# Patient Record
Sex: FEMALE | Race: BLACK OR AFRICAN AMERICAN | Employment: OTHER | URBAN - METROPOLITAN AREA
[De-identification: names, ages, dates, MRNs, and addresses within clinical notes are randomized per-mention and may not be internally consistent; named-entity substitution may affect disease eponyms.]

---

## 2017-02-06 ENCOUNTER — HOSPITAL ENCOUNTER (OUTPATIENT)
Age: 37
Setting detail: SPECIMEN
Discharge: HOME | End: 2017-02-06
Payer: COMMERCIAL

## 2017-02-06 LAB
ALBUMIN SERPL-MCNC: 4.1 G/DL (ref 3.5–5.2)
ALBUMIN/GLOBULIN RATIO: 1.5 (ref 1–2.5)
ALP BLD-CCNC: 96 U/L (ref 35–104)
ALT SERPL-CCNC: 17 U/L (ref 5–33)
ANION GAP SERPL CALCULATED.3IONS-SCNC: 12 MMOL/L (ref 9–17)
AST SERPL-CCNC: 15 U/L
BILIRUB SERPL-MCNC: 0.3 MG/DL (ref 0.3–1.2)
BILIRUBIN DIRECT: <0.08 MG/DL
BILIRUBIN, INDIRECT: NORMAL MG/DL (ref 0–1)
BUN BLDV-MCNC: 5 MG/DL (ref 6–20)
BUN/CREAT BLD: ABNORMAL (ref 9–20)
CALCIUM SERPL-MCNC: 8.8 MG/DL (ref 8.6–10.4)
CHLORIDE BLD-SCNC: 106 MMOL/L (ref 98–107)
CHOLESTEROL/HDL RATIO: 5.2
CHOLESTEROL: 165 MG/DL
CO2: 26 MMOL/L (ref 20–31)
CREAT SERPL-MCNC: 0.85 MG/DL (ref 0.5–0.9)
GFR AFRICAN AMERICAN: >60 ML/MIN
GFR NON-AFRICAN AMERICAN: >60 ML/MIN
GFR SERPL CREATININE-BSD FRML MDRD: ABNORMAL ML/MIN/{1.73_M2}
GFR SERPL CREATININE-BSD FRML MDRD: ABNORMAL ML/MIN/{1.73_M2}
GLOBULIN: NORMAL G/DL (ref 1.5–3.8)
GLUCOSE BLD-MCNC: 86 MG/DL (ref 70–99)
HDLC SERPL-MCNC: 32 MG/DL
LDL CHOLESTEROL: 94 MG/DL (ref 0–130)
POTASSIUM SERPL-SCNC: 3.6 MMOL/L (ref 3.7–5.3)
SODIUM BLD-SCNC: 144 MMOL/L (ref 135–144)
TOTAL CK: 105 U/L (ref 26–192)
TOTAL PROTEIN: 6.9 G/DL (ref 6.4–8.3)
TRIGL SERPL-MCNC: 196 MG/DL
TSH SERPL DL<=0.05 MIU/L-ACNC: 1.47 MIU/L (ref 0.3–5)
VITAMIN D 25-HYDROXY: 31.6 NG/ML (ref 30–100)
VLDLC SERPL CALC-MCNC: ABNORMAL MG/DL (ref 1–30)

## 2017-02-06 PROCEDURE — 36415 COLL VENOUS BLD VENIPUNCTURE: CPT

## 2017-02-06 PROCEDURE — 84443 ASSAY THYROID STIM HORMONE: CPT

## 2017-02-06 PROCEDURE — 80076 HEPATIC FUNCTION PANEL: CPT

## 2017-02-06 PROCEDURE — 82550 ASSAY OF CK (CPK): CPT

## 2017-02-06 PROCEDURE — 80048 BASIC METABOLIC PNL TOTAL CA: CPT

## 2017-02-06 PROCEDURE — 82306 VITAMIN D 25 HYDROXY: CPT

## 2017-02-06 PROCEDURE — 80061 LIPID PANEL: CPT

## 2017-03-13 ENCOUNTER — HOSPITAL ENCOUNTER (OUTPATIENT)
Age: 37
Setting detail: SPECIMEN
Discharge: HOME | End: 2017-03-13
Payer: COMMERCIAL

## 2017-03-13 LAB
ANION GAP SERPL CALCULATED.3IONS-SCNC: 12 MMOL/L (ref 9–17)
BUN BLDV-MCNC: 11 MG/DL (ref 6–20)
BUN/CREAT BLD: NORMAL (ref 9–20)
CALCIUM SERPL-MCNC: 9 MG/DL (ref 8.6–10.4)
CHLORIDE BLD-SCNC: 105 MMOL/L (ref 98–107)
CO2: 25 MMOL/L (ref 20–31)
CREAT SERPL-MCNC: 0.79 MG/DL (ref 0.5–0.9)
GFR AFRICAN AMERICAN: >60 ML/MIN
GFR NON-AFRICAN AMERICAN: >60 ML/MIN
GFR SERPL CREATININE-BSD FRML MDRD: NORMAL ML/MIN/{1.73_M2}
GFR SERPL CREATININE-BSD FRML MDRD: NORMAL ML/MIN/{1.73_M2}
GLUCOSE BLD-MCNC: 90 MG/DL (ref 70–99)
POTASSIUM SERPL-SCNC: 3.8 MMOL/L (ref 3.7–5.3)
SODIUM BLD-SCNC: 142 MMOL/L (ref 135–144)

## 2017-03-13 PROCEDURE — 80048 BASIC METABOLIC PNL TOTAL CA: CPT

## 2017-03-13 PROCEDURE — 36415 COLL VENOUS BLD VENIPUNCTURE: CPT

## 2017-06-16 ENCOUNTER — APPOINTMENT (OUTPATIENT)
Dept: GENERAL RADIOLOGY | Age: 37
End: 2017-06-16
Payer: COMMERCIAL

## 2017-06-16 ENCOUNTER — HOSPITAL ENCOUNTER (EMERGENCY)
Age: 37
Discharge: HOME | End: 2017-06-16
Attending: EMERGENCY MEDICINE
Payer: COMMERCIAL

## 2017-06-16 VITALS
RESPIRATION RATE: 16 BRPM | HEIGHT: 62 IN | TEMPERATURE: 97.5 F | SYSTOLIC BLOOD PRESSURE: 128 MMHG | BODY MASS INDEX: 39.56 KG/M2 | WEIGHT: 215 LBS | HEART RATE: 70 BPM | DIASTOLIC BLOOD PRESSURE: 83 MMHG | OXYGEN SATURATION: 98 %

## 2017-06-16 DIAGNOSIS — K59.00 CONSTIPATION, UNSPECIFIED CONSTIPATION TYPE: ICD-10-CM

## 2017-06-16 DIAGNOSIS — R10.9 ABDOMINAL PAIN, UNSPECIFIED LOCATION: Primary | ICD-10-CM

## 2017-06-16 LAB
-: NORMAL
ABSOLUTE EOS #: 0 K/UL (ref 0–0.4)
ABSOLUTE LYMPH #: 1.77 K/UL (ref 1–4.8)
ABSOLUTE MONO #: 0.74 K/UL (ref 0.1–1.2)
ALBUMIN SERPL-MCNC: 4 G/DL (ref 3.5–5.2)
ALBUMIN/GLOBULIN RATIO: 1.1 (ref 1–2.5)
ALP BLD-CCNC: 89 U/L (ref 35–104)
ALT SERPL-CCNC: 13 U/L (ref 5–33)
AMORPHOUS: NORMAL
ANION GAP SERPL CALCULATED.3IONS-SCNC: 12 MMOL/L (ref 9–17)
AST SERPL-CCNC: 13 U/L
BACTERIA: NORMAL
BASOPHILS # BLD: 0 %
BASOPHILS ABSOLUTE: 0 K/UL (ref 0–0.2)
BILIRUB SERPL-MCNC: 0.6 MG/DL (ref 0.3–1.2)
BILIRUBIN DIRECT: 0.14 MG/DL
BILIRUBIN URINE: NEGATIVE
BILIRUBIN, INDIRECT: 0.46 MG/DL (ref 0–1)
BUN BLDV-MCNC: 7 MG/DL (ref 6–20)
BUN/CREAT BLD: ABNORMAL (ref 9–20)
CALCIUM SERPL-MCNC: 9.3 MG/DL (ref 8.6–10.4)
CASTS UA: NORMAL /LPF (ref 0–8)
CHLORIDE BLD-SCNC: 106 MMOL/L (ref 98–107)
CO2: 22 MMOL/L (ref 20–31)
COLOR: YELLOW
COMMENT UA: ABNORMAL
CREAT SERPL-MCNC: 0.89 MG/DL (ref 0.5–0.9)
CRYSTALS, UA: NORMAL /HPF
DIFFERENTIAL TYPE: NORMAL
EOSINOPHILS RELATIVE PERCENT: 0 %
EPITHELIAL CELLS UA: NORMAL /HPF (ref 0–5)
GFR AFRICAN AMERICAN: >60 ML/MIN
GFR NON-AFRICAN AMERICAN: >60 ML/MIN
GFR SERPL CREATININE-BSD FRML MDRD: ABNORMAL ML/MIN/{1.73_M2}
GFR SERPL CREATININE-BSD FRML MDRD: ABNORMAL ML/MIN/{1.73_M2}
GLOBULIN: NORMAL G/DL (ref 1.5–3.8)
GLUCOSE BLD-MCNC: 106 MG/DL (ref 70–99)
GLUCOSE URINE: NEGATIVE
HCG(URINE) PREGNANCY TEST: NEGATIVE
HCT VFR BLD CALC: 38.4 % (ref 36–46)
HEMOGLOBIN: 13.2 G/DL (ref 12–16)
KETONES, URINE: NEGATIVE
LEUKOCYTE ESTERASE, URINE: ABNORMAL
LIPASE: 30 U/L (ref 13–60)
LYMPHOCYTES # BLD: 19 %
MCH RBC QN AUTO: 30 PG (ref 26–34)
MCHC RBC AUTO-ENTMCNC: 34.5 G/DL (ref 31–37)
MCV RBC AUTO: 87 FL (ref 80–100)
MONOCYTES # BLD: 8 %
MORPHOLOGY: NORMAL
MUCUS: NORMAL
NITRITE, URINE: NEGATIVE
OTHER OBSERVATIONS UA: NORMAL
PDW BLD-RTO: 14.1 % (ref 12.5–15.4)
PH UA: 7 (ref 5–8)
PLATELET # BLD: 200 K/UL (ref 140–450)
PLATELET ESTIMATE: NORMAL
PMV BLD AUTO: 8.9 FL (ref 6–12)
POTASSIUM SERPL-SCNC: 3.7 MMOL/L (ref 3.7–5.3)
PROTEIN UA: NEGATIVE
RBC # BLD: 4.41 M/UL (ref 4–5.2)
RBC # BLD: NORMAL 10*6/UL
RBC UA: NORMAL /HPF (ref 0–4)
RENAL EPITHELIAL, UA: NORMAL /HPF
SEG NEUTROPHILS: 73 %
SEGMENTED NEUTROPHILS ABSOLUTE COUNT: 6.79 K/UL (ref 1.8–7.7)
SODIUM BLD-SCNC: 140 MMOL/L (ref 135–144)
SPECIFIC GRAVITY UA: 1.01 (ref 1–1.03)
TOTAL PROTEIN: 7.6 G/DL (ref 6.4–8.3)
TRICHOMONAS: NORMAL
TURBIDITY: CLEAR
URINE HGB: NEGATIVE
UROBILINOGEN, URINE: NORMAL
WBC # BLD: 9.3 K/UL (ref 3.5–11)
WBC # BLD: NORMAL 10*3/UL
WBC UA: NORMAL /HPF (ref 0–5)
YEAST: NORMAL

## 2017-06-16 PROCEDURE — 74022 RADEX COMPL AQT ABD SERIES: CPT

## 2017-06-16 PROCEDURE — 80076 HEPATIC FUNCTION PANEL: CPT

## 2017-06-16 PROCEDURE — 85025 COMPLETE CBC W/AUTO DIFF WBC: CPT

## 2017-06-16 PROCEDURE — 84703 CHORIONIC GONADOTROPIN ASSAY: CPT

## 2017-06-16 PROCEDURE — 80048 BASIC METABOLIC PNL TOTAL CA: CPT

## 2017-06-16 PROCEDURE — 6370000000 HC RX 637 (ALT 250 FOR IP): Performed by: EMERGENCY MEDICINE

## 2017-06-16 PROCEDURE — 99284 EMERGENCY DEPT VISIT MOD MDM: CPT

## 2017-06-16 PROCEDURE — 83690 ASSAY OF LIPASE: CPT

## 2017-06-16 PROCEDURE — 81001 URINALYSIS AUTO W/SCOPE: CPT

## 2017-06-16 PROCEDURE — 87086 URINE CULTURE/COLONY COUNT: CPT

## 2017-06-16 RX ORDER — DOCUSATE SODIUM 100 MG/1
100 CAPSULE, LIQUID FILLED ORAL 2 TIMES DAILY
Qty: 30 CAPSULE | Refills: 0 | Status: SHIPPED | OUTPATIENT
Start: 2017-06-16 | End: 2022-08-02

## 2017-06-16 RX ORDER — POLYETHYLENE GLYCOL 3350 17 G/17G
17 POWDER, FOR SOLUTION ORAL DAILY
Qty: 1 BOTTLE | Refills: 0 | Status: SHIPPED | OUTPATIENT
Start: 2017-06-16 | End: 2017-06-23

## 2017-06-16 RX ORDER — LORAZEPAM 0.5 MG/1
1 TABLET ORAL ONCE
Status: COMPLETED | OUTPATIENT
Start: 2017-06-16 | End: 2017-06-16

## 2017-06-16 RX ADMIN — LORAZEPAM 1 MG: 0.5 TABLET ORAL at 13:58

## 2017-06-16 ASSESSMENT — ENCOUNTER SYMPTOMS
RHINORRHEA: 0
COUGH: 0
VOMITING: 0
SORE THROAT: 0
ABDOMINAL PAIN: 1
CONSTIPATION: 0
BLOOD IN STOOL: 0
DIARRHEA: 0
NAUSEA: 0
SHORTNESS OF BREATH: 0

## 2017-06-16 ASSESSMENT — PAIN DESCRIPTION - ORIENTATION: ORIENTATION: LOWER

## 2017-06-16 ASSESSMENT — PAIN DESCRIPTION - LOCATION: LOCATION: ABDOMEN

## 2017-06-16 ASSESSMENT — PAIN DESCRIPTION - FREQUENCY: FREQUENCY: CONTINUOUS

## 2017-06-16 ASSESSMENT — PAIN DESCRIPTION - PAIN TYPE: TYPE: ACUTE PAIN

## 2017-06-16 ASSESSMENT — PAIN SCALES - GENERAL: PAINLEVEL_OUTOF10: 9

## 2017-06-16 ASSESSMENT — PAIN DESCRIPTION - DESCRIPTORS: DESCRIPTORS: CONSTANT

## 2017-06-16 ASSESSMENT — PAIN DESCRIPTION - PROGRESSION: CLINICAL_PROGRESSION: NOT CHANGED

## 2017-06-17 LAB
CULTURE: NORMAL
CULTURE: NORMAL
Lab: NORMAL
SPECIMEN DESCRIPTION: NORMAL
STATUS: NORMAL

## 2017-06-23 ENCOUNTER — HOSPITAL ENCOUNTER (EMERGENCY)
Age: 37
Discharge: HOME | End: 2017-06-23
Attending: EMERGENCY MEDICINE
Payer: COMMERCIAL

## 2017-06-23 ENCOUNTER — APPOINTMENT (OUTPATIENT)
Dept: GENERAL RADIOLOGY | Age: 37
End: 2017-06-23
Payer: COMMERCIAL

## 2017-06-23 VITALS
TEMPERATURE: 97.5 F | DIASTOLIC BLOOD PRESSURE: 71 MMHG | OXYGEN SATURATION: 98 % | SYSTOLIC BLOOD PRESSURE: 121 MMHG | HEART RATE: 98 BPM | RESPIRATION RATE: 14 BRPM

## 2017-06-23 DIAGNOSIS — M25.462 KNEE EFFUSION, LEFT: Primary | ICD-10-CM

## 2017-06-23 PROCEDURE — 6370000000 HC RX 637 (ALT 250 FOR IP): Performed by: PHYSICIAN ASSISTANT

## 2017-06-23 PROCEDURE — 99283 EMERGENCY DEPT VISIT LOW MDM: CPT

## 2017-06-23 PROCEDURE — 73562 X-RAY EXAM OF KNEE 3: CPT

## 2017-06-23 RX ORDER — NAPROXEN 500 MG/1
500 TABLET ORAL 2 TIMES DAILY
Qty: 60 TABLET | Refills: 0 | Status: SHIPPED | OUTPATIENT
Start: 2017-06-23 | End: 2019-03-29 | Stop reason: ALTCHOICE

## 2017-06-23 RX ORDER — HYDROCODONE BITARTRATE AND ACETAMINOPHEN 5; 325 MG/1; MG/1
1 TABLET ORAL ONCE
Status: COMPLETED | OUTPATIENT
Start: 2017-06-23 | End: 2017-06-23

## 2017-06-23 RX ADMIN — HYDROCODONE BITARTRATE AND ACETAMINOPHEN 1 TABLET: 5; 325 TABLET ORAL at 21:24

## 2017-06-23 ASSESSMENT — PAIN DESCRIPTION - ORIENTATION: ORIENTATION: LEFT

## 2017-06-23 ASSESSMENT — PAIN DESCRIPTION - DESCRIPTORS: DESCRIPTORS: ACHING

## 2017-06-23 ASSESSMENT — PAIN DESCRIPTION - LOCATION: LOCATION: KNEE

## 2017-06-23 ASSESSMENT — PAIN DESCRIPTION - ONSET: ONSET: SUDDEN

## 2017-06-23 ASSESSMENT — ENCOUNTER SYMPTOMS
ABDOMINAL PAIN: 0
VOMITING: 0
BACK PAIN: 0
COUGH: 0
DIARRHEA: 0
COLOR CHANGE: 0
SHORTNESS OF BREATH: 0
NAUSEA: 0

## 2017-06-23 ASSESSMENT — PAIN DESCRIPTION - PAIN TYPE: TYPE: ACUTE PAIN

## 2017-06-23 ASSESSMENT — PAIN DESCRIPTION - FREQUENCY: FREQUENCY: CONTINUOUS

## 2017-06-23 ASSESSMENT — PAIN SCALES - GENERAL
PAINLEVEL_OUTOF10: 7
PAINLEVEL_OUTOF10: 4
PAINLEVEL_OUTOF10: 8

## 2017-06-29 ENCOUNTER — HOSPITAL ENCOUNTER (OUTPATIENT)
Age: 37
Setting detail: SPECIMEN
Discharge: HOME | End: 2017-06-29
Payer: COMMERCIAL

## 2017-06-29 LAB
ABSOLUTE EOS #: 0.1 K/UL (ref 0–0.4)
ABSOLUTE LYMPH #: 3.3 K/UL (ref 1–4.8)
ABSOLUTE MONO #: 0.4 K/UL (ref 0.1–1.2)
ALBUMIN SERPL-MCNC: 4.2 G/DL (ref 3.5–5.2)
ALBUMIN/GLOBULIN RATIO: 1.4 (ref 1–2.5)
ALP BLD-CCNC: 83 U/L (ref 35–104)
ALT SERPL-CCNC: 24 U/L (ref 5–33)
ANION GAP SERPL CALCULATED.3IONS-SCNC: 16 MMOL/L (ref 9–17)
AST SERPL-CCNC: 20 U/L
BASOPHILS # BLD: 1 %
BASOPHILS ABSOLUTE: 0 K/UL (ref 0–0.2)
BILIRUB SERPL-MCNC: 0.19 MG/DL (ref 0.3–1.2)
BILIRUBIN DIRECT: <0.08 MG/DL
BILIRUBIN, INDIRECT: ABNORMAL MG/DL (ref 0–1)
BUN BLDV-MCNC: 14 MG/DL (ref 6–20)
BUN/CREAT BLD: ABNORMAL (ref 9–20)
CALCIUM SERPL-MCNC: 9.5 MG/DL (ref 8.6–10.4)
CHLORIDE BLD-SCNC: 103 MMOL/L (ref 98–107)
CHOLESTEROL, FASTING: 161 MG/DL
CHOLESTEROL/HDL RATIO: 7
CO2: 22 MMOL/L (ref 20–31)
CREAT SERPL-MCNC: 0.96 MG/DL (ref 0.5–0.9)
DIFFERENTIAL TYPE: NORMAL
EOSINOPHILS RELATIVE PERCENT: 2 %
GFR AFRICAN AMERICAN: >60 ML/MIN
GFR NON-AFRICAN AMERICAN: >60 ML/MIN
GFR SERPL CREATININE-BSD FRML MDRD: ABNORMAL ML/MIN/{1.73_M2}
GFR SERPL CREATININE-BSD FRML MDRD: ABNORMAL ML/MIN/{1.73_M2}
GLOBULIN: ABNORMAL G/DL (ref 1.5–3.8)
GLUCOSE BLD-MCNC: 90 MG/DL (ref 70–99)
HCT VFR BLD CALC: 39.2 % (ref 36–46)
HDLC SERPL-MCNC: 23 MG/DL
HEMOGLOBIN: 13.2 G/DL (ref 12–16)
LDL CHOLESTEROL: 87 MG/DL (ref 0–130)
LYMPHOCYTES # BLD: 49 %
MCH RBC QN AUTO: 30 PG (ref 26–34)
MCHC RBC AUTO-ENTMCNC: 33.7 G/DL (ref 31–37)
MCV RBC AUTO: 89.3 FL (ref 80–100)
MONOCYTES # BLD: 6 %
PDW BLD-RTO: 13.6 % (ref 12.5–15.4)
PLATELET # BLD: 322 K/UL (ref 140–450)
PLATELET ESTIMATE: NORMAL
PMV BLD AUTO: 8.7 FL (ref 6–12)
POTASSIUM SERPL-SCNC: 3.9 MMOL/L (ref 3.7–5.3)
RBC # BLD: 4.39 M/UL (ref 4–5.2)
RBC # BLD: NORMAL 10*6/UL
SEG NEUTROPHILS: 42 %
SEGMENTED NEUTROPHILS ABSOLUTE COUNT: 2.8 K/UL (ref 1.8–7.7)
SODIUM BLD-SCNC: 141 MMOL/L (ref 135–144)
THYROXINE, FREE: 0.82 NG/DL (ref 0.93–1.7)
TOTAL PROTEIN: 7.3 G/DL (ref 6.4–8.3)
TRIGLYCERIDE, FASTING: 253 MG/DL
TSH SERPL DL<=0.05 MIU/L-ACNC: 5.24 MIU/L (ref 0.3–5)
VITAMIN D 25-HYDROXY: 40.2 NG/ML (ref 30–100)
VLDLC SERPL CALC-MCNC: ABNORMAL MG/DL (ref 1–30)
WBC # BLD: 6.7 K/UL (ref 3.5–11)
WBC # BLD: NORMAL 10*3/UL

## 2017-06-30 LAB
ESTIMATED AVERAGE GLUCOSE: 120 MG/DL
HBA1C MFR BLD: 5.8 % (ref 4–6)

## 2017-07-10 ENCOUNTER — OFFICE VISIT (OUTPATIENT)
Dept: ORTHOPEDIC SURGERY | Age: 37
End: 2017-07-10
Payer: COMMERCIAL

## 2017-07-10 VITALS — BODY MASS INDEX: 38.98 KG/M2 | HEIGHT: 63 IN | WEIGHT: 220 LBS

## 2017-07-10 DIAGNOSIS — M23.92 DERANGEMENT, KNEE INTERNAL, LEFT: Primary | ICD-10-CM

## 2017-07-10 PROCEDURE — 99203 OFFICE O/P NEW LOW 30 MIN: CPT | Performed by: STUDENT IN AN ORGANIZED HEALTH CARE EDUCATION/TRAINING PROGRAM

## 2017-07-10 RX ORDER — IBUPROFEN 800 MG/1
800 TABLET ORAL EVERY 8 HOURS
Qty: 120 TABLET | Refills: 1 | Status: ON HOLD | OUTPATIENT
Start: 2017-07-10 | End: 2019-04-05 | Stop reason: SDUPTHER

## 2017-07-10 ASSESSMENT — ENCOUNTER SYMPTOMS
CHEST TIGHTNESS: 0
NAUSEA: 0
EYE DISCHARGE: 0
VOMITING: 0
DIARRHEA: 0
WHEEZING: 0
CHOKING: 0
BACK PAIN: 0
ABDOMINAL PAIN: 0

## 2017-07-12 ENCOUNTER — HOSPITAL ENCOUNTER (OUTPATIENT)
Dept: PHYSICAL THERAPY | Age: 37
Setting detail: THERAPIES SERIES
Discharge: HOME | End: 2017-07-12
Payer: COMMERCIAL

## 2017-07-12 PROCEDURE — 97016 VASOPNEUMATIC DEVICE THERAPY: CPT

## 2017-07-12 PROCEDURE — 97161 PT EVAL LOW COMPLEX 20 MIN: CPT

## 2017-07-14 ENCOUNTER — HOSPITAL ENCOUNTER (OUTPATIENT)
Dept: PHYSICAL THERAPY | Age: 37
Setting detail: THERAPIES SERIES
Discharge: HOME | End: 2017-07-14
Payer: COMMERCIAL

## 2017-07-14 PROCEDURE — G0283 ELEC STIM OTHER THAN WOUND: HCPCS

## 2017-07-14 PROCEDURE — 97110 THERAPEUTIC EXERCISES: CPT

## 2017-07-18 ENCOUNTER — HOSPITAL ENCOUNTER (OUTPATIENT)
Dept: PHYSICAL THERAPY | Age: 37
Setting detail: THERAPIES SERIES
Discharge: HOME | End: 2017-07-18
Payer: COMMERCIAL

## 2017-07-21 ENCOUNTER — HOSPITAL ENCOUNTER (OUTPATIENT)
Dept: PHYSICAL THERAPY | Age: 37
Setting detail: THERAPIES SERIES
Discharge: HOME | End: 2017-07-21
Payer: COMMERCIAL

## 2017-07-21 PROCEDURE — 97016 VASOPNEUMATIC DEVICE THERAPY: CPT

## 2017-07-21 PROCEDURE — 97110 THERAPEUTIC EXERCISES: CPT

## 2017-07-25 ENCOUNTER — HOSPITAL ENCOUNTER (OUTPATIENT)
Dept: PHYSICAL THERAPY | Age: 37
Setting detail: THERAPIES SERIES
Discharge: HOME | End: 2017-07-25
Payer: COMMERCIAL

## 2017-07-25 PROCEDURE — 97140 MANUAL THERAPY 1/> REGIONS: CPT

## 2017-07-25 PROCEDURE — 97016 VASOPNEUMATIC DEVICE THERAPY: CPT

## 2017-07-25 PROCEDURE — 97110 THERAPEUTIC EXERCISES: CPT

## 2017-07-28 ENCOUNTER — HOSPITAL ENCOUNTER (OUTPATIENT)
Dept: PHYSICAL THERAPY | Age: 37
Setting detail: THERAPIES SERIES
Discharge: HOME | End: 2017-07-28
Payer: COMMERCIAL

## 2017-07-28 ENCOUNTER — HOSPITAL ENCOUNTER (OUTPATIENT)
Dept: MAMMOGRAPHY | Age: 37
Discharge: HOME | End: 2017-07-28
Payer: COMMERCIAL

## 2017-07-28 DIAGNOSIS — Z12.31 ENCOUNTER FOR SCREENING MAMMOGRAM FOR MALIGNANT NEOPLASM OF BREAST: ICD-10-CM

## 2017-07-28 PROCEDURE — G0202 SCR MAMMO BI INCL CAD: HCPCS

## 2017-07-28 PROCEDURE — 97110 THERAPEUTIC EXERCISES: CPT

## 2017-08-03 ENCOUNTER — HOSPITAL ENCOUNTER (OUTPATIENT)
Dept: ULTRASOUND IMAGING | Age: 37
Discharge: HOME | End: 2017-08-03
Payer: COMMERCIAL

## 2017-08-03 DIAGNOSIS — E01.0 THYROMEGALY: ICD-10-CM

## 2017-08-03 PROCEDURE — 76536 US EXAM OF HEAD AND NECK: CPT

## 2017-08-04 ENCOUNTER — HOSPITAL ENCOUNTER (OUTPATIENT)
Dept: PHYSICAL THERAPY | Age: 37
Setting detail: THERAPIES SERIES
Discharge: HOME | End: 2017-08-04
Payer: COMMERCIAL

## 2017-08-04 PROCEDURE — 97110 THERAPEUTIC EXERCISES: CPT

## 2017-08-11 ENCOUNTER — HOSPITAL ENCOUNTER (OUTPATIENT)
Dept: PHYSICAL THERAPY | Age: 37
Setting detail: THERAPIES SERIES
Discharge: HOME | End: 2017-08-11
Payer: COMMERCIAL

## 2017-08-14 ENCOUNTER — OFFICE VISIT (OUTPATIENT)
Dept: ORTHOPEDIC SURGERY | Age: 37
End: 2017-08-14
Payer: COMMERCIAL

## 2017-08-14 VITALS — BODY MASS INDEX: 42.14 KG/M2 | WEIGHT: 229 LBS | HEIGHT: 62 IN

## 2017-08-14 DIAGNOSIS — M22.2X2 PATELLOFEMORAL PAIN SYNDROME OF LEFT KNEE: Primary | ICD-10-CM

## 2017-08-14 PROCEDURE — 20610 DRAIN/INJ JOINT/BURSA W/O US: CPT | Performed by: STUDENT IN AN ORGANIZED HEALTH CARE EDUCATION/TRAINING PROGRAM

## 2017-08-14 PROCEDURE — 99213 OFFICE O/P EST LOW 20 MIN: CPT | Performed by: STUDENT IN AN ORGANIZED HEALTH CARE EDUCATION/TRAINING PROGRAM

## 2017-08-14 RX ORDER — BUPIVACAINE HYDROCHLORIDE 2.5 MG/ML
2 INJECTION, SOLUTION INFILTRATION; PERINEURAL ONCE
Status: COMPLETED | OUTPATIENT
Start: 2017-08-14 | End: 2017-08-14

## 2017-08-14 RX ORDER — METHYLPREDNISOLONE ACETATE 80 MG/ML
80 INJECTION, SUSPENSION INTRA-ARTICULAR; INTRALESIONAL; INTRAMUSCULAR; SOFT TISSUE ONCE
Status: COMPLETED | OUTPATIENT
Start: 2017-08-14 | End: 2017-08-14

## 2017-08-14 RX ADMIN — METHYLPREDNISOLONE ACETATE 80 MG: 80 INJECTION, SUSPENSION INTRA-ARTICULAR; INTRALESIONAL; INTRAMUSCULAR; SOFT TISSUE at 15:48

## 2017-08-14 RX ADMIN — BUPIVACAINE HYDROCHLORIDE 5 MG: 2.5 INJECTION, SOLUTION INFILTRATION; PERINEURAL at 15:48

## 2017-08-15 PROBLEM — M22.2X2 PATELLOFEMORAL PAIN SYNDROME OF LEFT KNEE: Status: ACTIVE | Noted: 2017-08-15

## 2017-08-23 ENCOUNTER — HOSPITAL ENCOUNTER (OUTPATIENT)
Dept: ULTRASOUND IMAGING | Age: 37
End: 2017-08-23
Payer: COMMERCIAL

## 2017-08-23 ENCOUNTER — HOSPITAL ENCOUNTER (OUTPATIENT)
Dept: MAMMOGRAPHY | Age: 37
Discharge: HOME | End: 2017-08-23
Payer: COMMERCIAL

## 2017-08-23 DIAGNOSIS — R92.8 ABNORMAL MAMMOGRAM: ICD-10-CM

## 2017-08-23 PROCEDURE — G0279 TOMOSYNTHESIS, MAMMO: HCPCS

## 2017-09-06 ENCOUNTER — HOSPITAL ENCOUNTER (OUTPATIENT)
Dept: PHYSICAL THERAPY | Age: 37
Setting detail: THERAPIES SERIES
Discharge: HOME | End: 2017-09-06
Payer: COMMERCIAL

## 2017-10-20 ENCOUNTER — HOSPITAL ENCOUNTER (OUTPATIENT)
Age: 37
Discharge: HOME | End: 2017-10-20
Payer: COMMERCIAL

## 2017-10-20 ENCOUNTER — HOSPITAL ENCOUNTER (OUTPATIENT)
Dept: PULMONOLOGY | Age: 37
Discharge: HOME | End: 2017-10-20
Payer: COMMERCIAL

## 2017-10-20 ENCOUNTER — HOSPITAL ENCOUNTER (OUTPATIENT)
Dept: GENERAL RADIOLOGY | Age: 37
Discharge: HOME | End: 2017-10-20
Payer: COMMERCIAL

## 2017-10-20 DIAGNOSIS — J45.40 MODERATE PERSISTENT ASTHMA, UNCOMPLICATED: ICD-10-CM

## 2017-10-20 PROCEDURE — 94727 GAS DIL/WSHOT DETER LNG VOL: CPT

## 2017-10-20 PROCEDURE — 88738 HGB QUANT TRANSCUTANEOUS: CPT

## 2017-10-20 PROCEDURE — 94729 DIFFUSING CAPACITY: CPT

## 2017-10-20 PROCEDURE — 94010 BREATHING CAPACITY TEST: CPT

## 2017-10-20 PROCEDURE — 94728 AIRWY RESIST BY OSCILLOMETRY: CPT

## 2017-10-20 PROCEDURE — 71020 XR CHEST STANDARD TWO VW: CPT

## 2017-10-20 PROCEDURE — 94726 PLETHYSMOGRAPHY LUNG VOLUMES: CPT

## 2017-11-28 ENCOUNTER — OFFICE VISIT (OUTPATIENT)
Dept: OBGYN | Age: 37
End: 2017-11-28
Payer: COMMERCIAL

## 2017-11-28 ENCOUNTER — HOSPITAL ENCOUNTER (OUTPATIENT)
Age: 37
Setting detail: SPECIMEN
Discharge: HOME | End: 2017-11-28
Payer: COMMERCIAL

## 2017-11-28 VITALS
DIASTOLIC BLOOD PRESSURE: 83 MMHG | HEIGHT: 62 IN | BODY MASS INDEX: 40.85 KG/M2 | HEART RATE: 68 BPM | WEIGHT: 222 LBS | SYSTOLIC BLOOD PRESSURE: 128 MMHG

## 2017-11-28 DIAGNOSIS — Z01.419 WELL WOMAN EXAM WITH ROUTINE GYNECOLOGICAL EXAM: Primary | ICD-10-CM

## 2017-11-28 DIAGNOSIS — Z11.3 SCREENING FOR STD (SEXUALLY TRANSMITTED DISEASE): ICD-10-CM

## 2017-11-28 DIAGNOSIS — D06.9 CARCINOMA IN SITU OF CERVIX, UNSPECIFIED LOCATION: ICD-10-CM

## 2017-11-28 LAB
CONTROL: NORMAL
DIRECT EXAM: ABNORMAL
Lab: ABNORMAL
PREGNANCY TEST URINE, POC: NEGATIVE
SPECIMEN DESCRIPTION: ABNORMAL
STATUS: ABNORMAL

## 2017-11-28 PROCEDURE — 81025 URINE PREGNANCY TEST: CPT | Performed by: OBSTETRICS & GYNECOLOGY

## 2017-11-28 PROCEDURE — 99395 PREV VISIT EST AGE 18-39: CPT | Performed by: OBSTETRICS & GYNECOLOGY

## 2017-11-28 RX ORDER — METRONIDAZOLE 500 MG/1
500 TABLET ORAL 2 TIMES DAILY
Qty: 14 TABLET | Refills: 0 | Status: SHIPPED | OUTPATIENT
Start: 2017-11-28 | End: 2017-12-05

## 2017-11-28 NOTE — PROGRESS NOTES
History and Physical    Goldy Graves  2017              44 y.o. Chief Complaint   Patient presents with    Gynecologic Exam       No LMP recorded (lmp unknown). Primary Care Physician: Geri Marin MD    The patient was seen and examined. She has no chief complaint today and is here for her annual exam.  Her bowels are regular. There are no voiding complaints. She denies any bloating. She denies vaginal discharge and was counseled on STD's and the need for barrier contraception. HPI : Goldy Graves is a 44 y.o. female [id number]    Pt here for annual exam.  No chief complaint.   Pt requesting STD testing today - will obtain cultures and give lab slips.  ________________________________________________________________________  Obstetric History       T7      L7     SAB0   TAB0   Ectopic0   Molar0   Multiple0   Live Births0       # Outcome Date GA Lbr Lasha/2nd Weight Sex Delivery Anes PTL Lv   7 Term      Vag-Spont      6 Term      Vag-Spont      5 Term      Vag-Spont      4 Term      Vag-Spont      3 Term      Vag-Spont      2 Term      Vag-Spont      1 Term      Vag-Spont           Past Medical History:   Diagnosis Date    Abnormal Pap smear of cervix [id number]    Anxiety     Asthma     Depression     GERD (gastroesophageal reflux disease)     Headache(784.0)     Heart murmur     Herpes     Hyperlipidemia     Hypertension     Obesity     Schizophrenia ([address]     Seasonal allergies     Sleep apnea     no machine                                                                   Past Surgical History:   Procedure Laterality Date    LEEP  16    PRE-MALIGNANT / BENIGN SKIN LESION EXCISION Left 16    foot     Family History   Problem Relation Age of Onset    Cancer Mother      uterine    Diabetes Mother     High Blood Pressure Mother     Endometrial Cancer Mother     Diabetes Brother     Heart Disease Brother      Social History     Social History    Marital status: Single     Spouse name: N/A    Number of children: N/A    Years of education: N/A     Occupational History    Not on file.      Social History Main Topics    Smoking status: Current Every Day Smoker     Packs/day: 1.00     Types: Cigarettes    Smokeless tobacco: Never Used    Alcohol use Yes      Comment: rare    Drug use: No      Comment: last use  5-8-16    Sexual activity: Yes     Partners: Male     Other Topics Concern    Not on file     Social History Narrative    No narrative on file       MEDICATIONS:  Current Outpatient Prescriptions   Medication Sig Dispense Refill    ibuprofen (ADVIL;MOTRIN) 800 MG tablet Take 1 tablet by mouth every 8 hours 120 tablet 1    naproxen (NAPROSYN) 500 MG tablet Take 1 tablet by mouth 2 times daily 60 tablet 0    docusate sodium (COLACE) 100 MG capsule Take 1 capsule by mouth 2 times daily 30 capsule 0    HYDROcodone-acetaminophen (NORCO) 5-325 MG per tablet Take 1 tablet by mouth every 6 hours as needed for Pain 10 tablet 0    atorvastatin (LIPITOR) 40 MG tablet Take 40 mg by mouth daily       econazole nitrate 1 % cream Apply 1 each topically daily as needed       Emollient (LUBRIDERM) LOTN Apply 1 each topically daily       Multiple Vitamin (MULTI-VITAMINS) TABS Take 1 tablet by mouth daily       paliperidone (INVEGA) 6 MG ER tablet Take 9 mg by mouth daily       topiramate (TOPAMAX) 25 MG tablet Take 25 mg by mouth daily       traZODone (DESYREL) 50 MG tablet Take 50 mg by mouth daily       Prenatal Multivit-Min-Fe-FA (MYNATAL) CAPS Take 1 tablet by mouth daily 60 capsule 6    traMADol (ULTRAM) 50 MG tablet Take 1 tablet by mouth every 8 hours as needed for Pain 20 tablet 0    Budesonide-Formoterol Fumarate (SYMBICORT IN) Inhale 2 puffs into the lungs daily as needed       cetirizine (ZYRTEC ALLERGY) 10 MG TABS Take 10 mg by mouth daily 30 tablet 0    albuterol (PROVENTIL HFA) 108 (90 BASE) MCG/ACT inhaler Inhale 2 puffs into the lungs every 4 hours as needed for Wheezing.  acyclovir (ZOVIRAX) 200 MG capsule Take 200 mg by mouth daily as needed       dicyclomine (BENTYL) 10 MG capsule Take 10 mg by mouth daily       diphenoxylate-atropine (LOMOTIL) 2.5-0.025 MG per tablet Take 1 tablet by mouth daily as needed       polyethylene glycol (GLYCOLAX) powder Take 17 g by mouth daily as needed       metoprolol (TOPROL-XL) 50 MG XL tablet Take 50 mg by mouth daily.  pantoprazole (PROTONIX) 40 MG tablet Take 40 mg by mouth daily.  ondansetron (ZOFRAN) 4 MG tablet Take 1 tablet by mouth every 8 hours as needed for Nausea. 20 tablet 0     No current facility-administered medications for this visit. ALLERGIES:  Allergies as of 11/28/2017 - Review Complete 11/28/2017   Allergen Reaction Noted    Codeine Hives 04/18/2014    Penicillins Swelling 04/18/2014       Symptoms of decreased mood absent  Symptoms of anhedonia absent      Immunization status: stated as current, but no records available. Gynecologic History:  Menarche: 14 yo  Menopause: n/a     No LMP recorded (lmp unknown). Sexually Active: Yes - men only    STD History: No     Permanent Sterilization: No   Reversible Birth Control: No        Hormone Replacement Exposure: No      Genetic Qualified Family History of Breast, Ovarian , Colon or Uterine Cancer: Yes - pt's mother had breast cancer     If YES see scanned worksheet.     Preventative Health Testing:    Health Maintenance:  Health Maintenance Due   Topic Date Due    DTaP/Tdap/Td vaccine (1 - Tdap) 03/21/1998    Pneumococcal med risk (1 of 1 - PPSV23) 03/21/1998    Flu vaccine (1) 09/01/2017       Date of Last Pap Smear: 2/2016  Abnormal Pap Smear History: see below    9/2011 - Pap normal  7/2014 - ASCUS +HPV  9/2014 - benign colpo biopsies  2/2016 - ASC-H +HPV  4/2016 - colpo biopsies showed EVER 1, 2, and 3  6/2016 - LEEP - EVER-3 on ectocervix with extension into endocervical glands, and endocervix showed EVER-2 with extension into endocervical glands, however, the margin appeared narrowly negative for dysplasia    Colposcopy History: see above  Date of Last Mammogram: 8/2017 - birads 1  Date of Last Colonoscopy: n/a  Date of Last Bone Density: n/a      ________________________________________________________________________      REVIEW OF SYSTEMS:       A minimum of an eleven point review of systems was completed. Review Of Systems (11 point):  Constitutional: No fever, chills or malaise; No weight change or fatigue  Head and Eyes: No vision, Headache, Dizziness or trauma in last 12 months  ENT ROS: No hearing, Tinnitis, sinus or taste problems  Hematological and Lymphatic ROS:No Lymphoma, Von Willebrand's, Hemophillia or Bleeding History  Psych ROS: No Depression, Homicidal thoughts,suicidal thoughts, or anxiety  Breast ROS: No prior breast abnormalities or lumps  Respiratory ROS: No SOB, Pneumoniae,Cough, or Pulmonary Embolism History  Cardiovascular ROS: No Chest Pain with Exertion, Palpitations, Syncope, Edema, Arrhythmia  Gastrointestinal ROS: No Indigestion, Heartburn, Nausea, vomiting, Diarrhea, Constipation,or Bowel Changes; No Bloody Stools or melena  Genito-Urinary ROS: No Dysuria, Hematuria or Nocturia. No Urinary Incontinence or Vaginal Discharge  Musculoskeletal ROS: No Arthralgia, Arthritis,Gout,Osteoporosis or Rheumatism  Neurological ROS: No CVA, Migraines, Epilepsy, Seizure Hx, or Limb Weakness  Dermatological ROS: No Rash, Itching, Hives, Mole Changes or Cancer                                                                                                                                                                                                                                  PHYSICAL Exam:     Constitutional:  Vitals:    11/28/17 0821   BP: 128/83   Pulse: 68   Weight: 222 lb (100.7 kg)   Height: 5' 2\" (1.575 m)         General Appearance:   This  is a well Developed, well Nourished, well groomed female. Her BMI was reviewed. Nutritional decision making was discussed. Skin:  There was a Normal Inspection of the skin without rashes or lesions. There were no rashes. (Papular, Maculopapular, Hives, Pustular, Macular)     There were no lesions (Ulcers, Erythema, Abn. Appearing Nevi)            Lymphatic:  No Lymph Nodes were Palpable in the neck , axilla or groin.  0 # Of Lymph Nodes; Location ; Character [Normal]  [Shotty] [Tender] [Enlarged]     Neck and EENT:  The neck was supple. There were no masses   The thyroid was not enlarged and had no masses. Perrla, EOMI B/L, TMI B/L No Abnormalities. Throat inspected-No exudates or Masses, Nares Patent No Masses        Respiratory: The lungs were auscultated and found to be clear. There were no rales, rhonchi or wheezes. There was a good respiratory effort. Cardiovascular: The heart was in a regular rate and rhythm. . No S3 or S4. There was no murmur appreciated. Location, grade, and radiation are not applicable. Extremities: The patients extremities were without calf tenderness, edema, or varicosities. There was full range of motion in all four extremities. Pulses in all four extremities were appreciated and are 2/4. Abdomen: The abdomen was soft and non-tender. There were good bowel sounds in all quadrants and there was no guarding, rebound or rigidity. On evaluation there was no evidence of hepatosplenomegaly and there was no costal vertebral carmen tenderness bilaterally. No hernias were appreciated. Abdominal Scars: none    Psych:   The patient had a normal Orientation to: Time, Place, Person, and Situation  There is no Mood / Affect changes    Breast:  (Chest)  normal appearance, no masses or tenderness, Inspection negative, No nipple retraction or dimpling, No nipple discharge or bleeding, No axillary or supraclavicular adenopathy, Normal to palpation without dominant masses, Taught monthly breast self examination  Self breast exams were reviewed in detail. Literature was given. Pelvic Exam:  External genitalia: normal general appearance  Urinary system: urethral meatus normal  Vaginal: normal mucosa without prolapse or lesions, normal rugae and vaginitis probe obtained  Cervix: normal appearance, thin prep PAP obtained and GC prep obtained  Adnexa: normal bimanual exam  Uterus: normal single, nontender, anteverted and mid-position  Negative bladder sweep, no lymphadenopathy, negative CMT        Musculosk:  Normal Gait and station was noted. Digits were evaluated without abnormal findings. Range of motion, stability and strength were evaluated and found to be appropriate for the patients age. OMM Structural Component:  The patient did not complain of a Chief complaint requiring OMM. Chief Complaint:none    Structural Exam: No Interest              ASSESSMENT:      44 y.o. Annual  1. Well woman exam with routine gynecological exam  PAP Smear    Vaginitis DNA Probe    C. Trachomatis / N. Gonorrhoeae, DNA   2. Carcinoma in situ of cervix, unspecified location  PAP Smear   3. Screening for STD (sexually transmitted disease)  Vaginitis DNA Probe    C. Trachomatis / N.  Gonorrhoeae, DNA    Hepatitis Panel, Acute    T. pallidum Ab    HIV Screen          Chief Complaint   Patient presents with    Gynecologic Exam          Past Medical History:   Diagnosis Date    Abnormal Pap smear of cervix [id number]    Anxiety     Asthma     Depression     GERD (gastroesophageal reflux disease)     Headache(784.0)     Heart murmur     Herpes     Hyperlipidemia     Hypertension     Obesity     Schizophrenia ([address]     Seasonal allergies     Sleep apnea     no machine         Patient Active Problem List    Diagnosis Date Noted    Atypical squamous cells cannot exclude high grade squamous intraepithelial lesion on cytologic smear of cervix (ASC-H) 04/05/2016     Priority: High     Colposcopy performed 4/5/16      ASCUS with positive high risk HPV 09/03/2014     Priority: High     colpo performed 9/3/14 - ECC and biopsy x 2 done negative  Needs co-testing 9/2015      Schizophrenia ([address] 09/03/2014     Priority: Low    HTN (hypertension) 09/03/2014     Priority: Low    Anxiety 09/03/2014     Priority: Low    GERD (gastroesophageal reflux disease) 09/03/2014     Priority: Low    Hyperlipemia 09/03/2014     Priority: Low    Patellofemoral pain syndrome of left knee 08/15/2017    EVER III (cervical intraepithelial neoplasia III)     EVER II (cervical intraepithelial neoplasia II)     EVER I (cervical intraepithelial neoplasia I)           Hereditary Breast, Ovarian, Colon and Uterine Cancer screening Done. Tobacco & Secondary smoke risks reviewed; instructed on cessation and avoidance      Counseling Completed:  Preventative Health Recommendations and Follow up. The patient was instructed to stop smoking. Morbidity, mortality, and cessation programs were reviewed. Worsening of long and short term medical health risks were discussed with the addition of tobacco. Secondary smoke risks were reviewed with respect to children and newborns. Increases in Sudden Infant Death Syndrome, asthma, respiratory problems, and cancers were reviewed. The patient was informed of the recommended preventative health recommendations. 1. Annuals every year; Cytology collections per prevailing guidelines. 2. Mammograms begin every year at 35 yo if no abnormalities are found and no family     History. 3. Bone density studies every 2-3 years. Begin at 71 yo. If no fracture history or osteoporosis family history. (significant). 4. Colonoscopy begin at 47 yo. Repeat every ten years if negative and no family history. 5. Calcium of 5863-4564 mg/day in split dosing  6. Vitamin D 400-800 IU/day  7. All other preventative health recommendations will be managed by the patients Primary care physician.                PLAN:  Pap smear

## 2017-11-29 LAB
C TRACH DNA GENITAL QL NAA+PROBE: NEGATIVE
HPV SAMPLE: ABNORMAL
HPV SOURCE: ABNORMAL
HPV, GENOTYPE 16: NOT DETECTED
HPV, GENOTYPE 18: NOT DETECTED
HPV, HIGH RISK OTHER: DETECTED
HPV, INTERPRETATION: ABNORMAL
N. GONORRHOEAE DNA: NEGATIVE

## 2017-12-05 LAB — CYTOLOGY REPORT: NORMAL

## 2018-01-24 ENCOUNTER — HOSPITAL ENCOUNTER (OUTPATIENT)
Age: 38
Setting detail: SPECIMEN
Discharge: HOME | End: 2018-01-24
Payer: COMMERCIAL

## 2018-01-24 LAB
-: ABNORMAL
ABSOLUTE EOS #: 0.06 K/UL (ref 0–0.44)
ABSOLUTE IMMATURE GRANULOCYTE: 0.03 K/UL (ref 0–0.3)
ABSOLUTE LYMPH #: 2.06 K/UL (ref 1.1–3.7)
ABSOLUTE MONO #: 0.56 K/UL (ref 0.1–1.2)
ALBUMIN SERPL-MCNC: 4.2 G/DL (ref 3.5–5.2)
ALBUMIN/GLOBULIN RATIO: 1.5 (ref 1–2.5)
ALP BLD-CCNC: 83 U/L (ref 35–104)
ALT SERPL-CCNC: 11 U/L (ref 5–33)
AMORPHOUS: ABNORMAL
ANION GAP SERPL CALCULATED.3IONS-SCNC: 15 MMOL/L (ref 9–17)
AST SERPL-CCNC: 12 U/L
BACTERIA: ABNORMAL
BASOPHILS # BLD: 1 % (ref 0–2)
BASOPHILS ABSOLUTE: 0.03 K/UL (ref 0–0.2)
BILIRUB SERPL-MCNC: 0.18 MG/DL (ref 0.3–1.2)
BILIRUBIN URINE: NEGATIVE
BUN BLDV-MCNC: 14 MG/DL (ref 6–20)
BUN/CREAT BLD: ABNORMAL (ref 9–20)
CALCIUM SERPL-MCNC: 9.2 MG/DL (ref 8.6–10.4)
CASTS UA: ABNORMAL /LPF (ref 0–2)
CHLORIDE BLD-SCNC: 108 MMOL/L (ref 98–107)
CHOLESTEROL/HDL RATIO: 3.8
CHOLESTEROL: 144 MG/DL
CO2: 20 MMOL/L (ref 20–31)
COLOR: YELLOW
COMMENT UA: ABNORMAL
CREAT SERPL-MCNC: 1.02 MG/DL (ref 0.5–0.9)
CRYSTALS, UA: ABNORMAL /HPF
DIFFERENTIAL TYPE: ABNORMAL
EOSINOPHILS RELATIVE PERCENT: 1 % (ref 1–4)
EPITHELIAL CELLS UA: ABNORMAL /HPF (ref 0–5)
GFR AFRICAN AMERICAN: >60 ML/MIN
GFR NON-AFRICAN AMERICAN: >60 ML/MIN
GFR SERPL CREATININE-BSD FRML MDRD: ABNORMAL ML/MIN/{1.73_M2}
GFR SERPL CREATININE-BSD FRML MDRD: ABNORMAL ML/MIN/{1.73_M2}
GLUCOSE BLD-MCNC: 67 MG/DL (ref 70–99)
GLUCOSE URINE: NEGATIVE
HCT VFR BLD CALC: 39.9 % (ref 36.3–47.1)
HDLC SERPL-MCNC: 38 MG/DL
HEMOGLOBIN: 12.8 G/DL (ref 11.9–15.1)
IMMATURE GRANULOCYTES: 1 %
KETONES, URINE: NEGATIVE
LDL CHOLESTEROL: 75 MG/DL (ref 0–130)
LEUKOCYTE ESTERASE, URINE: NEGATIVE
LYMPHOCYTES # BLD: 31 % (ref 24–43)
MCH RBC QN AUTO: 30.5 PG (ref 25.2–33.5)
MCHC RBC AUTO-ENTMCNC: 32.1 G/DL (ref 28.4–34.8)
MCV RBC AUTO: 95 FL (ref 82.6–102.9)
MONOCYTES # BLD: 9 % (ref 3–12)
MUCUS: ABNORMAL
NITRITE, URINE: NEGATIVE
NRBC AUTOMATED: 0 PER 100 WBC
OTHER OBSERVATIONS UA: ABNORMAL
PDW BLD-RTO: 13.5 % (ref 11.8–14.4)
PH UA: 6.5 (ref 5–8)
PLATELET # BLD: 251 K/UL (ref 138–453)
PLATELET ESTIMATE: ABNORMAL
PMV BLD AUTO: 11.2 FL (ref 8.1–13.5)
POTASSIUM SERPL-SCNC: 4 MMOL/L (ref 3.7–5.3)
PROTEIN UA: NEGATIVE
RBC # BLD: 4.2 M/UL (ref 3.95–5.11)
RBC # BLD: ABNORMAL 10*6/UL
RBC UA: ABNORMAL /HPF (ref 0–2)
RENAL EPITHELIAL, UA: ABNORMAL /HPF
SEG NEUTROPHILS: 57 % (ref 36–65)
SEGMENTED NEUTROPHILS ABSOLUTE COUNT: 3.82 K/UL (ref 1.5–8.1)
SODIUM BLD-SCNC: 143 MMOL/L (ref 135–144)
SPECIFIC GRAVITY UA: 1.02 (ref 1–1.03)
THYROXINE, FREE: 0.95 NG/DL (ref 0.93–1.7)
TOTAL PROTEIN: 7 G/DL (ref 6.4–8.3)
TRICHOMONAS: ABNORMAL
TRIGL SERPL-MCNC: 154 MG/DL
TSH SERPL DL<=0.05 MIU/L-ACNC: 1.71 MIU/L (ref 0.3–5)
TURBIDITY: ABNORMAL
URINE HGB: NEGATIVE
UROBILINOGEN, URINE: NORMAL
VITAMIN D 25-HYDROXY: 33.9 NG/ML (ref 30–100)
VLDLC SERPL CALC-MCNC: ABNORMAL MG/DL (ref 1–30)
WBC # BLD: 6.6 K/UL (ref 3.5–11.3)
WBC # BLD: ABNORMAL 10*3/UL
WBC UA: ABNORMAL /HPF (ref 0–5)
YEAST: ABNORMAL

## 2018-01-25 LAB
ESTIMATED AVERAGE GLUCOSE: 108 MG/DL
HBA1C MFR BLD: 5.4 % (ref 4–6)

## 2018-02-07 ENCOUNTER — HOSPITAL ENCOUNTER (OUTPATIENT)
Dept: ULTRASOUND IMAGING | Age: 38
Discharge: HOME | End: 2018-02-09
Payer: COMMERCIAL

## 2018-02-07 ENCOUNTER — HOSPITAL ENCOUNTER (OUTPATIENT)
Dept: NON INVASIVE DIAGNOSTICS | Age: 38
Discharge: HOME | End: 2018-02-07
Payer: COMMERCIAL

## 2018-02-07 ENCOUNTER — HOSPITAL ENCOUNTER (OUTPATIENT)
Age: 38
Discharge: HOME | End: 2018-02-07
Payer: COMMERCIAL

## 2018-02-07 DIAGNOSIS — E04.9 THYROID ENLARGED: ICD-10-CM

## 2018-02-07 LAB
EKG ATRIAL RATE: 44 BPM
EKG P-R INTERVAL: 130 MS
EKG Q-T INTERVAL: 486 MS
EKG QRS DURATION: 90 MS
EKG QTC CALCULATION (BAZETT): 415 MS
EKG R AXIS: -7 DEGREES
EKG T AXIS: -6 DEGREES
EKG VENTRICULAR RATE: 44 BPM
LV EF: 55 %
LVEF MODALITY: NORMAL

## 2018-02-07 PROCEDURE — 93005 ELECTROCARDIOGRAM TRACING: CPT

## 2018-02-07 PROCEDURE — 93306 TTE W/DOPPLER COMPLETE: CPT

## 2018-02-07 PROCEDURE — 76536 US EXAM OF HEAD AND NECK: CPT

## 2018-06-28 ENCOUNTER — NURSE ONLY (OUTPATIENT)
Dept: OBGYN | Age: 38
End: 2018-06-28
Payer: COMMERCIAL

## 2018-06-28 VITALS — HEIGHT: 62 IN | TEMPERATURE: 97.7 F | BODY MASS INDEX: 36.18 KG/M2 | WEIGHT: 196.6 LBS

## 2018-06-28 DIAGNOSIS — N91.2 AMENORRHEA: Primary | ICD-10-CM

## 2018-06-28 LAB
CONTROL: NORMAL
PREGNANCY TEST URINE, POC: NEGATIVE

## 2018-06-28 PROCEDURE — 99211 OFF/OP EST MAY X REQ PHY/QHP: CPT

## 2018-06-28 PROCEDURE — 81025 URINE PREGNANCY TEST: CPT

## 2018-07-24 ENCOUNTER — HOSPITAL ENCOUNTER (OUTPATIENT)
Age: 38
Setting detail: SPECIMEN
Discharge: HOME | End: 2018-07-24
Payer: COMMERCIAL

## 2018-07-24 LAB
-: ABNORMAL
AMORPHOUS: ABNORMAL
BACTERIA: ABNORMAL
BILIRUBIN URINE: NEGATIVE
CASTS UA: ABNORMAL /LPF (ref 0–8)
COLOR: YELLOW
COMMENT UA: ABNORMAL
CRYSTALS, UA: ABNORMAL /HPF
EPITHELIAL CELLS UA: ABNORMAL /HPF (ref 0–5)
GLUCOSE URINE: NEGATIVE
KETONES, URINE: NEGATIVE
LEUKOCYTE ESTERASE, URINE: ABNORMAL
MUCUS: ABNORMAL
NITRITE, URINE: NEGATIVE
OTHER OBSERVATIONS UA: ABNORMAL
PH UA: 7 (ref 5–8)
PROTEIN UA: NEGATIVE
RBC UA: ABNORMAL /HPF (ref 0–4)
RENAL EPITHELIAL, UA: ABNORMAL /HPF
SPECIFIC GRAVITY UA: 1.01 (ref 1–1.03)
TRICHOMONAS: ABNORMAL
TURBIDITY: CLEAR
URINE HGB: NEGATIVE
UROBILINOGEN, URINE: NORMAL
WBC UA: ABNORMAL /HPF (ref 0–5)
YEAST: ABNORMAL

## 2018-07-28 ENCOUNTER — HOSPITAL ENCOUNTER (EMERGENCY)
Age: 38
Discharge: HOME | End: 2018-07-28
Attending: EMERGENCY MEDICINE
Payer: COMMERCIAL

## 2018-07-28 VITALS
RESPIRATION RATE: 14 BRPM | DIASTOLIC BLOOD PRESSURE: 86 MMHG | TEMPERATURE: 99.7 F | BODY MASS INDEX: 36.58 KG/M2 | HEART RATE: 86 BPM | WEIGHT: 200 LBS | SYSTOLIC BLOOD PRESSURE: 130 MMHG | OXYGEN SATURATION: 97 %

## 2018-07-28 DIAGNOSIS — H10.31 ACUTE BACTERIAL CONJUNCTIVITIS OF RIGHT EYE: Primary | ICD-10-CM

## 2018-07-28 PROCEDURE — 6370000000 HC RX 637 (ALT 250 FOR IP): Performed by: EMERGENCY MEDICINE

## 2018-07-28 PROCEDURE — 99282 EMERGENCY DEPT VISIT SF MDM: CPT

## 2018-07-28 RX ORDER — POLYMYXIN B SULFATE AND TRIMETHOPRIM 1; 10000 MG/ML; [USP'U]/ML
1 SOLUTION OPHTHALMIC ONCE
Status: COMPLETED | OUTPATIENT
Start: 2018-07-28 | End: 2018-07-28

## 2018-07-28 RX ORDER — DIPHENHYDRAMINE HCL 25 MG
25 CAPSULE ORAL EVERY 6 HOURS PRN
Qty: 30 CAPSULE | Refills: 0 | Status: SHIPPED | OUTPATIENT
Start: 2018-07-28 | End: 2018-08-07

## 2018-07-28 RX ORDER — POLYMYXIN B SULFATE AND TRIMETHOPRIM 1; 10000 MG/ML; [USP'U]/ML
1 SOLUTION OPHTHALMIC EVERY 4 HOURS
Qty: 1 BOTTLE | Refills: 0 | Status: SHIPPED | OUTPATIENT
Start: 2018-07-28 | End: 2018-08-07

## 2018-07-28 RX ORDER — DIPHENHYDRAMINE HCL 25 MG
25 TABLET ORAL ONCE
Status: COMPLETED | OUTPATIENT
Start: 2018-07-28 | End: 2018-07-28

## 2018-07-28 RX ADMIN — DIPHENHYDRAMINE HCL 25 MG: 25 TABLET ORAL at 13:33

## 2018-07-28 RX ADMIN — POLYMYXIN B SULFATE, TRIMETHOPRIM SULFATE 1 DROP: 10000; 1 SOLUTION/ DROPS OPHTHALMIC at 13:34

## 2018-07-28 ASSESSMENT — PAIN DESCRIPTION - DESCRIPTORS: DESCRIPTORS: DISCOMFORT;BURNING

## 2018-07-28 ASSESSMENT — PAIN DESCRIPTION - LOCATION: LOCATION: EYE

## 2018-07-28 ASSESSMENT — PAIN SCALES - GENERAL: PAINLEVEL_OUTOF10: 7

## 2018-07-28 ASSESSMENT — PAIN DESCRIPTION - ORIENTATION: ORIENTATION: RIGHT

## 2018-07-28 ASSESSMENT — PAIN DESCRIPTION - PAIN TYPE: TYPE: ACUTE PAIN

## 2018-07-28 NOTE — ED NOTES
Pt presents to ED w/ c/o right eye itch and discharge which pt states started this AM, and pt rated pain and discomfort in right eye at 7/10 pain. Pt has noted redness to right eye, as well as purulent discharge noted.  Pt is A&OX4     Dawn Blakely RN  07/28/18 1816

## 2018-07-28 NOTE — ED NOTES
Bed: 05  Expected date:   Expected time:   Means of arrival:   Comments:  ENT     Margarito Bennett RN  07/28/18 8509

## 2018-07-30 NOTE — ED PROVIDER NOTES
adenopathy. Does not bruise/bleed easily. Psychiatric/Behavioral: Negative for confusion. The patient is not nervous/anxious. PHYSICAL EXAM   (up to 7 for level 4, 8 or more for level 5)      INITIAL VITALS:   /86   Pulse 86   Temp 99.7 °F (37.6 °C) (Oral)   Resp 14   Wt 200 lb (90.7 kg)   LMP 07/04/2018 Comment: June 2018  SpO2 97%   BMI 36.58 kg/m²     Physical Exam   Constitutional: She is oriented to person, place, and time. She appears well-developed and well-nourished. No distress. HENT:   Head: Normocephalic and atraumatic. Eyes: Conjunctivae and EOM are normal. Pupils are equal, round, and reactive to light. Neck: Normal range of motion. Neck supple. Cardiovascular: Normal rate, regular rhythm, normal heart sounds and intact distal pulses. Exam reveals no gallop and no friction rub. No murmur heard. Pulmonary/Chest: Effort normal and breath sounds normal. No respiratory distress. She has no wheezes. She has no rales. Abdominal: Soft. Bowel sounds are normal. She exhibits no distension. There is no tenderness. There is no rebound and no guarding. Musculoskeletal: Normal range of motion. She exhibits no edema or tenderness. Neurological: She is alert and oriented to person, place, and time. Skin: Skin is warm and dry. No rash noted. Psychiatric: She has a normal mood and affect. Her behavior is normal.       DIFFERENTIAL  DIAGNOSIS     PLAN (LABS / IMAGING / EKG):  No orders of the defined types were placed in this encounter.       MEDICATIONS ORDERED:  Orders Placed This Encounter   Medications    trimethoprim-polymyxin b (POLYTRIM) ophthalmic solution 1 drop    diphenhydrAMINE (BENADRYL) tablet 25 mg    diphenhydrAMINE (BENADRYL) 25 MG capsule     Sig: Take 1 capsule by mouth every 6 hours as needed for Itching or Allergies     Dispense:  30 capsule     Refill:  0    trimethoprim-polymyxin b (POLYTRIM) 51555-2.1 UNIT/ML-% ophthalmic solution     Sig: Place 1 drop into the right eye every 4 hours for 10 days     Dispense:  1 Bottle     Refill:  0       DDX: Allergic Conjunctivitis, Bacterial Conjunctivitis, Viral Conjunctivitis, Bacterial Endophthalmitis, Chalazion, Chemical Burn, Conjunctivitis, Acute Hemorrhagic, Contact Lens Complications, Corneal Foreign Body, Corneal Ulcer, Dacryocystitis, Corneal Abrasion, Glaucoma, Angle Closure, Acute, Herpes Zoster, Hordeolum, Orbital Cellulitis, Preseptal Cellulitis, Pterygium, Dubois-Alli Syndrome, Trauma     DIAGNOSTIC RESULTS / EMERGENCY DEPARTMENT COURSE / MDM     LABS:  No results found for this visit on 07/28/18. RADIOLOGY:  None    EKG  None    All EKG's are interpreted by the Emergency Department Physician who either signs or Co-signs this chart in the absence of a cardiologist.    EMERGENCY DEPARTMENT COURSE:  Pt seen and evaluated. She is sitting in the chair comfortably. In no acute distress. She is holding her right eye partially closed secondary to the discomfort. Examination findings most consistent with acute conjunctivitis of the right eye only, active purulent discharge concerning for potential bacterial infection. Discussed findings with pt and treatment plan for abx gtts and benadryl for the itching. She voiced understanding and is in agreement with the plan. Pt stable and ready for discharge home. PROCEDURES:  None    CONSULTS:  None    CRITICAL CARE:  None    FINAL IMPRESSION      1.  Acute bacterial conjunctivitis of right eye          DISPOSITION / PLAN     DISPOSITION Decision To Discharge 07/28/2018 01:27:55 PM      PATIENT REFERRED TO:  Anita Gomez, SHERRIE - CNP  [address] [address]  [phone]    Call in 2 days  As needed, If symptoms worsen      DISCHARGE MEDICATIONS:  Discharge Medication List as of 7/28/2018  1:31 PM      START taking these medications    Details   diphenhydrAMINE (BENADRYL) 25 MG capsule Take 1 capsule by mouth every 6 hours as needed for Itching or Allergies, Disp-30 capsule, R-0Print      trimethoprim-polymyxin b (POLYTRIM) 68354-9.1 UNIT/ML-% ophthalmic solution Place 1 drop into the right eye every 4 hours for 10 days, Disp-1 Bottle, R-0Print             Scotty Jay MD  Emergency Medicine Resident    (Please note that portions of this note were completed with a voice recognition program.  Efforts were made to edit the dictations but occasionally words are mis-transcribed.)       Scotty Jay MD  Resident  08/02/18 6615

## 2018-08-02 ASSESSMENT — ENCOUNTER SYMPTOMS
SHORTNESS OF BREATH: 0
NAUSEA: 0
VOMITING: 0
EYE ITCHING: 1
DIARRHEA: 0
EYE PAIN: 1
EYE REDNESS: 1
SORE THROAT: 0
EYE DISCHARGE: 1
ABDOMINAL PAIN: 0
COUGH: 0

## 2018-09-07 ENCOUNTER — HOSPITAL ENCOUNTER (OUTPATIENT)
Dept: MAMMOGRAPHY | Age: 38
Discharge: HOME | End: 2018-09-09
Payer: COMMERCIAL

## 2018-09-07 DIAGNOSIS — Z12.31 VISIT FOR SCREENING MAMMOGRAM: ICD-10-CM

## 2018-09-07 PROCEDURE — 77067 SCR MAMMO BI INCL CAD: CPT

## 2018-09-26 ENCOUNTER — NURSE ONLY (OUTPATIENT)
Dept: OBGYN | Age: 38
End: 2018-09-26
Payer: COMMERCIAL

## 2018-09-26 VITALS
SYSTOLIC BLOOD PRESSURE: 138 MMHG | DIASTOLIC BLOOD PRESSURE: 89 MMHG | BODY MASS INDEX: 36.78 KG/M2 | WEIGHT: 199.9 LBS | HEART RATE: 77 BPM | HEIGHT: 62 IN

## 2018-09-26 DIAGNOSIS — N92.6 ABNORMAL MENSES: Primary | ICD-10-CM

## 2018-09-26 LAB
CONTROL: PRESENT
PREGNANCY TEST URINE, POC: NEGATIVE

## 2018-09-26 PROCEDURE — 81025 URINE PREGNANCY TEST: CPT

## 2018-09-26 PROCEDURE — 99211 OFF/OP EST MAY X REQ PHY/QHP: CPT

## 2018-10-30 ENCOUNTER — HOSPITAL ENCOUNTER (OUTPATIENT)
Age: 38
Discharge: HOME | End: 2018-11-01
Payer: COMMERCIAL

## 2018-10-30 ENCOUNTER — HOSPITAL ENCOUNTER (OUTPATIENT)
Dept: ULTRASOUND IMAGING | Age: 38
Discharge: HOME | End: 2018-11-01
Payer: COMMERCIAL

## 2018-10-30 ENCOUNTER — HOSPITAL ENCOUNTER (OUTPATIENT)
Dept: NEUROLOGY | Age: 38
Discharge: HOME | End: 2018-10-30
Payer: COMMERCIAL

## 2018-10-30 ENCOUNTER — HOSPITAL ENCOUNTER (OUTPATIENT)
Dept: GENERAL RADIOLOGY | Age: 38
Discharge: HOME | End: 2018-11-01
Payer: COMMERCIAL

## 2018-10-30 DIAGNOSIS — J45.40 MODERATE PERSISTENT ASTHMA, UNCOMPLICATED: ICD-10-CM

## 2018-10-30 DIAGNOSIS — E01.0 THYROMEGALY: ICD-10-CM

## 2018-10-30 PROCEDURE — 94060 EVALUATION OF WHEEZING: CPT

## 2018-10-30 PROCEDURE — 76536 US EXAM OF HEAD AND NECK: CPT

## 2018-10-30 PROCEDURE — 94664 DEMO&/EVAL PT USE INHALER: CPT

## 2018-10-30 PROCEDURE — 6370000000 HC RX 637 (ALT 250 FOR IP): Performed by: NURSE PRACTITIONER

## 2018-10-30 PROCEDURE — 94729 DIFFUSING CAPACITY: CPT

## 2018-10-30 PROCEDURE — 71046 X-RAY EXAM CHEST 2 VIEWS: CPT

## 2018-10-30 RX ORDER — ALBUTEROL SULFATE 90 UG/1
2 AEROSOL, METERED RESPIRATORY (INHALATION) ONCE
Status: COMPLETED | OUTPATIENT
Start: 2018-10-30 | End: 2018-10-30

## 2018-10-30 RX ADMIN — ALBUTEROL SULFATE 2 PUFF: 90 AEROSOL, METERED RESPIRATORY (INHALATION) at 13:00

## 2018-12-12 ENCOUNTER — APPOINTMENT (OUTPATIENT)
Dept: GENERAL RADIOLOGY | Age: 38
End: 2018-12-12
Payer: COMMERCIAL

## 2018-12-12 ENCOUNTER — HOSPITAL ENCOUNTER (EMERGENCY)
Age: 38
Discharge: HOME | End: 2018-12-12
Attending: EMERGENCY MEDICINE
Payer: COMMERCIAL

## 2018-12-12 VITALS
RESPIRATION RATE: 18 BRPM | SYSTOLIC BLOOD PRESSURE: 126 MMHG | HEIGHT: 62 IN | DIASTOLIC BLOOD PRESSURE: 77 MMHG | OXYGEN SATURATION: 99 % | BODY MASS INDEX: 36.07 KG/M2 | TEMPERATURE: 98.4 F | HEART RATE: 60 BPM | WEIGHT: 196 LBS

## 2018-12-12 DIAGNOSIS — J40 BRONCHITIS: ICD-10-CM

## 2018-12-12 DIAGNOSIS — R07.89 MUSCULAR CHEST PAIN: Primary | ICD-10-CM

## 2018-12-12 LAB
ABSOLUTE EOS #: 0.05 K/UL (ref 0–0.44)
ABSOLUTE IMMATURE GRANULOCYTE: <0.03 K/UL (ref 0–0.3)
ABSOLUTE LYMPH #: 2.73 K/UL (ref 1.1–3.7)
ABSOLUTE MONO #: 0.6 K/UL (ref 0.1–1.2)
ANION GAP SERPL CALCULATED.3IONS-SCNC: 11 MMOL/L (ref 9–17)
BASOPHILS # BLD: 0 % (ref 0–2)
BASOPHILS ABSOLUTE: 0.03 K/UL (ref 0–0.2)
BUN BLDV-MCNC: 12 MG/DL (ref 6–20)
BUN/CREAT BLD: ABNORMAL (ref 9–20)
CALCIUM SERPL-MCNC: 9.1 MG/DL (ref 8.6–10.4)
CHLORIDE BLD-SCNC: 108 MMOL/L (ref 98–107)
CO2: 20 MMOL/L (ref 20–31)
CREAT SERPL-MCNC: 0.92 MG/DL (ref 0.5–0.9)
DIFFERENTIAL TYPE: NORMAL
EKG ATRIAL RATE: 68 BPM
EKG P AXIS: 55 DEGREES
EKG P-R INTERVAL: 156 MS
EKG Q-T INTERVAL: 416 MS
EKG QRS DURATION: 98 MS
EKG QTC CALCULATION (BAZETT): 442 MS
EKG R AXIS: -20 DEGREES
EKG T AXIS: 23 DEGREES
EKG VENTRICULAR RATE: 68 BPM
EOSINOPHILS RELATIVE PERCENT: 1 % (ref 1–4)
GFR AFRICAN AMERICAN: >60 ML/MIN
GFR NON-AFRICAN AMERICAN: >60 ML/MIN
GFR SERPL CREATININE-BSD FRML MDRD: ABNORMAL ML/MIN/{1.73_M2}
GFR SERPL CREATININE-BSD FRML MDRD: ABNORMAL ML/MIN/{1.73_M2}
GLUCOSE BLD-MCNC: 70 MG/DL (ref 70–99)
HCT VFR BLD CALC: 42 % (ref 36.3–47.1)
HEMOGLOBIN: 13.8 G/DL (ref 11.9–15.1)
IMMATURE GRANULOCYTES: 0 %
LYMPHOCYTES # BLD: 39 % (ref 24–43)
MCH RBC QN AUTO: 30.9 PG (ref 25.2–33.5)
MCHC RBC AUTO-ENTMCNC: 32.9 G/DL (ref 28.4–34.8)
MCV RBC AUTO: 94 FL (ref 82.6–102.9)
MONOCYTES # BLD: 9 % (ref 3–12)
NRBC AUTOMATED: 0 PER 100 WBC
PDW BLD-RTO: 13.2 % (ref 11.8–14.4)
PLATELET # BLD: 219 K/UL (ref 138–453)
PLATELET ESTIMATE: NORMAL
PMV BLD AUTO: 10.5 FL (ref 8.1–13.5)
POC TROPONIN I: 0 NG/ML (ref 0–0.1)
POC TROPONIN I: 0 NG/ML (ref 0–0.1)
POC TROPONIN INTERP: NORMAL
POC TROPONIN INTERP: NORMAL
POTASSIUM SERPL-SCNC: 4.4 MMOL/L (ref 3.7–5.3)
RBC # BLD: 4.47 M/UL (ref 3.95–5.11)
RBC # BLD: NORMAL 10*6/UL
SEG NEUTROPHILS: 51 % (ref 36–65)
SEGMENTED NEUTROPHILS ABSOLUTE COUNT: 3.65 K/UL (ref 1.5–8.1)
SODIUM BLD-SCNC: 139 MMOL/L (ref 135–144)
WBC # BLD: 7.1 K/UL (ref 3.5–11.3)
WBC # BLD: NORMAL 10*3/UL

## 2018-12-12 PROCEDURE — 80048 BASIC METABOLIC PNL TOTAL CA: CPT

## 2018-12-12 PROCEDURE — 99285 EMERGENCY DEPT VISIT HI MDM: CPT

## 2018-12-12 PROCEDURE — 71046 X-RAY EXAM CHEST 2 VIEWS: CPT

## 2018-12-12 PROCEDURE — 84484 ASSAY OF TROPONIN QUANT: CPT

## 2018-12-12 PROCEDURE — 93005 ELECTROCARDIOGRAM TRACING: CPT

## 2018-12-12 PROCEDURE — 6370000000 HC RX 637 (ALT 250 FOR IP): Performed by: EMERGENCY MEDICINE

## 2018-12-12 PROCEDURE — 85025 COMPLETE CBC W/AUTO DIFF WBC: CPT

## 2018-12-12 RX ORDER — IBUPROFEN 800 MG/1
800 TABLET ORAL EVERY 8 HOURS PRN
Qty: 30 TABLET | Refills: 0 | Status: SHIPPED | OUTPATIENT
Start: 2018-12-12 | End: 2019-03-29 | Stop reason: SDUPTHER

## 2018-12-12 RX ORDER — BENZONATATE 100 MG/1
100 CAPSULE ORAL 3 TIMES DAILY PRN
Qty: 30 CAPSULE | Refills: 0 | Status: SHIPPED | OUTPATIENT
Start: 2018-12-12 | End: 2018-12-19

## 2018-12-12 RX ORDER — CYCLOBENZAPRINE HCL 5 MG
5 TABLET ORAL 2 TIMES DAILY PRN
Qty: 12 TABLET | Refills: 0 | Status: SHIPPED | OUTPATIENT
Start: 2018-12-12 | End: 2018-12-17

## 2018-12-12 RX ORDER — BENZONATATE 100 MG/1
100 CAPSULE ORAL 3 TIMES DAILY PRN
Status: DISCONTINUED | OUTPATIENT
Start: 2018-12-12 | End: 2018-12-12 | Stop reason: HOSPADM

## 2018-12-12 RX ORDER — IBUPROFEN 800 MG/1
800 TABLET ORAL ONCE
Status: COMPLETED | OUTPATIENT
Start: 2018-12-12 | End: 2018-12-12

## 2018-12-12 RX ADMIN — IBUPROFEN 800 MG: 800 TABLET ORAL at 16:01

## 2018-12-12 RX ADMIN — BENZONATATE 100 MG: 100 CAPSULE ORAL at 16:01

## 2018-12-12 ASSESSMENT — ENCOUNTER SYMPTOMS
COLOR CHANGE: 0
ABDOMINAL PAIN: 0
SHORTNESS OF BREATH: 0
NAUSEA: 0
WHEEZING: 0
VOMITING: 0
DIARRHEA: 0

## 2018-12-12 ASSESSMENT — PAIN DESCRIPTION - ORIENTATION: ORIENTATION: LEFT

## 2018-12-12 ASSESSMENT — PAIN DESCRIPTION - DESCRIPTORS: DESCRIPTORS: PRESSURE

## 2018-12-12 ASSESSMENT — PAIN DESCRIPTION - FREQUENCY: FREQUENCY: CONTINUOUS

## 2018-12-12 ASSESSMENT — PAIN SCALES - GENERAL
PAINLEVEL_OUTOF10: 10
PAINLEVEL_OUTOF10: 10

## 2018-12-12 ASSESSMENT — PAIN DESCRIPTION - LOCATION: LOCATION: CHEST

## 2018-12-12 NOTE — ED PROVIDER NOTES
[facility] ED  Emergency Department Encounter  EmergencyMedicine Resident     Pt Name:Chantelle Black  MRN: [id number]  Birthdate 1980  Date of evaluation: 12/12/18  PCP:  SHERRIE Whiteside CNP    CHIEF COMPLAINT       Chief Complaint   Patient presents with    Chest Pain     x 3 weeks. Pt reports left sided chest pain, denies radiation. Pt denies taking anything/trying anything for pain.  URI     cough, congestion, rhinorrhea x 2 weeks. Pt states that she was seen by her PCP and was put on Z pack for sinus infection one week ago. HISTORY OF PRESENT ILLNESS  (Location/Symptom, Timing/Onset, Context/Setting, Quality, Duration, Modifying Factors, Severity.)      Pt states that her cough and Left sided infraclavicular chest pain began 3 weeks ago. Pt decided not to see a physician at the time, because she thought that it was a cold and would pass. One week ago, pt developed a sinus infection and saw her ENT, which gave her a z-pack for treatment with follow up instructions in late December. Pt states that after the onset of the sinus infection, her cough has worsened and aggravated her asthma and her chest pain. Her chest pain is rated 10/10 and was described as sharp on the left side of her chest originating in the infraclavicular region spreading to the whole chest.  The pain is brought on with coughing and movement of the left arm, otherwise no pain at rest. There is no radiation of pain to the jaw, shoulders, back, flank or groin. When pt is not moving, pt denies chest pain. Pt denies sick contacts.     Pt denies nausea or vomiting. Pt denies SOB. Pt also denies history of blood clots, hemoptysis, leg swelling or long periods of immobility. No risk factors for PE. Perc negative. PAST MEDICAL / SURGICAL / SOCIAL / FAMILY HISTORY      has a past medical history of Abnormal Pap smear of cervix; Anxiety;  Asthma; Depression; GERD (gastroesophageal reflux disease); daily 6/16/17   Luigi Earl MD   atorvastatin (LIPITOR) 40 MG tablet Take 40 mg by mouth daily  4/1/16   Historical Provider, MD   econazole nitrate 1 % cream Apply 1 each topically daily as needed  3/29/16   Historical Provider, MD Bonita Brar) LOTN Apply 1 each topically daily  3/30/16   Historical Provider, MD   Multiple Vitamin (MULTI-VITAMINS) TABS Take 1 tablet by mouth daily  3/30/16   Historical Provider, MD   paliperidone (INVEGA) 6 MG ER tablet Take 9 mg by mouth daily  3/28/16   Historical Provider, MD   topiramate (TOPAMAX) 25 MG tablet Take 25 mg by mouth daily  3/30/16   Historical Provider, MD   traZODone (DESYREL) 50 MG tablet Take 50 mg by mouth daily  3/28/16   Historical Provider, MD   Prenatal Multivit-[facility]) CAPS Take 1 tablet by mouth daily 3/9/16   Jennifer Longoria,    Budesonide-Formoterol Fumarate (SYMBICORT IN) Inhale 2 puffs into the lungs daily as needed     Historical Provider, MD   cetirizine (ZYRTEC ALLERGY) 10 MG TABS Take 10 mg by mouth daily 7/8/15   Graeme Rodriguez MD   albuterol (PROVENTIL HFA) 108 (90 BASE) MCG/ACT inhaler Inhale 2 puffs into the lungs every 4 hours as needed for Wheezing. Historical Provider, MD   acyclovir (ZOVIRAX) 200 MG capsule Take 200 mg by mouth daily as needed  5/16/14   Historical Provider, MD   dicyclomine (BENTYL) 10 MG capsule Take 10 mg by mouth daily  5/20/14   Historical Provider, MD   diphenoxylate-atropine (LOMOTIL) 2.5-0.025 MG per tablet Take 1 tablet by mouth daily as needed  4/18/14   Historical Provider, MD   polyethylene glycol (GLYCOLAX) powder Take 17 g by mouth daily as needed  5/20/14   Historical Provider, MD   metoprolol (TOPROL-XL) 50 MG XL tablet Take 50 mg by mouth daily. Historical Provider, MD   pantoprazole (PROTONIX) 40 MG tablet Take 40 mg by mouth daily. Historical Provider, MD   ondansetron (ZOFRAN) 4 MG tablet Take 1 tablet by mouth every 8 hours as needed for Nausea. (ADVIL;MOTRIN) tablet 800 mg    DISCONTD: benzonatate (TESSALON) capsule 100 mg    benzonatate (TESSALON PERLES) 100 MG capsule     Sig: Take 1 capsule by mouth 3 times daily as needed for Cough     Dispense:  30 capsule     Refill:  0    ibuprofen (ADVIL;MOTRIN) 800 MG tablet     Sig: Take 1 tablet by mouth every 8 hours as needed for Pain     Dispense:  30 tablet     Refill:  0    cyclobenzaprine (FLEXERIL) 5 MG tablet     Sig: Take 1 tablet by mouth 2 times daily as needed for Muscle spasms     Dispense:  12 tablet     Refill:  0       DDX: ACS versus STEMI versus dissection versus pneumonia versus tamponade versus musculoskeletal chest pain  versus PE versus gerd      DIAGNOSTIC RESULTS / EMERGENCY DEPARTMENT COURSE / MDM     LABS:  Results for orders placed or performed during the hospital encounter of 12/12/18   CBC WITH AUTO DIFFERENTIAL   Result Value Ref Range    WBC 7.1 3.5 - 11.3 k/uL    RBC 4.47 3.95 - 5.11 m/uL    Hemoglobin 13.8 11.9 - 15.1 g/dL    Hematocrit 42.0 36.3 - 47.1 %    MCV 94.0 82.6 - 102.9 fL    MCH 30.9 25.2 - 33.5 pg    MCHC 32.9 28.4 - 34.8 g/dL    RDW 13.2 11.8 - 14.4 %    Platelets 698 501 - 771 k/uL    MPV 10.5 8.1 - 13.5 fL    NRBC Automated 0.0 0.0 per 100 WBC    Differential Type NOT REPORTED     Seg Neutrophils 51 36 - 65 %    Lymphocytes 39 24 - 43 %    Monocytes 9 3 - 12 %    Eosinophils % 1 1 - 4 %    Basophils 0 0 - 2 %    Immature Granulocytes 0 0 %    Segs Absolute 3.65 1.50 - 8.10 k/uL    Absolute Lymph # 2.73 1.10 - 3.70 k/uL    Absolute Mono # 0.60 0.10 - 1.20 k/uL    Absolute Eos # 0.05 0.00 - 0.44 k/uL    Basophils # 0.03 0.00 - 0.20 k/uL    Absolute Immature Granulocyte <0.03 0.00 - 0.30 k/uL    WBC Morphology NOT REPORTED     RBC Morphology NOT REPORTED     Platelet Estimate NOT REPORTED    BASIC METABOLIC PANEL   Result Value Ref Range    Glucose 70 70 - 99 mg/dL    BUN 12 6 - 20 mg/dL    CREATININE 0.92 (H) 0.50 - 0.90 mg/dL    Bun/Cre Ratio NOT REPORTED 9 - 20 Calcium 9.1 8.6 - 10.4 mg/dL    Sodium 139 135 - 144 mmol/L    Potassium 4.4 3.7 - 5.3 mmol/L    Chloride 108 (H) 98 - 107 mmol/L    CO2 20 20 - 31 mmol/L    Anion Gap 11 9 - 17 mmol/L    GFR Non-African American >60 >60 mL/min    GFR African American >60 >60 mL/min    GFR Comment          GFR Staging NOT REPORTED    POCT troponin   Result Value Ref Range    POC Troponin I 0.00 0.00 - 0.10 ng/mL    POC Troponin Interp       The Troponin-I (POC) results cannot be compared to the Troponin-T results. POCT troponin   Result Value Ref Range    POC Troponin I 0.00 0.00 - 0.10 ng/mL    POC Troponin Interp       The Troponin-I (POC) results cannot be compared to the Troponin-T results. IMPRESSION: 53-year-old female comes emergency Department secondary to chest pain, chest pain is only present when she coughs or moves, otherwise no chest pain at rest, as been having significant cough and a recent bronchitis, most likely costochondritis however multiple cardiac risk factors, but to EKG, chest, 2 sets of troponins. If negative but discharged with Tessalon and  NSAIDS. RADIOLOGY:    Xr Chest Standard (2 Vw)    Result Date: 12/12/2018  EXAMINATION: TWO VIEWS OF THE CHEST 12/12/2018 4:18 pm COMPARISON: 30 October 2018 HISTORY: ORDERING SYSTEM PROVIDED HISTORY: Chest pain TECHNOLOGIST PROVIDED HISTORY: Chest pain FINDINGS: Two-view chest demonstrates overlying cardiac monitoring electrodes. Heart size is normal.  Vascularity is top normal and slightly increased over prior study without consolidation, effusion, or pneumothorax. Osseous and mediastinal structures are stable with mild scoliotic curvature noted. Vascularity is top normal.  Otherwise unremarkable two-view chest examination.        EKG    EKG Interpretation    Interpreted by me    Rhythm: normal sinus   Rate: normal 68  Axis: normal  Ectopy: none  Conduction: normal  ST Segments: no acute change  T Waves: no acute change  Q Waves: none    Clinical

## 2018-12-12 NOTE — H&P
[place]    Social History:     Social History     Social History    Marital status: Single     Spouse name: N/A    Number of children: N/A    Years of education: N/A     Occupational History    Not on file. Social History Main Topics    Smoking status: Former Smoker     Packs/day: 1.00     Types: Cigarettes    Smokeless tobacco: Never Used      Comment: stopped last month     Alcohol use No      Comment: rare    Drug use: Yes     Types: Marijuana      Comment: daily     Sexual activity: Yes     Partners: Male     Other Topics Concern    Not on file     Social History Narrative    No narrative on file       Family History:     Family History   Problem Relation Age of Onset    Cancer Mother         uterine    Diabetes Mother     High Blood Pressure Mother     Endometrial Cancer Mother     Diabetes Brother     Heart Disease Brother        Review of Systems:   Review of Systems    General ROS: positive for - chills and night sweats of 2 day duration. negative for - fatigue or objective fever. Psychological ROS: negative for - anxiety, behavioral disorder or depression  Ophthalmic ROS: negative for - blurry vision, dry eyes or eye pain  ENT ROS: negative for - epistaxis, headaches or nasal congestion  Hematological and Lymphatic ROS: negative for - bleeding problems, blood clots or bruising  Endocrine ROS: negative for -malaise/lethargy, mood swings or palpitations  Respiratory ROS: negative for - cough, hemoptysis or shortness of breath  Cardiovascular ROS: No chest pain or dyspnea on exertion  Gastrointestinal ROS: Pt states she has chronic constipation, but takes fiber.   Genito-Urinary ROS: no dysuria, trouble voiding, or hematuria  Neurological ROS: no TIA or stroke symptoms  Dermatological ROS: negative for dry skin and eczema      Physical Exam:     Vitals:  /83   Pulse 72   Temp 98.4 °F (36.9 °C) (Oral)   Resp 18   Ht 5' 2\" (1.575 m)   Wt 196 lb (88.9 kg)   LMP 12/07/2018 (Approximate)   SpO2 98%   BMI 35.85 kg/m²   Temperature Max - Temp  Av.4 °F (36.9 °C)  Min: 98.4 °F (36.9 °C)  Max: 98.4 °F (36.9 °C)    General appearance - Alert, well appearing and in no acute distress  Mental status - Oriented to person, place and time with normal affect  Head - Normocephalic and atraumatic  Chest - Clear to auscultation, normal effort. Heart - Normal rate, regular rhythm. No gallops, rubs or murmurs. Abdomen - Soft, non-tender, non-distended. Neurological - normal speech, no focal findings or movement disorder noted, cranial nerves II through XII grossly intact  Skin - no gross lesions, rashes, or induration noted  Extremities - PD and PT pulses palpable b/l, no pedal edema or calf pain with palpation. Labs:   {PKLL:[id number]}    EKG:  {Findings; diagnostics ecg readin}    Imaging:   ***      Assessment and Plan:     1. ***    1. Continue *** diet. 2. IVF at *** cc/hr with *** NS.  3. Pain control with *** PRN. 4. Resume home meds. 5. DVT prophylaxis with Lovenox ***, and GI prophylaxis with Protonix ***. 6. Strict I/Os. 7. No immediate surgical intervention indicated at this time.   ***  ----------------------------------------    Signed by David Coker, MS-3 on 2018, 4:10 PM.

## 2019-01-15 ENCOUNTER — OFFICE VISIT (OUTPATIENT)
Dept: OBGYN | Age: 39
End: 2019-01-15
Payer: COMMERCIAL

## 2019-01-15 ENCOUNTER — HOSPITAL ENCOUNTER (OUTPATIENT)
Age: 39
Setting detail: SPECIMEN
Discharge: HOME | End: 2019-01-15
Payer: COMMERCIAL

## 2019-01-15 VITALS
HEIGHT: 62 IN | RESPIRATION RATE: 16 BRPM | SYSTOLIC BLOOD PRESSURE: 131 MMHG | WEIGHT: 210.5 LBS | DIASTOLIC BLOOD PRESSURE: 85 MMHG | HEART RATE: 68 BPM | TEMPERATURE: 97.2 F | BODY MASS INDEX: 38.74 KG/M2

## 2019-01-15 DIAGNOSIS — Z00.00 PREVENTATIVE HEALTH CARE: ICD-10-CM

## 2019-01-15 DIAGNOSIS — Z01.419 WELL WOMAN EXAM WITH ROUTINE GYNECOLOGICAL EXAM: Primary | ICD-10-CM

## 2019-01-15 DIAGNOSIS — Z11.3 SCREEN FOR STD (SEXUALLY TRANSMITTED DISEASE): ICD-10-CM

## 2019-01-15 LAB
DIRECT EXAM: ABNORMAL
HAV IGM SER IA-ACNC: NONREACTIVE
HEPATITIS B CORE IGM ANTIBODY: NONREACTIVE
HEPATITIS B SURFACE ANTIGEN: NONREACTIVE
HEPATITIS C ANTIBODY: NONREACTIVE
HIV AG/AB: NONREACTIVE
Lab: ABNORMAL
SPECIMEN DESCRIPTION: ABNORMAL
STATUS: ABNORMAL
T. PALLIDUM, IGG: NONREACTIVE

## 2019-01-15 PROCEDURE — 80074 ACUTE HEPATITIS PANEL: CPT

## 2019-01-15 PROCEDURE — 87624 HPV HI-RISK TYP POOLED RSLT: CPT

## 2019-01-15 PROCEDURE — 86780 TREPONEMA PALLIDUM: CPT

## 2019-01-15 PROCEDURE — 87591 N.GONORRHOEAE DNA AMP PROB: CPT

## 2019-01-15 PROCEDURE — 36415 COLL VENOUS BLD VENIPUNCTURE: CPT

## 2019-01-15 PROCEDURE — 99395 PREV VISIT EST AGE 18-39: CPT | Performed by: OBSTETRICS & GYNECOLOGY

## 2019-01-15 PROCEDURE — 87660 TRICHOMONAS VAGIN DIR PROBE: CPT

## 2019-01-15 PROCEDURE — 87510 GARDNER VAG DNA DIR PROBE: CPT

## 2019-01-15 PROCEDURE — G0145 SCR C/V CYTO,THINLAYER,RESCR: HCPCS

## 2019-01-15 PROCEDURE — 87480 CANDIDA DNA DIR PROBE: CPT

## 2019-01-15 PROCEDURE — 99213 OFFICE O/P EST LOW 20 MIN: CPT | Performed by: OBSTETRICS & GYNECOLOGY

## 2019-01-15 PROCEDURE — G8484 FLU IMMUNIZE NO ADMIN: HCPCS | Performed by: OBSTETRICS & GYNECOLOGY

## 2019-01-15 PROCEDURE — 87389 HIV-1 AG W/HIV-1&-2 AB AG IA: CPT

## 2019-01-15 PROCEDURE — 87491 CHLMYD TRACH DNA AMP PROBE: CPT

## 2019-01-16 LAB
C TRACH DNA GENITAL QL NAA+PROBE: NEGATIVE
N. GONORRHOEAE DNA: NEGATIVE

## 2019-01-16 RX ORDER — METRONIDAZOLE 500 MG/1
500 TABLET ORAL 2 TIMES DAILY
Qty: 14 TABLET | Refills: 0 | Status: SHIPPED | OUTPATIENT
Start: 2019-01-16 | End: 2019-01-23

## 2019-01-17 LAB
HPV SAMPLE: ABNORMAL
HPV SOURCE: ABNORMAL
HPV, GENOTYPE 16: NOT DETECTED
HPV, GENOTYPE 18: NOT DETECTED
HPV, HIGH RISK OTHER: DETECTED
HPV, INTERPRETATION: ABNORMAL

## 2019-01-30 LAB — CYTOLOGY REPORT: NORMAL

## 2019-02-13 ENCOUNTER — HOSPITAL ENCOUNTER (OUTPATIENT)
Age: 39
Setting detail: SPECIMEN
Discharge: HOME | End: 2019-02-13
Payer: COMMERCIAL

## 2019-02-13 ENCOUNTER — OFFICE VISIT (OUTPATIENT)
Dept: OBGYN | Age: 39
End: 2019-02-13
Payer: COMMERCIAL

## 2019-02-13 VITALS
HEART RATE: 62 BPM | WEIGHT: 213 LBS | BODY MASS INDEX: 39.2 KG/M2 | HEIGHT: 62 IN | DIASTOLIC BLOOD PRESSURE: 74 MMHG | SYSTOLIC BLOOD PRESSURE: 105 MMHG

## 2019-02-13 DIAGNOSIS — A59.01 TRICHOMONAL VAGINITIS: Primary | ICD-10-CM

## 2019-02-13 PROCEDURE — 1036F TOBACCO NON-USER: CPT | Performed by: OBSTETRICS & GYNECOLOGY

## 2019-02-13 PROCEDURE — 99213 OFFICE O/P EST LOW 20 MIN: CPT | Performed by: OBSTETRICS & GYNECOLOGY

## 2019-02-13 PROCEDURE — G8427 DOCREV CUR MEDS BY ELIG CLIN: HCPCS | Performed by: OBSTETRICS & GYNECOLOGY

## 2019-02-13 PROCEDURE — G8484 FLU IMMUNIZE NO ADMIN: HCPCS | Performed by: OBSTETRICS & GYNECOLOGY

## 2019-02-13 PROCEDURE — G8417 CALC BMI ABV UP PARAM F/U: HCPCS | Performed by: OBSTETRICS & GYNECOLOGY

## 2019-02-14 LAB
C TRACH DNA GENITAL QL NAA+PROBE: NEGATIVE
DIRECT EXAM: ABNORMAL
Lab: ABNORMAL
N. GONORRHOEAE DNA: NEGATIVE
SPECIMEN DESCRIPTION: ABNORMAL
SPECIMEN DESCRIPTION: NORMAL

## 2019-02-14 RX ORDER — METRONIDAZOLE 500 MG/1
500 TABLET ORAL 2 TIMES DAILY
Qty: 14 TABLET | Refills: 0 | Status: SHIPPED | OUTPATIENT
Start: 2019-02-14 | End: 2019-02-21

## 2019-02-21 ENCOUNTER — HOSPITAL ENCOUNTER (OUTPATIENT)
Age: 39
Setting detail: SPECIMEN
Discharge: HOME | End: 2019-02-21
Payer: COMMERCIAL

## 2019-02-21 ENCOUNTER — PROCEDURE VISIT (OUTPATIENT)
Dept: OBGYN | Age: 39
End: 2019-02-21
Payer: COMMERCIAL

## 2019-02-21 VITALS
HEIGHT: 62 IN | DIASTOLIC BLOOD PRESSURE: 70 MMHG | SYSTOLIC BLOOD PRESSURE: 122 MMHG | WEIGHT: 214 LBS | BODY MASS INDEX: 39.38 KG/M2

## 2019-02-21 DIAGNOSIS — R87.613 HGSIL (HIGH GRADE SQUAMOUS INTRAEPITHELIAL LESION) ON PAP SMEAR OF CERVIX: Primary | ICD-10-CM

## 2019-02-21 PROBLEM — O34.40 HX LEEP (LOOP ELECTROSURGICAL EXCISION PROCEDURE), CERVIX, PREGNANCY: Status: ACTIVE | Noted: 2019-02-21

## 2019-02-21 PROBLEM — Z98.890 HX LEEP (LOOP ELECTROSURGICAL EXCISION PROCEDURE), CERVIX, PREGNANCY: Status: ACTIVE | Noted: 2019-02-21

## 2019-02-21 LAB
CONTROL: YES
PREGNANCY TEST URINE, POC: NEGATIVE

## 2019-02-21 PROCEDURE — 57454 BX/CURETT OF CERVIX W/SCOPE: CPT | Performed by: OBSTETRICS & GYNECOLOGY

## 2019-02-21 PROCEDURE — 81025 URINE PREGNANCY TEST: CPT | Performed by: OBSTETRICS & GYNECOLOGY

## 2019-02-26 LAB — SURGICAL PATHOLOGY REPORT: NORMAL

## 2019-03-05 ENCOUNTER — OFFICE VISIT (OUTPATIENT)
Dept: OBGYN | Age: 39
End: 2019-03-05
Payer: COMMERCIAL

## 2019-03-05 VITALS
BODY MASS INDEX: 40.3 KG/M2 | HEIGHT: 62 IN | SYSTOLIC BLOOD PRESSURE: 122 MMHG | WEIGHT: 219 LBS | HEART RATE: 82 BPM | DIASTOLIC BLOOD PRESSURE: 73 MMHG

## 2019-03-05 DIAGNOSIS — D06.9 CARCINOMA IN SITU OF CERVIX, UNSPECIFIED LOCATION: ICD-10-CM

## 2019-03-05 DIAGNOSIS — Z01.818 PRE-OPERATIVE CLEARANCE: Primary | ICD-10-CM

## 2019-03-05 PROCEDURE — G8427 DOCREV CUR MEDS BY ELIG CLIN: HCPCS | Performed by: OBSTETRICS & GYNECOLOGY

## 2019-03-05 PROCEDURE — 1036F TOBACCO NON-USER: CPT | Performed by: OBSTETRICS & GYNECOLOGY

## 2019-03-05 PROCEDURE — G8417 CALC BMI ABV UP PARAM F/U: HCPCS | Performed by: OBSTETRICS & GYNECOLOGY

## 2019-03-05 PROCEDURE — 93005 ELECTROCARDIOGRAM TRACING: CPT | Performed by: OBSTETRICS & GYNECOLOGY

## 2019-03-05 PROCEDURE — G8484 FLU IMMUNIZE NO ADMIN: HCPCS | Performed by: OBSTETRICS & GYNECOLOGY

## 2019-03-05 PROCEDURE — 99213 OFFICE O/P EST LOW 20 MIN: CPT | Performed by: OBSTETRICS & GYNECOLOGY

## 2019-03-05 PROCEDURE — 99212 OFFICE O/P EST SF 10 MIN: CPT | Performed by: OBSTETRICS & GYNECOLOGY

## 2019-03-11 ENCOUNTER — TELEPHONE (OUTPATIENT)
Dept: OBGYN | Age: 39
End: 2019-03-11

## 2019-03-18 ENCOUNTER — HOSPITAL ENCOUNTER (OUTPATIENT)
Age: 39
Discharge: HOME | End: 2019-03-18
Payer: COMMERCIAL

## 2019-03-18 LAB
ABSOLUTE EOS #: 0.07 K/UL (ref 0–0.44)
ABSOLUTE IMMATURE GRANULOCYTE: 0.05 K/UL (ref 0–0.3)
ABSOLUTE LYMPH #: 2.48 K/UL (ref 1.1–3.7)
ABSOLUTE MONO #: 0.72 K/UL (ref 0.1–1.2)
ALBUMIN SERPL-MCNC: 4.3 G/DL (ref 3.5–5.2)
ALBUMIN/GLOBULIN RATIO: 1.5 (ref 1–2.5)
ALP BLD-CCNC: 91 U/L (ref 35–104)
ALT SERPL-CCNC: 18 U/L (ref 5–33)
ANION GAP SERPL CALCULATED.3IONS-SCNC: 13 MMOL/L (ref 9–17)
AST SERPL-CCNC: 15 U/L
BASOPHILS # BLD: 1 % (ref 0–2)
BASOPHILS ABSOLUTE: 0.04 K/UL (ref 0–0.2)
BILIRUB SERPL-MCNC: 0.17 MG/DL (ref 0.3–1.2)
BILIRUBIN URINE: NEGATIVE
BUN BLDV-MCNC: 12 MG/DL (ref 6–20)
BUN/CREAT BLD: ABNORMAL (ref 9–20)
CALCIUM SERPL-MCNC: 8.5 MG/DL (ref 8.6–10.4)
CHLORIDE BLD-SCNC: 110 MMOL/L (ref 98–107)
CHOLESTEROL/HDL RATIO: 3.7
CHOLESTEROL: 144 MG/DL
CO2: 18 MMOL/L (ref 20–31)
COLOR: YELLOW
COMMENT UA: NORMAL
CREAT SERPL-MCNC: 1.01 MG/DL (ref 0.5–0.9)
DIFFERENTIAL TYPE: ABNORMAL
EOSINOPHILS RELATIVE PERCENT: 1 % (ref 1–4)
ESTIMATED AVERAGE GLUCOSE: 111 MG/DL
GFR AFRICAN AMERICAN: >60 ML/MIN
GFR NON-AFRICAN AMERICAN: >60 ML/MIN
GFR SERPL CREATININE-BSD FRML MDRD: ABNORMAL ML/MIN/{1.73_M2}
GFR SERPL CREATININE-BSD FRML MDRD: ABNORMAL ML/MIN/{1.73_M2}
GLUCOSE BLD-MCNC: 82 MG/DL (ref 70–99)
GLUCOSE URINE: NEGATIVE
HBA1C MFR BLD: 5.5 % (ref 4–6)
HCT VFR BLD CALC: 39.5 % (ref 36.3–47.1)
HDLC SERPL-MCNC: 39 MG/DL
HEMOGLOBIN: 13.2 G/DL (ref 11.9–15.1)
IMMATURE GRANULOCYTES: 1 %
KETONES, URINE: NEGATIVE
LDL CHOLESTEROL: 69 MG/DL (ref 0–130)
LEUKOCYTE ESTERASE, URINE: NEGATIVE
LYMPHOCYTES # BLD: 37 % (ref 24–43)
MCH RBC QN AUTO: 31 PG (ref 25.2–33.5)
MCHC RBC AUTO-ENTMCNC: 33.4 G/DL (ref 28.4–34.8)
MCV RBC AUTO: 92.7 FL (ref 82.6–102.9)
MONOCYTES # BLD: 11 % (ref 3–12)
NITRITE, URINE: NEGATIVE
NRBC AUTOMATED: 0 PER 100 WBC
PDW BLD-RTO: 13.1 % (ref 11.8–14.4)
PH UA: 7 (ref 5–8)
PLATELET # BLD: 258 K/UL (ref 138–453)
PLATELET ESTIMATE: ABNORMAL
PMV BLD AUTO: 10.4 FL (ref 8.1–13.5)
POTASSIUM SERPL-SCNC: 4 MMOL/L (ref 3.7–5.3)
PROTEIN UA: NEGATIVE
RBC # BLD: 4.26 M/UL (ref 3.95–5.11)
RBC # BLD: ABNORMAL 10*6/UL
SEG NEUTROPHILS: 49 % (ref 36–65)
SEGMENTED NEUTROPHILS ABSOLUTE COUNT: 3.35 K/UL (ref 1.5–8.1)
SODIUM BLD-SCNC: 141 MMOL/L (ref 135–144)
SPECIFIC GRAVITY UA: 1.02 (ref 1–1.03)
THYROXINE, FREE: 0.82 NG/DL (ref 0.93–1.7)
TOTAL PROTEIN: 7.2 G/DL (ref 6.4–8.3)
TRIGL SERPL-MCNC: 181 MG/DL
TSH SERPL DL<=0.05 MIU/L-ACNC: 1.09 MIU/L (ref 0.3–5)
TURBIDITY: CLEAR
URINE HGB: NEGATIVE
UROBILINOGEN, URINE: NORMAL
VITAMIN D 25-HYDROXY: 36.4 NG/ML (ref 30–100)
VLDLC SERPL CALC-MCNC: ABNORMAL MG/DL (ref 1–30)
WBC # BLD: 6.7 K/UL (ref 3.5–11.3)
WBC # BLD: ABNORMAL 10*3/UL

## 2019-03-18 PROCEDURE — 84439 ASSAY OF FREE THYROXINE: CPT

## 2019-03-18 PROCEDURE — 82306 VITAMIN D 25 HYDROXY: CPT

## 2019-03-18 PROCEDURE — 83036 HEMOGLOBIN GLYCOSYLATED A1C: CPT

## 2019-03-18 PROCEDURE — 36415 COLL VENOUS BLD VENIPUNCTURE: CPT

## 2019-03-18 PROCEDURE — 85025 COMPLETE CBC W/AUTO DIFF WBC: CPT

## 2019-03-18 PROCEDURE — 84443 ASSAY THYROID STIM HORMONE: CPT

## 2019-03-18 PROCEDURE — 80053 COMPREHEN METABOLIC PANEL: CPT

## 2019-03-18 PROCEDURE — 80061 LIPID PANEL: CPT

## 2019-03-18 PROCEDURE — 81003 URINALYSIS AUTO W/O SCOPE: CPT

## 2019-03-20 ENCOUNTER — TELEPHONE (OUTPATIENT)
Dept: OBGYN | Age: 39
End: 2019-03-20

## 2019-03-29 ENCOUNTER — HOSPITAL ENCOUNTER (OUTPATIENT)
Dept: GENERAL RADIOLOGY | Age: 39
Discharge: HOME | End: 2019-03-31
Payer: COMMERCIAL

## 2019-03-29 ENCOUNTER — HOSPITAL ENCOUNTER (OUTPATIENT)
Dept: PREADMISSION TESTING | Age: 39
Discharge: HOME | End: 2019-04-02
Payer: COMMERCIAL

## 2019-03-29 VITALS
WEIGHT: 215 LBS | BODY MASS INDEX: 39.56 KG/M2 | OXYGEN SATURATION: 97 % | RESPIRATION RATE: 18 BRPM | TEMPERATURE: 97.8 F | DIASTOLIC BLOOD PRESSURE: 75 MMHG | HEIGHT: 62 IN | HEART RATE: 75 BPM | SYSTOLIC BLOOD PRESSURE: 110 MMHG

## 2019-03-29 LAB
ANION GAP SERPL CALCULATED.3IONS-SCNC: 10 MMOL/L (ref 9–17)
BUN BLDV-MCNC: 11 MG/DL (ref 6–20)
CHLORIDE BLD-SCNC: 110 MMOL/L (ref 98–107)
CO2: 23 MMOL/L (ref 20–31)
CREAT SERPL-MCNC: 0.95 MG/DL (ref 0.5–0.9)
GFR AFRICAN AMERICAN: >60 ML/MIN
GFR NON-AFRICAN AMERICAN: >60 ML/MIN
GFR SERPL CREATININE-BSD FRML MDRD: ABNORMAL ML/MIN/{1.73_M2}
GFR SERPL CREATININE-BSD FRML MDRD: ABNORMAL ML/MIN/{1.73_M2}
GLUCOSE BLD-MCNC: 75 MG/DL (ref 70–99)
HCT VFR BLD CALC: 39.3 % (ref 36.3–47.1)
HEMOGLOBIN: 13.4 G/DL (ref 11.9–15.1)
MCH RBC QN AUTO: 31.4 PG (ref 25.2–33.5)
MCHC RBC AUTO-ENTMCNC: 34.1 G/DL (ref 28.4–34.8)
MCV RBC AUTO: 92 FL (ref 82.6–102.9)
NRBC AUTOMATED: 0 PER 100 WBC
PDW BLD-RTO: 12.8 % (ref 11.8–14.4)
PLATELET # BLD: 241 K/UL (ref 138–453)
PMV BLD AUTO: 10 FL (ref 8.1–13.5)
POTASSIUM SERPL-SCNC: 3.9 MMOL/L (ref 3.7–5.3)
RBC # BLD: 4.27 M/UL (ref 3.95–5.11)
SODIUM BLD-SCNC: 143 MMOL/L (ref 135–144)
WBC # BLD: 6.8 K/UL (ref 3.5–11.3)

## 2019-03-29 PROCEDURE — 80051 ELECTROLYTE PANEL: CPT

## 2019-03-29 PROCEDURE — 82565 ASSAY OF CREATININE: CPT

## 2019-03-29 PROCEDURE — 71046 X-RAY EXAM CHEST 2 VIEWS: CPT

## 2019-03-29 PROCEDURE — 82947 ASSAY GLUCOSE BLOOD QUANT: CPT

## 2019-03-29 PROCEDURE — 36415 COLL VENOUS BLD VENIPUNCTURE: CPT

## 2019-03-29 PROCEDURE — 85027 COMPLETE CBC AUTOMATED: CPT

## 2019-03-29 PROCEDURE — 84520 ASSAY OF UREA NITROGEN: CPT

## 2019-03-29 RX ORDER — SUMATRIPTAN 50 MG/1
100 TABLET, FILM COATED ORAL
COMMUNITY

## 2019-03-29 RX ORDER — ATORVASTATIN CALCIUM 20 MG/1
20 TABLET, FILM COATED ORAL DAILY
Refills: 2 | COMMUNITY
Start: 2019-03-18

## 2019-03-29 RX ORDER — LURASIDONE HYDROCHLORIDE 120 MG/1
120 TABLET, FILM COATED ORAL DAILY
Refills: 2 | COMMUNITY
Start: 2019-03-11

## 2019-03-29 RX ORDER — LEVOTHYROXINE SODIUM 0.05 MG/1
50 TABLET ORAL DAILY
Refills: 2 | COMMUNITY
Start: 2019-03-18

## 2019-03-29 RX ORDER — MIRTAZAPINE 15 MG/1
15 TABLET, FILM COATED ORAL NIGHTLY
COMMUNITY
Start: 2019-03-01 | End: 2020-01-30

## 2019-03-29 RX ORDER — SODIUM CHLORIDE, SODIUM LACTATE, POTASSIUM CHLORIDE, CALCIUM CHLORIDE 600; 310; 30; 20 MG/100ML; MG/100ML; MG/100ML; MG/100ML
1000 INJECTION, SOLUTION INTRAVENOUS CONTINUOUS
Status: CANCELLED | OUTPATIENT
Start: 2019-03-29

## 2019-04-04 ENCOUNTER — ANESTHESIA EVENT (OUTPATIENT)
Dept: OPERATING ROOM | Age: 39
End: 2019-04-04
Payer: COMMERCIAL

## 2019-04-05 ENCOUNTER — HOSPITAL ENCOUNTER (OUTPATIENT)
Age: 39
Setting detail: OUTPATIENT SURGERY
Discharge: HOME | End: 2019-04-05
Attending: OBSTETRICS & GYNECOLOGY | Admitting: OBSTETRICS & GYNECOLOGY
Payer: COMMERCIAL

## 2019-04-05 ENCOUNTER — ANESTHESIA (OUTPATIENT)
Dept: OPERATING ROOM | Age: 39
End: 2019-04-05
Payer: COMMERCIAL

## 2019-04-05 VITALS
WEIGHT: 215 LBS | DIASTOLIC BLOOD PRESSURE: 74 MMHG | RESPIRATION RATE: 16 BRPM | HEIGHT: 62 IN | TEMPERATURE: 97.7 F | BODY MASS INDEX: 39.56 KG/M2 | SYSTOLIC BLOOD PRESSURE: 132 MMHG | HEART RATE: 70 BPM | OXYGEN SATURATION: 98 %

## 2019-04-05 VITALS — DIASTOLIC BLOOD PRESSURE: 67 MMHG | SYSTOLIC BLOOD PRESSURE: 102 MMHG | OXYGEN SATURATION: 97 %

## 2019-04-05 DIAGNOSIS — M23.92 DERANGEMENT, KNEE INTERNAL, LEFT: ICD-10-CM

## 2019-04-05 DIAGNOSIS — G89.18 POST-OPERATIVE PAIN: Primary | ICD-10-CM

## 2019-04-05 PROBLEM — Z98.890 HISTORY OF CONIZATION OF CERVIX: Status: ACTIVE | Noted: 2019-04-05

## 2019-04-05 LAB — HCG, PREGNANCY URINE (POC): NEGATIVE

## 2019-04-05 PROCEDURE — 7100000040 HC SPAR PHASE II RECOVERY - FIRST 15 MIN: Performed by: OBSTETRICS & GYNECOLOGY

## 2019-04-05 PROCEDURE — 6360000002 HC RX W HCPCS: Performed by: ANESTHESIOLOGY

## 2019-04-05 PROCEDURE — 7100000041 HC SPAR PHASE II RECOVERY - ADDTL 15 MIN: Performed by: OBSTETRICS & GYNECOLOGY

## 2019-04-05 PROCEDURE — 84703 CHORIONIC GONADOTROPIN ASSAY: CPT

## 2019-04-05 PROCEDURE — 88305 TISSUE EXAM BY PATHOLOGIST: CPT

## 2019-04-05 PROCEDURE — 2580000003 HC RX 258: Performed by: OBSTETRICS & GYNECOLOGY

## 2019-04-05 PROCEDURE — 2580000003 HC RX 258: Performed by: ANESTHESIOLOGY

## 2019-04-05 PROCEDURE — 2500000003 HC RX 250 WO HCPCS: Performed by: OBSTETRICS & GYNECOLOGY

## 2019-04-05 PROCEDURE — 3600000003 HC SURGERY LEVEL 3 BASE: Performed by: OBSTETRICS & GYNECOLOGY

## 2019-04-05 PROCEDURE — 6370000000 HC RX 637 (ALT 250 FOR IP): Performed by: ANESTHESIOLOGY

## 2019-04-05 PROCEDURE — 6370000000 HC RX 637 (ALT 250 FOR IP): Performed by: OBSTETRICS & GYNECOLOGY

## 2019-04-05 PROCEDURE — 2500000003 HC RX 250 WO HCPCS: Performed by: NURSE ANESTHETIST, CERTIFIED REGISTERED

## 2019-04-05 PROCEDURE — 2709999900 HC NON-CHARGEABLE SUPPLY: Performed by: OBSTETRICS & GYNECOLOGY

## 2019-04-05 PROCEDURE — 57520 CONIZATION OF CERVIX: CPT | Performed by: OBSTETRICS & GYNECOLOGY

## 2019-04-05 PROCEDURE — 6360000002 HC RX W HCPCS: Performed by: NURSE ANESTHETIST, CERTIFIED REGISTERED

## 2019-04-05 PROCEDURE — 3600000013 HC SURGERY LEVEL 3 ADDTL 15MIN: Performed by: OBSTETRICS & GYNECOLOGY

## 2019-04-05 PROCEDURE — 3700000001 HC ADD 15 MINUTES (ANESTHESIA): Performed by: OBSTETRICS & GYNECOLOGY

## 2019-04-05 PROCEDURE — 2580000003 HC RX 258: Performed by: NURSE ANESTHETIST, CERTIFIED REGISTERED

## 2019-04-05 PROCEDURE — 3700000000 HC ANESTHESIA ATTENDED CARE: Performed by: OBSTETRICS & GYNECOLOGY

## 2019-04-05 RX ORDER — IBUPROFEN 800 MG/1
800 TABLET ORAL ONCE
Status: COMPLETED | OUTPATIENT
Start: 2019-04-05 | End: 2019-04-05

## 2019-04-05 RX ORDER — PROPOFOL 10 MG/ML
INJECTION, EMULSION INTRAVENOUS PRN
Status: DISCONTINUED | OUTPATIENT
Start: 2019-04-05 | End: 2019-04-05 | Stop reason: SDUPTHER

## 2019-04-05 RX ORDER — SODIUM CHLORIDE, SODIUM LACTATE, POTASSIUM CHLORIDE, CALCIUM CHLORIDE 600; 310; 30; 20 MG/100ML; MG/100ML; MG/100ML; MG/100ML
1000 INJECTION, SOLUTION INTRAVENOUS CONTINUOUS
Status: DISCONTINUED | OUTPATIENT
Start: 2019-04-05 | End: 2019-04-05 | Stop reason: HOSPADM

## 2019-04-05 RX ORDER — LIDOCAINE HYDROCHLORIDE 10 MG/ML
INJECTION, SOLUTION EPIDURAL; INFILTRATION; INTRACAUDAL; PERINEURAL PRN
Status: DISCONTINUED | OUTPATIENT
Start: 2019-04-05 | End: 2019-04-05 | Stop reason: SDUPTHER

## 2019-04-05 RX ORDER — BUPIVACAINE HYDROCHLORIDE AND EPINEPHRINE 5; 5 MG/ML; UG/ML
INJECTION, SOLUTION EPIDURAL; INTRACAUDAL; PERINEURAL PRN
Status: DISCONTINUED | OUTPATIENT
Start: 2019-04-05 | End: 2019-04-05 | Stop reason: ALTCHOICE

## 2019-04-05 RX ORDER — MIDAZOLAM HYDROCHLORIDE 1 MG/ML
1 INJECTION INTRAMUSCULAR; INTRAVENOUS EVERY 10 MIN PRN
Status: DISCONTINUED | OUTPATIENT
Start: 2019-04-05 | End: 2019-04-05 | Stop reason: HOSPADM

## 2019-04-05 RX ORDER — FENTANYL CITRATE 50 UG/ML
INJECTION, SOLUTION INTRAMUSCULAR; INTRAVENOUS PRN
Status: DISCONTINUED | OUTPATIENT
Start: 2019-04-05 | End: 2019-04-05 | Stop reason: SDUPTHER

## 2019-04-05 RX ORDER — GLYCOPYRROLATE 1 MG/5 ML
SYRINGE (ML) INTRAVENOUS PRN
Status: DISCONTINUED | OUTPATIENT
Start: 2019-04-05 | End: 2019-04-05 | Stop reason: SDUPTHER

## 2019-04-05 RX ORDER — SODIUM CHLORIDE, SODIUM LACTATE, POTASSIUM CHLORIDE, CALCIUM CHLORIDE 600; 310; 30; 20 MG/100ML; MG/100ML; MG/100ML; MG/100ML
INJECTION, SOLUTION INTRAVENOUS CONTINUOUS PRN
Status: DISCONTINUED | OUTPATIENT
Start: 2019-04-05 | End: 2019-04-05 | Stop reason: SDUPTHER

## 2019-04-05 RX ORDER — HYDROCODONE BITARTRATE AND ACETAMINOPHEN 5; 325 MG/1; MG/1
1 TABLET ORAL EVERY 4 HOURS PRN
Qty: 10 TABLET | Refills: 0 | Status: SHIPPED | OUTPATIENT
Start: 2019-04-05 | End: 2019-04-08

## 2019-04-05 RX ORDER — PROPOFOL 10 MG/ML
INJECTION, EMULSION INTRAVENOUS CONTINUOUS PRN
Status: DISCONTINUED | OUTPATIENT
Start: 2019-04-05 | End: 2019-04-05 | Stop reason: SDUPTHER

## 2019-04-05 RX ORDER — MAGNESIUM HYDROXIDE 1200 MG/15ML
LIQUID ORAL CONTINUOUS PRN
Status: DISCONTINUED | OUTPATIENT
Start: 2019-04-05 | End: 2019-04-05 | Stop reason: ALTCHOICE

## 2019-04-05 RX ORDER — IBUPROFEN 800 MG/1
800 TABLET ORAL EVERY 8 HOURS
Qty: 30 TABLET | Refills: 0 | Status: SHIPPED | OUTPATIENT
Start: 2019-04-05 | End: 2019-04-18 | Stop reason: SDUPTHER

## 2019-04-05 RX ORDER — FENTANYL CITRATE 50 UG/ML
25 INJECTION, SOLUTION INTRAMUSCULAR; INTRAVENOUS EVERY 5 MIN PRN
Status: DISCONTINUED | OUTPATIENT
Start: 2019-04-05 | End: 2019-04-05 | Stop reason: HOSPADM

## 2019-04-05 RX ORDER — MIDAZOLAM HYDROCHLORIDE 1 MG/ML
INJECTION INTRAMUSCULAR; INTRAVENOUS PRN
Status: DISCONTINUED | OUTPATIENT
Start: 2019-04-05 | End: 2019-04-05 | Stop reason: SDUPTHER

## 2019-04-05 RX ORDER — SODIUM CHLORIDE, SODIUM LACTATE, POTASSIUM CHLORIDE, CALCIUM CHLORIDE 600; 310; 30; 20 MG/100ML; MG/100ML; MG/100ML; MG/100ML
INJECTION, SOLUTION INTRAVENOUS CONTINUOUS
Status: DISCONTINUED | OUTPATIENT
Start: 2019-04-05 | End: 2019-04-05 | Stop reason: HOSPADM

## 2019-04-05 RX ADMIN — PROPOFOL 50 MG: 10 INJECTION, EMULSION INTRAVENOUS at 07:38

## 2019-04-05 RX ADMIN — LIDOCAINE HYDROCHLORIDE 100 MG: 10 INJECTION, SOLUTION EPIDURAL; INFILTRATION; INTRACAUDAL; PERINEURAL at 07:38

## 2019-04-05 RX ADMIN — MIDAZOLAM HYDROCHLORIDE 1 MG: 1 INJECTION, SOLUTION INTRAMUSCULAR; INTRAVENOUS at 06:57

## 2019-04-05 RX ADMIN — SODIUM CHLORIDE, POTASSIUM CHLORIDE, SODIUM LACTATE AND CALCIUM CHLORIDE: 600; 310; 30; 20 INJECTION, SOLUTION INTRAVENOUS at 07:30

## 2019-04-05 RX ADMIN — MIDAZOLAM HYDROCHLORIDE 2 MG: 1 INJECTION, SOLUTION INTRAMUSCULAR; INTRAVENOUS at 07:38

## 2019-04-05 RX ADMIN — Medication 0.2 MG: at 07:38

## 2019-04-05 RX ADMIN — SODIUM CHLORIDE, POTASSIUM CHLORIDE, SODIUM LACTATE AND CALCIUM CHLORIDE 1000 ML: 600; 310; 30; 20 INJECTION, SOLUTION INTRAVENOUS at 06:42

## 2019-04-05 RX ADMIN — FENTANYL CITRATE 50 MCG: 50 INJECTION INTRAMUSCULAR; INTRAVENOUS at 07:38

## 2019-04-05 RX ADMIN — PROPOFOL 100 MCG/KG/MIN: 10 INJECTION, EMULSION INTRAVENOUS at 07:38

## 2019-04-05 RX ADMIN — IBUPROFEN 800 MG: 800 TABLET, FILM COATED ORAL at 08:32

## 2019-04-05 RX ADMIN — PROPOFOL 30 MG: 10 INJECTION, EMULSION INTRAVENOUS at 07:40

## 2019-04-05 ASSESSMENT — PULMONARY FUNCTION TESTS
PIF_VALUE: 0
PIF_VALUE: 1

## 2019-04-05 ASSESSMENT — PAIN DESCRIPTION - DESCRIPTORS: DESCRIPTORS: CRAMPING

## 2019-04-05 ASSESSMENT — PAIN SCALES - GENERAL
PAINLEVEL_OUTOF10: 9
PAINLEVEL_OUTOF10: 2
PAINLEVEL_OUTOF10: 9
PAINLEVEL_OUTOF10: 5
PAINLEVEL_OUTOF10: 7

## 2019-04-05 ASSESSMENT — PAIN DESCRIPTION - LOCATION: LOCATION: PERINEUM

## 2019-04-05 ASSESSMENT — LIFESTYLE VARIABLES: SMOKING_STATUS: 1

## 2019-04-05 ASSESSMENT — PAIN DESCRIPTION - PAIN TYPE: TYPE: SURGICAL PAIN

## 2019-04-05 ASSESSMENT — PAIN - FUNCTIONAL ASSESSMENT: PAIN_FUNCTIONAL_ASSESSMENT: 0-10

## 2019-04-05 NOTE — ANESTHESIA PRE PROCEDURE
Department of Anesthesiology  Preprocedure Note       Name:  Ivan Matt   Age:  35 y.o.  :  1980                                          MRN:  [id number]         Date:  2019      Surgeon: Adriana Puga):  Claudia Burroughs DO    Procedure: COLD KNIFE CONIZATION WITH BIOPSY (N/A )    Medications prior to admission:   Prior to Admission medications    Medication Sig Start Date End Date Taking? Authorizing Provider   LATUDA 80 MG TABS tablet Take 80 mg by mouth daily 3/11/19  Yes Historical Provider, MD   atorvastatin (LIPITOR) 20 MG tablet Take 20 mg by mouth daily 3/18/19  Yes Historical Provider, MD   levothyroxine (SYNTHROID) 50 MCG tablet Take 50 mcg by mouth daily 3/18/19  Yes Historical Provider, MD   SUMAtriptan (IMITREX) 50 MG tablet Take 100 mg by mouth once as needed for Migraine   Yes Historical Provider, MD   fluticasone (VERAMYST) 27.5 MCG/SPRAY nasal spray 2 sprays by Nasal route 2 times daily   Yes Historical Provider, MD   econazole nitrate 1 % cream Apply 1 each topically daily as needed  3/29/16  Yes Historical Provider, MD   Emolucasient Matti Soto) LOTN Apply 1 each topically daily  3/30/16  Yes Historical Provider, MD   topiramate (TOPAMAX) 25 MG tablet Take 25 mg by mouth daily  3/30/16  Yes Historical Provider, MD   cetirizine (ZYRTEC ALLERGY) 10 MG TABS Take 10 mg by mouth daily 7/8/15  Yes Karyn Ojeda MD   pantoprazole (PROTONIX) 40 MG tablet Take 40 mg by mouth daily.    Yes Historical Provider, MD   mirtazapine (REMERON) 15 MG tablet Take 15 mg by mouth nightly 3/1/19   Historical Provider, MD   ibuprofen (ADVIL;MOTRIN) 800 MG tablet Take 1 tablet by mouth every 8 hours  Patient taking differently: Take 800 mg by mouth every 8 hours as needed for Pain  7/10/17   Dexter Moffett DO   docusate sodium (COLACE) 100 MG capsule Take 1 capsule by mouth 2 times daily 17   Ronald Rivera MD   Budesonide-Formoterol Fumarate (SYMBICORT IN) Inhale 2 puffs into the lungs daily as needed Historical Provider, MD   albuterol (PROVENTIL HFA) 108 (90 BASE) MCG/ACT inhaler Inhale 2 puffs into the lungs every 4 hours as needed for Wheezing. Historical Provider, MD   diphenoxylate-atropine (LOMOTIL) 2.5-0.025 MG per tablet Take 1 tablet by mouth daily as needed  4/18/14   Historical Provider, MD   polyethylene glycol (GLYCOLAX) powder Take 17 g by mouth daily as needed  5/20/14   Historical Provider, MD   ondansetron (ZOFRAN) 4 MG tablet Take 1 tablet by mouth every 8 hours as needed for Nausea. 4/18/14   Anthony Garces MD       Current medications:    Current Facility-Administered Medications   Medication Dose Route Frequency Provider Last Rate Last Dose    lactated ringers infusion   Intravenous Continuous Donte Quinn MD        midazolam (VERSED) injection 1 mg  1 mg Intravenous Q10 Min PRN Donte Quinn MD        lactated ringers infusion 1,000 mL  1,000 mL Intravenous Continuous Sumi Boo MD 50 mL/hr at 04/05/19 0642 1,000 mL at 04/05/19 5773       Allergies:     Allergies   Allergen Reactions    Codeine Hives    Penicillins Swelling       Problem List:    Patient Active Problem List   Diagnosis Code    Schizophrenia ([facility] [address] F20.9    HTN (hypertension) I10    Anxiety F41.9    GERD (gastroesophageal reflux disease) K21.9    Hyperlipemia E78.5    EVER III (cervical intraepithelial neoplasia III) D06.9    Patellofemoral pain syndrome of left knee M22.2X2    Hx LEEP  Z98.890       Past Medical History:        Diagnosis Date    Abnormal Pap smear of cervix [id number]    Anxiety     Aortic regurgitation     mild-mod on echo    Asthma     Depression     GERD (gastroesophageal reflux disease)     Headache(784.0)     Heart murmur     Herpes     Hyperlipidemia     Hypertension     [facility]-Dr. Segundo Jennings    Migraine headache     Obesity     Schizophrenia ([facility] [address]     Seasonal allergies     Sleep apnea     no machine    Wears partial dentures     upper and lower partial       Past Surgical History:        Procedure Laterality Date    LEEP  06/06/16    PRE-MALIGNANT / BENIGN SKIN LESION EXCISION Left 5/12/16    foot       Social History:    Social History     Tobacco Use    Smoking status: Current Every Day Smoker     Packs/day: 1.00     Types: Cigarettes    Smokeless tobacco: Never Used   Substance Use Topics    Alcohol use: No     Comment: rare                                Ready to quit: Not Answered  Counseling given: Not Answered      Vital Signs (Current):   Vitals:    04/05/19 0608 04/05/19 0635 04/05/19 0642   BP:   110/84   Pulse:   69   Resp:   16   Temp:  97.9 °F (36.6 °C)    TempSrc:  Temporal    SpO2:   98%   Weight: 215 lb (97.5 kg)     Height: 5' 2\" (1.575 m)                                                BP Readings from Last 3 Encounters:   04/05/19 110/84   03/29/19 110/75   03/05/19 122/73       NPO Status: Time of last liquid consumption: 2359                        Time of last solid consumption: 2359                        Date of last liquid consumption: 04/04/19                        Date of last solid food consumption: 04/04/19    BMI:   Wt Readings from Last 3 Encounters:   04/05/19 215 lb (97.5 kg)   03/29/19 215 lb (97.5 kg)   03/05/19 219 lb (99.3 kg)     Body mass index is 39.32 kg/m².     CBC:   Lab Results   Component Value Date    WBC 6.8 03/29/2019    RBC 4.27 03/29/2019    RBC 3.89 03/20/2012    HGB 13.4 03/29/2019    HCT 39.3 03/29/2019    MCV 92.0 03/29/2019    RDW 12.8 03/29/2019     03/29/2019     03/20/2012       CMP:   Lab Results   Component Value Date     03/29/2019    K 3.9 03/29/2019     03/29/2019    CO2 23 03/29/2019    BUN 11 03/29/2019    CREATININE 0.95 03/29/2019    GFRAA >60 03/29/2019    LABGLOM >60 03/29/2019    GLUCOSE 75 03/29/2019    PROT 7.2 03/18/2019    CALCIUM 8.5 03/18/2019    BILITOT 0.17 03/18/2019    ALKPHOS 91 03/18/2019    AST 15 03/18/2019    ALT 18 03/18/2019       POC Tests: No results for input(s): POCGLU, POCNA, POCK, POCCL, POCBUN, POCHEMO, POCHCT in the last 72 hours. Coags: No results found for: PROTIME, INR, APTT    HCG (If Applicable):   Lab Results   Component Value Date    PREGTESTUR negative 02/21/2019    HCG NEGATIVE 05/12/2016        ABGs: No results found for: PHART, PO2ART, ZGF6KOM, REG3PIA, BEART, [id number]     Type & Screen (If Applicable):  No results found for: LABABO, LABRH    Anesthesia Evaluation    Airway: Mallampati: II        Dental:    (+) upper dentures      Pulmonary: breath sounds clear to auscultation  (+) sleep apnea:  asthma: current smoker                           Cardiovascular:    (+) hypertension:, valvular problems/murmurs:,         Rhythm: regular  Rate: normal  Echocardiogram reviewed                  Neuro/Psych:   (+) headaches:, psychiatric history:            GI/Hepatic/Renal:   (+) GERD:, morbid obesity          Endo/Other: Negative Endo/Other ROS                    Abdominal:   (+) obese,         Vascular:                                        Anesthesia Plan      general and MAC     ASA 3       Induction: intravenous. Anesthetic plan and risks discussed with patient. Plan discussed with CRNA.                   Jada Kay MD   4/5/2019

## 2019-04-05 NOTE — OP NOTE
the procedure. A weighted speculum was placed into the vagina. The cervix was grasped with a single tooth tenaculum. The cervix was then injected with 10ml 1% lidocaine with epinephrine into the cervical stroma. Stay sutures were placed at the 3 and 9 O'clock positions. An 11 blade scalpel was then used to excise the cervical cone biopsy. The biopsy was grasped with an Allis clamp and curved may scissors were used to transect the base of the cone biopsy. Next, upper endocervical curettage was carried out with sharp curettage. Ball electrocautery was used for hemostasis at the site of the excision. Gel foam was then placed in the biopsy site and the stay sutures were tied in the midline to secure the gelfoam in place. All instruments were then removed from the vagina. Instrument, sponge, and needle counts were correct at the conclusion of the case. SCDs for DVT prophylaxis remain in place for the post operative period. Dr. Sampson Johnston was present for the entire operation.     Findings: Grossly appearing normal cervix   Total IV fluids/Blood products:  700 ml crystalloid  Urine Output:  Patient voided prior to procedure    Estimated blood loss:  5 mL  Drains:  none  Specimens:                1. Cervical Cone Biopsy, open @ 3 O'clock              2. Endocervical Curettings  Instrument and Sponge Count: Correct  Complications:  none  Condition:  stable, transferred to post anesthesia recovery      Neal Joyce DO  Ob/Gyn Resident  4/5/2019, 11:43 AM

## 2019-04-05 NOTE — H&P
OB/GYN Pre-Op H&P  [address] [facility]    Patient Name: Carmen Charles     Patient : 1980  [facility]  Admission Date/Time: 2019  5:02 AM  Primary Care Physician: SHERRIE Perez CNP  MRN: [id number]    Date: 2019  Time: 6:51 AM    The patient was seen in pre-op holding. She is here for a Cold Knife Cone. Patient is a 39y/o female  with persistent cervical dysplasia. She had a colposcopy performed on 19 secondary to HSIL on her pap smear from 1/15/19. Patients pathology revealed persistent EVER 2 on ECC. Both ectocervix biopsies were negative. Patient has had persistent EVER 2 since 2016. She had a LEEP performed in 2016 with narrowly negative margins on endocervical top hat. Patient was counseled on recommendation for repeat excisional procedure via cold knife cone vs definitive surgical management with hysterectomy. Patient states that she is planning to get  soon and would like the ability to have more children. Patient agreeable to recommendation for excisional procedure. Patient was counseled on R/B/A to procedure including risk for a shortened cervix and  delivery in future pregnancies. All questions answered    The labs, Consent, and H&P were reviewed and updated. The patient was counseled on the possibility of  the need of a second surgery. The patient voiced understanding and had all of her questions answered. The possibility of incomplete removal of abnormal tissue was discussed.     OBSTETRICAL HISTORY:   OB History    Para Term  AB Living   7 7 7 0 0 7   SAB TAB Ectopic Molar Multiple Live Births   0 0 0 0 0 0      # Outcome Date GA Lbr Lasha/2nd Weight Sex Delivery Anes PTL Lv   7 Term      Vag-Spont      6 Term      Vag-Spont      5 Term      Vag-Spont      4 Term      Vag-Spont      3 Term      Vag-Spont      2 Term      Vag-Spont      1 Term      Vag-Spont          PAST MEDICAL HISTORY:   has a past medical history of Abnormal Pap smear of cervix, Anxiety, Aortic regurgitation, Asthma, Depression, GERD (gastroesophageal reflux disease), Headache(784.0), Heart murmur, Herpes, Hyperlipidemia, Hypertension, Migraine headache, Obesity, Schizophrenia ([address] Seasonal allergies, Sleep apnea, and Wears partial dentures. PAST SURGICAL HISTORY:   has a past surgical history that includes pre-malignant / benign skin lesion excision (Left, 5/12/16) and LEEP (06/06/16). ALLERGIES:  Allergies as of 03/25/2019 - Review Complete 03/06/2019   Allergen Reaction Noted    Codeine Hives 04/18/2014    Penicillins Swelling 04/18/2014       MEDICATIONS:  Current Facility-Administered Medications   Medication Dose Route Frequency Provider Last Rate Last Dose    lactated ringers infusion   Intravenous Continuous Mery Lozano MD        midazolam (VERSED) injection 1 mg  1 mg Intravenous Q10 Min PRN Mery Lozano MD        lactated ringers infusion 1,000 mL  1,000 mL Intravenous Continuous Vidya Hinkle MD 50 mL/hr at 04/05/19 0642 1,000 mL at 04/05/19 0410    fentaNYL (SUBLIMAZE) injection 25 mcg  25 mcg Intravenous Q5 Min PRN Caridad Kirkland MD           FAMILY HISTORY:  family history includes Cancer in her mother; Diabetes in her brother and mother; Endometrial Cancer in her mother; Heart Disease in her brother; High Blood Pressure in her mother. SOCIAL HISTORY:   reports that she has been smoking cigarettes. She has been smoking about 1.00 pack per day. She has never used smokeless tobacco. She reports that she has current or past drug history. Drug: Marijuana. She reports that she does not drink alcohol.     VITALS:  Vitals:    04/05/19 0608 04/05/19 0635 04/05/19 0642   BP:   110/84   Pulse:   69   Resp:   16   Temp:  97.9 °F (36.6 °C)    TempSrc:  Temporal    SpO2:   98%   Weight: 215 lb (97.5 kg)     Height: 5' 2\" (1.575 m) PHYSICAL EXAM:     General: AAO x 3, NAD  Heart: RRR, no murmur  Lungs: CTA b/l, no wheezing/rhonci   Abdomen: soft, non tender, BS x 4, no guarding or rigidity   Extremities: non tender, non edematous      LAB RESULTS:  Hospital Outpatient Visit on 03/29/2019   Component Date Value Ref Range Status    WBC 03/29/2019 6.8  3.5 - 11.3 k/uL Final    RBC 03/29/2019 4.27  3.95 - 5.11 m/uL Final    Hemoglobin 03/29/2019 13.4  11.9 - 15.1 g/dL Final    Hematocrit 03/29/2019 39.3  36.3 - 47.1 % Final    MCV 03/29/2019 92.0  82.6 - 102.9 fL Final    MCH 03/29/2019 31.4  25.2 - 33.5 pg Final    MCHC 03/29/2019 34.1  28.4 - 34.8 g/dL Final    RDW 03/29/2019 12.8  11.8 - 14.4 % Final    Platelets 61/93/3064 241  138 - 453 k/uL Final    MPV 03/29/2019 10.0  8.1 - 13.5 fL Final    NRBC Automated 03/29/2019 0.0  0.0 per 100 WBC Final    Glucose 03/29/2019 75  70 - 99 mg/dL Final    Sodium 03/29/2019 143  135 - 144 mmol/L Final    Potassium 03/29/2019 3.9  3.7 - 5.3 mmol/L Final    Chloride 03/29/2019 110* 98 - 107 mmol/L Final    CO2 03/29/2019 23  20 - 31 mmol/L Final    Anion Gap 03/29/2019 10  9 - 17 mmol/L Final    BUN 03/29/2019 11  6 - 20 mg/dL Final    CREATININE 03/29/2019 0.95* 0.50 - 0.90 mg/dL Final    GFR Non- 03/29/2019 >60  >60 mL/min Final    GFR  03/29/2019 >60  >60 mL/min Final    GFR Comment 03/29/2019        Final    Comment: Average GFR for 38-51 years old:   80 mL/min/1.73sq m  Chronic Kidney Disease:   <60 mL/min/1.73sq m  Kidney failure:   <15 mL/min/1.73sq m              eGFR calculated using average adult body mass.  Additional eGFR calculator available at:        [email or web address]            GFR Staging 03/29/2019 NOT REPORTED   Final   Hospital Outpatient Visit on 03/18/2019   Component Date Value Ref Range Status    WBC 03/18/2019 6.7  3.5 - 11.3 k/uL Final    RBC 03/18/2019 4.26 3.95 - 5.11 m/uL Final    Hemoglobin 03/18/2019 13.2  11.9 - 15.1 g/dL Final    Hematocrit 03/18/2019 39.5  36.3 - 47.1 % Final    MCV 03/18/2019 92.7  82.6 - 102.9 fL Final    MCH 03/18/2019 31.0  25.2 - 33.5 pg Final    MCHC 03/18/2019 33.4  28.4 - 34.8 g/dL Final    RDW 03/18/2019 13.1  11.8 - 14.4 % Final    Platelets 92/87/2575 258  138 - 453 k/uL Final    MPV 03/18/2019 10.4  8.1 - 13.5 fL Final    NRBC Automated 03/18/2019 0.0  0.0 per 100 WBC Final    Differential Type 03/18/2019 NOT REPORTED   Final    Seg Neutrophils 03/18/2019 49  36 - 65 % Final    Lymphocytes 03/18/2019 37  24 - 43 % Final    Monocytes 03/18/2019 11  3 - 12 % Final    Eosinophils % 03/18/2019 1  1 - 4 % Final    Basophils 03/18/2019 1  0 - 2 % Final    Immature Granulocytes 03/18/2019 1* 0 % Final    Segs Absolute 03/18/2019 3.35  1.50 - 8.10 k/uL Final    Absolute Lymph # 03/18/2019 2.48  1.10 - 3.70 k/uL Final    Absolute Mono # 03/18/2019 0.72  0.10 - 1.20 k/uL Final    Absolute Eos # 03/18/2019 0.07  0.00 - 0.44 k/uL Final    Basophils # 03/18/2019 0.04  0.00 - 0.20 k/uL Final    Absolute Immature Granulocyte 03/18/2019 0.05  0.00 - 0.30 k/uL Final    WBC Morphology 03/18/2019 NOT REPORTED   Final    RBC Morphology 03/18/2019 NOT REPORTED   Final    Platelet Estimate 30/57/4888 NOT REPORTED   Final    Glucose 03/18/2019 82  70 - 99 mg/dL Final    BUN 03/18/2019 12  6 - 20 mg/dL Final    CREATININE 03/18/2019 1.01* 0.50 - 0.90 mg/dL Final    Bun/Cre Ratio 03/18/2019 NOT REPORTED  9 - 20 Final    Calcium 03/18/2019 8.5* 8.6 - 10.4 mg/dL Final    Sodium 03/18/2019 141  135 - 144 mmol/L Final    Potassium 03/18/2019 4.0  3.7 - 5.3 mmol/L Final    Chloride 03/18/2019 110* 98 - 107 mmol/L Final    CO2 03/18/2019 18* 20 - 31 mmol/L Final    Anion Gap 03/18/2019 13  9 - 17 mmol/L Final    Alkaline Phosphatase 03/18/2019 91  35 - 104 U/L Final    ALT 03/18/2019 18  5 - 33 U/L Final    AST 03/18/2019 15 <32 U/L Final    Total Bilirubin 03/18/2019 0.17* 0.3 - 1.2 mg/dL Final    Total Protein 03/18/2019 7.2  6.4 - 8.3 g/dL Final    Alb 03/18/2019 4.3  3.5 - 5.2 g/dL Final    Albumin/Globulin Ratio 03/18/2019 1.5  1.0 - 2.5 Final    GFR Non- 03/18/2019 >60  >60 mL/min Final    GFR  03/18/2019 >60  >60 mL/min Final    GFR Comment 03/18/2019        Final    Comment: Average GFR for 30-36 years old:   107 mL/min/1.73sq m  Chronic Kidney Disease:   <60 mL/min/1.73sq m  Kidney failure:   <15 mL/min/1.73sq m              eGFR calculated using average adult body mass. Additional eGFR calculator available at:        [employer]            GFR Staging 03/18/2019 NOT REPORTED   Final    Thyroxine, Free 03/18/2019 0.82* 0.93 - 1.70 ng/dL Final    Hemoglobin A1C 03/18/2019 5.5  4.0 - 6.0 % Final    Estimated Avg Glucose 03/18/2019 111  mg/dL Final    Comment: The ADA and AACC recommend providing the estimated average glucose result to permit better   patient understanding of their HBA1c result.  Cholesterol 03/18/2019 144  <200 mg/dL Final    Comment:    Cholesterol Guidelines:      <200  Desirable   200-240  Borderline      >240  Undesirable         HDL 03/18/2019 39* >40 mg/dL Final    Comment:    HDL Guidelines:    <40     Undesirable   40-59    Borderline    >59     Desirable         LDL Cholesterol 03/18/2019 69  0 - 130 mg/dL Final    Comment:    LDL Guidelines:     <100    Desirable   100-129   Near to/above Desirable   130-159   Borderline      >159   Undesirable     Direct (measured) LDL and calculated LDL are not interchangeable tests.  Chol/HDL Ratio 03/18/2019 3.7  <5 Final            Triglycerides 03/18/2019 181* <150 mg/dL Final    Comment:    Triglyceride Guidelines:     <150   Desirable   150-199  Borderline   200-499  High     >499   Very high   Based on AHA Guidelines for fasting triglyceride, October 2012.          VLDL 03/18/2019 NOT REPORTED* 1 - 30 mg/dL Final    TSH 03/18/2019 1.09  0.30 - 5.00 mIU/L Final    Vit D, 25-Hydroxy 03/18/2019 36.4  30.0 - 100.0 ng/mL Final    Comment:    Reference Range:  Vitamin D status         Range   Deficiency              <20 ng/mL   Mild Deficiency       20-30 ng/mL   Sufficiency           ng/mL   Toxicity               >100 ng/mL      Color, UA 03/18/2019 YELLOW  YELLOW Final    Turbidity UA 03/18/2019 CLEAR  CLEAR Final    Glucose, Ur 03/18/2019 NEGATIVE  NEGATIVE Final    Bilirubin Urine 03/18/2019 NEGATIVE  NEGATIVE Final    Ketones, Urine 03/18/2019 NEGATIVE  NEGATIVE Final    Specific Gravity, UA 03/18/2019 1.018  1.005 - 1.030 Final    Urine Hgb 03/18/2019 NEGATIVE  NEGATIVE Final    pH, UA 03/18/2019 7.0  5.0 - 8.0 Final    Protein, UA 03/18/2019 NEGATIVE  NEGATIVE Final    Urobilinogen, Urine 03/18/2019 Normal  Normal Final    Nitrite, Urine 03/18/2019 NEGATIVE  NEGATIVE Final    Leukocyte Esterase, Urine 03/18/2019 NEGATIVE  NEGATIVE Final    Urinalysis Comments 03/18/2019 Microscopic exam not performed based on chemical results unless requested in original order. Final       DIAGNOSTICS:  Xr Chest Standard (2 Vw)    Result Date: 3/29/2019  EXAMINATION: TWO VIEWS OF THE CHEST 3/29/2019 3:13 pm COMPARISON: 12/12/2018 HISTORY: ORDERING SYSTEM PROVIDED HISTORY: preop cervical conization with biopsy TECHNOLOGIST PROVIDED HISTORY: preop cervical conization with biopsy FINDINGS: Frontal and lateral views of the chest are submitted for review. The cardiac silhouette is normal in size. Lung parenchyma is clear without focal airspace consolidation, sizeable pleural effusion, or pneumothorax. Trachea is midline. Osseous structures and soft tissues are grossly intact. No acute cardiopulmonary pathology. DIAGNOSIS & PLAN:  1. Persistent Cervical Dysplasia (EVER 2)    - Proceed with planned procedure: Cold Knife Cone    - Consent signed, on chart.    - The patient is ready for transport to the operative suite.       Kings Mcmullen DO  Ob/Gyn Resident  Pager: [phone] [facility]  4/5/2019, 6:51 AM

## 2019-04-05 NOTE — BRIEF OP NOTE
Brief Operative Note  Department of Obstetrics and Gynecology  [address] [facility]     Patient: Nery Anderson   : 1980  MRN: [id number]       Acct: [de-identified]   Date of Procedure: 19     Pre-operative Diagnosis: 35 y.o. female [id number]    Persistent Cervical Dysplasia (EVER 2)   Hx EVER 3      Hx LEEP (2016)     Post-operative Diagnosis: Same    Procedure: Cold Knife Cone     Surgeon: Dr. Shagufta Dior, DO      Assistant(s): Omkar Bejarano, PGY-4, Rosalba Blake, PGY-3     Anesthesia: General    Findings: Grossly appearing normal cervix   Total IV fluids/Blood products:  700 ml crystalloid  Urine Output:  Patient voided prior to procedure    Estimated blood loss:  5 mL  Drains:  none  Specimens:     1. Cervical Cone Biopsy, open @ 3 O'clock   2. Endocervical Curettings  Instrument and Sponge Count: Correct  Complications:  none  Condition:  stable, transferred to post anesthesia recovery    See full operative report for further details.       Familia Villa DO  Ob/Gyn Resident  Pager: [phone]  2019, 7:27 AM

## 2019-04-05 NOTE — ANESTHESIA POSTPROCEDURE EVALUATION
Department of Anesthesiology  Postprocedure Note    Patient: Estephania Mansfield  MRN: [id number]  YOB: 1980  Date of evaluation: 4/5/2019  Time:  10:05 AM     Procedure Summary     Date:  04/05/19 Room / Location:  [facility] OR  / [facility] OR    Anesthesia Start:  0730 Anesthesia Stop:  5230    Procedure:  COLD KNIFE CONIZATION WITH BIOPSY (N/A ) Diagnosis:  (EVER-II)    Surgeon:  Rin Issa DO Responsible Provider:  Yuli Haskins MD    Anesthesia Type:  general, MAC ASA Status:  3          Anesthesia Type: general, MAC    Mason Phase I: Mason Score: 10    Mason Phase II: Mason Score: 10    Last vitals: Reviewed and per EMR flowsheets.        Anesthesia Post Evaluation    Patient location during evaluation: PACU  Patient participation: complete - patient participated  Level of consciousness: awake and alert  Pain score: 3  Airway patency: patent  Nausea & Vomiting: no nausea and no vomiting  Complications: no  Cardiovascular status: hemodynamically stable  Respiratory status: acceptable  Hydration status: euvolemic

## 2019-04-08 LAB — SURGICAL PATHOLOGY REPORT: NORMAL

## 2019-04-17 ENCOUNTER — OFFICE VISIT (OUTPATIENT)
Dept: OBGYN | Age: 39
End: 2019-04-17
Payer: COMMERCIAL

## 2019-04-17 VITALS
BODY MASS INDEX: 39.51 KG/M2 | SYSTOLIC BLOOD PRESSURE: 130 MMHG | HEART RATE: 63 BPM | DIASTOLIC BLOOD PRESSURE: 81 MMHG | WEIGHT: 216 LBS

## 2019-04-17 DIAGNOSIS — Z48.89 POSTOPERATIVE VISIT: Primary | ICD-10-CM

## 2019-04-17 DIAGNOSIS — M23.92 DERANGEMENT, KNEE INTERNAL, LEFT: ICD-10-CM

## 2019-04-17 PROCEDURE — 99024 POSTOP FOLLOW-UP VISIT: CPT | Performed by: OBSTETRICS & GYNECOLOGY

## 2019-04-17 NOTE — PROGRESS NOTES
Depression     GERD (gastroesophageal reflux disease)     Headache(784.0)     Heart murmur     Herpes     Hyperlipidemia     Hypertension     [facility]-Dr. Danika Granados    Migraine headache     Obesity     Schizophrenia ([address]     Seasonal allergies     Sleep apnea     no machine    Wears partial dentures     upper and lower partial       PAST SURGICAL HISTORY:                                                                    Procedure Laterality Date    CERVIX BIOPSY N/A 4/5/2019    COLD KNIFE CONIZATION WITH BIOPSY performed by Marbella Gomes DO at [address] [place] [address]  06/06/16    PRE-MALIGNANT / BENIGN SKIN LESION EXCISION Left 5/12/16    foot       MEDICATIONS:  Current Outpatient Medications   Medication Sig Dispense Refill    ibuprofen (ADVIL;MOTRIN) 800 MG tablet Take 1 tablet by mouth every 8 hours 30 tablet 0    LATUDA 80 MG TABS tablet Take 80 mg by mouth daily  2    atorvastatin (LIPITOR) 20 MG tablet Take 20 mg by mouth daily  2    levothyroxine (SYNTHROID) 50 MCG tablet Take 50 mcg by mouth daily  2    mirtazapine (REMERON) 15 MG tablet Take 15 mg by mouth nightly      SUMAtriptan (IMITREX) 50 MG tablet Take 100 mg by mouth once as needed for Migraine      fluticasone (VERAMYST) 27.5 MCG/SPRAY nasal spray 2 sprays by Nasal route 2 times daily      docusate sodium (COLACE) 100 MG capsule Take 1 capsule by mouth 2 times daily 30 capsule 0    econazole nitrate 1 % cream Apply 1 each topically daily as needed       Emollient (LUBRIDERM) LOTN Apply 1 each topically daily       topiramate (TOPAMAX) 25 MG tablet Take 25 mg by mouth daily       Budesonide-Formoterol Fumarate (SYMBICORT IN) Inhale 2 puffs into the lungs daily as needed       cetirizine (ZYRTEC ALLERGY) 10 MG TABS Take 10 mg by mouth daily 30 tablet 0    albuterol (PROVENTIL HFA) 108 (90 BASE) MCG/ACT inhaler Inhale 2 puffs into the lungs every 4 hours as needed for Wheezing.       diphenoxylate-atropine (LOMOTIL) History:   Pap smear 9/1/2011: negative for dysplasia   Pap smear 7/2/14: ASCUS HPV +   Colpo 9/3/14: negative ectocervical biopsies and ECC   Pap smear 2/29/16: ASCUS HPV+   Colpo 4/5/16:     ECC: EVER 2-3    6 o'clock biopsy: EVER 2    12 o'clock biopsy: EVER 1   LEEP 6/6/16:     Ectocervical Cone Biopsy: EVER 3 with negative margins    Endocervical Top Hat: EVER 2 with narrowly negative margins   Pap smear 11/28/17: negative for dysplasia   Pap smear 1/15/19: HSIL   Colpo 2/21/19: ECC, 12 o'clock, 3 o'clock biopsies pending       Patellofemoral pain syndrome of left knee 08/15/2017    EVER III (cervical intraepithelial neoplasia III)      HSIL pap, ECC positive for EVER 2. History of prior leep with top hat 2016. Findings consistent with persistent / recurrent high grade dysplasia. Plan: repeat excisional procedure / CKC. Hysterectomy would be acceptable if no desire for future pregnancy. Patient was seen with total face to face time of 15 minutes. More than 50% of this visit was on counseling and education regarding her diagnose(s) as listed below and options. She was also counseled on her preventative health maintenance recommendations and follow-up. Diagnosis Orders   1.  Postoperative visit         Raul Will DO  Ob/Gyn   [facility], [address]  4/17/2019, 11:51 AM

## 2019-04-18 RX ORDER — IBUPROFEN 800 MG/1
800 TABLET ORAL EVERY 8 HOURS
Qty: 30 TABLET | Refills: 0 | Status: SHIPPED | OUTPATIENT
Start: 2019-04-18 | End: 2019-09-25

## 2019-04-22 RX ORDER — MULTIVIT-MIN 60/IRON FUM/FOLIC 27 MG-1 MG
TABLET ORAL
Qty: 60 TABLET | Refills: 8 | Status: SHIPPED | OUTPATIENT
Start: 2019-04-22 | End: 2020-09-09

## 2019-09-25 ENCOUNTER — HOSPITAL ENCOUNTER (EMERGENCY)
Age: 39
Discharge: HOME | End: 2019-09-25
Attending: EMERGENCY MEDICINE
Payer: COMMERCIAL

## 2019-09-25 VITALS
DIASTOLIC BLOOD PRESSURE: 84 MMHG | TEMPERATURE: 98.2 F | SYSTOLIC BLOOD PRESSURE: 127 MMHG | RESPIRATION RATE: 17 BRPM | HEART RATE: 79 BPM | BODY MASS INDEX: 40.79 KG/M2 | WEIGHT: 223 LBS

## 2019-09-25 DIAGNOSIS — J06.9 URI WITH COUGH AND CONGESTION: Primary | ICD-10-CM

## 2019-09-25 PROCEDURE — 6360000002 HC RX W HCPCS: Performed by: EMERGENCY MEDICINE

## 2019-09-25 PROCEDURE — 6370000000 HC RX 637 (ALT 250 FOR IP): Performed by: EMERGENCY MEDICINE

## 2019-09-25 PROCEDURE — 96372 THER/PROPH/DIAG INJ SC/IM: CPT

## 2019-09-25 PROCEDURE — 99283 EMERGENCY DEPT VISIT LOW MDM: CPT

## 2019-09-25 RX ORDER — OXYMETAZOLINE HYDROCHLORIDE 0.05 G/100ML
1 SPRAY NASAL ONCE
Status: COMPLETED | OUTPATIENT
Start: 2019-09-25 | End: 2019-09-25

## 2019-09-25 RX ORDER — OXYMETAZOLINE HYDROCHLORIDE 0.05 G/100ML
2 SPRAY NASAL 2 TIMES DAILY
Qty: 14.7 ML | Refills: 0 | Status: SHIPPED | OUTPATIENT
Start: 2019-09-25 | End: 2019-10-25

## 2019-09-25 RX ORDER — IBUPROFEN 800 MG/1
800 TABLET ORAL EVERY 8 HOURS PRN
Qty: 30 TABLET | Refills: 0 | Status: ON HOLD | OUTPATIENT
Start: 2019-09-25 | End: 2021-08-27 | Stop reason: ALTCHOICE

## 2019-09-25 RX ORDER — KETOROLAC TROMETHAMINE 15 MG/ML
15 INJECTION, SOLUTION INTRAMUSCULAR; INTRAVENOUS ONCE
Status: COMPLETED | OUTPATIENT
Start: 2019-09-25 | End: 2019-09-25

## 2019-09-25 RX ORDER — GUAIFENESIN 600 MG/1
600 TABLET, EXTENDED RELEASE ORAL 2 TIMES DAILY
Qty: 30 TABLET | Refills: 0 | Status: SHIPPED | OUTPATIENT
Start: 2019-09-25 | End: 2019-10-10

## 2019-09-25 RX ORDER — PSEUDOEPHEDRINE HYDROCHLORIDE 30 MG/1
60 TABLET ORAL ONCE
Status: COMPLETED | OUTPATIENT
Start: 2019-09-25 | End: 2019-09-25

## 2019-09-25 RX ORDER — GUAIFENESIN 600 MG/1
600 TABLET, EXTENDED RELEASE ORAL ONCE
Status: COMPLETED | OUTPATIENT
Start: 2019-09-25 | End: 2019-09-25

## 2019-09-25 RX ORDER — PSEUDOEPHEDRINE HYDROCHLORIDE 30 MG/1
60 TABLET ORAL EVERY 6 HOURS PRN
Qty: 30 TABLET | Refills: 0 | Status: SHIPPED | OUTPATIENT
Start: 2019-09-25 | End: 2019-10-02

## 2019-09-25 RX ADMIN — KETOROLAC TROMETHAMINE 15 MG: 15 INJECTION INTRAMUSCULAR; INTRAVENOUS at 15:33

## 2019-09-25 RX ADMIN — PSEUDOEPHEDRINE HCL 60 MG: 30 TABLET, FILM COATED ORAL at 15:33

## 2019-09-25 RX ADMIN — Medication 1 SPRAY: at 15:33

## 2019-09-25 RX ADMIN — GUAIFENESIN 600 MG: 600 TABLET, EXTENDED RELEASE ORAL at 15:33

## 2019-09-25 ASSESSMENT — PAIN DESCRIPTION - DESCRIPTORS: DESCRIPTORS: ACHING

## 2019-09-25 ASSESSMENT — PAIN SCALES - GENERAL: PAINLEVEL_OUTOF10: 9

## 2019-09-25 ASSESSMENT — PAIN DESCRIPTION - LOCATION: LOCATION: GENERALIZED

## 2019-10-01 ASSESSMENT — ENCOUNTER SYMPTOMS
VOMITING: 0
SORE THROAT: 0
COUGH: 1
DIARRHEA: 1
EYE PAIN: 0
SHORTNESS OF BREATH: 0
RHINORRHEA: 1
NAUSEA: 1
ABDOMINAL PAIN: 0

## 2019-10-24 ENCOUNTER — HOSPITAL ENCOUNTER (OUTPATIENT)
Age: 39
Discharge: HOME | End: 2019-10-24
Payer: COMMERCIAL

## 2019-10-24 ENCOUNTER — HOSPITAL ENCOUNTER (OUTPATIENT)
Dept: MAMMOGRAPHY | Age: 39
Discharge: HOME | End: 2019-10-26
Payer: COMMERCIAL

## 2019-10-24 DIAGNOSIS — Z12.31 ENCOUNTER FOR SCREENING MAMMOGRAM FOR BREAST CANCER: ICD-10-CM

## 2019-10-24 LAB
-: NORMAL
ABSOLUTE EOS #: 0.09 K/UL (ref 0–0.44)
ABSOLUTE IMMATURE GRANULOCYTE: <0.03 K/UL (ref 0–0.3)
ABSOLUTE LYMPH #: 2.92 K/UL (ref 1.1–3.7)
ABSOLUTE MONO #: 0.7 K/UL (ref 0.1–1.2)
ALBUMIN SERPL-MCNC: 4.2 G/DL (ref 3.5–5.2)
ALBUMIN/GLOBULIN RATIO: 1.2 (ref 1–2.5)
ALP BLD-CCNC: 95 U/L (ref 35–104)
ALT SERPL-CCNC: 28 U/L (ref 5–33)
AMORPHOUS: NORMAL
ANION GAP SERPL CALCULATED.3IONS-SCNC: 12 MMOL/L (ref 9–17)
AST SERPL-CCNC: 23 U/L
BACTERIA: NORMAL
BASOPHILS # BLD: 1 % (ref 0–2)
BASOPHILS ABSOLUTE: 0.03 K/UL (ref 0–0.2)
BILIRUB SERPL-MCNC: 0.25 MG/DL (ref 0.3–1.2)
BILIRUBIN URINE: NEGATIVE
BUN BLDV-MCNC: 14 MG/DL (ref 6–20)
BUN/CREAT BLD: ABNORMAL (ref 9–20)
CALCIUM SERPL-MCNC: 9.1 MG/DL (ref 8.6–10.4)
CASTS UA: NORMAL /LPF (ref 0–8)
CHLORIDE BLD-SCNC: 106 MMOL/L (ref 98–107)
CHOLESTEROL, FASTING: 186 MG/DL
CHOLESTEROL/HDL RATIO: 5.3
CO2: 25 MMOL/L (ref 20–31)
COLOR: ABNORMAL
COMMENT UA: ABNORMAL
CREAT SERPL-MCNC: 0.88 MG/DL (ref 0.5–0.9)
CRYSTALS, UA: NORMAL /HPF
DIFFERENTIAL TYPE: NORMAL
EOSINOPHILS RELATIVE PERCENT: 1 % (ref 1–4)
EPITHELIAL CELLS UA: NORMAL /HPF (ref 0–5)
ESTIMATED AVERAGE GLUCOSE: 114 MG/DL
GFR AFRICAN AMERICAN: >60 ML/MIN
GFR NON-AFRICAN AMERICAN: >60 ML/MIN
GFR SERPL CREATININE-BSD FRML MDRD: ABNORMAL ML/MIN/{1.73_M2}
GFR SERPL CREATININE-BSD FRML MDRD: ABNORMAL ML/MIN/{1.73_M2}
GLUCOSE BLD-MCNC: 96 MG/DL (ref 70–99)
GLUCOSE URINE: NEGATIVE
HBA1C MFR BLD: 5.6 % (ref 4–6)
HCT VFR BLD CALC: 41.8 % (ref 36.3–47.1)
HDLC SERPL-MCNC: 35 MG/DL
HEMOGLOBIN: 13.7 G/DL (ref 11.9–15.1)
IMMATURE GRANULOCYTES: 0 %
KETONES, URINE: NEGATIVE
LDL CHOLESTEROL: 113 MG/DL (ref 0–130)
LEUKOCYTE ESTERASE, URINE: ABNORMAL
LYMPHOCYTES # BLD: 43 % (ref 24–43)
MCH RBC QN AUTO: 30.6 PG (ref 25.2–33.5)
MCHC RBC AUTO-ENTMCNC: 32.8 G/DL (ref 28.4–34.8)
MCV RBC AUTO: 93.3 FL (ref 82.6–102.9)
MONOCYTES # BLD: 11 % (ref 3–12)
MUCUS: NORMAL
NITRITE, URINE: NEGATIVE
NRBC AUTOMATED: 0 PER 100 WBC
OTHER OBSERVATIONS UA: NORMAL
PDW BLD-RTO: 13.4 % (ref 11.8–14.4)
PH UA: 5.5 (ref 5–8)
PLATELET # BLD: 283 K/UL (ref 138–453)
PLATELET ESTIMATE: NORMAL
PMV BLD AUTO: 10.2 FL (ref 8.1–13.5)
POTASSIUM SERPL-SCNC: 3.9 MMOL/L (ref 3.7–5.3)
PROTEIN UA: ABNORMAL
RBC # BLD: 4.48 M/UL (ref 3.95–5.11)
RBC # BLD: NORMAL 10*6/UL
RBC UA: NORMAL /HPF (ref 0–4)
RENAL EPITHELIAL, UA: NORMAL /HPF
SEG NEUTROPHILS: 44 % (ref 36–65)
SEGMENTED NEUTROPHILS ABSOLUTE COUNT: 2.9 K/UL (ref 1.5–8.1)
SODIUM BLD-SCNC: 143 MMOL/L (ref 135–144)
SPECIFIC GRAVITY UA: 1.03 (ref 1–1.03)
THYROXINE, FREE: 0.92 NG/DL (ref 0.93–1.7)
TOTAL PROTEIN: 7.6 G/DL (ref 6.4–8.3)
TRICHOMONAS: NORMAL
TRIGLYCERIDE, FASTING: 191 MG/DL
TSH SERPL DL<=0.05 MIU/L-ACNC: 2.76 MIU/L (ref 0.3–5)
TURBIDITY: ABNORMAL
URINE HGB: ABNORMAL
UROBILINOGEN, URINE: NORMAL
VITAMIN D 25-HYDROXY: 50.5 NG/ML (ref 30–100)
VLDLC SERPL CALC-MCNC: ABNORMAL MG/DL (ref 1–30)
WBC # BLD: 6.7 K/UL (ref 3.5–11.3)
WBC # BLD: NORMAL 10*3/UL
WBC UA: NORMAL /HPF (ref 0–5)
YEAST: NORMAL

## 2019-10-24 PROCEDURE — 83036 HEMOGLOBIN GLYCOSYLATED A1C: CPT

## 2019-10-24 PROCEDURE — 84443 ASSAY THYROID STIM HORMONE: CPT

## 2019-10-24 PROCEDURE — 85025 COMPLETE CBC W/AUTO DIFF WBC: CPT

## 2019-10-24 PROCEDURE — 80053 COMPREHEN METABOLIC PANEL: CPT

## 2019-10-24 PROCEDURE — 81001 URINALYSIS AUTO W/SCOPE: CPT

## 2019-10-24 PROCEDURE — 84439 ASSAY OF FREE THYROXINE: CPT

## 2019-10-24 PROCEDURE — 36415 COLL VENOUS BLD VENIPUNCTURE: CPT

## 2019-10-24 PROCEDURE — 77063 BREAST TOMOSYNTHESIS BI: CPT

## 2019-10-24 PROCEDURE — 80061 LIPID PANEL: CPT

## 2019-10-24 PROCEDURE — 82306 VITAMIN D 25 HYDROXY: CPT

## 2019-10-29 ENCOUNTER — HOSPITAL ENCOUNTER (OUTPATIENT)
Dept: MAMMOGRAPHY | Age: 39
Discharge: HOME | End: 2019-10-31
Payer: COMMERCIAL

## 2019-10-29 ENCOUNTER — HOSPITAL ENCOUNTER (OUTPATIENT)
Dept: ULTRASOUND IMAGING | Age: 39
End: 2019-10-29
Payer: COMMERCIAL

## 2019-10-29 DIAGNOSIS — R92.8 ABNORMAL MAMMOGRAM: ICD-10-CM

## 2019-10-29 PROCEDURE — G0279 TOMOSYNTHESIS, MAMMO: HCPCS

## 2020-01-30 ENCOUNTER — HOSPITAL ENCOUNTER (OUTPATIENT)
Age: 40
Setting detail: SPECIMEN
Discharge: HOME | End: 2020-01-30
Payer: COMMERCIAL

## 2020-01-30 ENCOUNTER — OFFICE VISIT (OUTPATIENT)
Dept: OBGYN | Age: 40
End: 2020-01-30
Payer: COMMERCIAL

## 2020-01-30 VITALS
BODY MASS INDEX: 41.22 KG/M2 | SYSTOLIC BLOOD PRESSURE: 114 MMHG | HEIGHT: 62 IN | WEIGHT: 224 LBS | DIASTOLIC BLOOD PRESSURE: 74 MMHG | HEART RATE: 83 BPM

## 2020-01-30 LAB
HEPATITIS B SURFACE ANTIGEN: NONREACTIVE
HEPATITIS C ANTIBODY: NONREACTIVE
HIV AG/AB: NONREACTIVE
T. PALLIDUM, IGG: NONREACTIVE

## 2020-01-30 PROCEDURE — 86780 TREPONEMA PALLIDUM: CPT

## 2020-01-30 PROCEDURE — 99396 PREV VISIT EST AGE 40-64: CPT | Performed by: OBSTETRICS & GYNECOLOGY

## 2020-01-30 PROCEDURE — 36415 COLL VENOUS BLD VENIPUNCTURE: CPT

## 2020-01-30 PROCEDURE — 87389 HIV-1 AG W/HIV-1&-2 AB AG IA: CPT

## 2020-01-30 PROCEDURE — 86803 HEPATITIS C AB TEST: CPT

## 2020-01-30 PROCEDURE — G8484 FLU IMMUNIZE NO ADMIN: HCPCS | Performed by: OBSTETRICS & GYNECOLOGY

## 2020-01-30 PROCEDURE — 87340 HEPATITIS B SURFACE AG IA: CPT

## 2020-01-30 NOTE — PATIENT INSTRUCTIONS
smoking, you improve the health of everyone who now breathes in your smoke. · Their heart, lung, and cancer risks drop, much like yours. · They are sick less. For babies and small children, living smoke-free means they're less likely to have ear infections, pneumonia, and bronchitis. · If you're a woman who is or will be pregnant someday, quitting smoking means a healthier . · Children who are close to you are less likely to become adult smokers. Where can you learn more? Go to https://AlertspeMass Appeal.Dennoo. org and sign in to your [employer] account. Enter [phone] in the [facility] box to learn more about \"Learning About Benefits From Quitting Smoking. \"     If you do not have an account, please click on the \"Sign Up Now\" link. Current as of: 2019  Content Version: 12.3  © 5435-4935 Healthwise, Incorporated. Care instructions adapted under license by [facility] ([facility]). If you have questions about a medical condition or this instruction, always ask your healthcare professional. [address] any warranty or liability for your use of this information.

## 2020-01-30 NOTE — PROGRESS NOTES
History and Physical    Chantelledieudonne Maddox  2020              35 y.o. Chief Complaint   Patient presents with    Annual Exam       Patient's last menstrual period was 2020. Primary Care Physician: SHERRIE Zapata CNP    The patient was seen and examined. She has no chief complaint today and is here for her annual exam.  Her bowels are regular. There are no voiding complaints. She denies any bloating. She denies vaginal discharge and was counseled on STD's and the need for barrier contraception. HPI : Michael Benavidez is a 35 y.o. female [id number] Patient's last menstrual period was 2020. Pt here for annual exam.  No chief complaint today. Pt is requesting STD testing.   She admits to a boil of the skin left breast that she squeezed 2 days ago.   ________________________________________________________________________  Pearlean Bottcher History    Para Term  AB Living   7 7 7 0 0 7   SAB TAB Ectopic Molar Multiple Live Births   0 0 0 0 0 0      # Outcome Date GA Lbr Lasha/2nd Weight Sex Delivery Anes PTL Lv   7 Term      Vag-Spont      6 Term      Vag-Spont      5 Term      Vag-Spont      4 Term      Vag-Spont      3 Term      Vag-Spont      2 Term      Vag-Spont      1 Term      Vag-Spont        Past Medical History:   Diagnosis Date    Abnormal Pap smear of cervix [id number]    Anxiety     Aortic regurgitation     mild-mod on echo    Asthma     Depression     GERD (gastroesophageal reflux disease)     Headache(784.0)     Heart murmur     Herpes     Hyperlipidemia     Hypertension     [facility]-Dr. Rolando Billings    Migraine headache     Obesity     Schizophrenia (HCC)     Seasonal allergies     Sleep apnea     no machine    Wears partial dentures     upper and lower partial                                                                   Past Surgical History:   Procedure Laterality Date    CERVIX BIOPSY N/A 2019    COLD KNIFE CONIZATION WITH BIOPSY performed by Jasper Rodriguez DO at [address] [place] [address] Drive [facility]  06/06/16    PRE-MALIGNANT / BENIGN SKIN LESION EXCISION Left 5/12/16    foot     Family History   Problem Relation Age of Onset    Cancer Mother         uterine    Diabetes Mother     High Blood Pressure Mother     Endometrial Cancer Mother     Diabetes Brother     Heart Disease Brother      Social History     Socioeconomic History    Marital status: Single     Spouse name: Not on file    Number of children: Not on file    Years of education: Not on file    Highest education level: Not on file   Occupational History    Not on file   Social Needs    Financial resource strain: Not on file    Food insecurity:     Worry: Not on file     Inability: Not on file    Transportation needs:     Medical: Not on file     Non-medical: Not on file   Tobacco Use    Smoking status: Current Every Day Smoker     Packs/day: 1.00     Types: Cigarettes    Smokeless tobacco: Never Used   Substance and Sexual Activity    Alcohol use: No     Comment: rare    Drug use: Yes     Types: Marijuana     Comment: daily   last dose 4/1/19    Sexual activity: Yes     Partners: Male   Lifestyle    Physical activity:     Days per week: Not on file     Minutes per session: Not on file    Stress: Not on file   Relationships    Social connections:     Talks on phone: Not on file     Gets together: Not on file     Attends [employer] service: Not on file     Active member of club or organization: Not on file     Attends meetings of clubs or organizations: Not on file     Relationship status: Not on file    Intimate partner violence:     Fear of current or ex partner: Not on file     Emotionally abused: Not on file     Physically abused: Not on file     Forced sexual activity: Not on file   Other Topics Concern    Not on file   Social History Narrative    Not on file       MEDICATIONS:  Current Outpatient Medications   Medication Sig Dispense Refill    ibuprofen (ADVIL;MOTRIN) 800 MG tablet Take 1 tablet by mouth every 8 hours as needed for Pain 30 tablet 0    prenatal vitamin (VOL-PLUS) 27-1 MG TABS TABLET TAKE 1 TABLET BY MOUTH DAILY 60 tablet 8    LATUDA 80 MG TABS tablet Take 80 mg by mouth daily  2    atorvastatin (LIPITOR) 20 MG tablet Take 20 mg by mouth daily  2    levothyroxine (SYNTHROID) 50 MCG tablet Take 50 mcg by mouth daily  2    SUMAtriptan (IMITREX) 50 MG tablet Take 100 mg by mouth once as needed for Migraine      fluticasone (VERAMYST) 27.5 MCG/SPRAY nasal spray 2 sprays by Nasal route 2 times daily      docusate sodium (COLACE) 100 MG capsule Take 1 capsule by mouth 2 times daily 30 capsule 0    econazole nitrate 1 % cream Apply 1 each topically daily as needed       Emollient (LUBRIDERM) LOTN Apply 1 each topically daily       topiramate (TOPAMAX) 25 MG tablet Take 25 mg by mouth daily       Budesonide-Formoterol Fumarate (SYMBICORT IN) Inhale 2 puffs into the lungs daily as needed       cetirizine (ZYRTEC ALLERGY) 10 MG TABS Take 10 mg by mouth daily 30 tablet 0    albuterol (PROVENTIL HFA) 108 (90 BASE) MCG/ACT inhaler Inhale 2 puffs into the lungs every 4 hours as needed for Wheezing.  diphenoxylate-atropine (LOMOTIL) 2.5-0.025 MG per tablet Take 1 tablet by mouth daily as needed       polyethylene glycol (GLYCOLAX) powder Take 17 g by mouth daily as needed       pantoprazole (PROTONIX) 40 MG tablet Take 40 mg by mouth daily.  ondansetron (ZOFRAN) 4 MG tablet Take 1 tablet by mouth every 8 hours as needed for Nausea. 20 tablet 0     No current facility-administered medications for this visit.             ALLERGIES:  Allergies as of 01/30/2020 - Review Complete 01/30/2020   Allergen Reaction Noted    Codeine Hives 04/18/2014    Penicillins Swelling 04/18/2014       Symptoms of decreased mood absent  Symptoms of anhedonia absent      Immunization status: refuses flu vaccine      Gynecologic History:  Menarche: 14 yo  Menopause: n/a     Patient's vertebral carmen tenderness bilaterally. No hernias were appreciated. Abdominal Scars: none    Psych: The patient had a normal Orientation to: Time, Place, Person, and Situation  There is no Mood / Affect changes    Breast:  (Chest)  Skin lesion left breast   normal appearance, no masses or tenderness, Inspection negative, No nipple retraction or dimpling, No nipple discharge or bleeding, No axillary or supraclavicular adenopathy, Normal to palpation without dominant masses    Pelvic Exam:  External genitalia: normal general appearance  Urinary system: urethral meatus normal  Vaginal: creamy white discharge  Cervix: normal appearance, thin prep PAP obtained and GC prep obtained  Adnexa: normal bimanual exam  Uterus: normal single, nontender, anteverted and mid-position  Negative bladder sweep, no lymphadenopathy, negative CMT          Musculosk:  Normal Gait and station was noted. Digits were evaluated without abnormal findings. Range of motion, stability and strength were evaluated and found to be appropriate for the patients age. ASSESSMENT:      35 y.o. Annual     Diagnosis Orders   1. Women's annual routine gynecological examination  C.trachomatis N.gonorrhoeae DNA    VAGINITIS DNA PROBE    PAP SMEAR   2. Screen for STD (sexually transmitted disease)  Hepatitis B Surface Antigen    Hepatitis C Antibody    HIV Screen    T. pallidum Ab   3. History of cervical dysplasia      [facility] 4/2019   4. Cutaneous abscess of chest wall     5.  Family history of breast cancer in mother      prior to age 48          Chief Complaint   Patient presents with    Annual Exam          Past Medical History:   Diagnosis Date    Abnormal Pap smear of cervix [id number]    Anxiety     Aortic regurgitation     mild-mod on echo    Asthma     Depression     GERD (gastroesophageal reflux disease)     Headache(784.0)     Heart murmur     Herpes     Hyperlipidemia     Hypertension     [facility]-

## 2020-02-03 ENCOUNTER — TELEPHONE (OUTPATIENT)
Dept: OBGYN | Age: 40
End: 2020-02-03

## 2020-02-03 NOTE — TELEPHONE ENCOUNTER
do you want patient to have Genic test for family history of cancer (mother) I see it in your plan of care from 1/30/20. Please let patient know ?

## 2020-02-04 RX ORDER — METRONIDAZOLE 500 MG/1
500 TABLET ORAL 2 TIMES DAILY
Qty: 14 TABLET | Refills: 0 | Status: SHIPPED | OUTPATIENT
Start: 2020-02-04 | End: 2020-02-11

## 2020-02-12 LAB — CYTOLOGY REPORT: NORMAL

## 2020-02-13 LAB
HPV SAMPLE: ABNORMAL
HPV, GENOTYPE 16: NOT DETECTED
HPV, GENOTYPE 18: NOT DETECTED
HPV, HIGH RISK OTHER: DETECTED
HPV, INTERPRETATION: ABNORMAL
SPECIMEN DESCRIPTION: ABNORMAL

## 2020-02-17 ENCOUNTER — TELEPHONE (OUTPATIENT)
Dept: OBGYN | Age: 40
End: 2020-02-17

## 2020-02-17 NOTE — TELEPHONE ENCOUNTER
Spoke with [facility] and informed her of results per Dr. Zoë Alcaraz and scheduled her for Colposcopy.

## 2020-03-02 ENCOUNTER — PROCEDURE VISIT (OUTPATIENT)
Dept: OBGYN | Age: 40
End: 2020-03-02
Payer: COMMERCIAL

## 2020-03-02 VITALS
BODY MASS INDEX: 41.15 KG/M2 | SYSTOLIC BLOOD PRESSURE: 109 MMHG | HEART RATE: 102 BPM | WEIGHT: 225 LBS | DIASTOLIC BLOOD PRESSURE: 75 MMHG

## 2020-03-02 LAB
CONTROL: PRESENT
PREGNANCY TEST URINE, POC: NEGATIVE

## 2020-03-02 PROCEDURE — 57452 EXAM OF CERVIX W/SCOPE: CPT | Performed by: STUDENT IN AN ORGANIZED HEALTH CARE EDUCATION/TRAINING PROGRAM

## 2020-03-02 PROCEDURE — 81025 URINE PREGNANCY TEST: CPT | Performed by: STUDENT IN AN ORGANIZED HEALTH CARE EDUCATION/TRAINING PROGRAM

## 2020-03-02 PROCEDURE — 99213 OFFICE O/P EST LOW 20 MIN: CPT | Performed by: OBSTETRICS & GYNECOLOGY

## 2020-03-02 ASSESSMENT — PATIENT HEALTH QUESTIONNAIRE - PHQ9
SUM OF ALL RESPONSES TO PHQ QUESTIONS 1-9: 0
SUM OF ALL RESPONSES TO PHQ9 QUESTIONS 1 & 2: 0
2. FEELING DOWN, DEPRESSED OR HOPELESS: 0
SUM OF ALL RESPONSES TO PHQ QUESTIONS 1-9: 0
1. LITTLE INTEREST OR PLEASURE IN DOING THINGS: 0

## 2020-03-02 NOTE — PROGRESS NOTES
[facility]    COLPOSCOPY PROCEDURE FORM    Chief Complaint:   Chief Complaint   Patient presents with    Procedure     colposcopy         3/2/2020  Sowmya Cuevas  Patient's last menstrual period was 02/12/2020 (approximate). 35 y.o.  [id number]    Her pap smear on 1/30/20 showed ASCUS with +HRHPV. Patient has persistent cervical dysplasia. She has had a colpo done on 2/21/19 that showed CIN2 on ECC. That colpo was done 2/2 HSIL on her 1/15/19 pap smear. She has had persistent CIN2 since 2016. She is s/p CKC on 4/5/19 and a LEEP on June 2016. Consent obtained prior to procedure.        Past Medical History:   Diagnosis Date    Abnormal Pap smear of cervix [id number]    Anxiety     Aortic regurgitation     mild-mod on echo    Asthma     Depression     GERD (gastroesophageal reflux disease)     Headache(784.0)     Heart murmur     Herpes     Hyperlipidemia     Hypertension     [facility]-Dr. Abby Vang    Migraine headache     Obesity     Schizophrenia ([address]     Seasonal allergies     Sleep apnea     no machine    Wears partial dentures     upper and lower partial         Past Surgical History:   Procedure Laterality Date    CERVIX BIOPSY N/A 4/5/2019    COLD KNIFE CONIZATION WITH BIOPSY performed by Kaitlynn Ogden DO at [address] LEEP  06/06/16    PRE-MALIGNANT / BENIGN SKIN LESION EXCISION Left 5/12/16    foot       Family History   Problem Relation Age of Onset    Cancer Mother         uterine    Diabetes Mother     High Blood Pressure Mother     Endometrial Cancer Mother     Diabetes Brother     Heart Disease Brother        Social History     Socioeconomic History    Marital status: Single     Spouse name: Not on file    Number of children: Not on file    Years of education: Not on file    Highest education level: Not on file   Occupational History    Not on file   Social Needs    Financial resource strain: Not on file    Food insecurity: Worry: Not on file     Inability: Not on file    Transportation needs:     Medical: Not on file     Non-medical: Not on file   Tobacco Use    Smoking status: Current Every Day Smoker     Packs/day: 1.00     Types: Cigarettes    Smokeless tobacco: Never Used   Substance and Sexual Activity    Alcohol use: No     Comment: rare    Drug use: Yes     Types: Marijuana     Comment: daily   last dose 4/1/19    Sexual activity: Yes     Partners: Male   Lifestyle    Physical activity:     Days per week: Not on file     Minutes per session: Not on file    Stress: Not on file   Relationships    Social connections:     Talks on phone: Not on file     Gets together: Not on file     Attends [employer] service: Not on file     Active member of club or organization: Not on file     Attends meetings of clubs or organizations: Not on file     Relationship status: Not on file    Intimate partner violence:     Fear of current or ex partner: Not on file     Emotionally abused: Not on file     Physically abused: Not on file     Forced sexual activity: Not on file   Other Topics Concern    Not on file   Social History Narrative    Not on file       Current Outpatient Medications on File Prior to Visit   Medication Sig Dispense Refill    ibuprofen (ADVIL;MOTRIN) 800 MG tablet Take 1 tablet by mouth every 8 hours as needed for Pain 30 tablet 0    prenatal vitamin (VOL-PLUS) 27-1 MG TABS TABLET TAKE 1 TABLET BY MOUTH DAILY 60 tablet 8    LATUDA 80 MG TABS tablet Take 80 mg by mouth daily  2    atorvastatin (LIPITOR) 20 MG tablet Take 20 mg by mouth daily  2    levothyroxine (SYNTHROID) 50 MCG tablet Take 50 mcg by mouth daily  2    SUMAtriptan (IMITREX) 50 MG tablet Take 100 mg by mouth once as needed for Migraine      fluticasone (VERAMYST) 27.5 MCG/SPRAY nasal spray 2 sprays by Nasal route 2 times daily      docusate sodium (COLACE) 100 MG capsule Take 1 capsule by mouth 2 times daily 30 capsule 0    econazole nitrate 1 % cream Apply 1 each topically daily as needed       Emollient (LUBRIDERM) LOTN Apply 1 each topically daily       topiramate (TOPAMAX) 25 MG tablet Take 25 mg by mouth daily       Budesonide-Formoterol Fumarate (SYMBICORT IN) Inhale 2 puffs into the lungs daily as needed       cetirizine (ZYRTEC ALLERGY) 10 MG TABS Take 10 mg by mouth daily 30 tablet 0    albuterol (PROVENTIL HFA) 108 (90 BASE) MCG/ACT inhaler Inhale 2 puffs into the lungs every 4 hours as needed for Wheezing.  diphenoxylate-atropine (LOMOTIL) 2.5-0.025 MG per tablet Take 1 tablet by mouth daily as needed       polyethylene glycol (GLYCOLAX) powder Take 17 g by mouth daily as needed       pantoprazole (PROTONIX) 40 MG tablet Take 40 mg by mouth daily.  ondansetron (ZOFRAN) 4 MG tablet Take 1 tablet by mouth every 8 hours as needed for Nausea. 20 tablet 0     No current facility-administered medications on file prior to visit. Allergies as of 03/02/2020 - Review Complete 03/02/2020   Allergen Reaction Noted    Codeine Hives 04/18/2014    Penicillins Swelling 04/18/2014           INDICATIONS:   1. ASCUS with positive high risk HPV cervical    2. History of cervical dysplasia    3. Hx LEEP       Urine pregnancy test negative              HPV:   positive   HR HPV    Birth Control Method: patient is not currently sexually active      COLPOSCOPIC EXAMINATION:                Vitals:    03/02/20 1454   BP: 109/75   Pulse: 102   Weight: 225 lb (102.1 kg)       Gross observations: negative  Satisfactory:  Yes            LANDMARKS and ATYPICAL FINDINGS:  TZ = transformation zone   SC = new squamocolumnar junction  NC = Nabothian cyst    ME = immature squamous metaplasia  PO = polyp   AV = atypical vessels  C = condyloma  L = leukoplakia  AW = acetowhite epithelium   P = punctation  MO = mosaicism   LS = decreased Lugols uptake  X = biopsy sites  CA = invasive carcinoma      FINDINGS: The colposcope was utilized on high and low magnification. A plain white light and green filter were  also implemented after 3% of acetic acid was applied to the cervix. There was no Aceto White Epithelium, Mosaic Changes, Punctation, or Irregular Vessels seen. ECC performed:  No    Lugols Iodine Applied:   No       IMPRESSIONS: no lesions visualized to biopsy   Biopsy sites: none taken      Assessment:   Diagnosis Orders   1. ASCUS with positive high risk HPV cervical  Colposcopy   2. History of cervical dysplasia  POCT urine pregnancy    Colposcopy   3. Hx LEEP          Patient Active Problem List    Diagnosis Date Noted    [facility] 09/03/2014     Priority: Low    HTN (hypertension) 09/03/2014     Priority: Low    Anxiety 09/03/2014     Priority: Low    GERD (gastroesophageal reflux disease) 09/03/2014     Priority: Low    Hyperlipemia 09/03/2014     Priority: Low    Cold Knife Cone 4/5/19 04/05/2019    Cervical dysplasia     Hx LEEP  02/21/2019     Overview Note:     Cervical Dysplasia History:   Pap smear 9/1/2011: negative for dysplasia   Pap smear 7/2/14: ASCUS HPV +   Colpo 9/3/14: negative ectocervical biopsies and ECC   Pap smear 2/29/16: ASCUS HPV+   Colpo 4/5/16:     ECC: EVER 2-3    6 o'clock biopsy: EVER 2    12 o'clock biopsy: EVER 1   LEEP 6/6/16:     Ectocervical Cone Biopsy: EVER 3 with negative margins    Endocervical Top Hat: EVER 2 with narrowly negative margins   Pap smear 11/28/17: negative for dysplasia   Pap smear 1/15/19: HSIL   Colpo 2/21/19: ECC showing CIN2, 12 o'clock, 3 o'clock biopsies negative   Cold Knife Cone 4/5/19 negative for dysplasia and ECC negative for dysplasia    Pap smear 1/30/20 ASCUS with +HRHPV   Colpo 3/2/20 no biopsies taken, f/u in 6 months for Pap smear         Patellofemoral pain syndrome of left knee 08/15/2017    EVER III (cervical intraepithelial neoplasia III)      Overview Note:     HSIL pap, ECC positive for EVER 2. History of prior leep with top hat 2016.  Findings consistent with persistent / recurrent high grade dysplasia. Plan: repeat excisional procedure / CKC. Hysterectomy would be acceptable if no desire for future pregnancy. PLAN:   1. Histopathology wnl and Negative Colposcopic evaluation  2. Follow up in 6 months for repeat pap smear   3.  HPV Barrier Recommendations and STD counseling completed      Janeth Griffin DO   OB/GYN Resident PGY2 Pager [phone]  [facility]  3/2/2020, 4:18 PM

## 2020-07-13 ENCOUNTER — HOSPITAL ENCOUNTER (OUTPATIENT)
Facility: MEDICAL CENTER | Age: 40
End: 2020-07-13
Payer: COMMERCIAL

## 2020-09-09 RX ORDER — MULTIVIT-MIN 60/IRON FUM/FOLIC 27 MG-1 MG
TABLET ORAL
Qty: 60 TABLET | Refills: 8 | Status: SHIPPED | OUTPATIENT
Start: 2020-09-09 | End: 2021-06-02 | Stop reason: SDUPTHER

## 2020-09-18 ENCOUNTER — HOSPITAL ENCOUNTER (OUTPATIENT)
Facility: MEDICAL CENTER | Age: 40
End: 2020-09-18
Payer: COMMERCIAL

## 2020-11-06 ENCOUNTER — OFFICE VISIT (OUTPATIENT)
Dept: OBGYN | Age: 40
End: 2020-11-06
Payer: COMMERCIAL

## 2020-11-06 ENCOUNTER — HOSPITAL ENCOUNTER (OUTPATIENT)
Age: 40
Setting detail: SPECIMEN
Discharge: HOME | End: 2020-11-06
Payer: COMMERCIAL

## 2020-11-06 VITALS
SYSTOLIC BLOOD PRESSURE: 154 MMHG | BODY MASS INDEX: 38.2 KG/M2 | DIASTOLIC BLOOD PRESSURE: 80 MMHG | HEIGHT: 62 IN | WEIGHT: 207.6 LBS | HEART RATE: 65 BPM

## 2020-11-06 LAB
CONTROL: PRESENT
HEPATITIS B SURFACE ANTIGEN: NONREACTIVE
HEPATITIS C ANTIBODY: NONREACTIVE
HIV AG/AB: NONREACTIVE
PREGNANCY TEST URINE, POC: NEGATIVE
T. PALLIDUM, IGG: NONREACTIVE

## 2020-11-06 PROCEDURE — G8427 DOCREV CUR MEDS BY ELIG CLIN: HCPCS | Performed by: STUDENT IN AN ORGANIZED HEALTH CARE EDUCATION/TRAINING PROGRAM

## 2020-11-06 PROCEDURE — 99213 OFFICE O/P EST LOW 20 MIN: CPT | Performed by: STUDENT IN AN ORGANIZED HEALTH CARE EDUCATION/TRAINING PROGRAM

## 2020-11-06 PROCEDURE — G8417 CALC BMI ABV UP PARAM F/U: HCPCS | Performed by: STUDENT IN AN ORGANIZED HEALTH CARE EDUCATION/TRAINING PROGRAM

## 2020-11-06 PROCEDURE — 4004F PT TOBACCO SCREEN RCVD TLK: CPT | Performed by: STUDENT IN AN ORGANIZED HEALTH CARE EDUCATION/TRAINING PROGRAM

## 2020-11-06 PROCEDURE — G8484 FLU IMMUNIZE NO ADMIN: HCPCS | Performed by: STUDENT IN AN ORGANIZED HEALTH CARE EDUCATION/TRAINING PROGRAM

## 2020-11-06 PROCEDURE — 99211 OFF/OP EST MAY X REQ PHY/QHP: CPT | Performed by: STUDENT IN AN ORGANIZED HEALTH CARE EDUCATION/TRAINING PROGRAM

## 2020-11-06 PROCEDURE — 81025 URINE PREGNANCY TEST: CPT | Performed by: STUDENT IN AN ORGANIZED HEALTH CARE EDUCATION/TRAINING PROGRAM

## 2020-11-06 NOTE — PROGRESS NOTES
I have discussed the care of the patient including pertinent history and examination findings with the resident. I agree with the assessment, and and orders as documented  by the resident. Plan Pap / Co-testing at annual exam 1/2021  GE Modifier: This service has been performed by a resident physician under the direction of a teaching physician.

## 2020-11-06 NOTE — PROGRESS NOTES
Chantelle Maddox  2020    YOB: 1980    HPI:  Henna Sim is a 38 y.o. female [id number]   The patient was seen and examined today. She is here to follow up from her colposcopy that was done 3/2/20. Patient has persistent cervical dysplasia. Her colpo on 3/2/20 was negative for any dysplasia and no biopsies were taken. Previously, her pap smear on 20 showed ASCUS with +HRHPV. She had a colpo done on 19 that showed CIN2 on ECC. That colpo was done 2/2 HSIL on her 1/15/19 pap smear. She has had persistent CIN2 since 2016. She is s/p CKC on 19 and a LEEP on 2016. Overall she has no complaint today. She desires STD screening as well. She denies having a new sexual partner, but is unsure if he is being faithful. Patient denies any headache, visual changes, difficulty breathing, N/V, F/C, and pain/swelling in lower extremities. Denies any dysuria or vaginal discharge.  Her bowels have been normal.       OB History    Para Term  AB Living   7 7 7 0 0 7   SAB TAB Ectopic Molar Multiple Live Births   0 0 0 0 0 0      # Outcome Date GA Lbr Lasha/2nd Weight Sex Delivery Anes PTL Lv   7 Term      Vag-Spont      6 Term      Vag-Spont      5 Term      Vag-Spont      4 Term      Vag-Spont      3 Term      Vag-Spont      2 Term      Vag-Spont      1 Term      Vag-Spont          Past Medical History:   Diagnosis Date    Abnormal Pap smear of cervix [id number]    Anxiety     Aortic regurgitation     mild-mod on echo    Asthma     Depression     GERD (gastroesophageal reflux disease)     Headache(784.0)     Heart murmur     Herpes     Hyperlipidemia     Hypertension     [facility]-Dr. Jayce Trinidad    Migraine headache     Obesity     Schizophrenia (HCC)     Seasonal allergies     Sleep apnea     no machine    Wears partial dentures     upper and lower partial       Past Surgical History:   Procedure Laterality Date    CERVIX BIOPSY N/A 2019    COLD prenatal vitamin (VOL-PLUS) 27-1 MG TABS TABLET TAKE 1 TABLET BY MOUTH DAILY 60 tablet 8    ibuprofen (ADVIL;MOTRIN) 800 MG tablet Take 1 tablet by mouth every 8 hours as needed for Pain 30 tablet 0    LATUDA 80 MG TABS tablet Take 80 mg by mouth daily  2    levothyroxine (SYNTHROID) 50 MCG tablet Take 50 mcg by mouth daily  2    SUMAtriptan (IMITREX) 50 MG tablet Take 100 mg by mouth once as needed for Migraine      fluticasone (VERAMYST) 27.5 MCG/SPRAY nasal spray 2 sprays by Nasal route 2 times daily      docusate sodium (COLACE) 100 MG capsule Take 1 capsule by mouth 2 times daily 30 capsule 0    econazole nitrate 1 % cream Apply 1 each topically daily as needed       Emollient (LUBRIDERM) LOTN Apply 1 each topically daily       topiramate (TOPAMAX) 25 MG tablet Take 25 mg by mouth daily       Budesonide-Formoterol Fumarate (SYMBICORT IN) Inhale 2 puffs into the lungs daily as needed       cetirizine (ZYRTEC ALLERGY) 10 MG TABS Take 10 mg by mouth daily 30 tablet 0    albuterol (PROVENTIL HFA) 108 (90 BASE) MCG/ACT inhaler Inhale 2 puffs into the lungs every 4 hours as needed for Wheezing.  diphenoxylate-atropine (LOMOTIL) 2.5-0.025 MG per tablet Take 1 tablet by mouth daily as needed       polyethylene glycol (GLYCOLAX) powder Take 17 g by mouth daily as needed       pantoprazole (PROTONIX) 40 MG tablet Take 40 mg by mouth daily.  ondansetron (ZOFRAN) 4 MG tablet Take 1 tablet by mouth every 8 hours as needed for Nausea. 20 tablet 0    atorvastatin (LIPITOR) 20 MG tablet Take 20 mg by mouth daily  2     No current facility-administered medications for this visit.         ALLERGIES:  Allergies as of 11/06/2020 - Review Complete 11/06/2020   Allergen Reaction Noted    Codeine Hives 04/18/2014    Penicillins Swelling 04/18/2014       REVIEW OF SYSTEMS:   Constitutional: negative fever, negative chills   HEENT: negative visual disturbances, negative headaches   Breast: Negative breast abnormalities, negative breast lumps   Respiratory: negative dyspnea, negative cough   Cardiovascular: negative chest pain,  negative palpitations   Gastrointestinal: negative abdominal pain, negative N/V, negative heartburn   Genitourinary: negative urinary incontinence, negative vaginal discharge  Dermatological: negative rash, negative pruritis   Hematologic: negative bruising  Immunologic/Lymphatic: negative recent illness   Musculoskeletal: negative back pain   Neurological:  negative dizziness, negative migraines   Behavior/Psych: negative SI, negative HI    Blood pressure (!) 154/80, pulse 65, height 5' 2\" (1.575 m), weight 207 lb 9.6 oz (94.2 kg), not currently breastfeeding.     PHYSICAL EXAM:  General appearance: no apparent distress, alert and cooperative  HEENT: head atraumatic, normocephalic, trachea midline, moist mucous membranes   Neurologic: alert, oriented, normal speech, no focal findings or movement disorder noted   Abdomen: soft, non-tender on palpation,  no guarding, no rebound, no rigidity   Extremities:  no calf tenderness bilaterally, non-edematous bilaterally    Musculoskeletal: no gross abnormalities, range of motion appropriate for age   Psychiatric: mood appropriate, normal affect   Pelvic Exam:   Vulva: normal appearing vulva, no masses, tenderness or lesions, normal clitoris    Vagina: normal appearing urethral meatus, normal appearing vaginal mucosa with normal color and white thick discharge, no lesions, no vaginal bleeding     Cervix: normal appearing cervix without pathologic appearing discharge or lesions         Lab Results:  Results for orders placed or performed in visit on 11/06/20   POCT urine pregnancy   Result Value Ref Range    Preg Test, Ur negative     Control present        Assessment/Plan:  Follow Up for abnormal pap with STD screening    - Vaginitis and Gc/C collected    - Will call patient with any abnormal results    - Mammogram ordered as patient did not complete this yet this year    - Hep C, Hep B, HIV, TPal ordered    - Urine pregnancy test negative during appointment    - Next pap due 1/30/2021    - Return to the office Feb 2021 for well women exam      Diagnosis Orders   1. Follow-up vaginal Pap smear  Chlamydia Trachomatis & Neisseria gonorrhoeae (GC) by amplified detection    Vaginitis DNA Probe    POCT urine pregnancy    CARYN DIGITAL SCREEN W OR WO CAD BILATERAL    HIV Screen    Hepatitis C Antibody    Hepatitis B Surface Antigen    T. Pallidum Ab   2. Screen for STD (sexually transmitted disease)       Patient Active Problem List    Diagnosis Date Noted    Schizophrenia ([facility] [address] 09/03/2014     Priority: Low    HTN (hypertension) 09/03/2014     Priority: Low    Anxiety 09/03/2014     Priority: Low    GERD (gastroesophageal reflux disease) 09/03/2014     Priority: Low    Hyperlipemia 09/03/2014     Priority: Low    Cold Knife Cone 4/5/19 04/05/2019    Cervical dysplasia     Hx LEEP  02/21/2019     Cervical Dysplasia History:   Pap smear 9/1/2011: negative for dysplasia   Pap smear 7/2/14: ASCUS HPV +   Colpo 9/3/14: negative ectocervical biopsies and ECC   Pap smear 2/29/16: ASCUS HPV+   Colpo 4/5/16:     ECC: EVER 2-3    6 o'clock biopsy: EVER 2    12 o'clock biopsy: EVER 1   LEEP 6/6/16:     Ectocervical Cone Biopsy: EVER 3 with negative margins    Endocervical Top Hat: EVER 2 with narrowly negative margins   Pap smear 11/28/17: negative for dysplasia   Pap smear 1/15/19: HSIL   Colpo 2/21/19: ECC showing CIN2, 12 o'clock, 3 o'clock biopsies negative   Cold Knife Cone 4/5/19 negative for dysplasia and ECC negative for dysplasia    Pap smear 1/30/20 ASCUS with +HRHPV   Colpo 3/2/20 no biopsies taken, f/u in 6 months for Pap smear         Patellofemoral pain syndrome of left knee 08/15/2017    EVER III (cervical intraepithelial neoplasia III)      HSIL pap, ECC positive for EVER 2. History of prior leep with top hat 2016.  Findings consistent with persistent / recurrent high grade dysplasia. Plan: repeat excisional procedure / CKC. Hysterectomy would be acceptable if no desire for future pregnancy. Counseling:  Repeat Annual every 1 year  Cervical Cytology Evaluation begins at 24years old. If Negative Cytology, Follow-up screening per current guidelines. Counseled on preventative health maintenance follow-up. Orders Placed This Encounter   Procedures    Chlamydia Trachomatis & Neisseria gonorrhoeae (GC) by amplified detection     Standing Status:   Future     Standing Expiration Date:   11/5/2021    Vaginitis DNA Probe     Standing Status:   Future     Standing Expiration Date:   11/5/2021    CARYN DIGITAL SCREEN W OR WO CAD BILATERAL     Standing Status:   Future     Standing Expiration Date:   1/6/2022    HIV Screen     Standing Status:   Future     Standing Expiration Date:   11/6/2021    Hepatitis C Antibody     Standing Status:   Future     Standing Expiration Date:   11/6/2021    Hepatitis B Surface Antigen     Standing Status:   Future     Standing Expiration Date:   11/6/2021    T. Pallidum Ab     Standing Status:   Future     Standing Expiration Date:   11/6/2021    POCT urine pregnancy       Patient was seen with total face to face time of 15 minutes. More than 50% of this visit was counseling and education regarding The primary encounter diagnosis was Follow-up vaginal Pap smear. A diagnosis of Screen for STD (sexually transmitted disease) was also pertinent to this visit. and Follow-up (fu pap exam)   as well as counseling on preventative health maintenance follow-up.

## 2020-11-07 LAB
DIRECT EXAM: ABNORMAL
Lab: ABNORMAL
SPECIMEN DESCRIPTION: ABNORMAL

## 2020-11-09 LAB
C TRACH DNA GENITAL QL NAA+PROBE: NEGATIVE
N. GONORRHOEAE DNA: NEGATIVE
SPECIMEN DESCRIPTION: NORMAL

## 2020-11-09 RX ORDER — METRONIDAZOLE 500 MG/1
500 TABLET ORAL 2 TIMES DAILY
Qty: 14 TABLET | Refills: 0 | Status: SHIPPED | OUTPATIENT
Start: 2020-11-09 | End: 2020-11-16

## 2020-12-02 ENCOUNTER — HOSPITAL ENCOUNTER (OUTPATIENT)
Dept: MRI IMAGING | Age: 40
Discharge: HOME | End: 2020-12-04
Payer: COMMERCIAL

## 2020-12-02 PROCEDURE — 73721 MRI JNT OF LWR EXTRE W/O DYE: CPT

## 2021-05-11 ENCOUNTER — APPOINTMENT (OUTPATIENT)
Dept: GENERAL RADIOLOGY | Age: 41
End: 2021-05-11
Payer: COMMERCIAL

## 2021-05-11 ENCOUNTER — HOSPITAL ENCOUNTER (EMERGENCY)
Age: 41
Discharge: HOME | End: 2021-05-11
Attending: EMERGENCY MEDICINE
Payer: COMMERCIAL

## 2021-05-11 VITALS
SYSTOLIC BLOOD PRESSURE: 136 MMHG | HEART RATE: 79 BPM | TEMPERATURE: 97.9 F | OXYGEN SATURATION: 99 % | WEIGHT: 197 LBS | RESPIRATION RATE: 14 BRPM | DIASTOLIC BLOOD PRESSURE: 78 MMHG | BODY MASS INDEX: 36.25 KG/M2 | HEIGHT: 62 IN

## 2021-05-11 DIAGNOSIS — S50.12XA CONTUSION OF LEFT FOREARM, INITIAL ENCOUNTER: Primary | ICD-10-CM

## 2021-05-11 PROCEDURE — 73130 X-RAY EXAM OF HAND: CPT

## 2021-05-11 PROCEDURE — 73090 X-RAY EXAM OF FOREARM: CPT

## 2021-05-11 PROCEDURE — 6360000002 HC RX W HCPCS: Performed by: EMERGENCY MEDICINE

## 2021-05-11 PROCEDURE — 99283 EMERGENCY DEPT VISIT LOW MDM: CPT

## 2021-05-11 PROCEDURE — 6370000000 HC RX 637 (ALT 250 FOR IP): Performed by: EMERGENCY MEDICINE

## 2021-05-11 PROCEDURE — 96374 THER/PROPH/DIAG INJ IV PUSH: CPT

## 2021-05-11 PROCEDURE — 73110 X-RAY EXAM OF WRIST: CPT

## 2021-05-11 PROCEDURE — 29131 APPL FINGER SPLINT DYNAMIC: CPT

## 2021-05-11 RX ORDER — ACETAMINOPHEN 500 MG
1000 TABLET ORAL ONCE
Status: COMPLETED | OUTPATIENT
Start: 2021-05-11 | End: 2021-05-11

## 2021-05-11 RX ORDER — ACETAMINOPHEN 500 MG
1000 TABLET ORAL 3 TIMES DAILY
Qty: 60 TABLET | Refills: 0 | Status: ON HOLD | OUTPATIENT
Start: 2021-05-11 | End: 2022-08-15

## 2021-05-11 RX ORDER — IBUPROFEN 800 MG/1
800 TABLET ORAL EVERY 6 HOURS PRN
Qty: 21 TABLET | Refills: 0 | Status: SHIPPED | OUTPATIENT
Start: 2021-05-11 | End: 2021-07-07

## 2021-05-11 RX ORDER — OXYCODONE HYDROCHLORIDE 5 MG/1
5 TABLET ORAL ONCE
Status: DISCONTINUED | OUTPATIENT
Start: 2021-05-11 | End: 2021-05-11

## 2021-05-11 RX ORDER — FENTANYL CITRATE 50 UG/ML
50 INJECTION, SOLUTION INTRAMUSCULAR; INTRAVENOUS ONCE
Status: COMPLETED | OUTPATIENT
Start: 2021-05-11 | End: 2021-05-11

## 2021-05-11 RX ORDER — IBUPROFEN 800 MG/1
800 TABLET ORAL ONCE
Status: COMPLETED | OUTPATIENT
Start: 2021-05-11 | End: 2021-05-11

## 2021-05-11 RX ADMIN — FENTANYL CITRATE 50 MCG: 50 INJECTION, SOLUTION INTRAMUSCULAR; INTRAVENOUS at 13:51

## 2021-05-11 RX ADMIN — IBUPROFEN 800 MG: 800 TABLET, FILM COATED ORAL at 12:19

## 2021-05-11 RX ADMIN — ACETAMINOPHEN 1000 MG: 500 TABLET ORAL at 12:19

## 2021-05-11 ASSESSMENT — PAIN SCALES - GENERAL: PAINLEVEL_OUTOF10: 7

## 2021-05-11 ASSESSMENT — ENCOUNTER SYMPTOMS
DIARRHEA: 0
VOMITING: 0
SHORTNESS OF BREATH: 0
NAUSEA: 0
ABDOMINAL PAIN: 0
SORE THROAT: 0
CONSTIPATION: 0

## 2021-05-11 NOTE — ED PROVIDER NOTES
[facility]  ED  Emergency Department Encounter  EmergencyMedicine Resident     Pt Name:Chantelle Jimenez  MRN: [id number]  [place] 1980  Date of evaluation: 5/11/21  PCP:  SHERRIE Sheldon CNP    CHIEF COMPLAINT       Chief Complaint   Patient presents with    Hand Injury     c/o left hand pain and swelling stating her hand was slammed in door last night       HISTORY OF PRESENT ILLNESS  (Location/Symptom, Timing/Onset, Context/Setting, Quality, Duration, Modifying Factors, Severity.)      Tatiana Burdick is a 42 y.o. female who presents to the emergency department with left mid forearm pain radiating down to the hand after crush injury where the patient had her forearm slammed in the jam of a door. Since that time she states she cannot move the hand and she is endorsing severe pain to the area for which she has not taken any pain medicine. This incident happened yesterday but she did not come to the emergency department because she wanted to first get her Covid test.  She denies fever, chills, nausea, vomiting, chest pain, shortness of breath, or any other concerns at this time. PAST MEDICAL / SURGICAL / SOCIAL / FAMILY HISTORY      has no past medical history on file. has no past surgical history on file.       Social History     Socioeconomic History    Marital status: Single     Spouse name: Not on file    Number of children: Not on file    Years of education: Not on file    Highest education level: Not on file   Occupational History    Not on file   Social Needs    Financial resource strain: Not on file    Food insecurity     Worry: Not on file     Inability: Not on file    Transportation needs     Medical: Not on file     Non-medical: Not on file   Tobacco Use    Smoking status: Not on file   Substance and Sexual Activity    Alcohol use: Not on file    Drug use: Not on file    Sexual activity: Not on file   Lifestyle    Physical activity     Days per week: Not atraumatic. Right Ear: External ear normal.      Left Ear: External ear normal.      Nose: Nose normal.      Mouth/Throat:      Mouth: Mucous membranes are moist.   Eyes:      Extraocular Movements: Extraocular movements intact. Conjunctiva/sclera: Conjunctivae normal.   Neck:      Musculoskeletal: Normal range of motion and neck supple. Trachea: No tracheal deviation. Cardiovascular:      Rate and Rhythm: Normal rate and regular rhythm. Pulmonary:      Effort: Pulmonary effort is normal. No respiratory distress. Abdominal:      General: Abdomen is flat. There is no distension. Musculoskeletal:         General: Tenderness and signs of injury present. No deformity. Comments: The left midforearm is very tender to palpation with intact radial pulse and capillary refill of all 5 phalanges. Tender down to the fingertips, including over snuffbox. The patient is able to minimally flex and extend all fingers but range of motion is limited by pain. Skin:     General: Skin is warm and dry. Capillary Refill: Capillary refill takes less than 2 seconds. Coloration: Skin is not jaundiced. Findings: No bruising or lesion. Neurological:      General: No focal deficit present. Mental Status: She is alert and oriented to person, place, and time. Mental status is at baseline. Motor: No abnormal muscle tone.          DIFFERENTIAL  DIAGNOSIS     PLAN (LABS / IMAGING / EKG):  Orders Placed This Encounter   Procedures    XR RADIUS ULNA LEFT (2 VIEWS)    XR WRIST LEFT (MIN 3 VIEWS)    XR HAND LEFT (MIN 3 VIEWS)    Apply ice to affected area       MEDICATIONS ORDERED:  Orders Placed This Encounter   Medications    acetaminophen (TYLENOL) tablet 1,000 mg    ibuprofen (ADVIL;MOTRIN) tablet 800 mg    DISCONTD: oxyCODONE (ROXICODONE) immediate release tablet 5 mg    fentaNYL (SUBLIMAZE) injection 50 mcg       DDX: Cinthia Krause is a 42 y.o. female who presents to the emergency department with left forearm, wrist, and hand pain after injury. Differential diagnosis includes fracture, bruise    DIAGNOSTIC RESULTS / EMERGENCY DEPARTMENT COURSE / [facility]   LAB RESULTS:  No results found for this visit on 05/11/21. IMPRESSION: Vidal Wiley is a 42 y.o. female who presents to the emergency department with left forearm, wrist, and hand pain after injury. On examination vital signs are normal and examination demonstrates a well-appearing female who is able to move the phalanges but with significant pain primarily in the mid forearm region. We will obtain x-ray of this area, apply ice, provide pain medication, reassess. Likely the patient will regardless require a thumb spica splint for snuffbox tenderness and orthopedic surgery follow-up. Patient verbalizes understanding and agreement with plan. RADIOLOGY:  No results found. EKG  None    All EKG's are interpreted by the Emergency Department Physician who either signs or co-signs this chart in the absence of a cardiologist.    EMERGENCY DEPARTMENT COURSE:   X-rays negative. Patient still complaining of pain in the left snuffbox, will place in a thumb spica and plan for orthopedic follow-up in 1 week. PROCEDURES:  PROCEDURE NOTE - SPLINT APPLICATION    PATIENT NAME: [facility] RECORD NO. [id number]  DATE: 5/11/2021  ATTENDING PHYSICIAN: Rosy    PREOPERATIVE DIAGNOSIS: Left-sided snuffbox tenderness  POSTOPERATIVE DIAGNOSIS:  Same  PROCEDURE PERFORMED:  Application of thumb spica splint  PERFORMING PHYSICIAN: Lynn Saldaña MD    DISCUSSION:  Vidal Wiley is a 42y.o.-year-old female who requires application of thumb spica splint due to left-sided snuffbox tenderness. The history and physical examination were reviewed and confirmed. CONSENT: The patient provided verbal consent for this procedure.      PROCEDURE:  The pre-splint application exam showed distal perfusion & neurologic function to be normal. The patient was placed in the appropriate position. Gauze applied prior. Orthoglass splint material used. a thumb spica splint. splint applied with Ace wrap used to secure. A post-splinting exam revealed distal perfusion & neurologic function to be normal.     The patient tolerated the procedure well. COMPLICATIONS:  None     Poonam Gu MD  2:23 PM, 5/11/21      CONSULTS:  None    CRITICAL CARE:  Please see attending note. FINAL IMPRESSION      1. Contusion of left forearm, initial encounter          DISPOSITION / PLAN     DISPOSITION Decision To Discharge 05/11/2021 02:22:26 PM      PATIENT REFERRED TO:  [address] [place] [address] Road  [address] [facility] [address] [phone]  Schedule an appointment as soon as possible for a visit in 1 week  For followup, for PCP establishment    [facility] ED  [address] [facility] [id number] [phone]  Go to   As needed, If symptoms worsen    [address] [place] [address]  [phone]  Schedule an appointment as soon as possible for a visit in 1 week  For followup      DISCHARGE MEDICATIONS:  New Prescriptions    No medications on file       Poonam Gu MD  Emergency Medicine Resident    This patient was evaluated in the Emergency Department for symptoms described in the history of present illness. He/she was evaluated in the context of the global COVID-19 pandemic, which necessitated consideration that the patient might be at risk for infection with the SARS-CoV-2 virus that causes COVID-19. Institutional protocols and algorithms that pertain to the evaluation of patients at risk for COVID-19 are in a state of rapid change based on information released by regulatory bodies including the CDC and federal and state organizations. These policies and algorithms were followed during the patient's care in the ED.     (Please note that portions of thisnote were completed with a voice recognition program. Efforts were made to edit the dictations but occasionally words are mis-transcribed.)       Poonam Gu MD  Resident  05/11/21 MD Marcelle  Resident  05/11/21 [id number]

## 2021-05-11 NOTE — ED PROVIDER NOTES
[facility]     Emergency Department     Faculty Attestation    I performed a history and physical examination of the patient and discussed management with the resident. I reviewed the residents note and agree with the documented findings and plan of care. Any areas of disagreement are noted on the chart. I was personally present for the key portions of any procedures. I have documented in the chart those procedures where I was not present during the key portions. I have reviewed the emergency nurses triage note. I agree with the chief complaint, past medical history, past surgical history, allergies, medications, social and family history as documented unless otherwise noted below. For Physician Assistant/ Nurse Practitioner cases/documentation I have personally evaluated this patient and have completed at least one if not all key elements of the E/M (history, physical exam, and MDM). Additional findings are as noted. I have personally seen and evaluated the patient. I find the patient's history and physical exam are consistent with the NP/PA documentation. I agree with the care provided, treatment rendered, disposition and follow-up plan. 76-year-old female presenting with left wrist pain after slamming her arm in a door. Able to move the arm, albeit painfully. Not on blood thinners. No prior injury to the area. Exam:  General: Sitting on the bed, awake, alert and in no acute distress  CV: normal rate and regular rhythm  Lungs: Breathing comfortably on room air with no tachypnea, hypoxia, or increased work of breathing  MSK: Tenderness in the anatomic snuffbox on the left wrist, as well as the mid forearm. Moving all fingers. Plan:  X-ray left forearm and wrist  If no evidence of fracture, will place in thumb spica Velcro splint, with concern for scaphoid injury. Follow-up in 7 to 10 days for repeat x-rays.         Maximus Garcia MD   Attending Emergency Physician    (Please note that portions of this note were completed with a voice recognition program. Efforts were made to edit the dictations but occasionally words are mis-transcribed.)             Juan Chino MD  05/11/21 [id number]

## 2021-05-13 ENCOUNTER — HOSPITAL ENCOUNTER (OUTPATIENT)
Age: 41
Discharge: HOME | End: 2021-05-13
Payer: COMMERCIAL

## 2021-05-13 ENCOUNTER — HOSPITAL ENCOUNTER (OUTPATIENT)
Dept: MAMMOGRAPHY | Facility: CLINIC | Age: 41
Discharge: HOME | End: 2021-05-15
Payer: COMMERCIAL

## 2021-05-13 ENCOUNTER — HOSPITAL ENCOUNTER (OUTPATIENT)
Dept: MAMMOGRAPHY | Age: 41
Discharge: HOME | End: 2021-05-15
Payer: COMMERCIAL

## 2021-05-13 DIAGNOSIS — Z12.72 FOLLOW-UP VAGINAL PAP SMEAR: ICD-10-CM

## 2021-05-13 LAB
-: NORMAL
ABSOLUTE EOS #: 0.05 K/UL (ref 0–0.44)
ABSOLUTE IMMATURE GRANULOCYTE: 0.03 K/UL (ref 0–0.3)
ABSOLUTE LYMPH #: 2.41 K/UL (ref 1.1–3.7)
ABSOLUTE MONO #: 0.81 K/UL (ref 0.1–1.2)
ALBUMIN SERPL-MCNC: 3.9 G/DL (ref 3.5–5.2)
ALBUMIN/GLOBULIN RATIO: 1.3 (ref 1–2.5)
ALP BLD-CCNC: 84 U/L (ref 35–104)
ALT SERPL-CCNC: 30 U/L (ref 5–33)
AMORPHOUS: NORMAL
ANION GAP SERPL CALCULATED.3IONS-SCNC: 12 MMOL/L (ref 9–17)
AST SERPL-CCNC: 27 U/L
BACTERIA: NORMAL
BASOPHILS # BLD: 1 % (ref 0–2)
BASOPHILS ABSOLUTE: 0.04 K/UL (ref 0–0.2)
BILIRUB SERPL-MCNC: 0.18 MG/DL (ref 0.3–1.2)
BILIRUBIN URINE: NEGATIVE
BUN BLDV-MCNC: 10 MG/DL (ref 6–20)
BUN/CREAT BLD: ABNORMAL (ref 9–20)
CALCIUM SERPL-MCNC: 8.3 MG/DL (ref 8.6–10.4)
CASTS UA: NORMAL /LPF (ref 0–8)
CHLORIDE BLD-SCNC: 103 MMOL/L (ref 98–107)
CHOLESTEROL/HDL RATIO: 4
CHOLESTEROL: 158 MG/DL
CO2: 23 MMOL/L (ref 20–31)
COLOR: YELLOW
COMMENT UA: ABNORMAL
CREAT SERPL-MCNC: 0.87 MG/DL (ref 0.5–0.9)
CRYSTALS, UA: NORMAL /HPF
DIFFERENTIAL TYPE: ABNORMAL
EOSINOPHILS RELATIVE PERCENT: 1 % (ref 1–4)
EPITHELIAL CELLS UA: NORMAL /HPF (ref 0–5)
ESTIMATED AVERAGE GLUCOSE: 108 MG/DL
GFR AFRICAN AMERICAN: >60 ML/MIN
GFR NON-AFRICAN AMERICAN: >60 ML/MIN
GFR SERPL CREATININE-BSD FRML MDRD: ABNORMAL ML/MIN/{1.73_M2}
GFR SERPL CREATININE-BSD FRML MDRD: ABNORMAL ML/MIN/{1.73_M2}
GLUCOSE BLD-MCNC: 86 MG/DL (ref 70–99)
GLUCOSE URINE: NEGATIVE
HBA1C MFR BLD: 5.4 % (ref 4–6)
HCT VFR BLD CALC: 40.5 % (ref 36.3–47.1)
HDLC SERPL-MCNC: 40 MG/DL
HEMOGLOBIN: 13.5 G/DL (ref 11.9–15.1)
IMMATURE GRANULOCYTES: 1 %
KETONES, URINE: NEGATIVE
LDL CHOLESTEROL: 67 MG/DL (ref 0–130)
LEUKOCYTE ESTERASE, URINE: NEGATIVE
LYMPHOCYTES # BLD: 37 % (ref 24–43)
MCH RBC QN AUTO: 30.8 PG (ref 25.2–33.5)
MCHC RBC AUTO-ENTMCNC: 33.3 G/DL (ref 28.4–34.8)
MCV RBC AUTO: 92.3 FL (ref 82.6–102.9)
MONOCYTES # BLD: 12 % (ref 3–12)
MUCUS: NORMAL
NITRITE, URINE: NEGATIVE
NRBC AUTOMATED: 0 PER 100 WBC
OTHER OBSERVATIONS UA: NORMAL
PDW BLD-RTO: 12.6 % (ref 11.8–14.4)
PH UA: 6.5 (ref 5–8)
PLATELET # BLD: 259 K/UL (ref 138–453)
PLATELET ESTIMATE: ABNORMAL
PMV BLD AUTO: 10.7 FL (ref 8.1–13.5)
POTASSIUM SERPL-SCNC: 3.8 MMOL/L (ref 3.7–5.3)
PROTEIN UA: ABNORMAL
RBC # BLD: 4.39 M/UL (ref 3.95–5.11)
RBC # BLD: ABNORMAL 10*6/UL
RBC UA: NORMAL /HPF (ref 0–4)
RENAL EPITHELIAL, UA: NORMAL /HPF
SEG NEUTROPHILS: 48 % (ref 36–65)
SEGMENTED NEUTROPHILS ABSOLUTE COUNT: 3.26 K/UL (ref 1.5–8.1)
SODIUM BLD-SCNC: 138 MMOL/L (ref 135–144)
SPECIFIC GRAVITY UA: 1.03 (ref 1–1.03)
THYROXINE, FREE: 1.08 NG/DL (ref 0.93–1.7)
TOTAL PROTEIN: 6.8 G/DL (ref 6.4–8.3)
TRICHOMONAS: NORMAL
TRIGL SERPL-MCNC: 254 MG/DL
TSH SERPL DL<=0.05 MIU/L-ACNC: 2.4 MIU/L (ref 0.3–5)
TURBIDITY: CLEAR
URINE HGB: ABNORMAL
UROBILINOGEN, URINE: NORMAL
VITAMIN D 25-HYDROXY: 60.8 NG/ML (ref 30–100)
VLDLC SERPL CALC-MCNC: ABNORMAL MG/DL (ref 1–30)
WBC # BLD: 6.6 K/UL (ref 3.5–11.3)
WBC # BLD: ABNORMAL 10*3/UL
WBC UA: NORMAL /HPF (ref 0–5)
YEAST: NORMAL

## 2021-05-13 PROCEDURE — 80053 COMPREHEN METABOLIC PANEL: CPT

## 2021-05-13 PROCEDURE — 84439 ASSAY OF FREE THYROXINE: CPT

## 2021-05-13 PROCEDURE — 82306 VITAMIN D 25 HYDROXY: CPT

## 2021-05-13 PROCEDURE — 77067 SCR MAMMO BI INCL CAD: CPT

## 2021-05-13 PROCEDURE — 83036 HEMOGLOBIN GLYCOSYLATED A1C: CPT

## 2021-05-13 PROCEDURE — 85025 COMPLETE CBC W/AUTO DIFF WBC: CPT

## 2021-05-13 PROCEDURE — 81001 URINALYSIS AUTO W/SCOPE: CPT

## 2021-05-13 PROCEDURE — 36415 COLL VENOUS BLD VENIPUNCTURE: CPT

## 2021-05-13 PROCEDURE — 80061 LIPID PANEL: CPT

## 2021-05-13 PROCEDURE — 84443 ASSAY THYROID STIM HORMONE: CPT

## 2021-05-18 DIAGNOSIS — M79.642 LEFT HAND PAIN: Primary | ICD-10-CM

## 2021-05-20 ENCOUNTER — OFFICE VISIT (OUTPATIENT)
Dept: ORTHOPEDIC SURGERY | Age: 41
End: 2021-05-20
Payer: COMMERCIAL

## 2021-05-20 VITALS — BODY MASS INDEX: 38.09 KG/M2 | HEIGHT: 62 IN | WEIGHT: 207 LBS

## 2021-05-20 DIAGNOSIS — M25.532 LEFT WRIST PAIN: Primary | ICD-10-CM

## 2021-05-20 PROCEDURE — 4004F PT TOBACCO SCREEN RCVD TLK: CPT | Performed by: ORTHOPAEDIC SURGERY

## 2021-05-20 PROCEDURE — G8417 CALC BMI ABV UP PARAM F/U: HCPCS | Performed by: ORTHOPAEDIC SURGERY

## 2021-05-20 PROCEDURE — G8428 CUR MEDS NOT DOCUMENT: HCPCS | Performed by: ORTHOPAEDIC SURGERY

## 2021-05-20 PROCEDURE — 99204 OFFICE O/P NEW MOD 45 MIN: CPT | Performed by: ORTHOPAEDIC SURGERY

## 2021-05-20 NOTE — PROGRESS NOTES
levothyroxine (SYNTHROID) 50 MCG tablet Take 50 mcg by mouth daily 3/18/19  Yes Historical Provider, MD   SUMAtriptan (IMITREX) 50 MG tablet Take 100 mg by mouth once as needed for Migraine   Yes Historical Provider, MD   fluticasone (VERAMYST) 27.5 MCG/SPRAY nasal spray 2 sprays by Nasal route 2 times daily   Yes Historical Provider, MD   docusate sodium (COLACE) 100 MG capsule Take 1 capsule by mouth 2 times daily 6/16/17  Yes Aliyah Young MD   econazole nitrate 1 % cream Apply 1 each topically daily as needed  3/29/16  Yes Historical Provider, MD   Emollient Glorietta Raymonegers) LOTN Apply 1 each topically daily  3/30/16  Yes Historical Provider, MD   topiramate (TOPAMAX) 25 MG tablet Take 25 mg by mouth daily  3/30/16  Yes Historical Provider, MD   Budesonide-Formoterol Fumarate (SYMBICORT IN) Inhale 2 puffs into the lungs daily as needed    Yes Historical Provider, MD   cetirizine (ZYRTEC ALLERGY) 10 MG TABS Take 10 mg by mouth daily 7/8/15  Yes Cem Whatley MD   albuterol (PROVENTIL HFA) 108 (90 BASE) MCG/ACT inhaler Inhale 2 puffs into the lungs every 4 hours as needed for Wheezing. Yes Historical Provider, MD   diphenoxylate-atropine (LOMOTIL) 2.5-0.025 MG per tablet Take 1 tablet by mouth daily as needed  4/18/14  Yes Historical Provider, MD   polyethylene glycol (GLYCOLAX) powder Take 17 g by mouth daily as needed  5/20/14  Yes Historical Provider, MD   pantoprazole (PROTONIX) 40 MG tablet Take 40 mg by mouth daily. Yes Historical Provider, MD   ondansetron (ZOFRAN) 4 MG tablet Take 1 tablet by mouth every 8 hours as needed for Nausea.  4/18/14  Yes Lacey Roper MD      Social History     Socioeconomic History    Marital status: Single     Spouse name: Not on file    Number of children: Not on file    Years of education: Not on file    Highest education level: Not on file   Occupational History    Not on file   Tobacco Use    Smoking status: Current Every Day Smoker     Packs/day: 1.00 Types: Cigarettes    Smokeless tobacco: Never Used   Substance and Sexual Activity    Alcohol use: No     Comment: rare    Drug use: Yes     Frequency: 7.0 times per week     Types: Marijuana    Sexual activity: Yes     Partners: Male   Other Topics Concern    Not on file   Social History Narrative    Not on file     Social Determinants of Health     Financial Resource Strain:     Difficulty of Paying Living Expenses:    Food Insecurity:     Worried About Running Out of Food in the Last Year:     Ran Out of Food in the Last Year:    Transportation Needs:     Lack of Transportation (Medical):  Lack of Transportation (Non-Medical):    Physical Activity:     Days of Exercise per Week:     Minutes of Exercise per Session:    Stress:     Feeling of Stress :    Social Connections:     Frequency of Communication with Friends and Family:     Frequency of Social Gatherings with Friends and Family:     Attends Mosque Services:     Active Member of Clubs or Organizations:     Attends Club or Organization Meetings:     Marital Status:    Intimate Partner Violence:     Fear of Current or Ex-Partner:     Emotionally Abused:     Physically Abused:     Sexually Abused:      Family History   Problem Relation Age of Onset    Cancer Mother         uterine    Diabetes Mother     High Blood Pressure Mother     Endometrial Cancer Mother     Diabetes Brother     Heart Disease Brother        Allergies: Codeine and Penicillins    ROS:  Constitutional: Negative for fever and chills. HENT: Negative for congestion, nose bleeds, and sore throat. Eyes: Negative for blurred vision and double vision. Respiratory: Negative for cough, shortness of breath. Cardiovascular: Negative for chest pain and palpitations. Gastrointestinal: Negative for nausea, vomiting. Musculoskeletal: Positive for left wrist pain and swelling. Skin: Negative for itching and rash.    Neurological: Negative numbness, tingling, weakness. Psychiatric/Behavioral: Negative for depression and suicidal ideas. OBJECTIVE:  Ht 5' 2\" (1.575 m)   Wt 207 lb (93.9 kg)   BMI 37.86 kg/m²   Physical exam:  Gen: Alert and oriented, NAD. CV: No dependent edema, regular rate. Resp: Unlabored respirations, equal chest rise bilaterally. Neuro: No dizziness or confusion. Eyes: Extra-ocular muscles intact. Mouth: No visible philly oral lesions. Skin: Warm, well perfused. Psych: Patient has good fund of knowledge and displays understanging of exam.  Ortho exam:  LUE: Skin intact, no gross signs of infection or wounds. Gross motor and sensation intact. TTP along the radial styloid, volar/radial and dorsal/radial aspects of the wrist with both light and deep palpation. Non tender within the snuff box. 2+ radial pulse. Slightly limited active range of motion in all planes, but still well preserved. PROCEDURES: None. RADIOLOGY:   History: 42y.o. year old female with a history of left wrist pain. Comparison: None. Findings: 3 views of the left hand and wrist including PA, lateral, oblique demonstrates no acute osseous abnormality including fracture or dislocation. Carpal alignment is well-maintained. No obvious lytic or blastic lesions present. Minimal degenerative changes present throughout the carpal and radiocarpal joints. Impression: No acute osseous abnormality as described above. ASSESSMENT:  1. Left wrist sprain    PLAN:  Bill Bustillos is here for evaluation of her left wrist. Radiographs demonstrate no osseous abnormality including fracture or dislocation. Likely she has pain and swelling due to sprained ligaments within the wrist. We discussed conservative treatment with NSAIDs, bracing, and later occupational therapy. Patient was provided a cock up wrist brace to wear during the day. She was recommended to remove this several times throughout the day to work on range of motion. Remove for hygiene purposes.  We will see her back in the office in 4 weeks. -Return in about 4 weeks (around 6/17/2021). No orders of the defined types were placed in this encounter. Orders Placed This Encounter   Procedures    XR WRIST LEFT (MIN 3 VIEWS)     Order Specific Question:   Reason for exam:     Answer:   PA, lateral, oblique         Electronically signed by Aishwarya Jovel DO on 5/20/2021 at 9:16 PM    This dictation was generated by voice recognition computer software. Although all attempts are made to edit the dictation for accuracy, there may be errors in the transcription that are not intended. The actual meaning should be extrapolated by the context of the sentence.

## 2021-05-23 ENCOUNTER — HOSPITAL ENCOUNTER (EMERGENCY)
Age: 41
Discharge: OTHER INSTITUTION | End: 2021-05-24
Attending: EMERGENCY MEDICINE
Payer: COMMERCIAL

## 2021-05-23 ENCOUNTER — APPOINTMENT (OUTPATIENT)
Dept: GENERAL RADIOLOGY | Age: 41
End: 2021-05-23
Payer: COMMERCIAL

## 2021-05-23 VITALS
TEMPERATURE: 97 F | RESPIRATION RATE: 23 BRPM | OXYGEN SATURATION: 96 % | WEIGHT: 207 LBS | DIASTOLIC BLOOD PRESSURE: 101 MMHG | HEART RATE: 74 BPM | HEIGHT: 62 IN | BODY MASS INDEX: 38.09 KG/M2 | SYSTOLIC BLOOD PRESSURE: 143 MMHG

## 2021-05-23 DIAGNOSIS — I50.23 ACUTE ON CHRONIC SYSTOLIC CONGESTIVE HEART FAILURE (HCC): Primary | ICD-10-CM

## 2021-05-23 LAB
ABSOLUTE EOS #: 0.06 K/UL (ref 0–0.44)
ABSOLUTE IMMATURE GRANULOCYTE: 0.03 K/UL (ref 0–0.3)
ABSOLUTE LYMPH #: 2.62 K/UL (ref 1.1–3.7)
ABSOLUTE MONO #: 0.69 K/UL (ref 0.1–1.2)
ANION GAP SERPL CALCULATED.3IONS-SCNC: 11 MMOL/L (ref 9–17)
BASOPHILS # BLD: 0 % (ref 0–2)
BASOPHILS ABSOLUTE: <0.03 K/UL (ref 0–0.2)
BNP INTERPRETATION: ABNORMAL
BUN BLDV-MCNC: 10 MG/DL (ref 6–20)
BUN/CREAT BLD: NORMAL (ref 9–20)
CALCIUM SERPL-MCNC: 9.2 MG/DL (ref 8.6–10.4)
CHLORIDE BLD-SCNC: 104 MMOL/L (ref 98–107)
CO2: 24 MMOL/L (ref 20–31)
CREAT SERPL-MCNC: 0.7 MG/DL (ref 0.5–0.9)
DIFFERENTIAL TYPE: NORMAL
EOSINOPHILS RELATIVE PERCENT: 1 % (ref 1–4)
GFR AFRICAN AMERICAN: >60 ML/MIN
GFR NON-AFRICAN AMERICAN: >60 ML/MIN
GFR SERPL CREATININE-BSD FRML MDRD: NORMAL ML/MIN/{1.73_M2}
GFR SERPL CREATININE-BSD FRML MDRD: NORMAL ML/MIN/{1.73_M2}
GLUCOSE BLD-MCNC: 70 MG/DL (ref 70–99)
HCT VFR BLD CALC: 43.6 % (ref 36.3–47.1)
HEMOGLOBIN: 14.7 G/DL (ref 11.9–15.1)
IMMATURE GRANULOCYTES: 0 %
LYMPHOCYTES # BLD: 35 % (ref 24–43)
MCH RBC QN AUTO: 30.6 PG (ref 25.2–33.5)
MCHC RBC AUTO-ENTMCNC: 33.7 G/DL (ref 28.4–34.8)
MCV RBC AUTO: 90.6 FL (ref 82.6–102.9)
MONOCYTES # BLD: 9 % (ref 3–12)
NRBC AUTOMATED: 0 PER 100 WBC
PDW BLD-RTO: 12.5 % (ref 11.8–14.4)
PLATELET # BLD: 295 K/UL (ref 138–453)
PLATELET ESTIMATE: NORMAL
PMV BLD AUTO: 9.9 FL (ref 8.1–13.5)
POTASSIUM SERPL-SCNC: 4 MMOL/L (ref 3.7–5.3)
PRO-BNP: 1022 PG/ML
RBC # BLD: 4.81 M/UL (ref 3.95–5.11)
RBC # BLD: NORMAL 10*6/UL
SARS-COV-2, RAPID: NOT DETECTED
SEG NEUTROPHILS: 55 % (ref 36–65)
SEGMENTED NEUTROPHILS ABSOLUTE COUNT: 4.13 K/UL (ref 1.5–8.1)
SODIUM BLD-SCNC: 139 MMOL/L (ref 135–144)
SPECIMEN DESCRIPTION: NORMAL
TROPONIN INTERP: NORMAL
TROPONIN INTERP: NORMAL
TROPONIN T: NORMAL NG/ML
TROPONIN T: NORMAL NG/ML
TROPONIN, HIGH SENSITIVITY: <6 NG/L (ref 0–14)
TROPONIN, HIGH SENSITIVITY: <6 NG/L (ref 0–14)
WBC # BLD: 7.6 K/UL (ref 3.5–11.3)
WBC # BLD: NORMAL 10*3/UL

## 2021-05-23 PROCEDURE — 96374 THER/PROPH/DIAG INJ IV PUSH: CPT

## 2021-05-23 PROCEDURE — 87635 SARS-COV-2 COVID-19 AMP PRB: CPT

## 2021-05-23 PROCEDURE — 99285 EMERGENCY DEPT VISIT HI MDM: CPT

## 2021-05-23 PROCEDURE — 84484 ASSAY OF TROPONIN QUANT: CPT

## 2021-05-23 PROCEDURE — 6360000002 HC RX W HCPCS: Performed by: STUDENT IN AN ORGANIZED HEALTH CARE EDUCATION/TRAINING PROGRAM

## 2021-05-23 PROCEDURE — 83880 ASSAY OF NATRIURETIC PEPTIDE: CPT

## 2021-05-23 PROCEDURE — 71046 X-RAY EXAM CHEST 2 VIEWS: CPT

## 2021-05-23 PROCEDURE — 93005 ELECTROCARDIOGRAM TRACING: CPT | Performed by: STUDENT IN AN ORGANIZED HEALTH CARE EDUCATION/TRAINING PROGRAM

## 2021-05-23 PROCEDURE — 85025 COMPLETE CBC W/AUTO DIFF WBC: CPT

## 2021-05-23 PROCEDURE — 80048 BASIC METABOLIC PNL TOTAL CA: CPT

## 2021-05-23 RX ORDER — FUROSEMIDE 10 MG/ML
40 INJECTION INTRAMUSCULAR; INTRAVENOUS ONCE
Status: COMPLETED | OUTPATIENT
Start: 2021-05-23 | End: 2021-05-23

## 2021-05-23 RX ADMIN — FUROSEMIDE 40 MG: 10 INJECTION, SOLUTION INTRAMUSCULAR; INTRAVENOUS at 20:40

## 2021-05-23 ASSESSMENT — PAIN SCALES - GENERAL: PAINLEVEL_OUTOF10: 9

## 2021-05-23 NOTE — ED PROVIDER NOTES
[facility]     Emergency Department     Faculty Attestation        I performed a history and physical examination of the patient and discussed management with the resident. I reviewed the residents note and agree with the documented findings and plan of care. Any areas of disagreement are noted on the chart. I was personally present for the key portions of any procedures. I have documented in the chart those procedures where I was not present during the key portions. I have reviewed the emergency nurses triage note. I agree with the chief complaint, past medical history, past surgical history, allergies, medications, social and family history as documented unless otherwise noted below. For Physician Assistant/ Nurse Practitioner cases/documentation I have personally evaluated this patient and have completed at least one if not all key elements of the E/M (history, physical exam, and MDM). Additional findings are as noted. Vital Signs: BP: (!) 138/97  Pulse: 76  Resp: 18  Temp: 97 °F (36.1 °C) SpO2: 96 %  PCP:  Gonzalo Prader, APRN - CNP    Pertinent Comments:     Patient is a 79-year-old female with history of asthma as well as diastolic congestive heart failure with mild to moderate aortic regurgitation on previous echo. Patient is supposed to follow-up with the [address] [place] [address] cardiology for JAMES at some point but has not yet. Over the last 2 weeks patient initially having fatigue that has progressed into some shortness of breath and concern for exacerbation of CHF and here for evaluation. Assessment/plan: We will obtain cardiac work-up as well as Covid swab and obviously chest x-ray. Reevaluate after    Patient's work-up here is negative for troponin however has a BNP of approximately thousand with chest x-ray with some early mild edema.    Her cardiology at [address] [place] [address] cardiology group was contacted and they wish transfer to the [facility].          [phone]  None    This patient was evaluated in the Emergency Department for symptoms described in the history of present illness. He/she was evaluated in the context of the global COVID-19 pandemic, which necessitated consideration that the patient might be at risk for infection with the SARS-CoV-2 virus that causes COVID-19. Institutional protocols and algorithms that pertain to the evaluation of patients at risk for COVID-19 are in a state of rapid change based on information released by regulatory bodies including the CDC and federal and state organizations. These policies and algorithms were followed during the patient's care in the ED. (Please note that portions of this note were completed with a voice recognition program. Efforts were made to edit the dictations but occasionally words are mis-transcribed.  Whenever words are used in this note in any gender, they shall be construed as though they were used in the gender appropriate to the circumstances; and whenever words are used in this note in the singular or plural form, they shall be construed as though they were used in the form appropriate to the circumstances.)    MD Gerald Han  Attending Emergency Medicine Physician           Karin Hendricks MD  05/23/21 1644       Karin Hendricks MD  05/23/21 88 Sharmila Worthy MD  05/23/21 2034

## 2021-05-23 NOTE — ED PROVIDER NOTES
[facility] ED  Emergency Department Encounter  EmergencyMedicine Resident     Pt Name:Chantelle Kulkarni   MRN: [id number]  Birthdate 1980  Date of evaluation: 5/23/21  PCP:  SHERRIE Marcelino 1376       Chief Complaint   Patient presents with    Cough     productive cough with yellow sputum, ongoing for weeks    Shortness of Breath     pt reports is it hard to breath.  Chest Pain     heaviness, pressure       HISTORY OF PRESENT ILLNESS  (Location/Symptom, Timing/Onset, Context/Setting, Quality, Duration, Modifying Factors, Severity.)      Carmine Rosen is a 42 y.o. female who presents with 7 days of worsening orthopnea shortness of breath generalized discomfort and mild cough with mucus. Patient does have history of severe aortic regurgitation for which she has been seen [facility] cardiology, is supposed to have JAMES. Is not on Lasix no lower extremity swelling is not requiring oxygen no dizziness no history of DVT no extremity. No sick contacts has not been vaccinated for Covid no fevers though some mild chills is afebrile here no hypoxia. No dysuria. PAST MEDICAL / SURGICAL / SOCIAL / FAMILY HISTORY      has a past medical history of Abnormal Pap smear of cervix, Anxiety, Aortic regurgitation, Asthma, Depression, GERD (gastroesophageal reflux disease), Headache(784.0), Heart murmur, Herpes, Hyperlipidemia, Hypertension, Migraine headache, Obesity, Schizophrenia ([address] Seasonal allergies, Sleep apnea, and Wears partial dentures. has a past surgical history that includes pre-malignant / benign skin lesion excision (Left, 5/12/16); LEEP (06/06/16); and Cervix biopsy (N/A, 4/5/2019).     Social History     Socioeconomic History    Marital status: Single     Spouse name: Not on file    Number of children: Not on file    Years of education: Not on file    Highest education level: Not on file   Occupational History    Not on file   Tobacco Use    Smoking status: Current Every Day Smoker     Packs/day: 1.00     Types: Cigarettes    Smokeless tobacco: Never Used   Substance and Sexual Activity    Alcohol use: No     Comment: rare    Drug use: Yes     Frequency: 7.0 times per week     Types: Marijuana    Sexual activity: Yes     Partners: Male   Other Topics Concern    Not on file   Social History Narrative    Not on file     Social Determinants of Health     Financial Resource Strain:     Difficulty of Paying Living Expenses:    Food Insecurity:     Worried About Running Out of Food in the Last Year:     Ran Out of Food in the Last Year:    Transportation Needs:     Lack of Transportation (Medical):  Lack of Transportation (Non-Medical):    Physical Activity:     Days of Exercise per Week:     Minutes of Exercise per Session:    Stress:     Feeling of Stress :    Social Connections:     Frequency of Communication with Friends and Family:     Frequency of Social Gatherings with Friends and Family:     Attends Voodoo Services:     Active Member of Clubs or Organizations:     Attends Club or Organization Meetings:     Marital Status:    Intimate Partner Violence:     Fear of Current or Ex-Partner:     Emotionally Abused:     Physically Abused:     Sexually Abused:        Family History   Problem Relation Age of Onset    Cancer Mother         uterine    Diabetes Mother     High Blood Pressure Mother     Endometrial Cancer Mother     Diabetes Brother     Heart Disease Brother        Allergies:  Codeine and Penicillins    Home Medications:  Prior to Admission medications    Medication Sig Start Date End Date Taking?  Authorizing Provider   prenatal vitamin (VOL-PLUS) 27-1 MG TABS TABLET TAKE 1 TABLET BY MOUTH DAILY 9/1/74   Andrzej Corbett DO   ibuprofen (ADVIL;MOTRIN) 800 MG tablet Take 1 tablet by mouth every 8 hours as needed for Pain 9/25/19   Garret Gill MD   LATUDA 80 MG TABS tablet Take 80 mg by mouth daily 3/11/19 Historical Provider, MD   atorvastatin (LIPITOR) 20 MG tablet Take 20 mg by mouth daily 3/18/19   Historical Provider, MD   levothyroxine (SYNTHROID) 50 MCG tablet Take 50 mcg by mouth daily 3/18/19   Historical Provider, MD   SUMAtriptan (IMITREX) 50 MG tablet Take 100 mg by mouth once as needed for Migraine    Historical Provider, MD   fluticasone (VERAMYST) 27.5 MCG/SPRAY nasal spray 2 sprays by Nasal route 2 times daily    Historical Provider, MD   docusate sodium (COLACE) 100 MG capsule Take 1 capsule by mouth 2 times daily 6/16/17   Chen Sam MD   econazole nitrate 1 % cream Apply 1 each topically daily as needed  3/29/16   Historical Provider, MD Bonita Alegria Noss) LOTN Apply 1 each topically daily  3/30/16   Historical Provider, MD   topiramate (TOPAMAX) 25 MG tablet Take 25 mg by mouth daily  3/30/16   Historical Provider, MD   Budesonide-Formoterol Fumarate (SYMBICORT IN) Inhale 2 puffs into the lungs daily as needed     Historical Provider, MD   cetirizine (ZYRTEC ALLERGY) 10 MG TABS Take 10 mg by mouth daily 7/8/15   Aaron Roberson MD   albuterol (PROVENTIL HFA) 108 (90 BASE) MCG/ACT inhaler Inhale 2 puffs into the lungs every 4 hours as needed for Wheezing. Historical Provider, MD   diphenoxylate-atropine (LOMOTIL) 2.5-0.025 MG per tablet Take 1 tablet by mouth daily as needed  4/18/14   Historical Provider, MD   polyethylene glycol (GLYCOLAX) powder Take 17 g by mouth daily as needed  5/20/14   Historical Provider, MD   pantoprazole (PROTONIX) 40 MG tablet Take 40 mg by mouth daily. Historical Provider, MD   ondansetron (ZOFRAN) 4 MG tablet Take 1 tablet by mouth every 8 hours as needed for Nausea.  4/18/14   Quinton Mcgarry MD     REVIEW OF SYSTEMS    (2-9 systems for level 4, 10 or more for level 5)      General ROS - No fevers, No chills, no gradual weight loss, no night sweats  Ophthalmic ROS - No discharge, No changes in vision  ENT ROS -  No sore throat, No rhinorrhea, Respiratory ROS - shortness of breath, positive cough, no  wheezing  Cardiovascular ROS - No chest pain, positive dyspnea on exertion  Gastrointestinal ROS - No abdominal pain, no nausea, no vomiting, no change in bowel habits, no black or bloody stools  Musculoskeletal ROS - No myalgias, No arthalgias  Neurological ROS - No headache, no dizziness/lightheadedness, No focal weakness, no loss of sensation  Dermatological ROS - No lesions, No rash     PHYSICAL EXAM   (up to 7 for level 4, 8 or more for level 5)      INITIAL VITALS:   BP (!) 165/106   Pulse 68   Temp 97 °F (36.1 °C) (Tympanic)   Resp 18   Ht 5' 2\" (1.575 m)   Wt 207 lb (93.9 kg)   SpO2 99%   BMI 37.86 kg/m²     General Appearance: Well-appearing, in no acute distress  HEENT: Head: normocephalic/atraumatic eyes: PERRLA, EOMT, conjunctiva not injected, sclerae nonicteric ears: External canals patent nose: Nares patent, no rhinorrhea, throat:mucous membranes moist, oropharynx clear     Neck: Trachea midline, no JVD. Lungs: No evidence of increased work of breathing. CTA B/L, no wheezes/rhonchi     Cardiovascular: RRR,  2+ peripheral pulses bilaterally. Cap refill less than 2 seconds. No lower extremity edema noted    Abdomen: Soft, nontender. No guarding or rebound tenderness. Neurologic: DOMINGO  to person, place, time, and event. No sensation deficits. Moving all extremities    Extremities: Skin warm, dry and intact.   DIFFERENTIAL  DIAGNOSIS     PLAN (LABS / IMAGING / EKG):  Orders Placed This Encounter   Procedures    COVID-19, Rapid    XR CHEST (2 VW)    CBC WITH AUTO DIFFERENTIAL    Basic Metabolic Panel    Troponin    Brain Natriuretic Peptide    Inpatient consult to Cardiology    EKG 12 Lead       MEDICATIONS ORDERED:  Orders Placed This Encounter   Medications    furosemide (LASIX) injection 40 mg           DIAGNOSTIC RESULTS / EMERGENCY DEPARTMENT COURSE / MDM     LABS:  Results for orders placed or performed during the hospital encounter of 05/23/21   COVID-19, Rapid    Specimen: Nasopharyngeal Swab   Result Value Ref Range    Specimen Description . NASOPHARYNGEAL SWAB     SARS-CoV-2, Rapid Not Detected Not Detected   CBC WITH AUTO DIFFERENTIAL   Result Value Ref Range    WBC 7.6 3.5 - 11.3 k/uL    RBC 4.81 3.95 - 5.11 m/uL    Hemoglobin 14.7 11.9 - 15.1 g/dL    Hematocrit 43.6 36.3 - 47.1 %    MCV 90.6 82.6 - 102.9 fL    MCH 30.6 25.2 - 33.5 pg    MCHC 33.7 28.4 - 34.8 g/dL    RDW 12.5 11.8 - 14.4 %    Platelets 898 068 - 516 k/uL    MPV 9.9 8.1 - 13.5 fL    NRBC Automated 0.0 0.0 per 100 WBC    Differential Type NOT REPORTED     Seg Neutrophils 55 36 - 65 %    Lymphocytes 35 24 - 43 %    Monocytes 9 3 - 12 %    Eosinophils % 1 1 - 4 %    Basophils 0 0 - 2 %    Immature Granulocytes 0 0 %    Segs Absolute 4.13 1.50 - 8.10 k/uL    Absolute Lymph # 2.62 1.10 - 3.70 k/uL    Absolute Mono # 0.69 0.10 - 1.20 k/uL    Absolute Eos # 0.06 0.00 - 0.44 k/uL    Basophils Absolute <0.03 0.00 - 0.20 k/uL    Absolute Immature Granulocyte 0.03 0.00 - 0.30 k/uL    WBC Morphology NOT REPORTED     RBC Morphology NOT REPORTED     Platelet Estimate NOT REPORTED    Basic Metabolic Panel   Result Value Ref Range    Glucose 70 70 - 99 mg/dL    BUN 10 6 - 20 mg/dL    CREATININE 0.70 0.50 - 0.90 mg/dL    Bun/Cre Ratio NOT REPORTED 9 - 20    Calcium 9.2 8.6 - 10.4 mg/dL    Sodium 139 135 - 144 mmol/L    Potassium 4.0 3.7 - 5.3 mmol/L    Chloride 104 98 - 107 mmol/L    CO2 24 20 - 31 mmol/L    Anion Gap 11 9 - 17 mmol/L    GFR Non-African American >60 >60 mL/min    GFR African American >60 >60 mL/min    GFR Comment          GFR Staging NOT REPORTED    Troponin   Result Value Ref Range    Troponin, High Sensitivity <6 0 - 14 ng/L    Troponin T NOT REPORTED <0.03 ng/mL    Troponin Interp NOT REPORTED    Troponin   Result Value Ref Range    Troponin, High Sensitivity <6 0 - 14 ng/L    Troponin T NOT REPORTED <0.03 ng/mL    Troponin Interp NOT REPORTED    Brain Natriuretic Peptide   Result Value Ref Range    Pro-BNP 1,022 (H) <300 pg/mL    BNP Interpretation Pro-BNP Reference Range:            RADIOLOGY:  CARYN RICCO DIGITAL SCREEN BILATERAL    Result Date: 5/13/2021  EXAMINATION: SCREENING DIGITAL BILATERAL  MAMMOGRAM WITH TOMOSYNTHESIS, 5/13/2021 TECHNIQUE: Screening mammography was performed with tomosynthesis including MLO and CC views of the bilateral breasts. Computer aided detection was used for the interpretation of this exam. COMPARISON: Mammogram dated 10/29/2019, 10/24/2019, 09/07/2018, 08/23/2017, 07/28/2017 HISTORY: Screening. FINDINGS: There are scattered areas of fibroglandular density. There is no suspicious mass, architectural distortion, or calcification. No mammographic findings of malignancy. BI-RADS 1 BIRADS - CATEGORY 1 Negative, no evidence of malignancy. Normal interval follow-up is recommended in 12 months. OVERALL ASSESSMENT - NEGATIVE A letter of notification will be sent to the patient regarding the results. The [employer] of [employer] recommends annual mammograms for women 40 years and older. EKG  No ST elevations or depressions suggestive of ischemia normal intervals normal rate and rhythm    All EKG's are interpreted by the Emergency Department Physician who either signs or Co-signs this chart in the absence of a cardiologist.    EMERGENCY DEPARTMENT COURSE/IMPRESSION:  ED Course as of May 23 2104   Sun May 23, 2021   1810 GFR Comment:      [RD]   2621 COVID-19, Rapid:    Specimen Description . NASOPHARYNGEAL SWAB   SARS-CoV-2, Rapid Not Detected [RD]   1959 Spoke to dr Jacob Ricci from [facility] cardiology. They would like patient transferred to [facility] for observation under cardiology with lasix 40 mg iv to be given. COVID negative.      [RD]      ED Course User Index  [RD] Lyn Marin MD   \36year-old female with shortness of breath history of severe aortic regurg, she is hemodynamically stable no obvious rales

## 2021-05-23 NOTE — ED NOTES
Pt arrived with complaints of SOB and chest heavyness. Pt stated that it started two week ago. Pt was diagnosed with atrial valve insufficiency. Pt stated that her chest discomfort feels similar to what its been because of this. Pt stated that she has been coughing up green and yellow sputum. Pt stated that she is dyspneic with excertion. Resident at the bedside.  Will continue plan of care     Yusef Mccarty, HILLARY  05/23/21 Gaby 1827, RN  05/23/21 [id number]

## 2021-05-24 NOTE — ED NOTES
Report called to [place], RN TT for 239S.   Questions/concerns addressed  Pt updated on transport for 1-130am       Karson Sahu RN  05/23/21 0013

## 2021-05-24 NOTE — ED NOTES
Pt up and down to restroom. Pt provided pad/underwear. Pt updated on awaiting transport.   Pt denies needs at this time     Cleveland Pak RN  05/23/21 3003

## 2021-05-24 NOTE — ED NOTES
contacted [employer] for update; they have not heard from [facility] for bed assignment but they will call them and then call back.      JESSICA Levy  05/23/21 3251

## 2021-05-24 NOTE — ED PROVIDER NOTES
FACULTY SIGN-OUT  ADDENDUM     Care of this patient was assumed from previous attending physician. The patient's initial evaluation and plan have been discussed with the prior provider who initially evaluated the patient. Attestation  I was available and discussed any additional care issues that arose and coordinated the management plans with the resident(s) caring for the patient during my duty period. Any areas of disagreement with resident's documentation of care or procedures are noted on the chart. I was personally present for the key portions of any/all procedures, during my duty period. I have documented in the chart those procedures where I was not present during the key portions. ED COURSE      The patient was given the following medications:  Orders Placed This Encounter   Medications    furosemide (LASIX) injection 40 mg       RECENT VITALS:   Temp: 97 °F (36.1 °C), Pulse: 68, Resp: 18, BP: (!) 165/106    MEDICAL DECISION MAKING        Kelsey Mejia is a 42 y.o. female who presents to the Emergency Department with complaints of shortness of breath. The patient to be transferred to [facility] currently awaiting transportation. Gerber Cisse MD, MD, F.A.C.E.P.   Attending Emergency Physician    (Please note that portions of this note were completed with a voice recognition program.  Efforts were made to edit the dictations but occasionally words are mis-transcribed.)         Gerber Cisse MD  05/23/21 [id number]

## 2021-05-24 NOTE — ED NOTES
contacted [employer]; no bed number at [facility]. They stated they will arrange transport once bed is assigned and contact  so appropriate paperwork can be completed.        JESSICA Renee  05/23/21 [id number]

## 2021-05-24 NOTE — ED PROVIDER NOTES
[address] [facility]  Emergency Department  Emergency Medicine Resident Sign-out     Care of Ryder Bautista was assumed from Dr. Bud Llanes and is being seen for Cough (productive cough with yellow sputum, ongoing for weeks), Shortness of Breath (pt reports is it hard to breath. ), and Chest Pain (heaviness, pressure)  . The patient's initial evaluation and plan have been discussed with the prior provider who initially evaluated the patient. EMERGENCY DEPARTMENT COURSE / MEDICAL DECISION MAKING:       MEDICATIONS GIVEN:  Orders Placed This Encounter   Medications    furosemide (LASIX) injection 40 mg       LABS / RADIOLOGY:     Labs Reviewed   BRAIN NATRIURETIC PEPTIDE - Abnormal; Notable for the following components:       Result Value    Pro-BNP 1,022 (*)     All other components within normal limits   COVID-19, RAPID   CBC WITH AUTO DIFFERENTIAL   BASIC METABOLIC PANEL   TROPONIN   TROPONIN       XR CHEST (2 VW)    Result Date: 5/23/2021  EXAMINATION: TWO XRAY VIEWS OF THE CHEST 5/23/2021 6:05 pm COMPARISON: Chest x-ray 03/29/2019. HISTORY: ORDERING SYSTEM PROVIDED HISTORY: severe aortic regurg, SOB, cough with sputum TECHNOLOGIST PROVIDED HISTORY: severe aortic regurg, SOB, cough with sputum FINDINGS: Cardiomediastinal silhouette is enlarged. Subsegmental left basilar opacity noted. Increased perihilar markings noted. No pleural effusion identified. Increased perihilar markings. Correlate with presentation to exclude interstitial edema or congestive heart failure. Nonspecific subsegmental lingular opacity given enlarged heart shadow. [facility] DIGITAL SCREEN BILATERAL    Result Date: 5/13/2021  EXAMINATION: SCREENING DIGITAL BILATERAL  MAMMOGRAM WITH TOMOSYNTHESIS, 5/13/2021 TECHNIQUE: Screening mammography was performed with tomosynthesis including MLO and CC views of the bilateral breasts.  Computer aided detection was used for the interpretation of this exam. COMPARISON: Mammogram

## 2021-05-25 LAB
EKG ATRIAL RATE: 74 BPM
EKG P AXIS: 58 DEGREES
EKG P-R INTERVAL: 152 MS
EKG Q-T INTERVAL: 436 MS
EKG QRS DURATION: 112 MS
EKG QTC CALCULATION (BAZETT): 483 MS
EKG R AXIS: -47 DEGREES
EKG T AXIS: -28 DEGREES
EKG VENTRICULAR RATE: 74 BPM

## 2021-06-02 ENCOUNTER — HOSPITAL ENCOUNTER (OUTPATIENT)
Age: 41
Setting detail: SPECIMEN
Discharge: HOME | End: 2021-06-02
Payer: COMMERCIAL

## 2021-06-02 ENCOUNTER — OFFICE VISIT (OUTPATIENT)
Dept: OBGYN | Age: 41
End: 2021-06-02
Payer: COMMERCIAL

## 2021-06-02 VITALS
WEIGHT: 202 LBS | HEIGHT: 62 IN | SYSTOLIC BLOOD PRESSURE: 124 MMHG | DIASTOLIC BLOOD PRESSURE: 82 MMHG | BODY MASS INDEX: 37.17 KG/M2 | HEART RATE: 80 BPM

## 2021-06-02 DIAGNOSIS — D06.9 CARCINOMA IN SITU OF CERVIX, UNSPECIFIED LOCATION: ICD-10-CM

## 2021-06-02 DIAGNOSIS — Z01.419 WELL WOMAN EXAM WITH ROUTINE GYNECOLOGICAL EXAM: Primary | ICD-10-CM

## 2021-06-02 DIAGNOSIS — Z00.00 PREVENTATIVE HEALTH CARE: ICD-10-CM

## 2021-06-02 DIAGNOSIS — Z80.3 FAMILY HISTORY OF BREAST CANCER IN MOTHER: ICD-10-CM

## 2021-06-02 PROCEDURE — 90651 9VHPV VACCINE 2/3 DOSE IM: CPT | Performed by: OBSTETRICS & GYNECOLOGY

## 2021-06-02 PROCEDURE — 99211 OFF/OP EST MAY X REQ PHY/QHP: CPT | Performed by: OBSTETRICS & GYNECOLOGY

## 2021-06-02 PROCEDURE — 99396 PREV VISIT EST AGE 40-64: CPT | Performed by: OBSTETRICS & GYNECOLOGY

## 2021-06-02 RX ORDER — MAGNESIUM OXIDE 400 MG/1
400 TABLET ORAL DAILY PRN
COMMUNITY
End: 2022-05-12

## 2021-06-02 RX ORDER — MONTELUKAST SODIUM 10 MG/1
10 TABLET ORAL NIGHTLY
COMMUNITY
Start: 2021-05-03

## 2021-06-02 RX ORDER — FUROSEMIDE 20 MG/1
20 TABLET ORAL DAILY
COMMUNITY
Start: 2021-05-25

## 2021-06-02 RX ORDER — METOPROLOL SUCCINATE 25 MG/1
25 TABLET, EXTENDED RELEASE ORAL DAILY
COMMUNITY
Start: 2021-05-18

## 2021-06-02 RX ORDER — POTASSIUM CHLORIDE 750 MG/1
60 TABLET, FILM COATED, EXTENDED RELEASE ORAL DAILY
COMMUNITY
End: 2022-08-02

## 2021-06-02 RX ORDER — MIRTAZAPINE 30 MG/1
30 TABLET, FILM COATED ORAL NIGHTLY PRN
COMMUNITY
End: 2022-08-02

## 2021-06-02 RX ORDER — SULINDAC 150 MG/1
150 TABLET ORAL 2 TIMES DAILY
COMMUNITY
End: 2022-05-12

## 2021-06-02 RX ORDER — MULTIVIT-MIN 60/IRON FUM/FOLIC 27 MG-1 MG
TABLET ORAL
Qty: 60 TABLET | Refills: 8 | Status: SHIPPED | OUTPATIENT
Start: 2021-06-02 | End: 2021-08-13

## 2021-06-02 RX ORDER — INDOMETHACIN 50 MG/1
50 CAPSULE ORAL 2 TIMES DAILY WITH MEALS
COMMUNITY
End: 2022-05-12

## 2021-06-02 RX ORDER — LOSARTAN POTASSIUM 25 MG/1
25 TABLET ORAL DAILY
COMMUNITY
Start: 2021-05-26

## 2021-06-02 NOTE — PROGRESS NOTES
After obtaining consent, and per orders of Dr. Stone Friday, injection of Gardasil given in Right deltoid by Letty ALSTON [phone]. Patient instructed to remain in clinic for 20 minutes afterwards, and to report any adverse reaction to me immediately.

## 2021-06-02 NOTE — PROGRESS NOTES
History and Physical    Ryder Bautista  2021              42 y.o. Chief Complaint   Patient presents with    Gynecologic Exam     annual       Patient's last menstrual period was 2021 (exact date). Primary Care Physician: Gonzalo Prader, APRN - CNP    The patient was seen and examined. She has no chief complaint today and is here for her annual exam.  Her bowels are regular. There are no voiding complaints. She denies any bloating. She denies vaginal discharge and was counseled on STD's and the need for barrier contraception. HPI : Ryder Bautista is a 42 y.o. female [id number]    Pt here for annual exam.  She has a long history of abnormal pap smears:    2014: Pap - ASCUS +HPV  9/3/2014: Colpo - no EVER  2016: Pap - ASC-H +HPV other  2016: Colpo - EVER 1,2,3  2016: LEEP - EVER 2,3 with negative margins  2017: Pap - negative cytology, +HPV other  1/15/2019: Pap - HSIL, +HPV other  2019: Colpo - EVER 2  2019: CKC - chronic cervicitis, negative for dysplasia  2020: Pap - ASCUS +HPV other  3/2/2020: Colpo - unremarkable, no biopsies or ECC required    Will repeat pap smear today.     Pt has no chief complaint today.  ________________________________________________________________________  OB History    Para Term  AB Living   7 7 7 0 0 7   SAB TAB Ectopic Molar Multiple Live Births   0 0 0 0 0 0      # Outcome Date GA Lbr Lasha/2nd Weight Sex Delivery Anes PTL Lv   7 Term      Vag-Spont      6 Term      Vag-Spont      5 Term      Vag-Spont      4 Term      Vag-Spont      3 Term      Vag-Spont      2 Term      Vag-Spont      1 Term      Vag-Spont        Past Medical History:   Diagnosis Date    Abnormal Pap smear of cervix [id number]    Anxiety     Aortic regurgitation     mild-mod on echo    Asthma     Depression     GERD (gastroesophageal reflux disease)     Headache(784.0)     Heart murmur     Herpes     Hyperlipidemia     Hypertension [facility]-Dr. Souleymane Taylor Migraine headache     Obesity     Schizophrenia ([address]     Seasonal allergies     Sleep apnea     no machine    Wears partial dentures     upper and lower partial                                                                   Past Surgical History:   Procedure Laterality Date    CERVIX BIOPSY N/A 4/5/2019    COLD KNIFE CONIZATION WITH BIOPSY performed by Kaveh Gregory DO at [address] [place] [address]  06/06/16    PRE-MALIGNANT / BENIGN SKIN LESION EXCISION Left 5/12/16    foot     Family History   Problem Relation Age of Onset    Cancer Mother         uterine    Diabetes Mother     High Blood Pressure Mother     Endometrial Cancer Mother     Diabetes Brother     Heart Disease Brother      Social History     Socioeconomic History    Marital status: Single     Spouse name: Not on file    Number of children: Not on file    Years of education: Not on file    Highest education level: Not on file   Occupational History    Not on file   Tobacco Use    Smoking status: Former Smoker     Packs/day: 1.00     Types: Cigarettes    Smokeless tobacco: Never Used   Substance and Sexual Activity    Alcohol use: No     Comment: rare    Drug use: Yes     Frequency: 7.0 times per week     Types: Marijuana    Sexual activity: Yes     Partners: Male   Other Topics Concern    Not on file   Social History Narrative    Not on file     Social Determinants of Health     Financial Resource Strain:     Difficulty of Paying Living Expenses:    Food Insecurity:     Worried About Running Out of Food in the Last Year:     Ran Out of Food in the Last Year:    Transportation Needs:     Lack of Transportation (Medical):      Lack of Transportation (Non-Medical):    Physical Activity:     Days of Exercise per Week:     Minutes of Exercise per Session:    Stress:     Feeling of Stress :    Social Connections:     Frequency of Communication with Friends and Family:     Frequency of Social Gatherings with Friends and Family:     Attends Christianity Services:     Active Member of Clubs or Organizations:     Attends Club or Organization Meetings:     Marital Status:    Intimate Partner Violence:     Fear of Current or Ex-Partner:     Emotionally Abused:     Physically Abused:     Sexually Abused:        MEDICATIONS:  Current Outpatient Medications   Medication Sig Dispense Refill    prenatal vitamin (VOL-PLUS) 27-1 MG TABS TABLET TAKE 1 TABLET BY MOUTH DAILY 60 tablet 8    sulindac (CLINORIL) 150 MG tablet Take 150 mg by mouth 2 times daily      potassium chloride (KLOR-CON) 10 MEQ extended release tablet potassium chloride ER 10 mEq tablet,extended release      montelukast (SINGULAIR) 10 MG tablet Take 10 mg by mouth nightly      mirtazapine (REMERON) 30 MG tablet Take 30 mg by mouth nightly      metoprolol succinate (TOPROL XL) 25 MG extended release tablet Take 25 mg by mouth daily      magnesium oxide (MAG-OX) 400 MG tablet Take 400 mg by mouth daily      losartan (COZAAR) 25 MG tablet       indomethacin (INDOCIN) 50 MG capsule Take 50 mg by mouth 2 times daily (with meals)      furosemide (LASIX) 20 MG tablet Take 20 mg by mouth daily      Cholecalciferol (VITAMIN D3 PO)       LATUDA 80 MG TABS tablet Take 80 mg by mouth daily  2    atorvastatin (LIPITOR) 20 MG tablet Take 20 mg by mouth daily  2    levothyroxine (SYNTHROID) 50 MCG tablet Take 50 mcg by mouth daily  2    SUMAtriptan (IMITREX) 50 MG tablet Take 100 mg by mouth once as needed for Migraine      fluticasone (VERAMYST) 27.5 MCG/SPRAY nasal spray 2 sprays by Nasal route 2 times daily      docusate sodium (COLACE) 100 MG capsule Take 1 capsule by mouth 2 times daily 30 capsule 0    econazole nitrate 1 % cream Apply 1 each topically daily as needed       Emollient (LUBRIDERM) LOTN Apply 1 each topically daily       topiramate (TOPAMAX) 25 MG tablet Take 25 mg by mouth daily       Budesonide-Formoterol Fumarate (SYMBICORT IN) Inhale 2 puffs into the lungs daily as needed       cetirizine (ZYRTEC ALLERGY) 10 MG TABS Take 10 mg by mouth daily 30 tablet 0    albuterol (PROVENTIL HFA) 108 (90 BASE) MCG/ACT inhaler Inhale 2 puffs into the lungs every 4 hours as needed for Wheezing.  diphenoxylate-atropine (LOMOTIL) 2.5-0.025 MG per tablet Take 1 tablet by mouth daily as needed       polyethylene glycol (GLYCOLAX) powder Take 17 g by mouth daily as needed       pantoprazole (PROTONIX) 40 MG tablet Take 40 mg by mouth daily.  ondansetron (ZOFRAN) 4 MG tablet Take 1 tablet by mouth every 8 hours as needed for Nausea. 20 tablet 0    acetaminophen (TYLENOL) 500 MG tablet Take 2 tablets by mouth 3 times daily for 10 days (Patient not taking: Reported on 6/2/2021) 60 tablet 0    ibuprofen (IBU) 800 MG tablet Take 1 tablet by mouth every 6 hours as needed for Pain (Patient not taking: Reported on 6/2/2021) 21 tablet 0    ibuprofen (ADVIL;MOTRIN) 800 MG tablet Take 1 tablet by mouth every 8 hours as needed for Pain (Patient not taking: Reported on 6/2/2021) 30 tablet 0     No current facility-administered medications for this visit. ALLERGIES:  Allergies as of 06/02/2021 - Fully Reviewed 06/02/2021   Allergen Reaction Noted    Codeine Hives 04/18/2014    Codeine Itching 05/11/2021    Penicillins Swelling 04/18/2014       Symptoms of decreased mood absent  Symptoms of anhedonia absent      Immunization status: stated as current, but no records available. Gynecologic History:  Menarche: 14 yo  Menopause: n/a     LMP was 5/23/2021 - she uses pads, they typically last 4 days, but lately have been shorter.       Sexually Active: Yes - male monogamous relationship    STD History: No     Permanent Sterilization: No   Reversible Birth Control: No     Hormone Replacement Exposure: No      Genetic Qualified Family History of Breast, Ovarian , Colon or Uterine Cancer: Yes - pt's mother had breast cancer at age 43     If YES see scanned worksheet. Preventative Health Testing:    Health Maintenance:  Health Maintenance Due   Topic Date Due    Pneumococcal 0-64 years Vaccine (1 of 2 - PPSV23) 03/21/1985    COVID-19 Vaccine (1) Never done       Date of Last Pap Smear: see below  Abnormal Pap Smear History: see below  Colposcopy History: see below    7/2/2014: Pap - ASCUS +HPV  9/3/2014: Colpo - no EVER  2/29/2016: Pap - ASC-H +HPV other  4/5/2016: Colpo - EVER 1,2,3  6/6/2016: LEEP - EVER 2,3 with negative margins  11/28/2017: Pap - negative cytology, +HPV other  1/15/2019: Pap - HSIL, +HPV other  2/21/2019: Colpo - EVER 2  4/5/2019: [facility] - chronic cervicitis, negative for dysplasia  1/30/2020: Pap - ASCUS +HPV other  3/2/2020: Colpo - unremarkable, no biopsies or ECC required  Repeat pap today    Date of Last Mammogram: 5/13/21 - birads 1  Date of Last Colonoscopy: n/a  Date of Last Bone Density: n/a      ________________________________________________________________________        REVIEW OF SYSTEMS:       A minimum of an eleven point review of systems was completed. Review Of Systems (11 point):  Constitutional: No fever, chills or malaise; No weight change or fatigue  Head and Eyes: No vision, Headache, Dizziness or trauma in last 12 months  ENT ROS: No hearing, Tinnitis, sinus or taste problems  Hematological and Lymphatic ROS:No Lymphoma, Von Willebrand's, Hemophillia or Bleeding History  Psych ROS: No Depression, Homicidal thoughts,suicidal thoughts, or anxiety  Breast ROS: No prior breast abnormalities or lumps  Respiratory ROS: No SOB, Pneumoniae,Cough, or Pulmonary Embolism History  Cardiovascular ROS: No Chest Pain with Exertion, Palpitations, Syncope, Edema, Arrhythmia  Gastrointestinal ROS: No Indigestion, Heartburn, Nausea, vomiting, Diarrhea, Constipation,or Bowel Changes; No Bloody Stools or melena  Genito-Urinary ROS: No Dysuria, Hematuria or Nocturia.  No Urinary Incontinence or Vaginal Discharge  Musculoskeletal ROS: No Arthralgia, Arthritis,Gout,Osteoporosis or Rheumatism  Neurological ROS: No CVA, Migraines, Epilepsy, Seizure Hx, or Limb Weakness  Dermatological ROS: No Rash, Itching, Hives, Mole Changes or Cancer                                                                                                                                                                                                                                  PHYSICAL Exam:     Constitutional:  Vitals:    06/02/21 0925   BP: 124/82   Site: Right Upper Arm   Position: Sitting   Cuff Size: Large Adult   Pulse: 80   Weight: 202 lb (91.6 kg)   Height: 5' 2\" (1.575 m)       Chaperone for Intimate Exam   Chaperone was offered and accepted as part of the rooming process.  Chaperone: Letty Rueda MA          General Appearance: This  is a well Developed, well Nourished, well groomed female. Her BMI was reviewed. Nutritional decision making was discussed. Skin:  There was a Normal Inspection of the skin without rashes or lesions. There were no rashes. (Papular, Maculopapular, Hives, Pustular, Macular)     There were no lesions (Ulcers, Erythema, Abn. Appearing Nevi)            Lymphatic:  No Lymph Nodes were Palpable in the neck , axilla or groin.  0 # Of Lymph Nodes; Location ; Character [Normal]  [Shotty] [Tender] [Enlarged]     Neck and EENT:  The neck was supple. There were no masses   The thyroid was not enlarged and had no masses. Perrla, EOMI B/L, TMI B/L No Abnormalities. Throat inspected-No exudates or Masses, Nares Patent No Masses        Respiratory: The lungs were auscultated and found to be clear. There were no rales, rhonchi or wheezes. There was a good respiratory effort. Cardiovascular: The heart was in a regular rate and rhythm. . No S3 or S4. There was no murmur appreciated. Location, grade, and radiation are not applicable. Extremities:   The patients extremities were without calf tenderness, edema, or varicosities. There was full range of motion in all four extremities. Pulses in all four extremities were appreciated and are 2/4. Abdomen: The abdomen was soft and non-tender. There were good bowel sounds in all quadrants and there was no guarding, rebound or rigidity. On evaluation there was no evidence of hepatosplenomegaly and there was no costal vertebral carmen tenderness bilaterally. No hernias were appreciated. Abdominal Scars: none    Psych: The patient had a normal Orientation to: Time, Place, Person, and Situation  There is no Mood / Affect changes    Breast:  (Chest)  normal appearance, no masses or tenderness, Inspection negative, No nipple retraction or dimpling, No nipple discharge or bleeding, No axillary or supraclavicular adenopathy, Normal to palpation without dominant masses, Taught monthly breast self examination  Self breast exams were reviewed in detail. Literature was given. Pelvic Exam:  External genitalia: normal general appearance  Urinary system: urethral meatus normal  Vaginal: normal mucosa without prolapse or lesions, normal rugae and vaginitis probe obtained  Cervix: normal appearance, thin prep PAP obtained and GC prep obtained  Adnexa: normal bimanual exam  Uterus: normal single, nontender, anteverted and mid-position  Negative bladder sweep, no lymphadenopathy, negative CMT          Musculosk:  Normal Gait and station was noted. Digits were evaluated without abnormal findings. Range of motion, stability and strength were evaluated and found to be appropriate for the patients age. OMM Structural Component:  The patient did not complain of a Chief complaint requiring OMM. Chief Complaint:none    Structural Exam: No Interest                  ASSESSMENT:      42 y.o. Annual   Diagnosis Orders   1.  Well woman exam with routine gynecological exam  PAP SMEAR    VAGINITIS DNA PROBE    C.trachomatis N.gonorrhoeae DNA    prenatal vitamin (VOL-PLUS) 27-1 MG TABS TABLET   2. Preventative health care  PAP SMEAR    HPV vaccine 9-valent IM (GARDASIL 9)   3. Carcinoma in situ of cervix, unspecified location     4.  Family history of breast cancer in mother  Chris Albertss, [place], [facility]          Chief Complaint   Patient presents with    Gynecologic Exam     annual          Past Medical History:   Diagnosis Date    Abnormal Pap smear of cervix [id number]    Anxiety     Aortic regurgitation     mild-mod on echo    Asthma     Depression     GERD (gastroesophageal reflux disease)     Headache(784.0)     Heart murmur     Herpes     Hyperlipidemia     Hypertension     [facility]-Dr. Patti Bashir    Migraine headache     Obesity     [facility]ia ([facility] [address]     Seasonal allergies     Sleep apnea     no machine    Wears partial dentures     upper and lower partial         Patient Active Problem List    Diagnosis Date Noted    Schizophrenia ([facility] [address] 09/03/2014     Priority: Low    HTN (hypertension) 09/03/2014     Priority: Low    Anxiety 09/03/2014     Priority: Low    GERD (gastroesophageal reflux disease) 09/03/2014     Priority: Low    Hyperlipemia 09/03/2014     Priority: Low    Cold Knife Cone 4/5/19 04/05/2019    Cervical dysplasia     Hx LEEP  02/21/2019     Cervical Dysplasia History:   Pap smear 9/1/2011: negative for dysplasia   Pap smear 7/2/14: ASCUS HPV +   Colpo 9/3/14: negative ectocervical biopsies and ECC   Pap smear 2/29/16: ASCUS HPV+   Colpo 4/5/16:     ECC: EVER 2-3    6 o'clock biopsy: EVER 2    12 o'clock biopsy: EVER 1   LEEP 6/6/16:     Ectocervical Cone Biopsy: EVER 3 with negative margins    Endocervical Top Hat: EVER 2 with narrowly negative margins   Pap smear 11/28/17: negative for dysplasia   Pap smear 1/15/19: HSIL   Colpo 2/21/19: ECC showing CIN2, 12 o'clock, 3 o'clock biopsies negative   Cold Knife Cone 4/5/19 negative for dysplasia and ECC negative for dysplasia    Pap smear 1/30/20 ASCUS with +HRHPV   Colpo 3/2/20 no biopsies taken, f/u in 1year          Patellofemoral pain syndrome of left knee 08/15/2017    EVER III (cervical intraepithelial neoplasia III)      7/2/2014: Pap - ASCUS +HPV  9/3/2014: Colpo - no EVER  2/29/2016: Pap - ASC-H +HPV other  4/5/2016: Colpo - EVER 1,2,3  6/6/2016: LEEP - EVER 2,3 with negative margins  11/28/2017: Pap - negative cytology, +HPV other  1/15/2019: Pap - HSIL, +HPV other  2/21/2019: Colpo - EVER 2  4/5/2019: CKC - chronic cervicitis, negative for dysplasia  1/30/2020: Pap - ASCUS +HPV other  3/2/2020: Colpo - unremarkable, no biopsies or ECC required  Repeat pap today 6/2/2021              Hereditary Breast, Ovarian, Colon and Uterine Cancer screening Done. Tobacco & Secondary smoke risks reviewed; instructed on cessation and avoidance      Counseling Completed:  Preventative Health Recommendations and Follow up. The patient was informed of the recommended preventative health recommendations. 1. Annuals every year; Cytology collections per prevailing guidelines. 2. Mammograms begin every year at 37 yo if no abnormalities are found and no family history. 3. Bone density studies every 2-3 years. Begin at 73 yo. If no fracture history or osteoporosis family history. (significant). 4. Colonoscopy begin at 38 yo. Repeat every ten years if negative and no family history. 5. Calcium of 4198-7486 mg/day in split dosing  6. Vitamin D 400-800 IU/day  7. All other preventative health recommendations will be managed by the patients Primary care physician. The patient was instructed to stop smoking. Morbidity, mortality, and cessation programs were reviewed. Worsening of long and short term medical health risks were discussed with the addition of tobacco. Secondary smoke risks were reviewed with respect to children and newborns. Increases in Sudden Infant Death Syndrome, asthma, respiratory problems, and cancers were reviewed.              PLAN:  Pap smear obtained - will follow ASCCP guidelines  Vaginal cultures collected - will treat if appropriate  Counseled pt on Gardasil vaccine - willing to start vaccine today  Will refer to genetic counselor because of family hx of breast cancer in patient's mother  Return in about 1 year (around 6/2/2022) for Annual Exam.  Repeat Annual every 1 year  Cervical Cytology Evaluation begins at 24years old. If Negative Cytology, Follow-up screening per current guidelines. Mammograms every 1 year. If 37 yo and last mammogram was negative. Calcium and Vitamin D dosing reviewed. Colonoscopy screening reviewed as well as onset for bone density testing. Birth control and barrier recommendations discussed. STD counseling and prevention reviewed. Gardisil counseling completed for all patients 10-37 yo. Routine health maintenance per patients PCP. The patient, Kev Aleman is a 42 y.o. female, was seen with a total time spent of 20 minutes for the visit on this date of service by the E/M provider. The time component had both face to face and non face to face time spent in determining the total time component. Counseling and education regarding her diagnosis listed below and her options regarding those diagnoses were also included in determining her time component. Diagnosis Orders   1. Well woman exam with routine gynecological exam  PAP SMEAR    VAGINITIS DNA PROBE    C.trachomatis N.gonorrhoeae DNA    prenatal vitamin (VOL-PLUS) 27-1 MG TABS TABLET   2. Preventative health care  PAP SMEAR    HPV vaccine 9-valent IM (GARDASIL 9)   3. Carcinoma in situ of cervix, unspecified location     4. Family history of breast cancer in mother  [facility]        The patient had her preventative health maintenance recommendations and follow-up reviewed with her at the completion of her visit.     Viraj Finley,

## 2021-06-03 DIAGNOSIS — Z01.419 WELL WOMAN EXAM WITH ROUTINE GYNECOLOGICAL EXAM: ICD-10-CM

## 2021-06-03 RX ORDER — METRONIDAZOLE 500 MG/1
500 TABLET ORAL 2 TIMES DAILY
Qty: 14 TABLET | Refills: 0 | Status: SHIPPED | OUTPATIENT
Start: 2021-06-03 | End: 2021-06-10

## 2021-06-05 ENCOUNTER — HOSPITAL ENCOUNTER (EMERGENCY)
Age: 41
Discharge: LEFT BEFORE DISCHARGE | End: 2021-06-06
Payer: COMMERCIAL

## 2021-06-05 VITALS
DIASTOLIC BLOOD PRESSURE: 87 MMHG | RESPIRATION RATE: 18 BRPM | TEMPERATURE: 97.2 F | HEART RATE: 79 BPM | OXYGEN SATURATION: 97 % | SYSTOLIC BLOOD PRESSURE: 140 MMHG

## 2021-06-05 ASSESSMENT — PAIN SCALES - GENERAL: PAINLEVEL_OUTOF10: 8

## 2021-06-06 NOTE — ED TRIAGE NOTES
Pt complains of sore throat, cough, CP, SOB for the past 2 days. Denies any sick contacts. Pt states coughing up yellow/clear phlegm.

## 2021-06-08 LAB — CYTOLOGY REPORT: NORMAL

## 2021-06-24 ENCOUNTER — HOSPITAL ENCOUNTER (OUTPATIENT)
Facility: MEDICAL CENTER | Age: 41
End: 2021-06-24
Payer: COMMERCIAL

## 2021-06-28 ENCOUNTER — NURSE ONLY (OUTPATIENT)
Dept: OBGYN | Age: 41
End: 2021-06-28
Payer: COMMERCIAL

## 2021-06-28 DIAGNOSIS — N91.2 AMENORRHEA: Primary | ICD-10-CM

## 2021-06-28 LAB
CONTROL: PRESENT
PREGNANCY TEST URINE, POC: NEGATIVE

## 2021-06-28 PROCEDURE — 81025 URINE PREGNANCY TEST: CPT | Performed by: OBSTETRICS & GYNECOLOGY

## 2021-07-07 ENCOUNTER — HOSPITAL ENCOUNTER (OUTPATIENT)
Age: 41
Setting detail: SPECIMEN
Discharge: HOME | End: 2021-07-07
Payer: COMMERCIAL

## 2021-07-07 ENCOUNTER — PROCEDURE VISIT (OUTPATIENT)
Dept: OBGYN | Age: 41
End: 2021-07-07
Payer: COMMERCIAL

## 2021-07-07 VITALS
SYSTOLIC BLOOD PRESSURE: 132 MMHG | BODY MASS INDEX: 36.51 KG/M2 | WEIGHT: 199.6 LBS | HEART RATE: 69 BPM | DIASTOLIC BLOOD PRESSURE: 72 MMHG

## 2021-07-07 DIAGNOSIS — N91.2 AMENORRHEA: ICD-10-CM

## 2021-07-07 DIAGNOSIS — R87.610 ASCUS WITH POSITIVE HIGH RISK HPV CERVICAL: Primary | ICD-10-CM

## 2021-07-07 DIAGNOSIS — R87.810 ASCUS WITH POSITIVE HIGH RISK HPV CERVICAL: Primary | ICD-10-CM

## 2021-07-07 LAB
CONTROL: PRESENT
PREGNANCY TEST URINE, POC: NORMAL

## 2021-07-07 PROCEDURE — 81025 URINE PREGNANCY TEST: CPT | Performed by: STUDENT IN AN ORGANIZED HEALTH CARE EDUCATION/TRAINING PROGRAM

## 2021-07-07 PROCEDURE — 99211 OFF/OP EST MAY X REQ PHY/QHP: CPT | Performed by: STUDENT IN AN ORGANIZED HEALTH CARE EDUCATION/TRAINING PROGRAM

## 2021-07-07 PROCEDURE — 57454 BX/CURETT OF CERVIX W/SCOPE: CPT | Performed by: STUDENT IN AN ORGANIZED HEALTH CARE EDUCATION/TRAINING PROGRAM

## 2021-07-07 NOTE — PROGRESS NOTES
[facility] Obstetrics & Gynecology    COLPOSCOPY PROCEDURE FORM    Chief Complaint:   Chief Complaint   Patient presents with    Procedure     colposcopy         7/7/2021  Dragan Naidu  Patient's last menstrual period was 05/07/2021.  42 y.o.  [id number]    Past Medical History:   Diagnosis Date    Abnormal Pap smear of cervix [id number]    Anxiety     Aortic regurgitation     mild-mod on echo    Asthma     Depression     GERD (gastroesophageal reflux disease)     Headache(784.0)     Heart murmur     Herpes     Hyperlipidemia     Hypertension     [facility]-Dr. Bev [place]    Migraine headache     Obesity     Schizophrenia ([address]     Seasonal allergies     Sleep apnea     no machine    Wears partial dentures     upper and lower partial         Past Surgical History:   Procedure Laterality Date    CERVIX BIOPSY N/A 4/5/2019    COLD KNIFE CONIZATION WITH BIOPSY performed by Franco Joe DO at [address] [facility]  06/06/16    PRE-MALIGNANT / BENIGN SKIN LESION EXCISION Left 5/12/16    foot       Family History   Problem Relation Age of Onset    Cancer Mother         uterine    Diabetes Mother     High Blood Pressure Mother     Endometrial Cancer Mother     Diabetes Brother     Heart Disease Brother        Social History     Socioeconomic History    Marital status: Single     Spouse name: Not on file    Number of children: Not on file    Years of education: Not on file    Highest education level: Not on file   Occupational History    Not on file   Tobacco Use    Smoking status: Former Smoker     Packs/day: 1.00     Types: Cigarettes    Smokeless tobacco: Never Used   Substance and Sexual Activity    Alcohol use: No     Comment: rare    Drug use: Yes     Frequency: 7.0 times per week     Types: Marijuana    Sexual activity: Yes     Partners: Male   Other Topics Concern    Not on file   Social History Narrative    Not on file     Social Determinants of Health     Financial Resource Strain:     Difficulty of Paying Living Expenses:    Food Insecurity:     Worried About Running Out of Food in the Last Year:     [address] in the Last Year:    Transportation Needs:     Lack of Transportation (Medical):  Lack of Transportation (Non-Medical):    Physical Activity:     Days of Exercise per Week:     Minutes of Exercise per Session:    Stress:     Feeling of Stress :    Social Connections:     Frequency of Communication with Friends and Family:     Frequency of Social Gatherings with Friends and Family:     Attends [employer] Services:     Active Member of Clubs or Organizations:     Attends Club or Organization Meetings:     Marital Status:    Intimate Partner Violence:     Fear of Current or Ex-Partner:     Emotionally Abused:     Physically Abused:     Sexually Abused:        Current Outpatient Medications on File Prior to Visit   Medication Sig Dispense Refill    potassium chloride (KLOR-CON) 10 MEQ extended release tablet potassium chloride ER 10 mEq tablet,extended release      metoprolol succinate (TOPROL XL) 25 MG extended release tablet Take 25 mg by mouth daily      losartan (COZAAR) 25 MG tablet       furosemide (LASIX) 20 MG tablet Take 20 mg by mouth daily      Cholecalciferol (VITAMIN D3 PO)       LATUDA 80 MG TABS tablet Take 80 mg by mouth daily  2    levothyroxine (SYNTHROID) 50 MCG tablet Take 50 mcg by mouth daily  2    SUMAtriptan (IMITREX) 50 MG tablet Take 100 mg by mouth once as needed for Migraine      pantoprazole (PROTONIX) 40 MG tablet Take 40 mg by mouth daily.       prenatal vitamin (VOL-PLUS) 27-1 MG TABS TABLET TAKE 1 TABLET BY MOUTH DAILY (Patient not taking: Reported on 7/7/2021) 60 tablet 8    sulindac (CLINORIL) 150 MG tablet Take 150 mg by mouth 2 times daily      montelukast (SINGULAIR) 10 MG tablet Take 10 mg by mouth nightly      mirtazapine (REMERON) 30 MG tablet Take 30 mg by mouth nightly      magnesium oxide (MAG-OX) 400 MG tablet Take 400 mg by mouth daily (Patient not taking: Reported on 7/7/2021)      indomethacin (INDOCIN) 50 MG capsule Take 50 mg by mouth 2 times daily (with meals) (Patient not taking: Reported on 7/7/2021)      acetaminophen (TYLENOL) 500 MG tablet Take 2 tablets by mouth 3 times daily for 10 days (Patient not taking: Reported on 6/2/2021) 60 tablet 0    ibuprofen (ADVIL;MOTRIN) 800 MG tablet Take 1 tablet by mouth every 8 hours as needed for Pain (Patient not taking: Reported on 6/2/2021) 30 tablet 0    atorvastatin (LIPITOR) 20 MG tablet Take 20 mg by mouth daily  2    fluticasone (VERAMYST) 27.5 MCG/SPRAY nasal spray 2 sprays by Nasal route 2 times daily      docusate sodium (COLACE) 100 MG capsule Take 1 capsule by mouth 2 times daily (Patient not taking: Reported on 7/7/2021) 30 capsule 0    econazole nitrate 1 % cream Apply 1 each topically daily as needed       Emollient (LUBRIDERM) LOTN Apply 1 each topically daily       topiramate (TOPAMAX) 25 MG tablet Take 25 mg by mouth daily       Budesonide-Formoterol Fumarate (SYMBICORT IN) Inhale 2 puffs into the lungs daily as needed       cetirizine (ZYRTEC ALLERGY) 10 MG TABS Take 10 mg by mouth daily 30 tablet 0    albuterol (PROVENTIL HFA) 108 (90 BASE) MCG/ACT inhaler Inhale 2 puffs into the lungs every 4 hours as needed for Wheezing.  diphenoxylate-atropine (LOMOTIL) 2.5-0.025 MG per tablet Take 1 tablet by mouth daily as needed       polyethylene glycol (GLYCOLAX) powder Take 17 g by mouth daily as needed  (Patient not taking: Reported on 7/7/2021)      ondansetron (ZOFRAN) 4 MG tablet Take 1 tablet by mouth every 8 hours as needed for Nausea. 20 tablet 0     No current facility-administered medications on file prior to visit. Allergies as of 07/07/2021 - Fully Reviewed 07/07/2021   Allergen Reaction Noted    Codeine Hives 04/18/2014    Codeine Itching 05/11/2021    Penicillins Swelling 04/18/2014           INDICATIONS:   1. ASCUS with positive high risk HPV cervical    2. Amenorrhea                   UHCG: negative         HPV:   positive  Other non-typed high risk HPV      Abnormal Cytology and Colposcopy History: ASCUS positive HPV 6/2/21, 1/30/20    COLPOSCOPIC EXAMINATION:                Blood pressure 132/72, pulse 69, weight 199 lb 9.6 oz (90.5 kg), last menstrual period 05/07/2021, not currently breastfeeding. Chaperone for Intimate Exam   Chaperone was offered and accepted as part of the rooming process.  Chaperone: Salvador Singleton      Gross observations: Decreased Lugol's uptake at 11 oclock position   Satisfactory: Yes                     LANDMARKS and ATYPICAL FINDINGS:  TZ = transformation zone   SC = new squamocolumnar junction  NC = Nabothian cyst    ME = immature squamous metaplasia  PO = polyp   AV = atypical vessels  C = condyloma  L = leukoplakia  AW = acetowhite epithelium   P = punctation  MO = mosaicism   LS = decreased Lugols uptake  X = biopsy sites  CA = invasive carcinoma      FINDINGS: No acetowhite changes. Decreased Lugol's uptake at 11 oclock position. ECC performed:  Yes    Lugols Iodine Applied:   Yes       IMPRESSIONS: EVER I  Biopsy sites: 11 oclock, ECC      Assessment:   Diagnosis Orders   1. ASCUS with positive high risk HPV cervical  KY COLPOSC,CERVIX W/ADJ VAG,W/BX & CURRETAG   2.  Amenorrhea  POCT urine pregnancy         Patient Active Problem List    Diagnosis Date Noted    [facility] 09/03/2014     Priority: Low    HTN (hypertension) 09/03/2014     Priority: Low    Anxiety 09/03/2014     Priority: Low    GERD (gastroesophageal reflux disease) 09/03/2014     Priority: Low    Hyperlipemia 09/03/2014     Priority: Low    Cold Knife Cone 4/5/19 04/05/2019    Cervical dysplasia     Hx LEEP  02/21/2019     Overview Note:     Cervical Dysplasia History:   Pap smear 9/1/2011: negative for dysplasia   Pap smear 7/2/14: ASCUS HPV +   Colpo 9/3/14: negative ectocervical biopsies and ECC   Pap smear 2/29/16: ASCUS HPV+   Colpo 4/5/16:     ECC: EVER 2-3    6 o'clock biopsy: EVER 2    12 o'clock biopsy: EVER 1   LEEP 6/6/16:     Ectocervical Cone Biopsy: EVER 3 with negative margins    Endocervical Top Hat: EVER 2 with narrowly negative margins   Pap smear 11/28/17: negative for dysplasia   Pap smear 1/15/19: HSIL   Colpo 2/21/19: ECC showing CIN2, 12 o'clock, 3 o'clock biopsies negative   Cold Knife Cone 4/5/19 negative for dysplasia and ECC negative for dysplasia    Pap smear 1/30/20 ASCUS with +HRHPV   Colpo 3/2/20 no biopsies taken, f/u in 1year          Patellofemoral pain syndrome of left knee 08/15/2017    EVER III (cervical intraepithelial neoplasia III)      Overview Note:     7/2/2014: Pap - ASCUS +HPV  9/3/2014: Colpo - no EVER  2/29/2016: Pap - ASC-H +HPV other  4/5/2016: Colpo - EVER 1,2,3  6/6/2016: LEEP - EVER 2,3 with negative margins  11/28/2017: Pap - negative cytology, +HPV other  1/15/2019: Pap - HSIL, +HPV other  2/21/2019: Colpo - EVER 2  4/5/2019: CKC - chronic cervicitis, negative for dysplasia  1/30/2020: Pap - ASCUS +HPV other  3/2/2020: Colpo - unremarkable, no biopsies or ECC required  Repeat pap today 6/2/2021               PLAN:   1. The patient was given formal restriction guidelines. She is to RTO in 2 weeks. FOR PATIENTS WHO UNDERWENT A BIOPSY-They were instructed to report         any increase in vaginal bleeding, discharge, or any temperature more than      100.4   F. Strict pelvic rest precautions were reviewed with lifting restrictions. 2. If WNL Histopathology or Negative Colposcopic evaluation without Biopsies           and/or ECC then Cytologic Collection/PAP in 6 months. 3. HPV Barrier Recommendations and STD counseling completed      Uriel Anand DO            Attending Physician Statement  I have discussed the care of [place], including pertinent history and exam findings,  with the resident.  I have seen and examined the patient and the key elements of all parts of the encounter have been performed by me. I agree with the assessment, plan and orders as documented by the resident.   (GC Modifier)

## 2021-07-09 LAB — SURGICAL PATHOLOGY REPORT: NORMAL

## 2021-07-19 ENCOUNTER — NURSE ONLY (OUTPATIENT)
Dept: OBGYN | Age: 41
End: 2021-07-19
Payer: COMMERCIAL

## 2021-07-19 DIAGNOSIS — N92.6 MISSED PERIODS: Primary | ICD-10-CM

## 2021-07-19 LAB
CONTROL: YES
PREGNANCY TEST URINE, POC: NEGATIVE

## 2021-07-19 PROCEDURE — 99211 OFF/OP EST MAY X REQ PHY/QHP: CPT

## 2021-07-19 PROCEDURE — 81025 URINE PREGNANCY TEST: CPT

## 2021-07-27 ENCOUNTER — VIRTUAL VISIT (OUTPATIENT)
Dept: OBGYN | Age: 41
End: 2021-07-27
Payer: COMMERCIAL

## 2021-07-27 DIAGNOSIS — N91.2 AMENORRHEA: Primary | ICD-10-CM

## 2021-07-27 DIAGNOSIS — R87.610 ASCUS WITH POSITIVE HIGH RISK HPV CERVICAL: ICD-10-CM

## 2021-07-27 DIAGNOSIS — R87.810 ASCUS WITH POSITIVE HIGH RISK HPV CERVICAL: ICD-10-CM

## 2021-07-27 PROCEDURE — 99211 OFF/OP EST MAY X REQ PHY/QHP: CPT

## 2021-07-27 PROCEDURE — G8427 DOCREV CUR MEDS BY ELIG CLIN: HCPCS | Performed by: OBSTETRICS & GYNECOLOGY

## 2021-07-27 PROCEDURE — 1036F TOBACCO NON-USER: CPT | Performed by: OBSTETRICS & GYNECOLOGY

## 2021-07-27 PROCEDURE — G8417 CALC BMI ABV UP PARAM F/U: HCPCS | Performed by: OBSTETRICS & GYNECOLOGY

## 2021-07-27 PROCEDURE — 99212 OFFICE O/P EST SF 10 MIN: CPT | Performed by: OBSTETRICS & GYNECOLOGY

## 2021-07-27 NOTE — PROGRESS NOTES
Flaquita Cuevas is a 42 y.o. female evaluated via telephone on 7/27/2021. Consent:  She and/or health care decision maker is aware that that she may receive a bill for this telephone service, depending on her insurance coverage, and has provided verbal consent to proceed: Yes      Documentation:  I communicated with the patient and/or health care decision maker about results of colposcopy biopsies and absent menses for more than 3 months. Pt does not use contraception. LMP 4/15/21. Details of this discussion including any medical advice provided:   1. Amenorrhea    - TSH with Reflex; Future  - HCG, Quantitative, Pregnancy; Future  - Prolactin; Future  - Follicle Stimulating Hormone; Future  - Estradiol; Future    2. ASCUS with positive high risk HPV cervical, negative colposcopy  - repeat co-testing in 12 months. I affirm this is a Patient Initiated Episode with a Patient who has not had a related appointment within my department in the past 7 days or scheduled within the next 24 hours. Patient identification was verified at the start of the visit: Yes    Total Time: minutes: 11-20 minutes    The visit was conducted pursuant to the emergency declaration under the [facility], [address] [place] [address] authority and the [employer] General Act. Patient identification was verified, and a caregiver was present when appropriate. The patient was located in a state where the provider was credentialed to provide care.     Note: not billable if this call serves to triage the patient into an appointment for the relevant concern      Caryl Olmstead MD

## 2021-08-04 ENCOUNTER — NURSE ONLY (OUTPATIENT)
Dept: OBGYN | Age: 41
End: 2021-08-04
Payer: COMMERCIAL

## 2021-08-04 ENCOUNTER — HOSPITAL ENCOUNTER (OUTPATIENT)
Age: 41
Setting detail: SPECIMEN
Discharge: HOME | End: 2021-08-04
Payer: COMMERCIAL

## 2021-08-04 DIAGNOSIS — Z23 NEED FOR HPV VACCINATION: Primary | ICD-10-CM

## 2021-08-04 DIAGNOSIS — N91.2 AMENORRHEA: ICD-10-CM

## 2021-08-04 LAB
ESTRADIOL LEVEL: 269 PG/ML (ref 27–314)
FOLLICLE STIMULATING HORMONE: 2.6 U/L (ref 1.7–21.5)
HCG QUANTITATIVE: <1 IU/L
PROLACTIN: 6.84 UG/L (ref 4.79–23.3)
TSH SERPL DL<=0.05 MIU/L-ACNC: 1.12 MIU/L (ref 0.3–5)

## 2021-08-04 PROCEDURE — 90651 9VHPV VACCINE 2/3 DOSE IM: CPT | Performed by: OBSTETRICS & GYNECOLOGY

## 2021-08-04 NOTE — PROGRESS NOTES
After obtaining consent, and per orders of Dr. Veroncia Tineo, injection of Gardasil 9 given in Left deltoid by Santos Lepe. Patient instructed to remain in clinic for 20 minutes afterwards, and to report any adverse reaction to me immediately.

## 2021-08-12 ENCOUNTER — HOSPITAL ENCOUNTER (OUTPATIENT)
Facility: MEDICAL CENTER | Age: 41
End: 2021-08-12
Payer: COMMERCIAL

## 2021-08-13 ENCOUNTER — HOSPITAL ENCOUNTER (OUTPATIENT)
Dept: PREADMISSION TESTING | Age: 41
Discharge: HOME | End: 2021-08-17
Payer: COMMERCIAL

## 2021-08-13 VITALS
BODY MASS INDEX: 38.64 KG/M2 | OXYGEN SATURATION: 99 % | DIASTOLIC BLOOD PRESSURE: 79 MMHG | WEIGHT: 210 LBS | HEART RATE: 65 BPM | RESPIRATION RATE: 18 BRPM | TEMPERATURE: 97.6 F | SYSTOLIC BLOOD PRESSURE: 145 MMHG | HEIGHT: 62 IN

## 2021-08-13 LAB
ABSOLUTE EOS #: 0.1 K/UL (ref 0–0.4)
ABSOLUTE IMMATURE GRANULOCYTE: ABNORMAL K/UL (ref 0–0.3)
ABSOLUTE LYMPH #: 2 K/UL (ref 1–4.8)
ABSOLUTE MONO #: 0.5 K/UL (ref 0.1–1.3)
ANION GAP SERPL CALCULATED.3IONS-SCNC: 5 MMOL/L (ref 9–17)
BASOPHILS # BLD: 1 % (ref 0–2)
BASOPHILS ABSOLUTE: 0 K/UL (ref 0–0.2)
BUN BLDV-MCNC: 10 MG/DL (ref 6–20)
BUN/CREAT BLD: ABNORMAL (ref 9–20)
CALCIUM SERPL-MCNC: 8.6 MG/DL (ref 8.6–10.4)
CHLORIDE BLD-SCNC: 103 MMOL/L (ref 98–107)
CO2: 27 MMOL/L (ref 20–31)
CREAT SERPL-MCNC: 0.95 MG/DL (ref 0.5–0.9)
DIFFERENTIAL TYPE: ABNORMAL
EOSINOPHILS RELATIVE PERCENT: 1 % (ref 0–4)
GFR AFRICAN AMERICAN: >60 ML/MIN
GFR NON-AFRICAN AMERICAN: >60 ML/MIN
GFR SERPL CREATININE-BSD FRML MDRD: ABNORMAL ML/MIN/{1.73_M2}
GFR SERPL CREATININE-BSD FRML MDRD: ABNORMAL ML/MIN/{1.73_M2}
GLUCOSE BLD-MCNC: 96 MG/DL (ref 70–99)
HCT VFR BLD CALC: 41.3 % (ref 36–46)
HEMOGLOBIN: 14.1 G/DL (ref 12–16)
IMMATURE GRANULOCYTES: ABNORMAL %
LYMPHOCYTES # BLD: 33 % (ref 24–44)
MCH RBC QN AUTO: 31.3 PG (ref 26–34)
MCHC RBC AUTO-ENTMCNC: 34.2 G/DL (ref 31–37)
MCV RBC AUTO: 91.6 FL (ref 80–100)
MONOCYTES # BLD: 9 % (ref 1–7)
NRBC AUTOMATED: ABNORMAL PER 100 WBC
PDW BLD-RTO: 13.6 % (ref 11.5–14.9)
PLATELET # BLD: 240 K/UL (ref 150–450)
PLATELET ESTIMATE: ABNORMAL
PMV BLD AUTO: 7.8 FL (ref 6–12)
POTASSIUM SERPL-SCNC: 3.8 MMOL/L (ref 3.7–5.3)
RBC # BLD: 4.51 M/UL (ref 4–5.2)
RBC # BLD: ABNORMAL 10*6/UL
SEG NEUTROPHILS: 56 % (ref 36–66)
SEGMENTED NEUTROPHILS ABSOLUTE COUNT: 3.4 K/UL (ref 1.3–9.1)
SODIUM BLD-SCNC: 135 MMOL/L (ref 135–144)
WBC # BLD: 6.1 K/UL (ref 3.5–11)
WBC # BLD: ABNORMAL 10*3/UL

## 2021-08-13 PROCEDURE — 80048 BASIC METABOLIC PNL TOTAL CA: CPT

## 2021-08-13 PROCEDURE — 85025 COMPLETE CBC W/AUTO DIFF WBC: CPT

## 2021-08-13 RX ORDER — LORATADINE 10 MG/1
10 CAPSULE, LIQUID FILLED ORAL DAILY
COMMUNITY

## 2021-08-13 RX ORDER — METFORMIN HYDROCHLORIDE 500 MG/1
1 TABLET, EXTENDED RELEASE ORAL DAILY
COMMUNITY
End: 2022-05-24 | Stop reason: SDUPTHER

## 2021-08-13 ASSESSMENT — ENCOUNTER SYMPTOMS
SHORTNESS OF BREATH: 1
NAUSEA: 0
VOMITING: 0
DIARRHEA: 0
SORE THROAT: 0
ABDOMINAL PAIN: 0
CONSTIPATION: 0
WHEEZING: 0
COUGH: 0

## 2021-08-13 ASSESSMENT — PAIN DESCRIPTION - ORIENTATION: ORIENTATION: LEFT

## 2021-08-13 ASSESSMENT — PAIN DESCRIPTION - PAIN TYPE: TYPE: ACUTE PAIN

## 2021-08-13 ASSESSMENT — PAIN DESCRIPTION - DESCRIPTORS: DESCRIPTORS: ACHING

## 2021-08-13 ASSESSMENT — PAIN DESCRIPTION - LOCATION: LOCATION: FOOT

## 2021-08-13 ASSESSMENT — PAIN SCALES - GENERAL: PAINLEVEL_OUTOF10: 9

## 2021-08-13 NOTE — DISCHARGE INSTR - COC
Continuity of Care Form    Patient Name: Virginia Olvera   :  1980  MRN:  [id number]    Admit date:  2021  Discharge date:  ***    Code Status Order: No Order   Advance Directives:   Advance Care Flowsheet Documentation     Date/Time Healthcare Directive Type of Healthcare Directive Copy in [address] Agent's Name Healthcare Agent's Phone Number    21 3041  No, patient does not have an advance directive for healthcare treatment  --  --  --  --  --          Admitting Physician:  No admitting provider for patient encounter. PCP: SHERRIE Bashir CNP    Discharging Nurse: [facility] Unit/Room#: No information available for this encounter. Discharging Unit Phone Number: ***    Emergency Contact:   Extended Emergency Contact Information  Primary Emergency Contact: Denver Collier of [address] [place] [address] Phone: [phone]  Work Phone: [phone]  Mobile Phone: [phone]  Relation: Brother/Sister  Hearing or visual needs: None  Other needs: None  Preferred language: English   needed?  No  Secondary Emergency Contact: [address] [facility] Phone: [phone]  Mobile Phone: [phone]  Relation: Other    Past Surgical History:  Past Surgical History:   Procedure Laterality Date    CARDIAC CATHETERIZATION  2021    no stents    CERVIX BIOPSY N/A 2019    COLD KNIFE CONIZATION WITH BIOPSY performed by Fernando Montoya DO at [address] [place] [address]  16    PRE-MALIGNANT / BENIGN SKIN LESION EXCISION Left 16    foot       Immunization History:   Immunization History   Administered Date(s) Administered    HPV 9-valent [place]) 2021, 2021    Hepatitis A Adult (Havrix, Vaqta) 10/20/2018    Influenza A (H1M1-34) Vaccine PF IM 2010    Influenza, Quadv, IM, PF (6 mo and older Fluzone, Flulaval, Fluarix, and 3 yrs and older Afluria) 10/20/2018    Pneumococcal Conjugate 7-valent (Florence Pitcher) 10/15/2008    Tdap (Boostrix, Adacel) 10/20/2018       Active Problems:  Patient Active Problem List   Diagnosis Code    Schizophrenia ([facility] F20.9    HTN (hypertension) I10    Anxiety F41.9    GERD (gastroesophageal reflux disease) K21.9    Hyperlipemia E78.5    EVER III (cervical intraepithelial neoplasia III) D06.9    Patellofemoral pain syndrome of left knee M22.2X2    Hx LEEP  Z98.890    Cold Knife Cone 19 Z98.890    Cervical dysplasia N87.9       Isolation/Infection:   Isolation          No Isolation        Unreconciled Outside Infections     Enable clinical decision support by reconciling outside information with the patient's chart.     .    Infection Onset Last Indicated Last Received Source    MRSA 10/19/15 10/19/15 05/12/21 [address] [place] [address]      Patient Infection Status     Infection Onset Added Last Indicated Last Indicated By Review Planned Expiration Resolved Resolved By    None active    Resolved    COVID-19 Rule Out 21 COVID-19, Rapid (Ordered)   21 Rule-Out Test Resulted          Nurse Assessment:  Last Vital Signs: BP (!) 145/79   Pulse 65   Temp 97.6 °F (36.4 °C) (Infrared)   Resp 18   Ht 5' 2\" (1.575 m)   Wt 210 lb (95.3 kg)   SpO2 99%   BMI 38.41 kg/m²     Last documented pain score (0-10 scale): Pain Level: 9  Last Weight:   Wt Readings from Last 1 Encounters:   21 210 lb (95.3 kg)     Mental Status:  {IP PT MENTAL STATUS:40318}    IV Access:  { CARL IV ACCESS:603663476}    Nursing Mobility/ADLs:  Walking   {CHP DME XJYN:675126953}  Transfer  {CHP DME OUB}  Bathing  {CHP DME OQXQ:600266685}  Dressing  {CHP DME MYVV:862578622}  Toileting  {CHP DME FHUA:673243788}  Feeding  {CHP DME DUIW:807763316}  Med Admin  {CHP DME YJYD:167279584}  Med Delivery   { CARL MED Delivery:704621596}    Wound Care Documentation and Therapy:        Elimination:  Continence:   · Bowel: {YES / ZV:86741}  · Bladder: {YES / GA:76273}  Urinary Catheter: {Urinary Catheter:235681267}   Colostomy/Ileostomy/Ileal Conduit: {YES / PN:02092}       Date of Last BM: ***  No intake or output data in the 24 hours ending 21 0948  No intake/output data recorded.     Safety Concerns:     [address]ENRY Safety Concerns:773058356}    Impairments/Disabilities:      [address] CARL Impairments/Disabilities:581285100}    Nutrition Therapy:  Current Nutrition Therapy:   [address] [facility] Diet List:485418085}    Routes of Feeding: {CHP DME Other Feedings:153853726}  Liquids: {Slp liquid thickness:26985}  Daily Fluid Restriction: {CHP DME Yes amt example:553068752}  Last Modified Barium Swallow with Video (Video Swallowing Test): {Done Not Done [facility]:399214291}    Treatments at the Time of Hospital Discharge:   Respiratory Treatments: ***  Oxygen Therapy:  {Therapy; copd oxygen:68532}  Ventilator:    { CC Vent REKQ:324787501}    Rehab Therapies: {THERAPEUTIC INTERVENTION:8439461099}  Weight Bearing Status/Restrictions: [address]  Weight Bearin}  Other Medical Equipment (for information only, NOT a DME order):  {EQUIPMENT:188522713}  Other Treatments: ***    Patient's personal belongings (please select all that are sent with patient):  {CHP DME Belongings:859181721}    RN SIGNATURE:  {Esignature:911407976}    CASE MANAGEMENT/SOCIAL WORK SECTION    Inpatient Status Date: ***    Readmission Risk Assessment Score:  Readmission Risk              Risk of Unplanned Readmission:  0           Discharging to Facility/ Agency   · Name:   · Address:  · Phone:  · Fax:    Dialysis Facility (if applicable)   · Name:  · Address:  · Dialysis Schedule:  · Phone:  · Fax:    / signature: {Esignature:628665204}    PHYSICIAN SECTION    Prognosis: {Prognosis:5898444757}    Condition at Discharge: [address] Patient Condition:367182473}    Rehab Potential (if transferring to Rehab): {Prognosis:7436314515}    Recommended Labs or Other Treatments After Discharge: ***    Physician Certification: I certify the above information and transfer of Mi Apple  is necessary for the continuing treatment of the diagnosis listed and that she requires {Admit to Appropriate Level of Care:18174} for {GREATER/LESS:731327724} 30 days.      Update Admission H&P: {CHP DME Changes in VVPFV:690718195}    PHYSICIAN SIGNATURE:  {Esignature:566891469}

## 2021-08-13 NOTE — PROGRESS NOTES
Dr. Juan Ramon Kasper, anesthesia, was contacted and informed of patient's history and planned surgery. Cardiac clearance requested. Surgery scheduling will notify Dr. Marco Antonio Triana office who will be responsible for making sure the clearance is obtained and is in the chart for surgery.
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
## 2021-08-19 ENCOUNTER — INITIAL CONSULT (OUTPATIENT)
Dept: ONCOLOGY | Age: 41
End: 2021-08-19
Payer: COMMERCIAL

## 2021-08-19 DIAGNOSIS — Z80.3 FAMILY HISTORY OF BREAST CANCER: Primary | ICD-10-CM

## 2021-08-19 PROCEDURE — 96040 PR GENETIC COUNSELING, EACH 30 MIN: CPT | Performed by: GENETIC COUNSELOR, MS

## 2021-08-19 NOTE — PROGRESS NOTES
3 [facility]   Hereditary Cancer Risk Assessment     Name: Mi Apple   YOB: 1980   Date of Consultation: 8/19/21     Ms. Maddox was seen at the [address] [place] [address] for genetic counseling on 8/19/21. Ms. Maddox was referred by Meek Curry DO due to her family history of breast cancer. PERSONAL HISTORY   Ms. Radha Junior is a 42 y.o.  female with no personal history of cancer. FAMILY HISTORY  Ms. Radha Junior has 3 son(s) and 4 daughter(s). She has 0 full sister(s) and 1 full brother(s). Ms. Arias Triana mother passed away at age 39 from breast cancer which was initially diagnosed at age 36. A maternal uncle passed away at age 46 from \"bone cancer\". There are other cancers in his maternal family; however, there are no details regarding the types and ages of diagnosis. There are no known details regarding her paternal family history. Ms. Radha Junior reports unknown ancestry and denies any known Ashkenazi Confucianism heritage. RISK ASSESSMENT   We discussed that approximately 5-10% of cancers are due to a hereditary gene mutation which causes an increased risk for certain cancers. Hereditary cancers are typically diagnosed at younger ages (under age 46y) and occur in multiple generations of a family. Multiple individuals with the same type of cancer (example: breast or colorectal) or uncommon cancers (example: ovarian, pancreatic, male breast cancer) are also features of hereditary cancers. We discussed that Ms. Maddox's maternal family history is somewhat concerning for a hereditary predisposition given that she has a close relative with early onset breast cancer. In summary, Ms. Maddox meets the [employer] (NCCN) guidelines for genetic testing of the BRCA1/2 genes based on having a first degree relative with breast cancer under age 39.      The NCCN guidelines also recognize that an individual's personal and/or family history may be explained by more than one inherited cancer syndrome. Thus, a multi-gene panel may be more efficient, more cost effecting, and increases the yield of detecting a hereditary mutation which would impact medical management. Given her personal and/or family history, we recommend testing for the following genes at minimum: VINCENT, CHEK2, NBN, and PALB2. DISCUSSION  We discussed that the BRCA1/2 genes are the most common genes associated with hereditary breast and ovarian cancer. We also discussed that genetic testing is available for multiple other genes related to hereditary cancer. Some of these genes are known to carry a significant increased risk for several cancers including colon, breast, uterine, ovarian, stomach, and pancreatic cancer, while some of these genes are believed to have a moderate increased risk for breast and other cancers. We discussed the possibility of finding a mutation in genes with limited information to guide medical management, as well we as the possibility of identifying variants of uncertain significance (VUS). We discussed the risks, benefits, and limitations of genetic testing. Possible test results were discussed as well as potential screening and prevention strategies. Specifically, we discussed increased breast cancer surveillance by mammogram and breast MRI as well as the option of prophylactic mastectomy. We discussed the recommendation for prophylactic oophorectomy for results which suggest an increased risk for ovarian cancer. Lastly, we discussed that the results of Ms. Maddox's genetic testing may be beneficial in defining her risk for cancer as well as for her family members. SUMMARY & PLAN  1) Ms. Maddox meets the NCCN criteria for genetic testing based on having a first degree relative with breast cancer under age 39.     2) Genetic testing via a multi-gene panel was recommended and offered to Ms. Maddox. 3) Ms. Maddox elected to proceed with the CancerNext Expanded + RNA Insight gene panel. 4) Ms. Maddox is aware that she will receive a notification from [email or web address] if the out of pocket cost for testing exceeds $100 (based on individual insurance plan) and the option to proceed with the self pay price of $249.     5) Informed consent was obtained and a blood sample was sent to [email or web address]. We will call Ms. Maddox with results as soon as they are available. A follow up appointment may be recommended. A summary letter with results and final medical management recommendations will be sent once available. A total of 40 minutes were spent face to face with Ms. Maddox and 50% of the time was spent educating and counseling. The [address] [place] [address] National Program would be glad to offer our assistance should you have any questions or concerns about this information. Please feel free to contact us at [phone]. Prince Hawk Stevens MS, [facility]   Licensed Genetic Counselor

## 2021-08-23 ENCOUNTER — NURSE ONLY (OUTPATIENT)
Dept: FAMILY MEDICINE CLINIC | Age: 41
End: 2021-08-23

## 2021-08-23 ENCOUNTER — HOSPITAL ENCOUNTER (OUTPATIENT)
Dept: LAB | Age: 41
Setting detail: SPECIMEN
Discharge: HOME | End: 2021-08-23
Payer: COMMERCIAL

## 2021-08-23 ENCOUNTER — HOSPITAL ENCOUNTER (OUTPATIENT)
Age: 41
Setting detail: SPECIMEN
Discharge: HOME | End: 2021-08-23
Payer: COMMERCIAL

## 2021-08-23 DIAGNOSIS — Z01.818 PRE-OP TESTING: Primary | ICD-10-CM

## 2021-08-24 DIAGNOSIS — Z01.818 PRE-OP TESTING: ICD-10-CM

## 2021-08-24 LAB
SARS-COV-2: NORMAL
SARS-COV-2: NOT DETECTED
SOURCE: NORMAL

## 2021-08-26 ENCOUNTER — ANESTHESIA EVENT (OUTPATIENT)
Dept: OPERATING ROOM | Age: 41
End: 2021-08-26
Payer: COMMERCIAL

## 2021-08-27 ENCOUNTER — HOSPITAL ENCOUNTER (OUTPATIENT)
Age: 41
Setting detail: OUTPATIENT SURGERY
Discharge: HOME | End: 2021-08-27
Attending: PODIATRIST | Admitting: PODIATRIST
Payer: COMMERCIAL

## 2021-08-27 ENCOUNTER — APPOINTMENT (OUTPATIENT)
Dept: GENERAL RADIOLOGY | Age: 41
End: 2021-08-27
Attending: PODIATRIST
Payer: COMMERCIAL

## 2021-08-27 ENCOUNTER — ANESTHESIA (OUTPATIENT)
Dept: OPERATING ROOM | Age: 41
End: 2021-08-27
Payer: COMMERCIAL

## 2021-08-27 VITALS
TEMPERATURE: 97.7 F | HEART RATE: 58 BPM | OXYGEN SATURATION: 97 % | DIASTOLIC BLOOD PRESSURE: 77 MMHG | RESPIRATION RATE: 16 BRPM | HEIGHT: 62 IN | SYSTOLIC BLOOD PRESSURE: 153 MMHG | WEIGHT: 207 LBS | BODY MASS INDEX: 38.09 KG/M2

## 2021-08-27 VITALS — TEMPERATURE: 97 F | SYSTOLIC BLOOD PRESSURE: 142 MMHG | OXYGEN SATURATION: 98 % | DIASTOLIC BLOOD PRESSURE: 81 MMHG

## 2021-08-27 DIAGNOSIS — G89.18 POST-OP PAIN: Primary | ICD-10-CM

## 2021-08-27 LAB
-: NORMAL
HCG, PREGNANCY URINE (POC): NEGATIVE

## 2021-08-27 PROCEDURE — 3700000001 HC ADD 15 MINUTES (ANESTHESIA): Performed by: PODIATRIST

## 2021-08-27 PROCEDURE — 3600000003 HC SURGERY LEVEL 3 BASE: Performed by: PODIATRIST

## 2021-08-27 PROCEDURE — 2720000010 HC SURG SUPPLY STERILE: Performed by: PODIATRIST

## 2021-08-27 PROCEDURE — 3600000013 HC SURGERY LEVEL 3 ADDTL 15MIN: Performed by: PODIATRIST

## 2021-08-27 PROCEDURE — 81025 URINE PREGNANCY TEST: CPT

## 2021-08-27 PROCEDURE — 2500000003 HC RX 250 WO HCPCS: Performed by: PODIATRIST

## 2021-08-27 PROCEDURE — 7100000001 HC PACU RECOVERY - ADDTL 15 MIN: Performed by: PODIATRIST

## 2021-08-27 PROCEDURE — 7100000031 HC ASPR PHASE II RECOVERY - ADDTL 15 MIN: Performed by: PODIATRIST

## 2021-08-27 PROCEDURE — 2709999900 HC NON-CHARGEABLE SUPPLY: Performed by: PODIATRIST

## 2021-08-27 PROCEDURE — 6360000002 HC RX W HCPCS

## 2021-08-27 PROCEDURE — 7100000011 HC PHASE II RECOVERY - ADDTL 15 MIN: Performed by: PODIATRIST

## 2021-08-27 PROCEDURE — 2580000003 HC RX 258: Performed by: ANESTHESIOLOGY

## 2021-08-27 PROCEDURE — 7100000010 HC PHASE II RECOVERY - FIRST 15 MIN: Performed by: PODIATRIST

## 2021-08-27 PROCEDURE — 7100000030 HC ASPR PHASE II RECOVERY - FIRST 15 MIN: Performed by: PODIATRIST

## 2021-08-27 PROCEDURE — 6360000002 HC RX W HCPCS: Performed by: ANESTHESIOLOGY

## 2021-08-27 PROCEDURE — 7100000000 HC PACU RECOVERY - FIRST 15 MIN: Performed by: PODIATRIST

## 2021-08-27 PROCEDURE — 2500000003 HC RX 250 WO HCPCS

## 2021-08-27 PROCEDURE — 88304 TISSUE EXAM BY PATHOLOGIST: CPT

## 2021-08-27 PROCEDURE — 3700000000 HC ANESTHESIA ATTENDED CARE: Performed by: PODIATRIST

## 2021-08-27 PROCEDURE — C1713 ANCHOR/SCREW BN/BN,TIS/BN: HCPCS | Performed by: PODIATRIST

## 2021-08-27 DEVICE — ANCHOR SUT NO2 DIA2.9MM MAXBRAID DBL LD SFT W/ TAPR NDL: Type: IMPLANTABLE DEVICE | Site: ANKLE | Status: FUNCTIONAL

## 2021-08-27 RX ORDER — SODIUM CHLORIDE 9 MG/ML
25 INJECTION, SOLUTION INTRAVENOUS PRN
Status: DISCONTINUED | OUTPATIENT
Start: 2021-08-27 | End: 2021-08-27 | Stop reason: HOSPADM

## 2021-08-27 RX ORDER — ONDANSETRON 2 MG/ML
4 INJECTION INTRAMUSCULAR; INTRAVENOUS
Status: DISCONTINUED | OUTPATIENT
Start: 2021-08-27 | End: 2021-08-27 | Stop reason: HOSPADM

## 2021-08-27 RX ORDER — FENTANYL CITRATE 50 UG/ML
INJECTION, SOLUTION INTRAMUSCULAR; INTRAVENOUS PRN
Status: DISCONTINUED | OUTPATIENT
Start: 2021-08-27 | End: 2021-08-27 | Stop reason: SDUPTHER

## 2021-08-27 RX ORDER — DIPHENHYDRAMINE HYDROCHLORIDE 50 MG/ML
25 INJECTION INTRAMUSCULAR; INTRAVENOUS
Status: DISCONTINUED | OUTPATIENT
Start: 2021-08-27 | End: 2021-08-27 | Stop reason: HOSPADM

## 2021-08-27 RX ORDER — DIPHENHYDRAMINE HYDROCHLORIDE 50 MG/ML
12.5 INJECTION INTRAMUSCULAR; INTRAVENOUS
Status: DISCONTINUED | OUTPATIENT
Start: 2021-08-27 | End: 2021-08-27 | Stop reason: HOSPADM

## 2021-08-27 RX ORDER — BUPIVACAINE HYDROCHLORIDE 5 MG/ML
INJECTION, SOLUTION EPIDURAL; INTRACAUDAL PRN
Status: DISCONTINUED | OUTPATIENT
Start: 2021-08-27 | End: 2021-08-27 | Stop reason: ALTCHOICE

## 2021-08-27 RX ORDER — SODIUM CHLORIDE 0.9 % (FLUSH) 0.9 %
10 SYRINGE (ML) INJECTION EVERY 12 HOURS SCHEDULED
Status: DISCONTINUED | OUTPATIENT
Start: 2021-08-27 | End: 2021-08-27 | Stop reason: HOSPADM

## 2021-08-27 RX ORDER — OXYCODONE HYDROCHLORIDE AND ACETAMINOPHEN 5; 325 MG/1; MG/1
1 TABLET ORAL EVERY 6 HOURS PRN
Qty: 28 TABLET | Refills: 0 | Status: SHIPPED | OUTPATIENT
Start: 2021-08-27 | End: 2021-09-03

## 2021-08-27 RX ORDER — OXYCODONE HYDROCHLORIDE AND ACETAMINOPHEN 5; 325 MG/1; MG/1
1 TABLET ORAL EVERY 4 HOURS PRN
Status: DISCONTINUED | OUTPATIENT
Start: 2021-08-27 | End: 2021-08-27 | Stop reason: HOSPADM

## 2021-08-27 RX ORDER — LIDOCAINE HYDROCHLORIDE 10 MG/ML
1 INJECTION, SOLUTION EPIDURAL; INFILTRATION; INTRACAUDAL; PERINEURAL
Status: DISCONTINUED | OUTPATIENT
Start: 2021-08-27 | End: 2021-08-27 | Stop reason: HOSPADM

## 2021-08-27 RX ORDER — ROCURONIUM BROMIDE 10 MG/ML
INJECTION, SOLUTION INTRAVENOUS PRN
Status: DISCONTINUED | OUTPATIENT
Start: 2021-08-27 | End: 2021-08-27 | Stop reason: SDUPTHER

## 2021-08-27 RX ORDER — LABETALOL HYDROCHLORIDE 5 MG/ML
5 INJECTION, SOLUTION INTRAVENOUS EVERY 10 MIN PRN
Status: DISCONTINUED | OUTPATIENT
Start: 2021-08-27 | End: 2021-08-27 | Stop reason: HOSPADM

## 2021-08-27 RX ORDER — NEOSTIGMINE METHYLSULFATE 5 MG/5 ML
SYRINGE (ML) INTRAVENOUS PRN
Status: DISCONTINUED | OUTPATIENT
Start: 2021-08-27 | End: 2021-08-27 | Stop reason: SDUPTHER

## 2021-08-27 RX ORDER — DEXAMETHASONE SODIUM PHOSPHATE 4 MG/ML
INJECTION, SOLUTION INTRA-ARTICULAR; INTRALESIONAL; INTRAMUSCULAR; INTRAVENOUS; SOFT TISSUE PRN
Status: DISCONTINUED | OUTPATIENT
Start: 2021-08-27 | End: 2021-08-27 | Stop reason: SDUPTHER

## 2021-08-27 RX ORDER — MIDAZOLAM HYDROCHLORIDE 1 MG/ML
INJECTION INTRAMUSCULAR; INTRAVENOUS PRN
Status: DISCONTINUED | OUTPATIENT
Start: 2021-08-27 | End: 2021-08-27 | Stop reason: SDUPTHER

## 2021-08-27 RX ORDER — LABETALOL HYDROCHLORIDE 5 MG/ML
INJECTION, SOLUTION INTRAVENOUS PRN
Status: DISCONTINUED | OUTPATIENT
Start: 2021-08-27 | End: 2021-08-27 | Stop reason: SDUPTHER

## 2021-08-27 RX ORDER — MEPERIDINE HYDROCHLORIDE 25 MG/ML
12.5 INJECTION INTRAMUSCULAR; INTRAVENOUS; SUBCUTANEOUS EVERY 5 MIN PRN
Status: DISCONTINUED | OUTPATIENT
Start: 2021-08-27 | End: 2021-08-27 | Stop reason: HOSPADM

## 2021-08-27 RX ORDER — DIPHENHYDRAMINE HYDROCHLORIDE 50 MG/ML
25 INJECTION INTRAMUSCULAR; INTRAVENOUS ONCE
Status: COMPLETED | OUTPATIENT
Start: 2021-08-27 | End: 2021-08-27

## 2021-08-27 RX ORDER — KETOROLAC TROMETHAMINE 30 MG/ML
30 INJECTION, SOLUTION INTRAMUSCULAR; INTRAVENOUS ONCE
Status: COMPLETED | OUTPATIENT
Start: 2021-08-27 | End: 2021-08-27

## 2021-08-27 RX ORDER — SODIUM CHLORIDE, SODIUM LACTATE, POTASSIUM CHLORIDE, CALCIUM CHLORIDE 600; 310; 30; 20 MG/100ML; MG/100ML; MG/100ML; MG/100ML
INJECTION, SOLUTION INTRAVENOUS CONTINUOUS
Status: DISCONTINUED | OUTPATIENT
Start: 2021-08-27 | End: 2021-08-27 | Stop reason: HOSPADM

## 2021-08-27 RX ORDER — SODIUM CHLORIDE 0.9 % (FLUSH) 0.9 %
10 SYRINGE (ML) INJECTION PRN
Status: DISCONTINUED | OUTPATIENT
Start: 2021-08-27 | End: 2021-08-27 | Stop reason: HOSPADM

## 2021-08-27 RX ORDER — LIDOCAINE HYDROCHLORIDE 10 MG/ML
INJECTION, SOLUTION EPIDURAL; INFILTRATION; INTRACAUDAL; PERINEURAL PRN
Status: DISCONTINUED | OUTPATIENT
Start: 2021-08-27 | End: 2021-08-27 | Stop reason: SDUPTHER

## 2021-08-27 RX ORDER — ONDANSETRON 2 MG/ML
INJECTION INTRAMUSCULAR; INTRAVENOUS PRN
Status: DISCONTINUED | OUTPATIENT
Start: 2021-08-27 | End: 2021-08-27 | Stop reason: SDUPTHER

## 2021-08-27 RX ORDER — GLYCOPYRROLATE 1 MG/5 ML
SYRINGE (ML) INTRAVENOUS PRN
Status: DISCONTINUED | OUTPATIENT
Start: 2021-08-27 | End: 2021-08-27 | Stop reason: SDUPTHER

## 2021-08-27 RX ORDER — MORPHINE SULFATE 2 MG/ML
2 INJECTION, SOLUTION INTRAMUSCULAR; INTRAVENOUS EVERY 5 MIN PRN
Status: DISCONTINUED | OUTPATIENT
Start: 2021-08-27 | End: 2021-08-27 | Stop reason: HOSPADM

## 2021-08-27 RX ORDER — CLINDAMYCIN PHOSPHATE 900 MG/50ML
900 INJECTION INTRAVENOUS ONCE
Status: COMPLETED | OUTPATIENT
Start: 2021-08-27 | End: 2021-08-27

## 2021-08-27 RX ORDER — PROPOFOL 10 MG/ML
INJECTION, EMULSION INTRAVENOUS PRN
Status: DISCONTINUED | OUTPATIENT
Start: 2021-08-27 | End: 2021-08-27 | Stop reason: SDUPTHER

## 2021-08-27 RX ADMIN — KETOROLAC TROMETHAMINE 30 MG: 30 INJECTION, SOLUTION INTRAMUSCULAR; INTRAVENOUS at 11:50

## 2021-08-27 RX ADMIN — FENTANYL CITRATE 50 MCG: 50 INJECTION, SOLUTION INTRAMUSCULAR; INTRAVENOUS at 09:05

## 2021-08-27 RX ADMIN — PROPOFOL 180 MG: 10 INJECTION, EMULSION INTRAVENOUS at 08:29

## 2021-08-27 RX ADMIN — Medication 0.8 MG: at 09:43

## 2021-08-27 RX ADMIN — ROCURONIUM BROMIDE 50 MG: 10 INJECTION, SOLUTION INTRAVENOUS at 08:29

## 2021-08-27 RX ADMIN — DEXAMETHASONE SODIUM PHOSPHATE 4 MG: 4 INJECTION, SOLUTION INTRAMUSCULAR; INTRAVENOUS at 08:46

## 2021-08-27 RX ADMIN — LIDOCAINE HYDROCHLORIDE 50 MG: 10 INJECTION, SOLUTION EPIDURAL; INFILTRATION; INTRACAUDAL; PERINEURAL at 08:29

## 2021-08-27 RX ADMIN — DIPHENHYDRAMINE HYDROCHLORIDE 25 MG: 50 INJECTION INTRAMUSCULAR; INTRAVENOUS at 11:46

## 2021-08-27 RX ADMIN — ONDANSETRON 4 MG: 2 INJECTION, SOLUTION INTRAMUSCULAR; INTRAVENOUS at 09:33

## 2021-08-27 RX ADMIN — Medication 4 MG: at 09:43

## 2021-08-27 RX ADMIN — CLINDAMYCIN PHOSPHATE 900 MG: 900 INJECTION, SOLUTION INTRAVENOUS at 08:47

## 2021-08-27 RX ADMIN — SODIUM CHLORIDE, POTASSIUM CHLORIDE, SODIUM LACTATE AND CALCIUM CHLORIDE: 600; 310; 30; 20 INJECTION, SOLUTION INTRAVENOUS at 07:30

## 2021-08-27 RX ADMIN — HYDROMORPHONE HYDROCHLORIDE 0.5 MG: 1 INJECTION, SOLUTION INTRAMUSCULAR; INTRAVENOUS; SUBCUTANEOUS at 10:38

## 2021-08-27 RX ADMIN — HYDROMORPHONE HYDROCHLORIDE 0.5 MG: 1 INJECTION, SOLUTION INTRAMUSCULAR; INTRAVENOUS; SUBCUTANEOUS at 10:29

## 2021-08-27 RX ADMIN — LABETALOL HYDROCHLORIDE 5 MG: 5 INJECTION, SOLUTION INTRAVENOUS at 09:40

## 2021-08-27 RX ADMIN — FENTANYL CITRATE 50 MCG: 50 INJECTION, SOLUTION INTRAMUSCULAR; INTRAVENOUS at 08:29

## 2021-08-27 RX ADMIN — MIDAZOLAM 2 MG: 1 INJECTION INTRAMUSCULAR; INTRAVENOUS at 08:24

## 2021-08-27 ASSESSMENT — PULMONARY FUNCTION TESTS
PIF_VALUE: 29
PIF_VALUE: 5
PIF_VALUE: 26
PIF_VALUE: 26
PIF_VALUE: 29
PIF_VALUE: 0
PIF_VALUE: 29
PIF_VALUE: 26
PIF_VALUE: 3
PIF_VALUE: 29
PIF_VALUE: 29
PIF_VALUE: 5
PIF_VALUE: 29
PIF_VALUE: 24
PIF_VALUE: 4
PIF_VALUE: 2
PIF_VALUE: 29
PIF_VALUE: 29
PIF_VALUE: 4
PIF_VALUE: 2
PIF_VALUE: 27
PIF_VALUE: 29
PIF_VALUE: 29
PIF_VALUE: 2
PIF_VALUE: 26
PIF_VALUE: 29
PIF_VALUE: 24
PIF_VALUE: 23
PIF_VALUE: 24
PIF_VALUE: 29
PIF_VALUE: 3
PIF_VALUE: 15
PIF_VALUE: 29
PIF_VALUE: 14
PIF_VALUE: 15
PIF_VALUE: 29
PIF_VALUE: 24
PIF_VALUE: 32
PIF_VALUE: 29
PIF_VALUE: 28
PIF_VALUE: 14
PIF_VALUE: 29
PIF_VALUE: 29
PIF_VALUE: 2
PIF_VALUE: 14
PIF_VALUE: 2
PIF_VALUE: 29
PIF_VALUE: 7
PIF_VALUE: 23
PIF_VALUE: 24
PIF_VALUE: 29
PIF_VALUE: 29
PIF_VALUE: 3
PIF_VALUE: 1
PIF_VALUE: 34
PIF_VALUE: 2
PIF_VALUE: 29
PIF_VALUE: 23
PIF_VALUE: 3
PIF_VALUE: 4
PIF_VALUE: 29
PIF_VALUE: 28
PIF_VALUE: 29
PIF_VALUE: 19
PIF_VALUE: 29
PIF_VALUE: 2
PIF_VALUE: 29
PIF_VALUE: 3
PIF_VALUE: 22
PIF_VALUE: 29
PIF_VALUE: 29
PIF_VALUE: 3
PIF_VALUE: 26
PIF_VALUE: 29
PIF_VALUE: 20
PIF_VALUE: 4
PIF_VALUE: 15
PIF_VALUE: 3
PIF_VALUE: 29
PIF_VALUE: 29
PIF_VALUE: 3
PIF_VALUE: 29
PIF_VALUE: 24
PIF_VALUE: 17
PIF_VALUE: 19
PIF_VALUE: 24
PIF_VALUE: 13
PIF_VALUE: 24
PIF_VALUE: 15
PIF_VALUE: 24
PIF_VALUE: 30
PIF_VALUE: 25
PIF_VALUE: 24
PIF_VALUE: 29

## 2021-08-27 ASSESSMENT — ENCOUNTER SYMPTOMS
STRIDOR: 0
SHORTNESS OF BREATH: 0

## 2021-08-27 ASSESSMENT — PAIN DESCRIPTION - ORIENTATION
ORIENTATION: LEFT
ORIENTATION: LEFT
ORIENTATION: LEFT;LOWER

## 2021-08-27 ASSESSMENT — PAIN DESCRIPTION - LOCATION
LOCATION: FOOT
LOCATION: FOOT
LOCATION: LEG

## 2021-08-27 ASSESSMENT — PAIN DESCRIPTION - PAIN TYPE
TYPE: SURGICAL PAIN

## 2021-08-27 ASSESSMENT — PAIN - FUNCTIONAL ASSESSMENT
PAIN_FUNCTIONAL_ASSESSMENT: 0-10
PAIN_FUNCTIONAL_ASSESSMENT: PREVENTS OR INTERFERES SOME ACTIVE ACTIVITIES AND ADLS

## 2021-08-27 ASSESSMENT — PAIN DESCRIPTION - DESCRIPTORS: DESCRIPTORS: SHARP;SHOOTING

## 2021-08-27 ASSESSMENT — PAIN SCALES - GENERAL
PAINLEVEL_OUTOF10: 9
PAINLEVEL_OUTOF10: 5
PAINLEVEL_OUTOF10: 8
PAINLEVEL_OUTOF10: 8
PAINLEVEL_OUTOF10: 0
PAINLEVEL_OUTOF10: 10

## 2021-08-27 ASSESSMENT — LIFESTYLE VARIABLES: SMOKING_STATUS: 0

## 2021-08-27 NOTE — PROGRESS NOTES
I observed this pt scratching her skin. I notified Dr Andree Leahc and received orders. Prior to discharge the pt stated that her itching and pain were better.

## 2021-08-27 NOTE — OP NOTE
PODIATRY BRIEF OP NOTE    PATIENT NAME: Amanda Espinoza  YOB: 1980  -  42 y.o. female  MRN: [id number]  DATE: 8/27/2021  BILLING #: [id number]    Surgeon(s):  Brayan Van DPM     ASSISTANTS: Daniel Aparicio DPM     PRE-OP DIAGNOSIS:   1. Achilles tendonitis, left leg  2. Haglunds deformity, left leg  3. Equinus, left leg  4. Pain in left leg  5. Difficulty ambulating    POST-OP DIAGNOSIS: Same as above. PROCEDURE:   1. Resection of calcaneal spur, left leg  2. Debridement of achilles tendonosis, left leg    ANESTHESIA: General with 10 ml of a 1:1 racemic mixture of 0.5% marcaine plain and 1% lidocaine plain      HEMOSTASIS: Pneumatic ankle tourniquet @ 250 mmHg for 43 minutes. ESTIMATED BLOOD LOSS: Less than 25cc. MATERIALS:   Implant Name Type Inv. Item Serial No.  Lot No. LRB No. Used Action   ANCHOR SUT NO2 DIA2. 9MM MAXBRAID DBL LD SFT W/ TAPR NDL  ANCHOR SUT NO2 DIA2. 9MM MAXBRAID DBL LD SFT W/ TAPR NDL  [employer] [id number] Left 1 Implanted   ANCHOR SUT NO2 DIA2. 9MM MAXBRAID DBL LD SFT W/ TAPR NDL  ANCHOR SUT NO2 DIA2. 9MM MAXBRAID DBL LD SFT W/ TAPR NDL  [employer] [id number] Left 1 Implanted       INJECTABLES: 10 ml 0.5% marcaine plain    SPECIMEN: Achilles bursal sac  * No specimens in log *    COMPLICATIONS: None    FINDINGS: Hypertrophic calcaneal posterior prominence left leg; achilles tendonitis centrally with bursitis     INDICATIONS FOR PROCEDURE:  Patient has had pain in left her heel for years with little relief from conservative treatments. Patient understands reason for procedure. Risks and benefits discussed no guarantees given or implied. Consent is signed in chart. PROCEDURE IN DETAIL: The patient was transported from pre-op to the operating room and placed on the operating table in the prone position, once adequate anesthesia was given and intubation performed. A safety strap was placed across the lap.  A pneumatic thigh tourniquet was placed about the patient's left thigh. The lower extremity was then scrubbed, prepped and draped in the usual aseptic manner. The lower extremity was elevated and exsanguinated using an Esmarch bandage. The tourniquet was then inflated. Attention was then directed to the left posterior heel where a linear incision was made overlying the achilles tendon and calcaneus centrally. The incision was deepened down in a layered fashion through skin and subcutaneous layers. Superficial, deep fascia, and peritenon were dissected off the tendon utilizing a combination of sharp and blunt dissection. Care was taken to retract all vital neurovascular structures. All bleeders were cauterized and ligated as necessary. Once down to the level of the the achilles tendon, the tendon was split midline and reflected medially and laterally. There was noted to be some yellow discoloration and ossification in portions of the distal thickened achilles tendon. These areas were then excised. The discolored tendon and bone were sent to pathology. There was a large exostosis of the posterior calcaneus. This was removed using osteotomes. The area was made smooth with a power rasp. The surgical site was irrigated with copious amounts of saline. Next, 2.9mm JuggerKnot suture anchors were inserted in the posterior calcaneus. The achilles tendon was then sutured down tight to the calcaneus using the anchor and the midline splint in the achilles was repaired with the JuggerKnot sutures from distal to proximal. Any apparent weaknesses in the achilles insertion were reinforced with 3-0 Vicryl. The Achilles tendon was noted to be under physiologic tension. The surgical site was again flushed with copious amounts of sterile saline. The peritenon was repaired. The surgical site was then closed in a layered fashion utilizing 3-0 vicryl and 3-0 Prolene. A local V-block of 10cc 0.5% Marcaine was given.

## 2021-08-27 NOTE — ANESTHESIA POSTPROCEDURE EVALUATION
POST- ANESTHESIA EVALUATION       Pt Name: Thanh Clemens  MRN: [id number]  [place]: 1980  Date of evaluation: 8/27/2021  Time:  12:40 PM      /84   Pulse 64   Temp 97.7 °F (36.5 °C) (Temporal)   Resp 16   Ht 5' 2\" (1.575 m)   Wt 207 lb (93.9 kg)   SpO2 97%   BMI 37.86 kg/m²      Consciousness Level  Awake  Cardiopulmonary Status  Stable  Pain Adequately Treated YES  Nausea / Vomiting  NO  Adequate Hydration  YES  Anesthesia Related Complications NONE      Electronically signed by Gloria Stanley MD on 8/27/2021 at 12:40 PM       Department of Anesthesiology  Postprocedure Note    Patient: Thanh Clemens  MRN: [id number]  YOB: 1980  Date of evaluation: 8/27/2021  Time:  12:40 PM     Procedure Summary     Date: 08/27/21 Room / Location: [address] [place] [address] [facility]    Anesthesia Start: 0825 Anesthesia Stop: 6944    Procedure: LEFT DETACH & REATTACH ACHILLES TENDON (Left Ankle) Diagnosis: (LEFT ACHILLES TENDINITIS (COVID TEST 8/23))    Surgeons: Cecil Rockwell DPM Responsible Provider: Gloria Stanley MD    Anesthesia Type: general ASA Status: 3          Anesthesia Type: general    Mason Phase I: Mason Score: 8    Mason Phase II:      Last vitals: Reviewed and per EMR flowsheets.        Anesthesia Post Evaluation

## 2021-08-27 NOTE — ANESTHESIA PRE PROCEDURE
Department of Anesthesiology  Preprocedure Note       Name:  Zhao Emery   Age:  42 y.o.  :  1980                                          MRN:  [id number]         Date:  2021      Surgeon: Faviola Aldridge):  Anjel Torres DPM    Procedure: Procedure(s):  LEFT DETACH & REATTACH ACHILLES TENDON    Medications prior to admission:   Prior to Admission medications    Medication Sig Start Date End Date Taking?  Authorizing Provider   Prenatal Vit-Fe Fumarate-FA (NIVA-PLUS) 27-1 MG TABS Take 1 tablet by mouth daily   Yes Historical Provider, MD   loratadine (CLARITIN) 10 MG capsule Take 10 mg by mouth daily   Yes Historical Provider, MD   potassium chloride (KLOR-CON) 10 MEQ extended release tablet potassium chloride ER 10 mEq tablet,extended release   Yes Historical Provider, MD   montelukast (SINGULAIR) 10 MG tablet Take 10 mg by mouth nightly 5/3/21  Yes Historical Provider, MD   mirtazapine (REMERON) 30 MG tablet Take 30 mg by mouth nightly   Yes Historical Provider, MD   metoprolol succinate (TOPROL XL) 25 MG extended release tablet Take 25 mg by mouth daily  21  Yes Historical Provider, MD   losartan (COZAAR) 25 MG tablet Take 25 mg by mouth daily  21  Yes Historical Provider, MD   indomethacin (INDOCIN) 50 MG capsule Take 50 mg by mouth 2 times daily (with meals)    Yes Historical Provider, MD   furosemide (LASIX) 20 MG tablet Take 20 mg by mouth daily 21  Yes Historical Provider, MD   Cholecalciferol (VITAMIN D3 PO) Take 2,000 Units by mouth daily  3/17/21  Yes Historical Provider, MD   LATUDA 120 MG tablet Take 120 mg by mouth daily  3/11/19  Yes Historical Provider, MD   atorvastatin (LIPITOR) 20 MG tablet Take 20 mg by mouth daily 3/18/19  Yes Historical Provider, MD   levothyroxine (SYNTHROID) 50 MCG tablet Take 50 mcg by mouth daily 3/18/19  Yes Historical Provider, MD   SUMAtriptan (IMITREX) 50 MG tablet Take 100 mg by mouth once as needed for Migraine   Yes Historical Provider, MD fluticasone (VERAMYST) 27.5 MCG/SPRAY nasal spray 2 sprays by Nasal route 2 times daily   Yes Historical Provider, MD   docusate sodium (COLACE) 100 MG capsule Take 1 capsule by mouth 2 times daily 6/16/17  Yes Cezar Munoz MD   topiramate (TOPAMAX) 25 MG tablet Take 25 mg by mouth daily  3/30/16  Yes Historical Provider, MD   Budesonide-Formoterol Fumarate (SYMBICORT IN) Inhale 2 puffs into the lungs daily as needed    Yes Historical Provider, MD   cetirizine (ZYRTEC ALLERGY) 10 MG TABS Take 10 mg by mouth daily 7/8/15  Yes Saqib Shetty MD   albuterol (PROVENTIL HFA) 108 (90 BASE) MCG/ACT inhaler Inhale 2 puffs into the lungs every 4 hours as needed for Wheezing. Yes Historical Provider, MD   pantoprazole (PROTONIX) 40 MG tablet Take 40 mg by mouth daily. Yes Historical Provider, MD   sulindac (CLINORIL) 150 MG tablet Take 150 mg by mouth 2 times daily    Historical Provider, MD   magnesium oxide (MAG-OX) 400 MG tablet Take 400 mg by mouth daily as needed     Historical Provider, MD   acetaminophen (TYLENOL) 500 MG tablet Take 2 tablets by mouth 3 times daily for 10 days 5/11/21 8/13/21  David Ramos MD   econazole nitrate 1 % cream Apply 1 each topically daily as needed  3/29/16   Historical Provider, MD   Emollient [facility]) LOTN Apply 1 each topically daily  3/30/16   Historical Provider, MD   diphenoxylate-atropine (LOMOTIL) 2.5-0.025 MG per tablet Take 1 tablet by mouth daily as needed  4/18/14   Historical Provider, MD   ondansetron (ZOFRAN) 4 MG tablet Take 1 tablet by mouth every 8 hours as needed for Nausea.  4/18/14   Aldo Robin MD       Current medications:    Current Facility-Administered Medications   Medication Dose Route Frequency Provider Last Rate Last Admin    lactated ringers infusion   IntraVENous Continuous Tiffanie Craft  mL/hr at 08/27/21 0730 New Bag at 08/27/21 0730    sodium chloride flush 0.9 % injection 10 mL  10 mL IntraVENous 2 times per day Tiffanie Craft Answered      Vital Signs (Current):   Vitals:    08/27/21 0705   BP: 129/69   Pulse: 54   Resp: 18   Temp: 97.5 °F (36.4 °C)   TempSrc: Infrared   SpO2: 96%   Weight: 207 lb (93.9 kg)   Height: 5' 2\" (1.575 m)                                              BP Readings from Last 3 Encounters:   08/27/21 129/69   08/13/21 (!) 145/79   07/07/21 132/72       NPO Status: Time of last liquid consumption: 2359                        Time of last solid consumption: 2359                        Date of last liquid consumption: 08/26/21                        Date of last solid food consumption: 08/26/21    BMI:   Wt Readings from Last 3 Encounters:   08/27/21 207 lb (93.9 kg)   08/13/21 210 lb (95.3 kg)   07/07/21 199 lb 9.6 oz (90.5 kg)     Body mass index is 37.86 kg/m². CBC:   Lab Results   Component Value Date    WBC 6.1 08/13/2021    RBC 4.51 08/13/2021    RBC 3.89 03/20/2012    HGB 14.1 08/13/2021    HCT 41.3 08/13/2021    MCV 91.6 08/13/2021    RDW 13.6 08/13/2021     08/13/2021     03/20/2012     LR    CMP:   Lab Results   Component Value Date     08/13/2021    K 3.8 08/13/2021     08/13/2021    CO2 27 08/13/2021    BUN 10 08/13/2021    CREATININE 0.95 08/13/2021    GFRAA >60 08/13/2021    LABGLOM >60 08/13/2021    GLUCOSE 96 08/13/2021    PROT 6.8 05/13/2021    CALCIUM 8.6 08/13/2021    BILITOT 0.18 05/13/2021    ALKPHOS 84 05/13/2021    AST 27 05/13/2021    ALT 30 05/13/2021       POC Tests: No results for input(s): POCGLU, POCNA, POCK, POCCL, POCBUN, POCHEMO, POCHCT in the last 72 hours.     Coags: No results found for: PROTIME, INR, APTT    HCG (If Applicable):   Lab Results   Component Value Date    PREGTESTUR negative 07/19/2021    HCG NEGATIVE 04/05/2019    HCGQUANT <1 08/04/2021        ABGs: No results found for: PHART, PO2ART, QHJ2MEY, ANR3PTY, BEART, C7KIYLJX     Type & Screen (If Applicable):  No results found for: LABABO, LABRH    Drug/Infectious Status (If Applicable):  Lab Results   Component Value Date    HEPCAB NONREACTIVE 11/06/2020       COVID-19 Screening (If Applicable):   Lab Results   Component Value Date    COVID19 Not Detected 08/23/2021           Anesthesia Evaluation  Patient summary reviewed and Nursing notes reviewed no history of anesthetic complications:   Airway: Mallampati: II  TM distance: >3 FB     Mouth opening: > = 3 FB Dental:          Pulmonary:normal exam  breath sounds clear to auscultation  (+) sleep apnea:  asthma: allergic asthma,     (-) pneumonia, COPD, shortness of breath, recent URI, rhonchi, wheezes, rales, stridor, not a current smoker and no decreased breath sounds                           Cardiovascular:  Exercise tolerance: good (>4 METS),   (+) hypertension: no interval change, hyperlipidemia    (-) pacemaker, valvular problems/murmurs, past MI, CAD, CABG/stent, dysrhythmias,  angina,  CHF, orthopnea, PND,  VANCE, murmur, weak pulses,  friction rub, systolic click, carotid bruit,  JVD, peripheral edema and no pulmonary hypertension    ECG reviewed  Rhythm: regular  Rate: normal           Beta Blocker:  Dose within 24 Hrs         Neuro/Psych:   (+) headaches:, psychiatric history: stable without treatmentdepression/anxiety    (-) seizures, neuromuscular disease, TIA and CVA           GI/Hepatic/Renal:   (+) GERD: no interval change,      (-) hiatal hernia, PUD, hepatitis, liver disease, no renal disease, bowel prep and no morbid obesity       Endo/Other: Negative Endo/Other ROS   (+) no malignancy/cancer. (-) diabetes mellitus, hypothyroidism, hyperthyroidism, blood dyscrasia, arthritis, no electrolyte abnormalities, no malignancy/cancer               Abdominal:             Vascular: negative vascular ROS. - PVD, DVT and PE. Other Findings:           Anesthesia Plan      general     ASA 3       Induction: intravenous. MIPS: Postoperative opioids intended and Prophylactic antiemetics administered.   Anesthetic plan and risks discussed with patient. Plan discussed with CRNA.                   Severo Gavel, MD   8/27/2021

## 2021-08-27 NOTE — BRIEF OP NOTE
PODIATRY BRIEF OP NOTE    PATIENT NAME: Rubio Cheung  YOB: 1980  -  42 y.o. female  MRN: [id number]  DATE: 8/27/2021  BILLING #: [id number]    Surgeon(s):  Fabricio Rodriguez DPM     ASSISTANTS: Moiz Le DPM     PRE-OP DIAGNOSIS:   1. Achilles tendonitis, left leg  2. Haglunds deformity, left leg  3. Equinus, left leg  4. Pain in left leg  5. Difficulty ambulating    POST-OP DIAGNOSIS: Same as above. PROCEDURE:   1. Resection of calcaneal spur, left leg  2. Debridement of achilles tendonosis, left leg    ANESTHESIA: General with 10 ml of a 1:1 racemic mixture of 0.5% marcaine plain and 1% lidocaine plain      HEMOSTASIS: Pneumatic ankle tourniquet @ 250 mmHg for 43 minutes. ESTIMATED BLOOD LOSS: Less than 25cc. MATERIALS:   Implant Name Type Inv. Item Serial No.  Lot No. LRB No. Used Action   ANCHOR SUT NO2 DIA2. 9MM MAXBRAID DBL LD SFT W/ TAPR NDL  ANCHOR SUT NO2 DIA2. 9MM MAXBRAID DBL LD SFT W/ TAPR NDL  [employer] [id number] Left 1 Implanted   ANCHOR SUT NO2 DIA2. 9MM MAXBRAID DBL LD SFT W/ TAPR NDL  ANCHOR SUT NO2 DIA2. 9MM MAXBRAID DBL LD SFT W/ TAPR NDL  [employer] [id number] Left 1 Implanted       INJECTABLES: 10 ml 0.5% marcaine plain    SPECIMEN: Achilles bursal sac  * No specimens in log *    COMPLICATIONS: None    FINDINGS: Hypertrophic calcaneal posterior prominence left leg; achilles tendonitis centrally with bursitis     The patient was counseled at length about the risks of ignacio Covid-19 during their perioperative period and any recovery window from their procedure. The patient was made aware that ignacio Covid-19  may worsen their prognosis for recovering from their procedure  and lend to a higher morbidity and/or mortality risk. All material risks, benefits, and reasonable alternatives including postponing the procedure were discussed.  The patient does wish to proceed with the procedure at this rosalind.    Daniel Aparicio, DPM   Podiatric Medicine & Surgery   8/27/2021 at 10:06 AM

## 2021-08-30 LAB — SURGICAL PATHOLOGY REPORT: NORMAL

## 2021-09-07 ENCOUNTER — TELEPHONE (OUTPATIENT)
Dept: ONCOLOGY | Age: 41
End: 2021-09-07

## 2021-09-13 ENCOUNTER — HOSPITAL ENCOUNTER (EMERGENCY)
Age: 41
Discharge: HOME | End: 2021-09-14
Attending: EMERGENCY MEDICINE
Payer: COMMERCIAL

## 2021-09-13 DIAGNOSIS — R07.9 CHEST PAIN, UNSPECIFIED TYPE: Primary | ICD-10-CM

## 2021-09-13 PROCEDURE — 99283 EMERGENCY DEPT VISIT LOW MDM: CPT

## 2021-09-13 PROCEDURE — 93005 ELECTROCARDIOGRAM TRACING: CPT | Performed by: STUDENT IN AN ORGANIZED HEALTH CARE EDUCATION/TRAINING PROGRAM

## 2021-09-13 RX ORDER — ASPIRIN 81 MG/1
324 TABLET, CHEWABLE ORAL ONCE
Status: COMPLETED | OUTPATIENT
Start: 2021-09-14 | End: 2021-09-14

## 2021-09-13 ASSESSMENT — ENCOUNTER SYMPTOMS
VOMITING: 0
COUGH: 1
SORE THROAT: 0
BACK PAIN: 0
ABDOMINAL PAIN: 0
NAUSEA: 0
SHORTNESS OF BREATH: 0

## 2021-09-13 ASSESSMENT — PAIN DESCRIPTION - FREQUENCY: FREQUENCY: CONTINUOUS

## 2021-09-13 ASSESSMENT — PAIN SCALES - GENERAL: PAINLEVEL_OUTOF10: 9

## 2021-09-13 ASSESSMENT — PAIN DESCRIPTION - DESCRIPTORS: DESCRIPTORS: PRESSURE

## 2021-09-13 ASSESSMENT — PAIN DESCRIPTION - ONSET: ONSET: ON-GOING

## 2021-09-13 ASSESSMENT — PAIN DESCRIPTION - ORIENTATION: ORIENTATION: RIGHT

## 2021-09-13 ASSESSMENT — PAIN DESCRIPTION - PAIN TYPE: TYPE: ACUTE PAIN

## 2021-09-13 ASSESSMENT — PAIN DESCRIPTION - PROGRESSION: CLINICAL_PROGRESSION: NOT CHANGED

## 2021-09-13 ASSESSMENT — PAIN DESCRIPTION - LOCATION: LOCATION: CHEST

## 2021-09-14 ENCOUNTER — APPOINTMENT (OUTPATIENT)
Dept: CT IMAGING | Age: 41
End: 2021-09-14
Payer: COMMERCIAL

## 2021-09-14 ENCOUNTER — VIRTUAL VISIT (OUTPATIENT)
Dept: OBGYN | Age: 41
End: 2021-09-14
Payer: COMMERCIAL

## 2021-09-14 ENCOUNTER — APPOINTMENT (OUTPATIENT)
Dept: GENERAL RADIOLOGY | Age: 41
End: 2021-09-14
Payer: COMMERCIAL

## 2021-09-14 VITALS
BODY MASS INDEX: 37.91 KG/M2 | RESPIRATION RATE: 16 BRPM | SYSTOLIC BLOOD PRESSURE: 110 MMHG | WEIGHT: 206 LBS | DIASTOLIC BLOOD PRESSURE: 67 MMHG | OXYGEN SATURATION: 97 % | HEIGHT: 62 IN | TEMPERATURE: 98.4 F | HEART RATE: 64 BPM

## 2021-09-14 DIAGNOSIS — N91.2 AMENORRHEA: Primary | ICD-10-CM

## 2021-09-14 LAB
ABSOLUTE EOS #: 0.12 K/UL (ref 0–0.44)
ABSOLUTE IMMATURE GRANULOCYTE: 0.04 K/UL (ref 0–0.3)
ABSOLUTE LYMPH #: 3.84 K/UL (ref 1.1–3.7)
ABSOLUTE MONO #: 1.1 K/UL (ref 0.1–1.2)
ANION GAP SERPL CALCULATED.3IONS-SCNC: 14 MMOL/L (ref 9–17)
BASOPHILS # BLD: 0 % (ref 0–2)
BASOPHILS ABSOLUTE: 0.03 K/UL (ref 0–0.2)
BNP INTERPRETATION: NORMAL
BUN BLDV-MCNC: 9 MG/DL (ref 6–20)
BUN/CREAT BLD: ABNORMAL (ref 9–20)
CALCIUM SERPL-MCNC: 8.8 MG/DL (ref 8.6–10.4)
CHLORIDE BLD-SCNC: 102 MMOL/L (ref 98–107)
CO2: 25 MMOL/L (ref 20–31)
CREAT SERPL-MCNC: 0.95 MG/DL (ref 0.5–0.9)
D-DIMER QUANTITATIVE: 0.57 MG/L FEU
DIFFERENTIAL TYPE: ABNORMAL
EKG ATRIAL RATE: 76 BPM
EKG P AXIS: 55 DEGREES
EKG P-R INTERVAL: 154 MS
EKG Q-T INTERVAL: 446 MS
EKG QRS DURATION: 108 MS
EKG QTC CALCULATION (BAZETT): 501 MS
EKG R AXIS: -51 DEGREES
EKG T AXIS: -18 DEGREES
EKG VENTRICULAR RATE: 76 BPM
EOSINOPHILS RELATIVE PERCENT: 1 % (ref 1–4)
GFR AFRICAN AMERICAN: >60 ML/MIN
GFR NON-AFRICAN AMERICAN: >60 ML/MIN
GFR SERPL CREATININE-BSD FRML MDRD: ABNORMAL ML/MIN/{1.73_M2}
GFR SERPL CREATININE-BSD FRML MDRD: ABNORMAL ML/MIN/{1.73_M2}
GLUCOSE BLD-MCNC: 103 MG/DL (ref 70–99)
HCG QUALITATIVE: NEGATIVE
HCT VFR BLD CALC: 39.6 % (ref 36.3–47.1)
HEMOGLOBIN: 13.6 G/DL (ref 11.9–15.1)
IMMATURE GRANULOCYTES: 0 %
LYMPHOCYTES # BLD: 41 % (ref 24–43)
MCH RBC QN AUTO: 31.3 PG (ref 25.2–33.5)
MCHC RBC AUTO-ENTMCNC: 34.3 G/DL (ref 28.4–34.8)
MCV RBC AUTO: 91.2 FL (ref 82.6–102.9)
MONOCYTES # BLD: 12 % (ref 3–12)
NRBC AUTOMATED: 0 PER 100 WBC
PDW BLD-RTO: 13 % (ref 11.8–14.4)
PLATELET # BLD: 253 K/UL (ref 138–453)
PLATELET ESTIMATE: ABNORMAL
PMV BLD AUTO: 9.9 FL (ref 8.1–13.5)
POTASSIUM SERPL-SCNC: 3.7 MMOL/L (ref 3.7–5.3)
PRO-BNP: 268 PG/ML
RBC # BLD: 4.34 M/UL (ref 3.95–5.11)
RBC # BLD: ABNORMAL 10*6/UL
SEG NEUTROPHILS: 46 % (ref 36–65)
SEGMENTED NEUTROPHILS ABSOLUTE COUNT: 4.35 K/UL (ref 1.5–8.1)
SODIUM BLD-SCNC: 141 MMOL/L (ref 135–144)
TROPONIN INTERP: NORMAL
TROPONIN T: NORMAL NG/ML
TROPONIN, HIGH SENSITIVITY: <6 NG/L (ref 0–14)
WBC # BLD: 9.5 K/UL (ref 3.5–11.3)
WBC # BLD: ABNORMAL 10*3/UL

## 2021-09-14 PROCEDURE — 85025 COMPLETE CBC W/AUTO DIFF WBC: CPT

## 2021-09-14 PROCEDURE — G8427 DOCREV CUR MEDS BY ELIG CLIN: HCPCS | Performed by: OBSTETRICS & GYNECOLOGY

## 2021-09-14 PROCEDURE — 99213 OFFICE O/P EST LOW 20 MIN: CPT | Performed by: OBSTETRICS & GYNECOLOGY

## 2021-09-14 PROCEDURE — 80048 BASIC METABOLIC PNL TOTAL CA: CPT

## 2021-09-14 PROCEDURE — 83880 ASSAY OF NATRIURETIC PEPTIDE: CPT

## 2021-09-14 PROCEDURE — 1036F TOBACCO NON-USER: CPT | Performed by: OBSTETRICS & GYNECOLOGY

## 2021-09-14 PROCEDURE — G8417 CALC BMI ABV UP PARAM F/U: HCPCS | Performed by: OBSTETRICS & GYNECOLOGY

## 2021-09-14 PROCEDURE — 84703 CHORIONIC GONADOTROPIN ASSAY: CPT

## 2021-09-14 PROCEDURE — 6370000000 HC RX 637 (ALT 250 FOR IP): Performed by: STUDENT IN AN ORGANIZED HEALTH CARE EDUCATION/TRAINING PROGRAM

## 2021-09-14 PROCEDURE — 6360000004 HC RX CONTRAST MEDICATION: Performed by: STUDENT IN AN ORGANIZED HEALTH CARE EDUCATION/TRAINING PROGRAM

## 2021-09-14 PROCEDURE — 71260 CT THORAX DX C+: CPT

## 2021-09-14 PROCEDURE — 85379 FIBRIN DEGRADATION QUANT: CPT

## 2021-09-14 PROCEDURE — 71045 X-RAY EXAM CHEST 1 VIEW: CPT

## 2021-09-14 PROCEDURE — 84484 ASSAY OF TROPONIN QUANT: CPT

## 2021-09-14 RX ORDER — MEDROXYPROGESTERONE ACETATE 10 MG/1
10 TABLET ORAL DAILY
Qty: 10 TABLET | Refills: 2 | Status: SHIPPED | OUTPATIENT
Start: 2021-09-14 | End: 2021-10-18

## 2021-09-14 RX ORDER — ACETAMINOPHEN 500 MG
1000 TABLET ORAL ONCE
Status: COMPLETED | OUTPATIENT
Start: 2021-09-14 | End: 2021-09-14

## 2021-09-14 RX ADMIN — ACETAMINOPHEN 1000 MG: 500 TABLET ORAL at 02:13

## 2021-09-14 RX ADMIN — ASPIRIN 324 MG: 81 TABLET, CHEWABLE ORAL at 00:05

## 2021-09-14 RX ADMIN — IOPAMIDOL 75 ML: 755 INJECTION, SOLUTION INTRAVENOUS at 01:52

## 2021-09-14 ASSESSMENT — PAIN SCALES - GENERAL: PAINLEVEL_OUTOF10: 9

## 2021-09-14 NOTE — ED PROVIDER NOTES
[facility] ED  Emergency Department Encounter  EmergencyMedicine Resident     Pt Name:Chantelle Wilkerson  MRN: [id number]  Birthdate 1980  Date of evaluation: 9/13/21  PCP:  SHERRIE Del Cid CNP    This patient was evaluated in the Emergency Department for symptoms described in the history of present illness. The patient was evaluated in the context of the global COVID-19 pandemic, which necessitated consideration that the patient might be at risk for infection with the SARS-CoV-2 virus that causes COVID-19. Institutional protocols and algorithms that pertain to the evaluation of patients at risk for COVID-19 are in a state of rapid change based on information released by regulatory bodies including the CDC and federal and state organizations. These policies and algorithms were followed during the patient's care in the ED. CHIEF COMPLAINT       Chief Complaint   Patient presents with    Arm Pain     tingling; started this AM    Chest Pain       HISTORY OF PRESENT ILLNESS  (Location/Symptom, Timing/Onset, Context/Setting, Quality, Duration, Modifying Factors, Severity.)      Cato Bamberger is a 42 y.o. female who presents with right arm tenderness radiating down to fingers since waking up this morning, patient states that this radiates to anterior chest with mild pleurisy with deep breath. Patient states that she also has been coughing and not bringing anything up. Recent left ankle surgery 3 weeks ago. No history of DVT/PE, no chemical AC. Does have a history of aortic valve regurgitation on echo, last cardiac catheterization in June 2021, was unremarkable. Denies abdominal pain, nausea, vomiting, fevers or chills. Covid vaccinated, second dose received 1 week ago. Does have a history of hypertension, asthma, obesity.     PAST MEDICAL / SURGICAL / SOCIAL / FAMILY HISTORY      has a past medical history of Abnormal Pap smear of cervix, Anxiety, Aortic regurgitation, Asthma, Cancer Gastrointestinal: Negative for abdominal pain, nausea and vomiting. Endocrine: Negative for polyuria. Genitourinary: Negative for dysuria and hematuria. Musculoskeletal: Negative for back pain. Skin: Negative for rash. Neurological: Negative for light-headedness and headaches. Psychiatric/Behavioral: Negative for confusion. PHYSICAL EXAM   (up to 7 for level 4, 8 or more for level 5)      INITIAL VITALS:   /67   Pulse 64   Temp 98.4 °F (36.9 °C)   Resp 16   Ht 5' 2\" (1.575 m)   Wt 206 lb (93.4 kg)   SpO2 97%   BMI 37.68 kg/m²     Physical Exam  Constitutional:       Appearance: Normal appearance. She is obese. HENT:      Head: Normocephalic. Nose: Nose normal.      Mouth/Throat:      Mouth: Mucous membranes are moist.      Pharynx: Oropharynx is clear. Eyes:      Extraocular Movements: Extraocular movements intact. Pupils: Pupils are equal, round, and reactive to light. Cardiovascular:      Rate and Rhythm: Normal rate and regular rhythm. Pulses: Normal pulses. Heart sounds: Normal heart sounds. Pulmonary:      Effort: Pulmonary effort is normal.      Breath sounds: Normal breath sounds. Chest:      Chest wall: Tenderness (Right anterior chest tenderness on palpation) present. Abdominal:      Palpations: Abdomen is soft. Tenderness: There is no abdominal tenderness. There is no right CVA tenderness or left CVA tenderness. Musculoskeletal:         General: No tenderness. Cervical back: Normal range of motion and neck supple. Right lower leg: No edema. Left lower leg: No edema. Comments: Left leg in cast, nontender, swollen, patient able to move distal extremities without pain or issue  Distal cap refill less than 2   Skin:     General: Skin is warm. Capillary Refill: Capillary refill takes less than 2 seconds. Neurological:      General: No focal deficit present.       Mental Status: She is alert and oriented to person, place, and time. Mental status is at baseline.    Psychiatric:         Mood and Affect: Mood normal.       DIFFERENTIAL  DIAGNOSIS     PLAN (LABS / IMAGING / EKG):  Orders Placed This Encounter   Procedures    XR CHEST PORTABLE    CT CHEST PULMONARY EMBOLISM W CONTRAST    CBC WITH AUTO DIFFERENTIAL    BASIC METABOLIC PANEL    D-DIMER, QUANTITATIVE    Brain Natriuretic Peptide    HCG Qualitative, Serum    EKG 12 Lead       MEDICATIONS ORDERED:  Orders Placed This Encounter   Medications    aspirin chewable tablet 324 mg    iopamidol (ISOVUE-370) 76 % injection 75 mL    acetaminophen (TYLENOL) tablet 1,000 mg       DDX: PE, pneumonia, angina, STEMI, NSTEMI, costochondritis, arthritis, neuropathy    DIAGNOSTIC RESULTS / EMERGENCY DEPARTMENT COURSE / MDM   LAB RESULTS:  Results for orders placed or performed during the hospital encounter of 09/13/21   CBC WITH AUTO DIFFERENTIAL   Result Value Ref Range    WBC 9.5 3.5 - 11.3 k/uL    RBC 4.34 3.95 - 5.11 m/uL    Hemoglobin 13.6 11.9 - 15.1 g/dL    Hematocrit 39.6 36.3 - 47.1 %    MCV 91.2 82.6 - 102.9 fL    MCH 31.3 25.2 - 33.5 pg    MCHC 34.3 28.4 - 34.8 g/dL    RDW 13.0 11.8 - 14.4 %    Platelets 746 468 - 646 k/uL    MPV 9.9 8.1 - 13.5 fL    NRBC Automated 0.0 0.0 per 100 WBC    Differential Type NOT REPORTED     Seg Neutrophils 46 36 - 65 %    Lymphocytes 41 24 - 43 %    Monocytes 12 3 - 12 %    Eosinophils % 1 1 - 4 %    Basophils 0 0 - 2 %    Immature Granulocytes 0 0 %    Segs Absolute 4.35 1.50 - 8.10 k/uL    Absolute Lymph # 3.84 (H) 1.10 - 3.70 k/uL    Absolute Mono # 1.10 0.10 - 1.20 k/uL    Absolute Eos # 0.12 0.00 - 0.44 k/uL    Basophils Absolute 0.03 0.00 - 0.20 k/uL    Absolute Immature Granulocyte 0.04 0.00 - 0.30 k/uL    WBC Morphology NOT REPORTED     RBC Morphology NOT REPORTED     Platelet Estimate NOT REPORTED    BASIC METABOLIC PANEL   Result Value Ref Range    Glucose 103 (H) 70 - 99 mg/dL    BUN 9 6 - 20 mg/dL    CREATININE 0.95 (H) 0.50 - 0.90 mg/dL    Bun/Cre Ratio NOT REPORTED 9 - 20    Calcium 8.8 8.6 - 10.4 mg/dL    Sodium 141 135 - 144 mmol/L    Potassium 3.7 3.7 - 5.3 mmol/L    Chloride 102 98 - 107 mmol/L    CO2 25 20 - 31 mmol/L    Anion Gap 14 9 - 17 mmol/L    GFR Non-African American >60 >60 mL/min    GFR African American >60 >60 mL/min    GFR Comment          GFR Staging NOT REPORTED    Troponin   Result Value Ref Range    Troponin, High Sensitivity <6 0 - 14 ng/L    Troponin T NOT REPORTED <0.03 ng/mL    Troponin Interp NOT REPORTED    D-DIMER, QUANTITATIVE   Result Value Ref Range    D-Dimer, Quant 0.57 mg/L FEU   Brain Natriuretic Peptide   Result Value Ref Range    Pro- <300 pg/mL    BNP Interpretation Pro-BNP Reference Range:    HCG Qualitative, Serum   Result Value Ref Range    hCG Qual NEGATIVE NEGATIVE       IMPRESSION: 50-year-old lady presents to the emergency department with 1 day history of right arm pain rating down to her fingers and up right anterior chest with mild pleurisy. Status post left ankle surgery 3 weeks ago, no chemical AC at this time. Previous cardiac cath done in June 2021 was unremarkable. Physical exam grossly unremarkable, does have a mild tenderness to right anterior chest on palpation. Cardiac work-up grossly unremarkable, D-dimer elevated, CT PE unremarkable for PE. Discussed with patient with regards to follow-up with primary care doctor, orthopedic surgeon and for strict return precautions, patient verbalized agreement and understanding. Stable for discharge. RADIOLOGY:  See radiology report    EMERGENCY DEPARTMENT COURSE:  ED Course as of Sep 14 0330   Tue Sep 14, 2021   0026 CT PE ordered   D-Dimer, Quant: 0.57 [EM]   5225 CXR  Findings consistent with mild congestive failure.    [EM]   0325 CT PE  No evidence of pulmonary embolism or acute pulmonary abnormality.     [EM]      ED Course User Index  [EM] Carrol Mancini MD        PROCEDURES:  None    CONSULTS:  None    FINAL IMPRESSION 1. Chest pain, unspecified type          DISPOSITION / PLAN     DISPOSITION        PATIENT REFERRED TO:  Mattodette Olvera, APRN - CNP  [address] [facility] [place]   [phone]    Schedule an appointment as soon as possible for a visit   For follow up    [facility]  [address]  Go to   As needed    [address] [place] [address] [facility]  [address] [place] [id number] [phone]  Schedule an appointment as soon as possible for a visit   For follow up      DISCHARGE MEDICATIONS:  New Prescriptions    No medications on file       Princess Hall MD  Emergency Medicine Resident    (Please note that portions of thisnote were completed with a voice recognition program.  Efforts were made to edit the dictations but occasionally words are mis-transcribed.)       Princess Hall MD  Resident  09/14/21 6984

## 2021-09-14 NOTE — ED NOTES
Bed: 33  Expected date:   Expected time:   Means of arrival:   Comments:     Taylor Almonte RN  09/13/21 1207

## 2021-09-14 NOTE — ED PROVIDER NOTES
Faculty Sign-Out Attestation  Handoff taken on the following patient from prior Attending Physician: Dian Winters    I was available and discussed any additional care issues that arose and coordinated the management plans with the resident(s) caring for the patient during my duty period. Any areas of disagreement with residents documentation of care or procedures are noted on the chart. I was personally present for the key portions of any/all procedures during my duty period. I have documented in the chart those procedures where I was not present during the key portions.     Arm pain, ct r/o pe pending, if negative > discharge    Geeta Bella DO  Attending Physician     Geeta Bella DO  09/14/21 [id number]

## 2021-09-14 NOTE — ED PROVIDER NOTES
[facility] ED     Emergency Department     Faculty Attestation    I performed a history and physical examination of the patient and discussed management with the resident. I reviewed the residents note and agree with the documented findings and plan of care. Any areas of disagreement are noted on the chart. I was personally present for the key portions of any procedures. I have documented in the chart those procedures where I was not present during the key portions. I have reviewed the emergency nurses triage note. I agree with the chief complaint, past medical history, past surgical history, allergies, medications, social and family history as documented unless otherwise noted below. For Physician Assistant/ Nurse Practitioner cases/documentation I have personally evaluated this patient and have completed at least one if not all key elements of the E/M (history, physical exam, and MDM). Additional findings are as noted. Patient presents with pain to the right side of her chest rating down her right arm. She says it started at 4:00 yesterday morning while she was in bed. She says the pain has been constant but with has been worsening over the past few hours. She says she does feel little short of breath as well. She denies fever but has had a cough. She denies abdominal pain, nausea or vomiting. Patient has been vaccinated for Covid. On exam, patient is resting comfortably in the bed. She is not in respiratory distress. Lungs are clear to auscultation bilaterally heart sounds are normal.  Abdomen is soft and nontender. Patient does have a cast to the left lower leg. She says she had surgery on the ankle few weeks ago. Will get EKG, chest x-ray, labs and reassess.     EKG Interpretation    Interpreted by emergency department physician    Rhythm: normal sinus   Rate: normal  Axis: left  Ectopy: none  Conduction: left anterior fasciclar block  ST Segments: nonspecific changes  T Waves: non specific changes  Q Waves: nonspecific    Clinical Impression: non-specific EKG    MD Ángela Drew MD  Attending Emergency  Physician              Kimberly Reyes MD  09/14/21 [id number]

## 2021-09-14 NOTE — PROGRESS NOTES
Chantelle Maddox  2021    YOB: 1980      Mariluz Freed is a 42 y.o. female evaluated via telephone on 2021. Consent:  She and/or health care decision maker is aware that that she may receive a bill for this telephone service, depending on her insurance coverage, and has provided verbal consent to proceed: Yes      Documentation:  I communicated with the patient and/or health care decision maker about absent menses. Details of this discussion including any medical advice provided: see note below. I affirm this is a Patient Initiated Episode with a Patient who has not had a related appointment within my department in the past 7 days or scheduled within the next 24 hours. Patient identification was verified at the start of the visit: Yes    Total Time: minutes: 11-20 minutes    The visit was conducted pursuant to the emergency declaration under the [address] [place] [address], [address] [place] [address] authority and the [employer] Act. Patient identification was verified, and a caregiver was present when appropriate. The patient was located in a state where the provider was credentialed to provide care. Note: not billable if this call serves to triage the patient into an appointment for the relevant concern      Mary Akers MD       The patient visit was conducted by phone. . She is here regarding absent menses . Her bowels are regular and she is voiding without difficulty. HPI:  Mariluz Freed is a 42 y.o. female [id number] LMP 2021. She had negative home preg test , PL, FSH, E, TSH and BHCG were all normal. There is no complaint of nipple discharge. Pt is sexually active, same partner x 5 years. No contraception. Pt medication list was reviewed.          OB History    Para Term  AB Living   7 7 7 0 0 7   SAB TAB Ectopic Molar Multiple Live Births   0 0 0 0 0 0      # Outcome Date GA Lbr Lasha/2nd Weight Sex Delivery Anes PTL Lv   7 Term      Vag-Spont      6 Term      Vag-Spont      5 Term      Vag-Spont      4 Term      Vag-Spont      3 Term      Vag-Spont      2 Term      Vag-Spont      1 Term      Vag-Spont          Past Medical History:   Diagnosis Date    Abnormal Pap smear of cervix [id number]    Anxiety     Aortic regurgitation     mild-mod on echo    Asthma     Cancer (HCC)     cervical cancer    Depression     GERD (gastroesophageal reflux disease)     Headache(784.0)     Heart murmur     Herpes     Hyperlipidemia     Hypertension     [facility]-Dr. Zion Ferrari    Migraine headache     Obesity     Schizophrenia ([facility] [address]     Seasonal allergies     Sleep apnea     no machine    Wears partial dentures     upper and lower partial       Past Surgical History:   Procedure Laterality Date    ACHILLES TENDON SURGERY Left 8/27/2021    LEFT DETACH & REATTACH ACHILLES TENDON performed by Drake Rebolledo DPM at [facility]  06/2021    no stents    CERVIX BIOPSY N/A 4/5/2019    COLD KNIFE CONIZATION WITH BIOPSY performed by Zack Michaels DO at [address] [facility]  06/06/16    PRE-MALIGNANT / BENIGN SKIN LESION EXCISION Left 5/12/16    foot       Family History   Problem Relation Age of Onset    Cancer Mother         uterine    Diabetes Mother     High Blood Pressure Mother     Endometrial Cancer Mother     Diabetes Brother     Heart Disease Brother        Social History     Socioeconomic History    Marital status: Single     Spouse name: Not on file    Number of children: Not on file    Years of education: Not on file    Highest education level: Not on file   Occupational History    Not on file   Tobacco Use    Smoking status: Former Smoker     Packs/day: 1.00     Types: Cigarettes    Smokeless tobacco: Never Used   Vaping Use    Vaping Use: Never used   Substance and Sexual Activity    Alcohol use: No     Comment: rare    Drug use:  Yes Frequency: 7.0 times per week     Types: Marijuana     Comment: daily    Sexual activity: Yes     Partners: Male   Other Topics Concern    Not on file   Social History Narrative    Not on file     Social Determinants of Health     Financial Resource Strain:     Difficulty of Paying Living Expenses:    Food Insecurity:     Worried About Running Out of Food in the Last Year:     [address] in the Last Year:    Transportation Needs:     Lack of Transportation (Medical):      Lack of Transportation (Non-Medical):    Physical Activity:     Days of Exercise per Week:     Minutes of Exercise per Session:    Stress:     Feeling of Stress :    Social Connections:     Frequency of Communication with Friends and Family:     Frequency of Social Gatherings with Friends and Family:     Attends [employer] Services:     Active Member of Clubs or Organizations:     Attends Club or Organization Meetings:     Marital Status:    Intimate Partner Violence:     Fear of Current or Ex-Partner:     Emotionally Abused:     Physically Abused:     Sexually Abused:          MEDICATIONS:  Current Outpatient Medications on File Prior to Visit   Medication Sig Dispense Refill    Prenatal Vit-Fe Fumarate-FA (NIVA-PLUS) 27-1 MG TABS Take 1 tablet by mouth daily      loratadine (CLARITIN) 10 MG capsule Take 10 mg by mouth daily      sulindac (CLINORIL) 150 MG tablet Take 150 mg by mouth 2 times daily      potassium chloride (KLOR-CON) 10 MEQ extended release tablet potassium chloride ER 10 mEq tablet,extended release      montelukast (SINGULAIR) 10 MG tablet Take 10 mg by mouth nightly      mirtazapine (REMERON) 30 MG tablet Take 30 mg by mouth nightly      metoprolol succinate (TOPROL XL) 25 MG extended release tablet Take 25 mg by mouth daily       magnesium oxide (MAG-OX) 400 MG tablet Take 400 mg by mouth daily as needed       losartan (COZAAR) 25 MG tablet Take 25 mg by mouth daily       indomethacin (INDOCIN) 50 MG capsule Take 50 mg by mouth 2 times daily (with meals)       furosemide (LASIX) 20 MG tablet Take 20 mg by mouth daily      Cholecalciferol (VITAMIN D3 PO) Take 2,000 Units by mouth daily       LATUDA 120 MG tablet Take 120 mg by mouth daily   2    atorvastatin (LIPITOR) 20 MG tablet Take 20 mg by mouth daily  2    levothyroxine (SYNTHROID) 50 MCG tablet Take 50 mcg by mouth daily  2    SUMAtriptan (IMITREX) 50 MG tablet Take 100 mg by mouth once as needed for Migraine      fluticasone (VERAMYST) 27.5 MCG/SPRAY nasal spray 2 sprays by Nasal route 2 times daily      docusate sodium (COLACE) 100 MG capsule Take 1 capsule by mouth 2 times daily 30 capsule 0    Budesonide-Formoterol Fumarate (SYMBICORT IN) Inhale 2 puffs into the lungs daily as needed       cetirizine (ZYRTEC ALLERGY) 10 MG TABS Take 10 mg by mouth daily 30 tablet 0    albuterol (PROVENTIL HFA) 108 (90 BASE) MCG/ACT inhaler Inhale 2 puffs into the lungs every 4 hours as needed for Wheezing.  diphenoxylate-atropine (LOMOTIL) 2.5-0.025 MG per tablet Take 1 tablet by mouth daily as needed       pantoprazole (PROTONIX) 40 MG tablet Take 40 mg by mouth daily.  ondansetron (ZOFRAN) 4 MG tablet Take 1 tablet by mouth every 8 hours as needed for Nausea. 20 tablet 0    acetaminophen (TYLENOL) 500 MG tablet Take 2 tablets by mouth 3 times daily for 10 days 60 tablet 0    econazole nitrate 1 % cream Apply 1 each topically daily as needed  (Patient not taking: Reported on 9/14/2021)      Emollient (LUBRIDERM) LOTN Apply 1 each topically daily  (Patient not taking: Reported on 9/14/2021)      topiramate (TOPAMAX) 25 MG tablet Take 25 mg by mouth daily  (Patient not taking: Reported on 9/14/2021)       No current facility-administered medications on file prior to visit.            ALLERGIES:  Allergies as of 09/14/2021 - Fully Reviewed 09/14/2021   Allergen Reaction Noted    Penicillins Anaphylaxis 04/18/2014    Codeine Hives 04/18/2014    Codeine Itching 05/11/2021             Lab Results:  Results for orders placed or performed during the hospital encounter of 09/13/21   CBC WITH AUTO DIFFERENTIAL   Result Value Ref Range    WBC 9.5 3.5 - 11.3 k/uL    RBC 4.34 3.95 - 5.11 m/uL    Hemoglobin 13.6 11.9 - 15.1 g/dL    Hematocrit 39.6 36.3 - 47.1 %    MCV 91.2 82.6 - 102.9 fL    MCH 31.3 25.2 - 33.5 pg    MCHC 34.3 28.4 - 34.8 g/dL    RDW 13.0 11.8 - 14.4 %    Platelets 581 079 - 758 k/uL    MPV 9.9 8.1 - 13.5 fL    NRBC Automated 0.0 0.0 per 100 WBC    Differential Type NOT REPORTED     Seg Neutrophils 46 36 - 65 %    Lymphocytes 41 24 - 43 %    Monocytes 12 3 - 12 %    Eosinophils % 1 1 - 4 %    Basophils 0 0 - 2 %    Immature Granulocytes 0 0 %    Segs Absolute 4.35 1.50 - 8.10 k/uL    Absolute Lymph # 3.84 (H) 1.10 - 3.70 k/uL    Absolute Mono # 1.10 0.10 - 1.20 k/uL    Absolute Eos # 0.12 0.00 - 0.44 k/uL    Basophils Absolute 0.03 0.00 - 0.20 k/uL    Absolute Immature Granulocyte 0.04 0.00 - 0.30 k/uL    WBC Morphology NOT REPORTED     RBC Morphology NOT REPORTED     Platelet Estimate NOT REPORTED    BASIC METABOLIC PANEL   Result Value Ref Range    Glucose 103 (H) 70 - 99 mg/dL    BUN 9 6 - 20 mg/dL    CREATININE 0.95 (H) 0.50 - 0.90 mg/dL    Bun/Cre Ratio NOT REPORTED 9 - 20    Calcium 8.8 8.6 - 10.4 mg/dL    Sodium 141 135 - 144 mmol/L    Potassium 3.7 3.7 - 5.3 mmol/L    Chloride 102 98 - 107 mmol/L    CO2 25 20 - 31 mmol/L    Anion Gap 14 9 - 17 mmol/L    GFR Non-African American >60 >60 mL/min    GFR African American >60 >60 mL/min    GFR Comment          GFR Staging NOT REPORTED    Troponin   Result Value Ref Range    Troponin, High Sensitivity <6 0 - 14 ng/L    Troponin T NOT REPORTED <0.03 ng/mL    Troponin Interp NOT REPORTED    D-DIMER, QUANTITATIVE   Result Value Ref Range    D-Dimer, Quant 0.57 mg/L FEU   Brain Natriuretic Peptide   Result Value Ref Range    Pro- <300 pg/mL    BNP Interpretation Pro-BNP Reference

## 2021-10-18 DIAGNOSIS — N91.2 AMENORRHEA: ICD-10-CM

## 2021-10-18 RX ORDER — MEDROXYPROGESTERONE ACETATE 10 MG/1
10 TABLET ORAL DAILY
Qty: 10 TABLET | Refills: 2 | Status: SHIPPED | OUTPATIENT
Start: 2021-10-18 | End: 2022-04-08

## 2021-10-18 NOTE — TELEPHONE ENCOUNTER
Maricruz request for medroxyprogesterone tablets. Patient is scheduled for an appt with Dr. Zaki Reid on 10/19/21.

## 2021-10-19 ENCOUNTER — OFFICE VISIT (OUTPATIENT)
Dept: OBGYN | Age: 41
End: 2021-10-19
Payer: COMMERCIAL

## 2021-10-19 VITALS
HEIGHT: 62 IN | BODY MASS INDEX: 39.75 KG/M2 | WEIGHT: 216 LBS | DIASTOLIC BLOOD PRESSURE: 72 MMHG | HEART RATE: 64 BPM | SYSTOLIC BLOOD PRESSURE: 126 MMHG

## 2021-10-19 DIAGNOSIS — N91.2 AMENORRHEA: Primary | ICD-10-CM

## 2021-10-19 LAB
CONTROL: PRESENT
PREGNANCY TEST URINE, POC: NEGATIVE

## 2021-10-19 PROCEDURE — G8417 CALC BMI ABV UP PARAM F/U: HCPCS | Performed by: OBSTETRICS & GYNECOLOGY

## 2021-10-19 PROCEDURE — 1036F TOBACCO NON-USER: CPT | Performed by: OBSTETRICS & GYNECOLOGY

## 2021-10-19 PROCEDURE — G8427 DOCREV CUR MEDS BY ELIG CLIN: HCPCS | Performed by: OBSTETRICS & GYNECOLOGY

## 2021-10-19 PROCEDURE — 81025 URINE PREGNANCY TEST: CPT | Performed by: OBSTETRICS & GYNECOLOGY

## 2021-10-19 PROCEDURE — G8484 FLU IMMUNIZE NO ADMIN: HCPCS | Performed by: OBSTETRICS & GYNECOLOGY

## 2021-10-19 PROCEDURE — 99211 OFF/OP EST MAY X REQ PHY/QHP: CPT | Performed by: OBSTETRICS & GYNECOLOGY

## 2021-10-19 PROCEDURE — 99212 OFFICE O/P EST SF 10 MIN: CPT | Performed by: OBSTETRICS & GYNECOLOGY

## 2021-10-19 ASSESSMENT — PATIENT HEALTH QUESTIONNAIRE - PHQ9
9. THOUGHTS THAT YOU WOULD BE BETTER OFF DEAD, OR OF HURTING YOURSELF: 0
3. TROUBLE FALLING OR STAYING ASLEEP: 0
4. FEELING TIRED OR HAVING LITTLE ENERGY: 1
SUM OF ALL RESPONSES TO PHQ9 QUESTIONS 1 & 2: 5
SUM OF ALL RESPONSES TO PHQ QUESTIONS 1-9: 9
10. IF YOU CHECKED OFF ANY PROBLEMS, HOW DIFFICULT HAVE THESE PROBLEMS MADE IT FOR YOU TO DO YOUR WORK, TAKE CARE OF THINGS AT HOME, OR GET ALONG WITH OTHER PEOPLE: 1
7. TROUBLE CONCENTRATING ON THINGS, SUCH AS READING THE NEWSPAPER OR WATCHING TELEVISION: 0
1. LITTLE INTEREST OR PLEASURE IN DOING THINGS: 3
6. FEELING BAD ABOUT YOURSELF - OR THAT YOU ARE A FAILURE OR HAVE LET YOURSELF OR YOUR FAMILY DOWN: 1
2. FEELING DOWN, DEPRESSED OR HOPELESS: 2
SUM OF ALL RESPONSES TO PHQ QUESTIONS 1-9: 9
8. MOVING OR SPEAKING SO SLOWLY THAT OTHER PEOPLE COULD HAVE NOTICED. OR THE OPPOSITE, BEING SO FIGETY OR RESTLESS THAT YOU HAVE BEEN MOVING AROUND A LOT MORE THAN USUAL: 1
5. POOR APPETITE OR OVEREATING: 1
SUM OF ALL RESPONSES TO PHQ QUESTIONS 1-9: 9

## 2021-10-19 NOTE — PROGRESS NOTES
Onset    Cancer Mother         uterine    Diabetes Mother     High Blood Pressure Mother     Endometrial Cancer Mother     Diabetes Brother     Heart Disease Brother        Social History     Socioeconomic History    Marital status: Single     Spouse name: Not on file    Number of children: Not on file    Years of education: Not on file    Highest education level: Not on file   Occupational History    Not on file   Tobacco Use    Smoking status: Former Smoker     Packs/day: 1.00     Types: Cigarettes    Smokeless tobacco: Never Used   Vaping Use    Vaping Use: Never used   Substance and Sexual Activity    Alcohol use: No     Comment: rare    Drug use: Yes     Frequency: 7.0 times per week     Types: Marijuana     Comment: daily    Sexual activity: Yes     Partners: Male   Other Topics Concern    Not on file   Social History Narrative    Not on file     Social Determinants of Health     Financial Resource Strain:     Difficulty of Paying Living Expenses:    Food Insecurity:     Worried About Running Out of Food in the Last Year:     Ran Out of Food in the Last Year:    Transportation Needs:     Lack of Transportation (Medical):      Lack of Transportation (Non-Medical):    Physical Activity:     Days of Exercise per Week:     Minutes of Exercise per Session:    Stress:     Feeling of Stress :    Social Connections:     Frequency of Communication with Friends and Family:     Frequency of Social Gatherings with Friends and Family:     Attends Christian Services:     Active Member of Clubs or Organizations:     Attends Club or Organization Meetings:     Marital Status:    Intimate Partner Violence:     Fear of Current or Ex-Partner:     Emotionally Abused:     Physically Abused:     Sexually Abused:          MEDICATIONS:  Current Outpatient Medications on File Prior to Visit   Medication Sig Dispense Refill    medroxyPROGESTERone (PROVERA) 10 MG tablet TAKE 1 TABLET BY MOUTH DAILY FOR 10 DAYS 10 tablet 2    Prenatal Vit-Fe Fumarate-FA (NIVA-PLUS) 27-1 MG TABS Take 1 tablet by mouth daily      loratadine (CLARITIN) 10 MG capsule Take 10 mg by mouth daily      sulindac (CLINORIL) 150 MG tablet Take 150 mg by mouth 2 times daily      potassium chloride (KLOR-CON) 10 MEQ extended release tablet potassium chloride ER 10 mEq tablet,extended release      montelukast (SINGULAIR) 10 MG tablet Take 10 mg by mouth nightly      mirtazapine (REMERON) 30 MG tablet Take 30 mg by mouth nightly      metoprolol succinate (TOPROL XL) 25 MG extended release tablet Take 25 mg by mouth daily       magnesium oxide (MAG-OX) 400 MG tablet Take 400 mg by mouth daily as needed       losartan (COZAAR) 25 MG tablet Take 25 mg by mouth daily       indomethacin (INDOCIN) 50 MG capsule Take 50 mg by mouth 2 times daily (with meals)       furosemide (LASIX) 20 MG tablet Take 20 mg by mouth daily      Cholecalciferol (VITAMIN D3 PO) Take 2,000 Units by mouth daily       acetaminophen (TYLENOL) 500 MG tablet Take 2 tablets by mouth 3 times daily for 10 days 60 tablet 0    LATUDA 120 MG tablet Take 120 mg by mouth daily   2    atorvastatin (LIPITOR) 20 MG tablet Take 20 mg by mouth daily  2    levothyroxine (SYNTHROID) 50 MCG tablet Take 50 mcg by mouth daily  2    SUMAtriptan (IMITREX) 50 MG tablet Take 100 mg by mouth once as needed for Migraine      fluticasone (VERAMYST) 27.5 MCG/SPRAY nasal spray 2 sprays by Nasal route 2 times daily      docusate sodium (COLACE) 100 MG capsule Take 1 capsule by mouth 2 times daily 30 capsule 0    econazole nitrate 1 % cream Apply 1 each topically daily as needed       Emollient (LUBRIDERM) LOTN Apply 1 each topically daily       topiramate (TOPAMAX) 25 MG tablet Take 25 mg by mouth daily       Budesonide-Formoterol Fumarate (SYMBICORT IN) Inhale 2 puffs into the lungs daily as needed       cetirizine (ZYRTEC ALLERGY) 10 MG TABS Take 10 mg by mouth daily 30 tablet 0  albuterol (PROVENTIL HFA) 108 (90 BASE) MCG/ACT inhaler Inhale 2 puffs into the lungs every 4 hours as needed for Wheezing.  diphenoxylate-atropine (LOMOTIL) 2.5-0.025 MG per tablet Take 1 tablet by mouth daily as needed       pantoprazole (PROTONIX) 40 MG tablet Take 40 mg by mouth daily.  ondansetron (ZOFRAN) 4 MG tablet Take 1 tablet by mouth every 8 hours as needed for Nausea. 20 tablet 0     No current facility-administered medications on file prior to visit. ALLERGIES:  Allergies as of 10/19/2021 - Fully Reviewed 10/19/2021   Allergen Reaction Noted    Penicillins Anaphylaxis 04/18/2014    Codeine Hives 04/18/2014    Codeine Itching 05/11/2021       Blood pressure 126/72, pulse 64, height 5' 2\" (1.575 m), weight 216 lb (98 kg), last menstrual period 10/18/2021, not currently breastfeeding. Lab Results:  Results for orders placed or performed in visit on 10/19/21   POCT urine pregnancy   Result Value Ref Range    Preg Test, Ur negative     Control present          Assessment:   Diagnosis Orders   1. Amenorrhea  POCT urine pregnancy           PLAN:    Return in about 3 weeks (around 11/9/2021) for follow up, virtual visit. Repeat progestin challenge . Provera refilled 10/18/21. Orders Placed This Encounter   Procedures    POCT urine pregnancy       Patient was seen with total face to face time of 15 minutes. More than 50% of this visit was counseling and education regarding The encounter diagnosis was Amenorrhea. and Follow-up (Patient has elevated depression screen)   as well as  counseling on preventative health maintenance follow-up.

## 2021-10-31 ENCOUNTER — HOSPITAL ENCOUNTER (EMERGENCY)
Age: 41
Discharge: LEFT BEFORE DISCHARGE | End: 2021-10-31
Payer: COMMERCIAL
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
## 2021-11-09 ENCOUNTER — TELEPHONE (OUTPATIENT)
Dept: OBGYN | Age: 41
End: 2021-11-09

## 2021-11-30 ENCOUNTER — VIRTUAL VISIT (OUTPATIENT)
Dept: OBGYN | Age: 41
End: 2021-11-30
Payer: COMMERCIAL

## 2021-11-30 DIAGNOSIS — N97.8 FEMALE INFERTILITY DUE TO ADVANCED MATERNAL AGE: Primary | ICD-10-CM

## 2021-11-30 PROCEDURE — 99213 OFFICE O/P EST LOW 20 MIN: CPT | Performed by: OBSTETRICS & GYNECOLOGY

## 2021-11-30 PROCEDURE — G8417 CALC BMI ABV UP PARAM F/U: HCPCS | Performed by: OBSTETRICS & GYNECOLOGY

## 2021-11-30 PROCEDURE — G8484 FLU IMMUNIZE NO ADMIN: HCPCS | Performed by: OBSTETRICS & GYNECOLOGY

## 2021-11-30 PROCEDURE — G8427 DOCREV CUR MEDS BY ELIG CLIN: HCPCS | Performed by: OBSTETRICS & GYNECOLOGY

## 2021-11-30 PROCEDURE — 4004F PT TOBACCO SCREEN RCVD TLK: CPT | Performed by: OBSTETRICS & GYNECOLOGY

## 2021-11-30 NOTE — PROGRESS NOTES
Erin Shane is a 42 y.o. female evaluated via telephone on 11/30/2021. Consent:  She and/or health care decision maker is aware that that she may receive a bill for this telephone service, depending on her insurance coverage, and has provided verbal consent to proceed: Yes      Documentation:  I communicated with the patient and/or health care decision maker about history of irregular menses and patient considering conceiving another pregnancy. .   Details of this discussion including any medical advice provided:  The patient used provera 10 mg X 10 days in October 10/18 - 10/27. She experienced menses on 10/31/21 that lasted 8 days. She experienced spontaneous menstrual period 11/26/21. 1. Female infertility due to advanced maternal age    - Anti Mullerian Hormone; Future  - Progesterone; Future  - I will discuss results when available. - pregnancy at [facility] and declining fertility  Discussed,  Contact information for Dr. Adrienne Lauren , [facility] Provided. I advised Ms Meek Denver it may be helpful to her and her partner to have consultation with Dr. Adrienne Lauren to explore options that might be open to her partner. I affirm this is a Patient Initiated Episode with a Patient who has not had a related appointment within my department in the past 7 days or scheduled within the next 24 hours. Patient identification was verified at the start of the visit: Yes    Total Time: minutes: 11-20 minutes    The visit was conducted pursuant to the emergency declaration under the [address] [place] [address], [address] [place] [address] authority and the [employer] Act. Patient identification was verified, and a caregiver was present when appropriate. The patient was located in a state where the provider was credentialed to provide care.     Note: not billable if this call serves to triage the patient into an appointment for the relevant concern      Beauty Litter Claudeen Ares, MD

## 2021-12-08 ENCOUNTER — NURSE ONLY (OUTPATIENT)
Dept: OBGYN | Age: 41
End: 2021-12-08
Payer: COMMERCIAL

## 2021-12-08 DIAGNOSIS — Z23 NEED FOR HPV VACCINATION: Primary | ICD-10-CM

## 2021-12-08 PROCEDURE — 96372 THER/PROPH/DIAG INJ SC/IM: CPT | Performed by: OBSTETRICS & GYNECOLOGY

## 2021-12-08 PROCEDURE — 90471 IMMUNIZATION ADMIN: CPT | Performed by: OBSTETRICS & GYNECOLOGY

## 2021-12-08 PROCEDURE — 90651 9VHPV VACCINE 2/3 DOSE IM: CPT | Performed by: OBSTETRICS & GYNECOLOGY

## 2021-12-08 NOTE — PROGRESS NOTES
After obtaining consent, and per orders of Dr. Pierre Vazquez, injection of Gardasil given in Left deltoid by Pamela Mckeon MA. Patient instructed to remain in clinic for 20 minutes afterwards, and to report any adverse reaction to me immediately.

## 2021-12-17 ENCOUNTER — HOSPITAL ENCOUNTER (OUTPATIENT)
Age: 41
Setting detail: SPECIMEN
Discharge: HOME | End: 2021-12-17
Payer: COMMERCIAL

## 2021-12-17 DIAGNOSIS — N97.8 FEMALE INFERTILITY DUE TO ADVANCED MATERNAL AGE: ICD-10-CM

## 2021-12-17 LAB — PROGESTERONE LEVEL: 5.69 NG/ML

## 2021-12-17 PROCEDURE — 36415 COLL VENOUS BLD VENIPUNCTURE: CPT

## 2021-12-17 PROCEDURE — 84144 ASSAY OF PROGESTERONE: CPT

## 2021-12-17 PROCEDURE — 83520 IMMUNOASSAY QUANT NOS NONAB: CPT

## 2021-12-22 LAB — ANTI-MULLERIAN HORMONE: 0.27 NG/ML

## 2022-04-08 ENCOUNTER — OFFICE VISIT (OUTPATIENT)
Dept: OBGYN | Age: 42
End: 2022-04-08
Payer: COMMERCIAL

## 2022-04-08 ENCOUNTER — HOSPITAL ENCOUNTER (OUTPATIENT)
Age: 42
Setting detail: SPECIMEN
Discharge: HOME | End: 2022-04-08

## 2022-04-08 VITALS
SYSTOLIC BLOOD PRESSURE: 137 MMHG | DIASTOLIC BLOOD PRESSURE: 87 MMHG | HEART RATE: 75 BPM | BODY MASS INDEX: 37.36 KG/M2 | WEIGHT: 203 LBS | HEIGHT: 62 IN

## 2022-04-08 DIAGNOSIS — Z01.419 WELL WOMAN EXAM: ICD-10-CM

## 2022-04-08 DIAGNOSIS — R87.610 ATYPICAL SQUAMOUS CELLS OF UNDETERMINED SIGNIFICANCE ON CYTOLOGIC SMEAR OF CERVIX (ASC-US): ICD-10-CM

## 2022-04-08 DIAGNOSIS — Z12.31 SCREENING MAMMOGRAM FOR HIGH-RISK PATIENT: ICD-10-CM

## 2022-04-08 DIAGNOSIS — Z01.419 WELL WOMAN EXAM: Primary | ICD-10-CM

## 2022-04-08 LAB
CANDIDA SPECIES, DNA PROBE: NEGATIVE
GARDNERELLA VAGINALIS, DNA PROBE: POSITIVE
SOURCE: ABNORMAL
TRICHOMONAS VAGINALIS DNA: NEGATIVE

## 2022-04-08 PROCEDURE — 99396 PREV VISIT EST AGE 40-64: CPT | Performed by: STUDENT IN AN ORGANIZED HEALTH CARE EDUCATION/TRAINING PROGRAM

## 2022-04-08 NOTE — PROGRESS NOTES
[facility] OB/GYN Annual Visit    Dragan Naidu  4/8/2022                       Primary Care Physician: Rosa Bellamy, APRN - CNP    CC:   Chief Complaint   Patient presents with    Annual Exam     last pap 6/2/2021 ascus hpv +, last deborah 5/13/2021 neg          HPI: Dragan Naidu is a 36 y.o. female [id number]    The patient was seen and examined. She is here for an annual visit. She is complaining of some bilateral nipple dryness and tenderness, advised patient on lotions and moisture as well as completing mammogram.     Her Patient's last menstrual period was 03/29/2022 (approximate). . She denies irregular/heavy bleeding and dysmenorrhea. Her periods are regular and last 4-5 days. She describes them as moderate. Her bowel habits are regular. She denies any bloating. She denies dysuria. She denies urinary leaking. She denies vaginal discharge. She is sexually active with multiple partners, contraception - condoms. She uses condoms for contraception and is not desiring pregnancy.     Depression Screen: Symptoms of decreased mood absent  Symptoms of anhedonia absent  **If either question is answered in a  positive fashion then complete the PHQ9 Scoring Evaluation and make the appropriate referral**    REVIEW OF SYSTEMS:   Constitutional: negative fever, negative chills, negative weight changes   HEENT: negative visual disturbances, negative headaches, negative dizziness, negative hearing loss  Breast: Negative breast abnormalities, negative breast lumps, negative nipple discharge, positive nipple dryness  Respiratory: negative dyspnea, negative cough, negative SOB  Cardiovascular: negative chest pain,  negative palpitations, negative arrhythmia, negative syncope   Gastrointestinal: negative abdominal pain, negative RUQ pain, negative N/V, negative diarrhea, negative constipation, negative bowel changes, negative heartburn   Genitourinary: negative dysuria, negative hematuria, negative urinary incontinence, negative vaginal discharge, negative vaginal bleeding or spotting  Dermatological: negative rash, negative pruritis, negative mole or other skin changes  Hematologic: negative bruising  Immunologic/Lymphatic: negative recent illness, negative recent sick contact  Musculoskeletal: negative back pain, negative myalgias, negative arthralgias  Neurological:  negative dizziness, negative migraines, negative seizures, negative weakness  Behavior/Psych: negative depression, negative anxiety, negative SI, negative HI      ________________________________________________________________________    GYNECOLOGICAL HISTORY:  Age of Menarche: 14  Age of Menopause: n/a     Sexually Active: multiple partners, contraception - condoms  STD History: no past history    Pap History: Last PAP was abnormal;  2021 - ASC-US; Atypical Squamous Cells of Undetermined Significance. Colposcopy History: admits to last 2021    Permanent Sterilization: no  Reversible Birth Control: no  Hormone Replacement Exposure: no    HEALTH MAINTENANCE:  Immunization status: up to date and documented  Garidisil immunization: done    Date of Last Mammogram: was normal (2021)    OBSTETRICAL HISTORY:  OB History    Para Term  AB Living   7 7 7 0 0 7   SAB IAB Ectopic Molar Multiple Live Births   0 0 0 0 0 0      # Outcome Date GA Lbr Lasha/2nd Weight Sex Delivery Anes PTL Lv   7 Term      Vag-Spont      6 Term      Vag-Spont      5 Term      Vag-Spont      4 Term      Vag-Spont      3 Term      Vag-Spont      2 Term      Vag-Spont      1 Term      Vag-Spont          PAST MEDICAL HISTORY:   has a past medical history of Abnormal Pap smear of cervix, Anxiety, Aortic regurgitation, Asthma, Cancer ([address] Depression, GERD (gastroesophageal reflux disease), Headache(784.0), Heart murmur, Herpes, Hyperlipidemia, Hypertension, Migraine headache, Obesity, Schizophrenia ([address] Seasonal allergies, Sleep apnea, and Wears partial dentures.     PAST SURGICAL HISTORY:   has a past surgical history that includes pre-malignant / benign skin lesion excision (Left, 5/12/16); LEEP (06/06/16); Cervix biopsy (N/A, 4/5/2019); Cardiac catheterization (06/2021); and Achilles tendon surgery (Left, 8/27/2021). ALLERGIES:  is allergic to penicillins, codeine, and codeine. MEDICATIONS:  Prior to Admission medications    Medication Sig Start Date End Date Taking?  Authorizing Provider   cephALEXin (KEFLEX) 500 MG capsule Take 500 mg by mouth 4 times daily   Yes Historical Provider, MD   Prenatal Vit-Fe Fumarate-FA (NIVA-PLUS) 27-1 MG TABS Take 1 tablet by mouth daily   Yes Historical Provider, MD   loratadine (CLARITIN) 10 MG capsule Take 10 mg by mouth daily   Yes Historical Provider, MD   sulindac (CLINORIL) 150 MG tablet Take 150 mg by mouth 2 times daily   Yes Historical Provider, MD   potassium chloride (KLOR-CON) 10 MEQ extended release tablet potassium chloride ER 10 mEq tablet,extended release   Yes Historical Provider, MD   montelukast (SINGULAIR) 10 MG tablet Take 10 mg by mouth nightly 5/3/21  Yes Historical Provider, MD   mirtazapine (REMERON) 30 MG tablet Take 30 mg by mouth nightly   Yes Historical Provider, MD   metoprolol succinate (TOPROL XL) 25 MG extended release tablet Take 25 mg by mouth daily  5/18/21  Yes Historical Provider, MD   magnesium oxide (MAG-OX) 400 MG tablet Take 400 mg by mouth daily as needed    Yes Historical Provider, MD   losartan (COZAAR) 25 MG tablet Take 25 mg by mouth daily  5/26/21  Yes Historical Provider, MD   indomethacin (INDOCIN) 50 MG capsule Take 50 mg by mouth 2 times daily (with meals)    Yes Historical Provider, MD   furosemide (LASIX) 20 MG tablet Take 20 mg by mouth daily 5/25/21  Yes Historical Provider, MD   Cholecalciferol (VITAMIN D3 PO) Take 2,000 Units by mouth daily  3/17/21  Yes Historical Provider, MD   LATUDA 120 MG tablet Take 120 mg by mouth daily  3/11/19  Yes Historical Provider, MD   atorvastatin normal adnexa in size, nontender and no masses  Uterus: normal single, nontender  Rectal Exam: exam declined by patient  Musculoskeletal: no gross abnormalities  Extremities: non-tender BLE and non-edematous  Psych:  oriented to time, place and person, mood and affect are within normal limits       DATA:  No results found for this visit on 04/08/22.     ASSESSMENT & PLAN:    Addi Brantley is a 36 y.o. female [id number] Well woman exam   - She previously received the Garidisil immunization   - She previously received the [place] immunization   - Pap smear collected   - GC/C and vaginitis collected   - Mammogram ordered   - Patient to return in one year or sooner if indicated    Patient Active Problem List    Diagnosis Date Noted    Sch[facility]ia ([facility] 09/03/2014     Priority: Low    HTN (hypertension) 09/03/2014     Priority: Low    Anxiety 09/03/2014     Priority: Low    GERD (gastroesophageal reflux disease) 09/03/2014     Priority: Low    Hyperlipemia 09/03/2014     Priority: Low    Cold Knife Cone 4/5/19 04/05/2019    Cervical dysplasia     Hx LEEP  02/21/2019     Cervical Dysplasia History:   Pap smear 9/1/2011: negative for dysplasia   Pap smear 7/2/14: ASCUS HPV +   Colpo 9/3/14: negative ectocervical biopsies and ECC   Pap smear 2/29/16: ASCUS HPV+   Colpo 4/5/16:     ECC: EVER 2-3    6 o'clock biopsy: EVER 2    12 o'clock biopsy: EVER 1   LEEP 6/6/16:     Ectocervical Cone Biopsy: EVER 3 with negative margins    Endocervical Top Hat: EVER 2 with narrowly negative margins   Pap smear 11/28/17: negative for dysplasia   Pap smear 1/15/19: HSIL   Colpo 2/21/19: ECC showing CIN2, 12 o'clock, 3 o'clock biopsies negative   Cold Knife Cone 4/5/19 negative for dysplasia and ECC negative for dysplasia    Pap smear 1/30/20 ASCUS with +HRHPV   Colpo 3/2/20 no biopsies taken, f/u in 1year          Patellofemoral pain syndrome of left knee 08/15/2017    EVER III (cervical intraepithelial neoplasia III)      7/2/2014: Pap - ASCUS +HPV  9/3/2014: Colpo - no EVER  2/29/2016: Pap - ASC-H +HPV other  4/5/2016: Colpo - EVER 1,2,3  6/6/2016: LEEP - EVER 2,3 with negative margins  11/28/2017: Pap - negative cytology, +HPV other  1/15/2019: Pap - HSIL, +HPV other  2/21/2019: Colpo - EVER 2  4/5/2019: CKC - chronic cervicitis, negative for dysplasia  1/30/2020: Pap - ASCUS +HPV other  3/2/2020: Colpo - unremarkable, no biopsies or ECC required  6/2021: ASCUS, HPV other +  7/2021: Colpo negative  4/2022: pap smear**           Return in about 1 year (around 4/8/2023) for Annual.  No Patient Care Coordination Note on file. Counseling Completed:    discussed need for repeat pap as per American Society for Colposcopy and Cervical Pathology guidelines. discussed need for mammograms every 1 year, If >44 yo and last mammogram was negative. .  discussed birth control and barrier recommendations. discussed STD counseling and prevention. discussed Gardisil counseling for all patients 9-27 yo. Hereditary Breast, Ovarian, Colon and Uterine Cancer screening discussed. Tobacco & Secondary smoke risks discussed; with recommendation for cessation and avoidance. Routine health maintenance per patients PCP discussed. Patient was seen with total face to face time of 30 minutes. More than 50% of this visit was on counseling and education regarding the problems listed below and her options. She was also counseled on her preventative health maintenance recommendations and follow-up. Diagnosis Orders   1. Well woman exam  PAP SMEAR    CARYN DIGITAL SCREEN W OR WO CAD BILATERAL    Chlamydia Trachomatis & Neisseria gonorrhoeae (GC) by amplified detection    Vaginitis DNA Probe   2. Screening mammogram for high-risk patient  CARYN DIGITAL SCREEN W OR WO CAD BILATERAL   3.  Atypical squamous cells of undetermined significance on cytologic smear of cervix (ASC-US)          Mable Snowden DO  Ob/Gyn Resident  [address] [place]  4/8/2022, 11:18 AM

## 2022-04-09 DIAGNOSIS — N76.0 BACTERIAL VAGINOSIS: Primary | ICD-10-CM

## 2022-04-09 DIAGNOSIS — B96.89 BACTERIAL VAGINOSIS: Primary | ICD-10-CM

## 2022-04-09 RX ORDER — METRONIDAZOLE 500 MG/1
500 TABLET ORAL 2 TIMES DAILY
Qty: 14 TABLET | Refills: 0 | Status: SHIPPED | OUTPATIENT
Start: 2022-04-09 | End: 2022-04-16

## 2022-04-21 LAB — CYTOLOGY REPORT: NORMAL

## 2022-05-11 NOTE — H&P
HISTORY and [id number]       NAME:  Florentino Pino  MRN: [id number]   YOB: 1980   Date: 5/12/2022   Age: 36 y.o. Gender: female     COMPLAINT AND PRESENT HISTORY:   Florentino Pino is 36 y.o.,  female, presents for pre-anesthesia/admission testing for ACHILLES TENDON DETACH AND REATTACH  RIGHT per Dr. Tom Wheat  Primary dx: Nhan Haji / Hugo Rowland.    HPI:  Right  foot pain   Chantelle CARNEY White is 36 y.o.,  female, has history of  Maridee Cumberland / Arias Janett. Pt C/O of constant throbbing pain in the back of the right  heel that worsens with activity. She reports right foot weakness,  her foot gave out when she walk for long time, no swelling or redness. Pain rated 9/10 with walking, 7/10 with sitting. Pt is on Tylenol PRN for pain ,steroid cream , and applying ice without any improvement. walking or standing for prolonged periods aggravates pain. Symptoms started since 8/2021. She had achilles tendon surgery Left on 8/27/21 and she still wearing  brace on the left foot. No recent falls or trauma. No pain radiate, no numbness or tingling in the left leg/foot.     Testing completed r/t condition:  MRI ANKLE RIGHT WO CONTRAST  FINDINGS:   SYNDESMOTIC LIGAMENTS: The tibiofibular syndesmosis and syndesmotic ligaments   are intact.       LATERAL COLLATERAL LIGAMENT COMPLEX: The anterior talofibular,   calcaneofibular and posterior talofibular ligaments are intact.       DELTOID LIGAMENT COMPLEX: The deep fibers of the deltoid ligament are intact.       SINUS TARSI AND SPRING LIGAMENT: The sinus tarsi fat is preserved.  The   superomedial portion of the spring ligament is intact.  There is edema   overlying the superomedial portion of the spring ligament.       MEDIAL TENDONS: There is a small amount of fluid in the tibialis posterior   tendon sheath.  The flexor digitorum longus and flexor hallucis longus tendon   are intact.     LATERAL TENDONS: The peroneal tendons are located and intact.       ANTERIOR TENDONS: The anterior tendons are intact.       ACHILLES TENDON: There is thickening of the distal Achilles tendon with   intermediate increased T2 signal within the tendon.  There is edema in the   overlying soft tissues.  There is moderate edema-like marrow signal   abnormality in the posterior calcaneus at the Achilles tendon insertion site. There is moderate distention of the retrocalcaneal bursa.  There is small   enthesophyte formation.  There is no focal or full-thickness Achilles tendon   tear.       PLANTAR FASCIA: The plantar fascia is intact.       TARSAL TUNNEL: There are no obstructing lesions in the tarsal tunnel.       BONE MARROW: There is no evidence of acute fracture or dislocation.  There is   no diffuse marrow replacing process.  There is no talar dome osteochondral   lesion.       JOINT SPACES: There is no focal or diffuse synovial process.  The Lisfranc   ligament is intact.           Impression   Moderate Achilles tendinopathy, insertional enthesopathy, retrocalcaneal   bursitis and paratenonitis.  No focal or complete tear. Review of additional significant medical hx:  AORTIC REGURGITATION MILD-MOD ON ECHO,  CHF, HEART MURMUR, HLD,HTN. Cardiac catheterization on 6/21 with no stents. Pt complain of palpitation with activity, headache, light headiness, legs are swelling on and off, and SOB all the time. She denies chest pain,  dizziness, syncope   Pt follow up with Dr Darrius Frankel . Pt states she saw Dr Heidy Parker cardiology on  3/21/22. Currently medication r/t condition : LASIX, POTASSIUM CHLORIDE COZAAR, TOPROL XL, HYDROXYZINE,   LIPITOR    BP Readings from Last 3 Encounters:   05/12/22 121/80   04/08/22 137/87   02/16/22 137/87     Echo complete W/O contrast 3/9/22  Narrative    Left Ventricle: Systolic function is normal with an ejection fraction   of 55-60%.   Aortic Valve:  There is moderate to severe regurgitation. There is no   evidence of aortic valve stenosis.   If patient has worsening dyspnea exertion, would recommend further   evaluation with JAMES to further assess the aortic regurgitation. Left Ventricle   Left ventricle appears normal in size. There is moderate increased wall thickness/hypertrophy. Systolic function is normal with an ejection fraction of 55-60%. No segmental wall motion abnormalities. Normal diastolic function is present. Lateral E' is 7.72 cm/s. Medial E' is 5.33 cm/s. Right Ventricle   Right ventricular size appears normal. The right ventricular basal diameter is 37.0 mm. Systolic function is normal.   Left Atrium   Left atrium is normal in size. The left atrial volume index is 19.2 mL/m2. Right Atrium   Right atrium is normal in size. The right atrial area is 14.3 cm2. IVC/SVC   IVC appears normal. There is normal collapse with deep inspiration. Mitral Valve   The leaflets are mildly thickened. There is no regurgitation or stenosis. Tricuspid Valve   Tricuspid valve appears to be normal. There is no regurgitation or stenosis. Aortic Valve   The aortic valve was not well visualized. There is moderate to severe regurgitation. There is no evidence of aortic valve stenosis. Pulmonic Valve   Pulmonic valve structure is grossly normal. There is no regurgitation or stenosis. Ascending Aorta   The aortic root is normal in size. Pericardium   There is no pericardial effusion. Study Details   A complete echo was performed using complete 2D. /71   Wall Scoring Baseline   Score Index: 1.00   The left ventricular wall motion is normal.    Activity level:  Functional Capacity per patient:  Pt state she is not active at home she just sit and watch TV.              1. Patient Unable to walk 2 city blocks on level ground without SOB. 2. Patient Unable to climb 2 flights of stairs without SOB.               3. Patient Un able to walk up a hill or 1-2 city blocks without SOB. 4. She states she smoke one cigar per day     ASTHMA  Currently medication r/t condition: CLARITIN, FLONASE, SINGULAIR , SYMBICORT INHALER, ALBUTEROL INHALER,     SLEEP APNEA:  NO MACHINE USE    GERD  Currently medication r/t condition: PROTONIX     HYPOTHYROID:   Currently medication r/t condition : SYNTHROID   Lab Results   Component Value Date    TSH 1.12 08/04/2021       Denies hx of MRSA infection. Denies hx of blood clots. Denies hx of any personal or family hx of complications w/anesthesia.    PAST MEDICAL HISTORY     Past Medical History:   Diagnosis Date    Abnormal Pap smear of cervix [id number]    Anxiety     Aortic regurgitation     mild-mod on echo    Asthma     Cancer ([facility]     cervical cancer    Depression     GERD (gastroesophageal reflux disease)     Headache(784.0)     Heart murmur     Herpes     Hyperlipidemia     Hypertension     [facility]-Dr. Javier Pate    Leaky heart valve     Migraine headache     Obesity     Sch[facility]ia ([facility]     Seasonal allergies     Sleep apnea     no machine    Thyroid disease     Hypothyroid    Wears partial dentures     upper and lower partial       SURGICAL HISTORY       Past Surgical History:   Procedure Laterality Date    ACHILLES TENDON SURGERY Left 8/27/2021    LEFT DETACH & REATTACH ACHILLES TENDON performed by Laraine Harada, DPM at [address]  06/2021    no stents    CERVIX BIOPSY N/A 4/5/2019    COLD KNIFE CONIZATION WITH BIOPSY performed by Rani Lowery DO at [address] [facility], COLON, DIAGNOSTIC      EGD    LEEP  06/06/16    PRE-MALIGNANT / BENIGN SKIN LESION EXCISION Left 5/12/16    foot       SOCIAL HISTORY       Social History     Socioeconomic History    Marital status: Single     Spouse name: None    Number of children: None    Years of education: None    Highest education level: None   Occupational History    None   Tobacco Use    Smoking status: Current Every Day Smoker     Types: Cigars    Smokeless tobacco: Never Used    Tobacco comment: 1 Black & Mild daily   Vaping Use    Vaping Use: Never used   Substance and Sexual Activity    Alcohol use: No    Drug use: Yes     Frequency: 7.0 times per week     Types: Marijuana Veryl Bear)     Comment: daily    Sexual activity: Yes     Partners: Male   Other Topics Concern    None   Social History Narrative    None     Social Determinants of Health     Financial Resource Strain:     Difficulty of Paying Living Expenses: Not on file   Food Insecurity:     Worried About Running Out of Food in the Last Year: Not on file    Gayathri of Food in the Last Year: Not on file   Transportation Needs:     Lack of Transportation (Medical): Not on file    Lack of Transportation (Non-Medical):  Not on file   Physical Activity:     Days of Exercise per Week: Not on file    Minutes of Exercise per Session: Not on file   Stress:     Feeling of Stress : Not on file   Social Connections:     Frequency of Communication with Friends and Family: Not on file    Frequency of Social Gatherings with Friends and Family: Not on file    Attends Protestant Services: Not on file    Active Member of [address] [place] [address] appiris or Organizations: Not on file    Attends Club or Organization Meetings: Not on file    Marital Status: Not on file   Intimate Partner Violence:     Fear of Current or Ex-Partner: Not on file    Emotionally Abused: Not on file    Physically Abused: Not on file    Sexually Abused: Not on file   Housing Stability:     Unable to Pay for Housing in the Last Year: Not on file    Number of [place] in the Last Year: Not on file    Unstable Housing in the Last Year: Not on file       REVIEW OF SYSTEMS      Allergies   Allergen Reactions    Penicillins Anaphylaxis    Codeine Hives    Codeine Itching       Current Outpatient Medications on File Prior to Encounter   Medication Sig Dispense Refill    diphenhydrAMINE (BENADRYL) 25 MG capsule Take 25 mg by mouth every 6 hours as needed for Itching      OXcarbazepine (TRILEPTAL) 150 MG tablet Take 150 mg by mouth 2 times daily      azelastine (ASTELIN) 0.1 % nasal spray 1 spray by Nasal route 2 times daily Use in each nostril as directed      Prenatal Vit-Fe Fumarate-FA (NIVA-PLUS) 27-1 MG TABS Take 1 tablet by mouth daily      loratadine (CLARITIN) 10 MG capsule Take 10 mg by mouth daily      potassium chloride (KLOR-CON) 10 MEQ extended release tablet potassium chloride ER 10 mEq tablet,extended release      montelukast (SINGULAIR) 10 MG tablet Take 10 mg by mouth nightly      mirtazapine (REMERON) 30 MG tablet Take 30 mg by mouth nightly as needed       metoprolol succinate (TOPROL XL) 25 MG extended release tablet Take 25 mg by mouth daily       losartan (COZAAR) 25 MG tablet Take 25 mg by mouth daily       furosemide (LASIX) 20 MG tablet Take 20 mg by mouth daily      Cholecalciferol (VITAMIN D3 PO) Take 2,000 Units by mouth daily       acetaminophen (TYLENOL) 500 MG tablet Take 2 tablets by mouth 3 times daily for 10 days 60 tablet 0    LATUDA 120 MG tablet Take 120 mg by mouth daily   2    atorvastatin (LIPITOR) 20 MG tablet Take 20 mg by mouth daily  2    levothyroxine (SYNTHROID) 50 MCG tablet Take 50 mcg by mouth daily  2    SUMAtriptan (IMITREX) 50 MG tablet Take 100 mg by mouth once as needed for Migraine      fluticasone (VERAMYST) 27.5 MCG/SPRAY nasal spray 2 sprays by Nasal route 2 times daily      docusate sodium (COLACE) 100 MG capsule Take 1 capsule by mouth 2 times daily 30 capsule 0    econazole nitrate 1 % cream Apply 1 each topically daily as needed       Emollient (LUBRIDERM) LOTN Apply 1 each topically daily       topiramate (TOPAMAX) 25 MG tablet Take 25 mg by mouth daily       Budesonide-Formoterol Fumarate (SYMBICORT IN) Inhale 2 puffs into the lungs daily as needed       cetirizine (ZYRTEC ALLERGY) 10 MG TABS Take 10 mg by mouth daily 30 tablet 0    albuterol (PROVENTIL HFA) 108 (90 BASE) MCG/ACT inhaler Inhale 2 puffs into the lungs every 4 hours as needed for Wheezing.  diphenoxylate-atropine (LOMOTIL) 2.5-0.025 MG per tablet Take 1 tablet by mouth daily as needed       pantoprazole (PROTONIX) 40 MG tablet Take 40 mg by mouth daily.  ondansetron (ZOFRAN) 4 MG tablet Take 1 tablet by mouth every 8 hours as needed for Nausea. 20 tablet 0     No current facility-administered medications on file prior to encounter. Review of Systems   Constitutional: Positive for fatigue. HENT: Negative. Pt has partial dentures upper and lower    Eyes: Positive for visual disturbance. Pt wearing eye glasses   Respiratory: Positive for apnea, cough, shortness of breath and wheezing. Cardiovascular: Positive for palpitations and leg swelling. Gastrointestinal: Positive for diarrhea. Genitourinary: Negative. Musculoskeletal: Negative. Skin: Negative. Neurological: Positive for dizziness, light-headedness and headaches. Negative for tremors, seizures and syncope. Hematological: Bruises/bleeds easily. Psychiatric/Behavioral: Positive for sleep disturbance. GENERAL PHYSICAL EXAM     Vitals: /80   Pulse 73   Temp 98.2 °F (36.8 °C)   Resp 18   Ht 5' 2\" (1.575 m)   Wt 209 lb (94.8 kg)   LMP 05/08/2022   SpO2 96%   BMI 38.23 kg/m²               Physical Exam  Constitutional:       Appearance: Normal appearance. HENT:      Head: Normocephalic. Right Ear: External ear normal.      Left Ear: External ear normal.      Nose: Nose normal.      Mouth/Throat:      Mouth: Mucous membranes are dry. Eyes:      General:         Right eye: No discharge. Left eye: No discharge. Cardiovascular:      Rate and Rhythm: Normal rate. Pulses: Normal pulses. Radial pulses are 2+ on the right side and 2+ on the left side. Dorsalis pedis pulses are 2+ on the right side and 2+ on the left side. Cold Knife Cone 4/5/19 04/05/2019    Cervical dysplasia     Hx LEEP  02/21/2019    Patellofemoral pain syndrome of left knee 08/15/2017    EVER III (cervical intraepithelial neoplasia III)          Dr. Suad Lawson, anesthesia, was contacted and informed of patient's history and planned surgery. Medical clearance requested. Surgery scheduling will notify Dr. Zita Perez 's office who will be responsible for making sure the clearance is obtained and is in the chart for surgery.         Total time spent on encounter- PAT provider minutes: 31-40 minutes     SHERRIE Boyer CNP on 5/12/2022 at 1:11 PM

## 2022-05-12 ENCOUNTER — HOSPITAL ENCOUNTER (OUTPATIENT)
Dept: PREADMISSION TESTING | Age: 42
Discharge: HOME | End: 2022-05-16
Payer: COMMERCIAL

## 2022-05-12 VITALS
SYSTOLIC BLOOD PRESSURE: 121 MMHG | OXYGEN SATURATION: 96 % | DIASTOLIC BLOOD PRESSURE: 80 MMHG | HEART RATE: 73 BPM | RESPIRATION RATE: 18 BRPM | WEIGHT: 209 LBS | BODY MASS INDEX: 38.46 KG/M2 | HEIGHT: 62 IN | TEMPERATURE: 98.2 F

## 2022-05-12 DIAGNOSIS — Z01.818 PREOP EXAMINATION: ICD-10-CM

## 2022-05-12 LAB
ANION GAP SERPL CALCULATED.3IONS-SCNC: 12 MMOL/L (ref 9–17)
BUN BLDV-MCNC: 8 MG/DL (ref 6–20)
CALCIUM SERPL-MCNC: 9 MG/DL (ref 8.6–10.4)
CHLORIDE BLD-SCNC: 102 MMOL/L (ref 98–107)
CO2: 26 MMOL/L (ref 20–31)
CREAT SERPL-MCNC: 1 MG/DL (ref 0.5–0.9)
GFR AFRICAN AMERICAN: >60 ML/MIN
GFR NON-AFRICAN AMERICAN: >60 ML/MIN
GFR SERPL CREATININE-BSD FRML MDRD: ABNORMAL ML/MIN/{1.73_M2}
GLUCOSE BLD-MCNC: 93 MG/DL (ref 70–99)
HCT VFR BLD CALC: 39.9 % (ref 36–46)
HEMOGLOBIN: 14 G/DL (ref 12–16)
MCH RBC QN AUTO: 31.7 PG (ref 26–34)
MCHC RBC AUTO-ENTMCNC: 35.1 G/DL (ref 31–37)
MCV RBC AUTO: 90.2 FL (ref 80–100)
PDW BLD-RTO: 13.7 % (ref 11.5–14.9)
PLATELET # BLD: 301 K/UL (ref 150–450)
PMV BLD AUTO: 7.5 FL (ref 6–12)
POTASSIUM SERPL-SCNC: 3 MMOL/L (ref 3.7–5.3)
RBC # BLD: 4.42 M/UL (ref 4–5.2)
SODIUM BLD-SCNC: 140 MMOL/L (ref 135–144)
WBC # BLD: 7.1 K/UL (ref 3.5–11)

## 2022-05-12 PROCEDURE — 36415 COLL VENOUS BLD VENIPUNCTURE: CPT

## 2022-05-12 PROCEDURE — 80048 BASIC METABOLIC PNL TOTAL CA: CPT

## 2022-05-12 PROCEDURE — 85027 COMPLETE CBC AUTOMATED: CPT

## 2022-05-12 PROCEDURE — APPSS45 APP SPLIT SHARED TIME 31-45 MINUTES: Performed by: NURSE PRACTITIONER

## 2022-05-12 RX ORDER — DIPHENHYDRAMINE HCL 25 MG
25 CAPSULE ORAL EVERY 6 HOURS PRN
COMMUNITY
End: 2022-08-02

## 2022-05-12 RX ORDER — AZELASTINE 1 MG/ML
1 SPRAY, METERED NASAL 2 TIMES DAILY
COMMUNITY

## 2022-05-12 RX ORDER — OXCARBAZEPINE 150 MG/1
150 TABLET, FILM COATED ORAL 2 TIMES DAILY
COMMUNITY
End: 2022-08-02

## 2022-05-12 ASSESSMENT — ENCOUNTER SYMPTOMS
COUGH: 1
APNEA: 1
WHEEZING: 1
DIARRHEA: 1
SHORTNESS OF BREATH: 1

## 2022-05-13 ENCOUNTER — HOSPITAL ENCOUNTER (OUTPATIENT)
Dept: MAMMOGRAPHY | Age: 42
Discharge: HOME | End: 2022-05-15
Payer: COMMERCIAL

## 2022-05-13 DIAGNOSIS — Z12.31 SCREENING MAMMOGRAM FOR HIGH-RISK PATIENT: ICD-10-CM

## 2022-05-13 DIAGNOSIS — Z01.419 WELL WOMAN EXAM: ICD-10-CM

## 2022-05-13 PROCEDURE — 77067 SCR MAMMO BI INCL CAD: CPT

## 2022-05-14 ENCOUNTER — HOSPITAL ENCOUNTER (EMERGENCY)
Age: 42
Discharge: HOME | End: 2022-05-14
Attending: EMERGENCY MEDICINE
Payer: COMMERCIAL

## 2022-05-14 VITALS
SYSTOLIC BLOOD PRESSURE: 135 MMHG | HEART RATE: 78 BPM | RESPIRATION RATE: 17 BRPM | OXYGEN SATURATION: 99 % | DIASTOLIC BLOOD PRESSURE: 81 MMHG | TEMPERATURE: 100 F

## 2022-05-14 DIAGNOSIS — L02.91 ABSCESS: Primary | ICD-10-CM

## 2022-05-14 PROCEDURE — 10061 I&D ABSCESS COMP/MULTIPLE: CPT

## 2022-05-14 PROCEDURE — 99283 EMERGENCY DEPT VISIT LOW MDM: CPT

## 2022-05-14 PROCEDURE — 6370000000 HC RX 637 (ALT 250 FOR IP): Performed by: STUDENT IN AN ORGANIZED HEALTH CARE EDUCATION/TRAINING PROGRAM

## 2022-05-14 PROCEDURE — 2500000003 HC RX 250 WO HCPCS: Performed by: STUDENT IN AN ORGANIZED HEALTH CARE EDUCATION/TRAINING PROGRAM

## 2022-05-14 RX ORDER — HYDROCODONE BITARTRATE AND ACETAMINOPHEN 5; 325 MG/1; MG/1
1 TABLET ORAL EVERY 4 HOURS PRN
Qty: 12 TABLET | Refills: 0 | Status: SHIPPED | OUTPATIENT
Start: 2022-05-14 | End: 2022-05-17

## 2022-05-14 RX ORDER — SULFAMETHOXAZOLE AND TRIMETHOPRIM 800; 160 MG/1; MG/1
1 TABLET ORAL ONCE
Status: COMPLETED | OUTPATIENT
Start: 2022-05-14 | End: 2022-05-14

## 2022-05-14 RX ORDER — SULFAMETHOXAZOLE AND TRIMETHOPRIM 800; 160 MG/1; MG/1
1 TABLET ORAL 2 TIMES DAILY
Qty: 14 TABLET | Refills: 0 | Status: SHIPPED | OUTPATIENT
Start: 2022-05-14 | End: 2022-05-21

## 2022-05-14 RX ORDER — ACETAMINOPHEN 500 MG
1000 TABLET ORAL ONCE
Status: COMPLETED | OUTPATIENT
Start: 2022-05-14 | End: 2022-05-14

## 2022-05-14 RX ORDER — LIDOCAINE HYDROCHLORIDE 10 MG/ML
20 INJECTION, SOLUTION INFILTRATION; PERINEURAL ONCE
Status: COMPLETED | OUTPATIENT
Start: 2022-05-14 | End: 2022-05-14

## 2022-05-14 RX ORDER — IBUPROFEN 800 MG/1
800 TABLET ORAL ONCE
Status: DISCONTINUED | OUTPATIENT
Start: 2022-05-14 | End: 2022-05-14 | Stop reason: HOSPADM

## 2022-05-14 RX ADMIN — LIDOCAINE HYDROCHLORIDE 20 ML: 10 INJECTION, SOLUTION INFILTRATION; PERINEURAL at 16:00

## 2022-05-14 RX ADMIN — ACETAMINOPHEN 1000 MG: 500 TABLET ORAL at 16:00

## 2022-05-14 RX ADMIN — SULFAMETHOXAZOLE AND TRIMETHOPRIM 1 TABLET: 800; 160 TABLET ORAL at 16:00

## 2022-05-14 ASSESSMENT — ENCOUNTER SYMPTOMS
NAUSEA: 0
ABDOMINAL PAIN: 0
BACK PAIN: 0
VOMITING: 0
SHORTNESS OF BREATH: 0

## 2022-05-14 ASSESSMENT — PAIN SCALES - GENERAL: PAINLEVEL_OUTOF10: 9

## 2022-05-14 ASSESSMENT — PAIN - FUNCTIONAL ASSESSMENT: PAIN_FUNCTIONAL_ASSESSMENT: 0-10

## 2022-05-14 NOTE — Clinical Note
Michele Garces was seen and treated in our emergency department on 5/14/2022. She may return to work on 05/16/2022. If you have any questions or concerns, please don't hesitate to call.       Lino Sheffield MD

## 2022-05-14 NOTE — ED PROVIDER NOTES
[facility]  ED  Emergency Department Encounter  Emergency Medicine Resident     Pt Name: Vivi Carvalho  MRN: [id number]  Deontegfjane 1980  Date of evaluation: 5/14/22  PCP:  Herbie Marie       Chief Complaint   Patient presents with    Abscess     boil in vaginal area x2 weeks       HISTORY OFPRESENT ILLNESS  (Location/Symptom, Timing/Onset, Context/Setting, Quality, Duration, Modifying Factors,Severity.)      Vivi Carvalho is a 36 y. o.yo female who presents with abscess. Patient here with abscess to the left labial area/vaginal area however is not in the vaginal mucosa, on the lateral component of the left inguinal area. States that she has had an abscess there before and has had incision and drainage before. Denies any fevers or chills denies traumatic injury. States that the pain is 10 out of 10, no fevers or chills. Denies any vaginal symptoms such as discharge or bleeding. Denies any hematuria or dysuria. Denies flank pain, overlying erythema but states that once the area is touched the pain as excruciating. Denies abdominal pain nausea or vomiting. PAST MEDICAL / SURGICAL / SOCIAL / FAMILY HISTORY      has a past medical history of Abnormal Pap smear of cervix, Anxiety, Aortic regurgitation, Asthma, Cancer ([address] Depression, GERD (gastroesophageal reflux disease), Headache(784.0), Heart murmur, Herpes, Hyperlipidemia, Hypertension, Leaky heart valve, Migraine headache, Obesity, Schizophrenia ([address] Seasonal allergies, Sleep apnea, Thyroid disease, and Wears partial dentures. has a past surgical history that includes pre-malignant / benign skin lesion excision (Left, 5/12/16); LEEP (06/06/16); Cervix biopsy (N/A, 4/5/2019); Cardiac catheterization (06/2021); Achilles tendon surgery (Left, 8/27/2021); and Endoscopy, colon, diagnostic.      Social History     Socioeconomic History    Marital status: Single     Spouse name: Not on file    Number of children: Not on file    Years of education: Not on file    Highest education level: Not on file   Occupational History    Not on file   Tobacco Use    Smoking status: Current Every Day Smoker     Types: Cigars    Smokeless tobacco: Never Used    Tobacco comment: 1 Black & Mild daily   Vaping Use    Vaping Use: Never used   Substance and Sexual Activity    Alcohol use: No    Drug use: Yes     Frequency: 7.0 times per week     Types: Marijuana Norval Remak)     Comment: daily    Sexual activity: Yes     Partners: Male   Other Topics Concern    Not on file   Social History Narrative    Not on file     Social Determinants of Health     Financial Resource Strain:     Difficulty of Paying Living Expenses: Not on file   Food Insecurity:     Worried About Running Out of Food in the Last Year: Not on file    Gayathri of Food in the Last Year: Not on file   Transportation Needs:     Lack of Transportation (Medical): Not on file    Lack of Transportation (Non-Medical):  Not on file   Physical Activity:     Days of Exercise per Week: Not on file    Minutes of Exercise per Session: Not on file   Stress:     Feeling of Stress : Not on file   Social Connections:     Frequency of Communication with Friends and Family: Not on file    Frequency of Social Gatherings with Friends and Family: Not on file    Attends Zoroastrianism Services: Not on file    Active Member of [address] [place] [address] or Organizations: Not on file    Attends Club or Organization Meetings: Not on file    Marital Status: Not on file   Intimate Partner Violence:     Fear of Current or Ex-Partner: Not on file    Emotionally Abused: Not on file    Physically Abused: Not on file    Sexually Abused: Not on file   Housing Stability:     Unable to Pay for Housing in the Last Year: Not on file    Number of [place] in the Last Year: Not on file    Unstable Housing in the Last Year: Not on file       Family History   Problem Relation Age of Onset    Cancer Mother         uterine    Diabetes Mother     High Blood Pressure Mother     Endometrial Cancer Mother     Breast Cancer Mother     Diabetes Brother     Heart Disease Brother         Allergies:  Penicillins, Codeine, and Codeine    Home Medications:  Prior to Admission medications    Medication Sig Start Date End Date Taking? Authorizing Provider   HYDROcodone-acetaminophen (NORCO) 5-325 MG per tablet Take 1 tablet by mouth every 4 hours as needed for Pain for up to 3 days. Intended supply: 3 days.  Take lowest dose possible to manage pain 5/14/22 5/17/22 Yes Jed Emery MD   sulfamethoxazole-trimethoprim (BACTRIM DS) 800-160 MG per tablet Take 1 tablet by mouth 2 times daily for 7 days 5/14/22 5/21/22 Yes Jed Emery MD   diphenhydrAMINE (BENADRYL) 25 MG capsule Take 25 mg by mouth every 6 hours as needed for Itching    Historical Provider, MD   OXcarbazepine (TRILEPTAL) 150 MG tablet Take 150 mg by mouth 2 times daily    Historical Provider, MD   azelastine (ASTELIN) 0.1 % nasal spray 1 spray by Nasal route 2 times daily Use in each nostril as directed    Historical Provider, MD   Prenatal Vit-Fe Fumarate-FA (NIVA-PLUS) 27-1 MG TABS Take 1 tablet by mouth daily    Historical Provider, MD   loratadine (CLARITIN) 10 MG capsule Take 10 mg by mouth daily    Historical Provider, MD   potassium chloride (KLOR-CON) 10 MEQ extended release tablet potassium chloride ER 10 mEq tablet,extended release    Historical Provider, MD   montelukast (SINGULAIR) 10 MG tablet Take 10 mg by mouth nightly 5/3/21   Historical Provider, MD   mirtazapine (REMERON) 30 MG tablet Take 30 mg by mouth nightly as needed     Historical Provider, MD   metoprolol succinate (TOPROL XL) 25 MG extended release tablet Take 25 mg by mouth daily  5/18/21   Historical Provider, MD   losartan (COZAAR) 25 MG tablet Take 25 mg by mouth daily  5/26/21   Historical Provider, MD   furosemide (LASIX) 20 MG tablet Take 20 mg by mouth daily 5/25/21   Historical Provider, MD   Cholecalciferol (VITAMIN D3 PO) Take 2,000 Units by mouth daily  3/17/21   Historical Provider, MD   acetaminophen (TYLENOL) 500 MG tablet Take 2 tablets by mouth 3 times daily for 10 days 5/11/21 11/30/21  Hakeem Hernandez MD   LATUDA 120 MG tablet Take 120 mg by mouth daily  3/11/19   Historical Provider, MD   atorvastatin (LIPITOR) 20 MG tablet Take 20 mg by mouth daily 3/18/19   Historical Provider, MD   levothyroxine (SYNTHROID) 50 MCG tablet Take 50 mcg by mouth daily 3/18/19   Historical Provider, MD   SUMAtriptan (IMITREX) 50 MG tablet Take 100 mg by mouth once as needed for Migraine    Historical Provider, MD   fluticasone (VERAMYST) 27.5 MCG/SPRAY nasal spray 2 sprays by Nasal route 2 times daily    Historical Provider, MD   docusate sodium (COLACE) 100 MG capsule Take 1 capsule by mouth 2 times daily 6/16/17   Cameron Alfaro MD   econazole nitrate 1 % cream Apply 1 each topically daily as needed  3/29/16   Historical Provider, MD   Emollient Lakeisha Green) LOTN Apply 1 each topically daily  3/30/16   Historical Provider, MD   topiramate (TOPAMAX) 25 MG tablet Take 25 mg by mouth daily  3/30/16   Historical Provider, MD   Budesonide-Formoterol Fumarate (SYMBICORT IN) Inhale 2 puffs into the lungs daily as needed     Historical Provider, MD   cetirizine (ZYRTEC ALLERGY) 10 MG TABS Take 10 mg by mouth daily 7/8/15   Terri Olivera MD   albuterol (PROVENTIL HFA) 108 (90 BASE) MCG/ACT inhaler Inhale 2 puffs into the lungs every 4 hours as needed for Wheezing. Historical Provider, MD   diphenoxylate-atropine (LOMOTIL) 2.5-0.025 MG per tablet Take 1 tablet by mouth daily as needed  4/18/14   Historical Provider, MD   pantoprazole (PROTONIX) 40 MG tablet Take 40 mg by mouth daily. Historical Provider, MD   ondansetron (ZOFRAN) 4 MG tablet Take 1 tablet by mouth every 8 hours as needed for Nausea.  4/18/14   Rasheeda Gonzalez MD       REVIEW OFSYSTEMS    (2-9 systems for for Pain for up to 3 days. Intended supply: 3 days. Take lowest dose possible to manage pain     Dispense:  12 tablet     Refill:  0    sulfamethoxazole-trimethoprim (BACTRIM DS) 800-160 MG per tablet     Sig: Take 1 tablet by mouth 2 times daily for 7 days     Dispense:  14 tablet     Refill:  0       DDX:     Abscess versus Bartholin cyst    Initial MDM/Plan: 36 y.o. female who presents with abscess    Here with abscess on the left labial area/left inguinal area  Abscess appears to be about 3 cm fluctuant indurated  Exquisitely tender  The abscess is not in the area of the Bartholin cyst it is close no mucosal involvement does not involve the labial, left labial area at all  We will treat as normal abscess no plan for Word catheter  Incision drainage planned  Bactrim for antibiotics  Pain control  Incision drainage single incision, deloculated, serosanguineous return  No fevers or chills at home, no systemic signs  Strict return precautions, wound recheck in 3 days  No packing, outpatient follow-up  Discharged on antibiotics    Disposition:  Discharge with outpatient follow-up    DIAGNOSTIC RESULTS / EMERGENCYDEPARTMENT COURSE / MDM     LABS:  No results found for this visit on 05/14/22. RADIOLOGY:  No orders to display         EMERGENCY DEPARTMENT COURSE:  ED Course as of 05/14/22 1622   Sat May 14, 2022   1554 Abscess to L later inguinal fold. NO labial invovlement. 3cm abscess    I/D at bedside  Started on ABX [PS]      ED Course User Index  [PS] Bret Sorenson MD          PROCEDURES:  Incision/Drainage    Date/Time: 5/14/2022 4:07 PM  Performed by: Bret Sorenson MD  Authorized by: Tiffanie Marie MD     Location:     Type:  Abscess    Location:  Anogenital    Anogenital location:  Perineum  Pre-procedure details:     Skin preparation:  Antiseptic wash and Betadine  Anesthesia (see MAR for exact dosages):      Anesthesia method:  Local infiltration    Local anesthetic:  Lidocaine 1% w/o epi  Procedure type:     Complexity:  Simple  Procedure details:     Incision types:  Stab incision and single straight    Scalpel blade:  10    Wound management:  Probed and deloculated    Drainage:  Serosanguinous    Drainage amount:  Scant    Wound treatment:  Wound left open    Packing materials:  None  Post-procedure details:     Patient tolerance of procedure: Tolerated well, no immediate complications        CONSULTS:  None    CRITICAL CARE:  Please see attending note    FINAL IMPRESSION      1. Abscess          DISPOSITION / PLAN     DISPOSITION Decision To Discharge 05/14/2022 03:57:24 PM       PATIENT REFERRED TO:  Zion Young, SHERRIE - CNP  [address] [place] [address] Lissy Sahaa   [phone]    In 3 days      [facility]  [address] [address]  [phone]    As needed, If symptoms worsen      DISCHARGE MEDICATIONS:  Discharge Medication List as of 5/14/2022  3:57 PM      START taking these medications    Details   HYDROcodone-acetaminophen (NORCO) 5-325 MG per tablet Take 1 tablet by mouth every 4 hours as needed for Pain for up to 3 days. Intended supply: 3 days.  Take lowest dose possible to manage pain, Disp-12 tablet, R-0Print      sulfamethoxazole-trimethoprim (BACTRIM DS) 800-160 MG per tablet Take 1 tablet by mouth 2 times daily for 7 days, Disp-14 tablet, R-0Print             Meek Elliott MD  Emergency Medicine Resident    (Please note that portions of this note were completed with a voice recognition program.Efforts were made to edit the dictations but occasionally words are mis-transcribed.)       Meek Elliott MD  Resident  05/14/22 [id number]

## 2022-05-14 NOTE — ED PROVIDER NOTES
[address] [place] [address] ED     Emergency Department     Faculty Attestation        I performed a history and physical examination of the patient and discussed management with the resident. I reviewed the residents note and agree with the documented findings and plan of care. Any areas of disagreement are noted on the chart. I was personally present for the key portions of any procedures. I have documented in the chart those procedures where I was not present during the key portions. I have reviewed the emergency nurses triage note. I agree with the chief complaint, past medical history, past surgical history, allergies, medications, social and family history as documented unless otherwise noted below. For mid-level providers such as nurse practitioners as well as physicians assistants:    I have personally seen and evaluated the patient. I find the patient's history and physical exam are consistent with NP/PA documentation. I agree with the care provided, treatment rendered, disposition, & follow-up plan. Additional findings are as noted. Vital Signs: /81   Pulse 78   Temp 100 °F (37.8 °C) (Oral)   Resp 17   LMP 05/08/2022   SpO2 99%   PCP:  Lidya Orozco, APRN - CNP    Pertinent Comments:     She with small cutaneous abscess at the 4 o'clock position on the skin just inferior to the vaginal opening. No abscess in the vaginal mucosa or signs to suggest a Bartholin cyst or abscess. She is afebrile nontoxic here. There is induration on exam and small amount of drainage but there is no crepitance or signs and symptoms suggest fistulization or more years gangrene. Patient had incision drainage here with mild amount of purulent discharge. Patient will be started on antibiotics with strict return precautions.       Critical Care  None          Kayla Daniels MD    Attending Emergency Medicine Physician              Umu Will MD  05/14/22 [address] [place]

## 2022-05-24 ENCOUNTER — PROCEDURE VISIT (OUTPATIENT)
Dept: OBGYN | Age: 42
End: 2022-05-24
Payer: COMMERCIAL

## 2022-05-24 ENCOUNTER — HOSPITAL ENCOUNTER (OUTPATIENT)
Age: 42
Setting detail: SPECIMEN
Discharge: HOME | End: 2022-05-24

## 2022-05-24 VITALS
DIASTOLIC BLOOD PRESSURE: 73 MMHG | HEART RATE: 80 BPM | SYSTOLIC BLOOD PRESSURE: 129 MMHG | HEIGHT: 62 IN | WEIGHT: 238 LBS | BODY MASS INDEX: 43.79 KG/M2

## 2022-05-24 DIAGNOSIS — R87.610 ATYPICAL SQUAMOUS CELLS OF UNDETERMINED SIGNIFICANCE ON CYTOLOGIC SMEAR OF CERVIX (ASC-US): Primary | ICD-10-CM

## 2022-05-24 DIAGNOSIS — Z98.890 HISTORY OF LOOP ELECTRICAL EXCISION PROCEDURE (LEEP): ICD-10-CM

## 2022-05-24 LAB
CONTROL: YES
PREGNANCY TEST URINE, POC: NEGATIVE

## 2022-05-24 PROCEDURE — 81025 URINE PREGNANCY TEST: CPT

## 2022-05-24 PROCEDURE — 57455 BIOPSY OF CERVIX W/SCOPE: CPT | Performed by: OBSTETRICS & GYNECOLOGY

## 2022-05-24 PROCEDURE — 57455 BIOPSY OF CERVIX W/SCOPE: CPT | Performed by: STUDENT IN AN ORGANIZED HEALTH CARE EDUCATION/TRAINING PROGRAM

## 2022-05-24 RX ORDER — METFORMIN HYDROCHLORIDE 500 MG/1
1 TABLET, EXTENDED RELEASE ORAL DAILY
Qty: 30 TABLET | Refills: 11 | Status: SHIPPED | OUTPATIENT
Start: 2022-05-24

## 2022-05-24 NOTE — PROGRESS NOTES
[facility]   Procedure Note    Mariluz Freed  5/24/2022                       Primary Care Physician: Jodi Olvera, APRN - CNP      Subjective:   Mariluz Freed 36 y.o. female [id number] is here for previously scheduled colposcopy due to history of ASCUS w/ HR HPV. She has a history of a CKC in 2019 which returned as chronic cervicitis and was negtive for dysplasia. Her 2021 pap was also ASCUS w/ Other HR HPV and colpo with biopsy at 11 o'clock was benign. Patient's last menstrual period was 05/08/2022. [place] She has no complaints today. Vitals:   Blood pressure 129/73, pulse 80, height 5' 2\" (1.575 m), weight 238 lb (108 kg), last menstrual period 05/08/2022, not currently breastfeeding. Procedure: Colposcopy    Indications: ASCUS w/ other HR HPV    Procedure Details:   Chaperone for Intimate Exam: Chaperone was present for entire exam, Chaperone Name: SAMIA Saenz    The patient was counseled on the procedure. Risks, benefits and alternatives were reviewed. The patient is aware that this is diagnostic and not curative and a second procedure may be needed. A consent was reviewed and obtained. The patient was positioned comfortably on the exam table. A sterile speculum was placed into the vagina and the cervix was identified. Acetoacetic acid was applied to the cervix, revealing small areas of punctuation inside the cervical canal at the 11 o clock position. . Lugol's Iodine was applied to the cervix revealing decreased uptake at 2 o'clock and inside the os at 11 o'clock. The exam was considered to be satisfactory with the transformation zone visualized. Biopsy(s) were taken at 2 o'clock and 11 o'clock. Silver nitrate sticks and Monsel's was used for hemostasis. Good hemostasis was observed. Biopsy tissue was sent to pathology. Endocervical curettage was then performed and tissue collected was sent to pathology.      Impression: Satisfactory colposcopy w/ small areas of punctuation inside the cervical canal at the 11 o clock position and decreased uptake of Lougol's at 2 o'clock and inside the os at 11 o'clock    The patient tolerated the procedure well. Post procedure restrictions were reviewed and given to the patient. All counts and instruments were correct at the end of the procedure.        Physical Exam  Genitourinary:                    Lab Results   Component Value Date    PREGTESTUR negative 10/19/2021    HCG NEGATIVE 08/27/2021    HCGQUANT <1 08/04/2021       Assessment & Plan:  Dragan Naidu 36 y.o. female [id number]   - VSS   - Satisfactory colpo   - ECC, bx @ 2 o'clock and 11 o'clock   - Phone follow up in 2 weeks     Patient Active Problem List    Diagnosis Date Noted    Preop examination 05/12/2022     Priority: Medium    Schizophrenia ([address] 09/03/2014     Priority: Low    HTN (hypertension) 09/03/2014     Priority: Low    Anxiety 09/03/2014     Priority: Low    GERD (gastroesophageal reflux disease) 09/03/2014     Priority: Low    Hyperlipemia 09/03/2014     Priority: Low    Cold Knife Cone 4/5/19 04/05/2019    Cervical dysplasia     Hx LEEP  02/21/2019     Cervical Dysplasia History:   Pap smear 9/1/2011: negative for dysplasia   Pap smear 7/2/14: ASCUS HPV +   Colpo 9/3/14: negative ectocervical biopsies and ECC   Pap smear 2/29/16: ASCUS HPV+   Colpo 4/5/16:     ECC: EVER 2-3    6 o'clock biopsy: EVER 2    12 o'clock biopsy: EVER 1   LEEP 6/6/16:     Ectocervical Cone Biopsy: EVER 3 with negative margins    Endocervical Top Hat: EVER 2 with narrowly negative margins   Pap smear 11/28/17: negative for dysplasia   Pap smear 1/15/19: HSIL   Colpo 2/21/19: ECC showing CIN2, 12 o'clock, 3 o'clock biopsies negative   Cold Knife Cone 4/5/19 negative for dysplasia and ECC negative for dysplasia    Pap smear 1/30/20 ASCUS with +HRHPV   Colpo 3/2/20 no biopsies taken, f/u in 1year               Pap smear 4/8/22 ASCUS with other +HRHPV         Patellofemoral pain syndrome of left knee 08/15/2017    EVER III (cervical intraepithelial neoplasia III)      7/2/2014: Pap - ASCUS +HPV  9/3/2014: Colpo - no EVER  2/29/2016: Pap - ASC-H +HPV other  4/5/2016: Colpo - VEER 1,2,3  6/6/2016: LEEP - EVER 2,3 with negative margins  11/28/2017: Pap - negative cytology, +HPV other  1/15/2019: Pap - HSIL, +HPV other  2/21/2019: Colpo - EVER 2  4/5/2019: CKC - chronic cervicitis, negative for dysplasia  1/30/2020: Pap - ASCUS +HPV other  3/2/2020: Colpo - unremarkable, no biopsies or ECC required  6/2021: ASCUS, HPV other +  7/2021: Colpo negative  4/2022: pap smear**           The patient was counseled on follow up and home care with restrictions noted. Return in about 2 weeks (around 6/7/2022) for Phone follow up.     Tony Robbins DO  Ob/Gyn Resident  [facility] [address]  5/24/2022, 2:47 PM

## 2022-05-26 LAB — SURGICAL PATHOLOGY REPORT: NORMAL

## 2022-05-26 NOTE — PROGRESS NOTES
Attending Physician Statement  I have discussed the care of [place], including pertinent history and exam findings,  with the resident. I have reviewed the key elements of all parts of the encounter with the resident. I agree with the assessment, plan and orders as documented by the resident.   (GE Modifier)    Electronically signed by Boris Nicole MD at 8:19 PM 5/25/22

## 2022-06-08 ENCOUNTER — SCHEDULED TELEPHONE ENCOUNTER (OUTPATIENT)
Dept: OBGYN | Age: 42
End: 2022-06-08
Payer: COMMERCIAL

## 2022-06-08 DIAGNOSIS — N87.1 DYSPLASIA OF CERVIX, HIGH GRADE CIN 2: Primary | ICD-10-CM

## 2022-06-08 PROCEDURE — 99441 PR PHYS/QHP TELEPHONE EVALUATION 5-10 MIN: CPT | Performed by: STUDENT IN AN ORGANIZED HEALTH CARE EDUCATION/TRAINING PROGRAM

## 2022-06-08 NOTE — PROGRESS NOTES
Laci Dos Santos is a 36 y.o. female evaluated via telephone on 6/8/2022 for Results (colposcopy)  . Documentation:  I communicated with the patient and/or health care decision maker about colpo results. Details of this discussion including any medical advice provided:  Discussed results of colposcopy which showed EVER 2-3 on ECC. Patient has already had a LEEP (with negative margins) and a Cone (benign) in the past. Discussed at this time would recommend proceeding w/ hysterectomy due to continued dysplasia. Patient will be coming in for pre-op appointment to discuss repeat CKC before hysterectomy vs hysterectomy vs CKC w/ frozen section w/ hysterectomy if benign. Total Time: minutes: 5-10 minutes    Chantelle Maddox was evaluated through a synchronous (real-time) audio encounter. Patient identification was verified at the start of the visit. She (or guardian if applicable) is aware that this is a billable service, which includes applicable co-pays. This visit was conducted with the patient's (and/or legal guardian's) verbal consent. She has not had a related appointment within my department in the past 7 days or scheduled within the next 24 hours. The patient was located at Home: [address] [place] [address]. The provider was located at Home.      Note: not billable if this call serves to triage the patient into an appointment for the relevant concern    Laila Cutler DO

## 2022-06-11 PROBLEM — Z01.818 PREOP EXAMINATION: Status: RESOLVED | Noted: 2022-05-12 | Resolved: 2022-06-11

## 2022-06-24 ENCOUNTER — TELEPHONE (OUTPATIENT)
Dept: OBGYN | Age: 42
End: 2022-06-24

## 2022-06-24 ENCOUNTER — HOSPITAL ENCOUNTER (OUTPATIENT)
Dept: PREADMISSION TESTING | Age: 42
Discharge: HOME | End: 2022-06-28

## 2022-06-24 VITALS
RESPIRATION RATE: 16 BRPM | HEIGHT: 62 IN | HEART RATE: 90 BPM | TEMPERATURE: 97.7 F | SYSTOLIC BLOOD PRESSURE: 129 MMHG | DIASTOLIC BLOOD PRESSURE: 69 MMHG | OXYGEN SATURATION: 97 % | BODY MASS INDEX: 38.64 KG/M2 | WEIGHT: 210 LBS

## 2022-06-24 PROCEDURE — APPSS45 APP SPLIT SHARED TIME 31-45 MINUTES: Performed by: NURSE PRACTITIONER

## 2022-06-24 RX ORDER — ASPIRIN 81 MG/1
81 TABLET ORAL DAILY
COMMUNITY

## 2022-06-24 ASSESSMENT — PAIN DESCRIPTION - ORIENTATION: ORIENTATION: RIGHT

## 2022-06-24 ASSESSMENT — PAIN SCALES - GENERAL: PAINLEVEL_OUTOF10: 8

## 2022-06-24 ASSESSMENT — PAIN DESCRIPTION - DESCRIPTORS: DESCRIPTORS: ACHING

## 2022-06-24 ASSESSMENT — PAIN DESCRIPTION - LOCATION: LOCATION: FOOT

## 2022-06-24 NOTE — TELEPHONE ENCOUNTER
Patient stated she has been waiting for a call regarding a possible procedure and can be reached at [phone]. Okay to leave a message.
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
## 2022-06-24 NOTE — TELEPHONE ENCOUNTER
Left message for pt to call back. Needs to schedule a pre op appointment to discuss surgery on a Wednesday when Dr. Christopher Brewer is in the office.

## 2022-07-05 ENCOUNTER — ANESTHESIA EVENT (OUTPATIENT)
Dept: OPERATING ROOM | Age: 42
End: 2022-07-05
Payer: COMMERCIAL

## 2022-07-05 NOTE — PROGRESS NOTES
Patient's Surgery is 7-6-22. Cardiac and medical clearance requested. Cardiac clearance received. Medical clearance not received. Writer speaks with Dr. Beckie Power regarding medical clearance requested for harsh productive cough. Writer calls patient. Patient reports she no longer has cough and has no other respiratory symptoms. Dr. Beckie Power notified, reports patient may proceed with surgery without medical clearance.

## 2022-07-06 ENCOUNTER — ANESTHESIA (OUTPATIENT)
Dept: OPERATING ROOM | Age: 42
End: 2022-07-06
Payer: COMMERCIAL

## 2022-07-06 ENCOUNTER — HOSPITAL ENCOUNTER (OUTPATIENT)
Age: 42
Setting detail: OUTPATIENT SURGERY
Discharge: HOME | End: 2022-07-06
Attending: PODIATRIST | Admitting: PODIATRIST
Payer: COMMERCIAL

## 2022-07-06 VITALS
WEIGHT: 243 LBS | OXYGEN SATURATION: 97 % | RESPIRATION RATE: 20 BRPM | HEIGHT: 62 IN | TEMPERATURE: 97 F | HEART RATE: 79 BPM | BODY MASS INDEX: 44.72 KG/M2 | SYSTOLIC BLOOD PRESSURE: 157 MMHG | DIASTOLIC BLOOD PRESSURE: 79 MMHG

## 2022-07-06 DIAGNOSIS — G89.18 POSTOPERATIVE PAIN: ICD-10-CM

## 2022-07-06 DIAGNOSIS — M92.61 ACQUIRED HAGLUND'S DEFORMITY OF RIGHT HEEL: Primary | ICD-10-CM

## 2022-07-06 PROBLEM — E66.01 SEVERE OBESITY (BMI 35.0-39.9) WITH COMORBIDITY (HCC): Status: ACTIVE | Noted: 2018-07-31

## 2022-07-06 PROBLEM — R60.0 LEG EDEMA: Status: ACTIVE | Noted: 2021-08-23

## 2022-07-06 PROBLEM — G47.33 OSA (OBSTRUCTIVE SLEEP APNEA): Status: ACTIVE | Noted: 2019-08-23

## 2022-07-06 PROBLEM — R09.81 NASAL CONGESTION: Status: ACTIVE | Noted: 2018-12-05

## 2022-07-06 PROBLEM — R06.02 SOB (SHORTNESS OF BREATH): Status: ACTIVE | Noted: 2019-09-16

## 2022-07-06 PROBLEM — I35.1 SEVERE AORTIC REGURGITATION: Status: ACTIVE | Noted: 2021-05-24

## 2022-07-06 PROBLEM — R42 DIZZINESS: Status: ACTIVE | Noted: 2018-05-15

## 2022-07-06 PROBLEM — R09.82 POSTNASAL DRIP: Status: ACTIVE | Noted: 2018-12-05

## 2022-07-06 PROBLEM — R06.83 SNORING: Status: ACTIVE | Noted: 2019-03-21

## 2022-07-06 PROBLEM — R94.39 ABNORMAL STRESS TEST: Status: ACTIVE | Noted: 2018-04-05

## 2022-07-06 PROBLEM — K21.00 REFLUX ESOPHAGITIS: Status: ACTIVE | Noted: 2018-09-13

## 2022-07-06 PROBLEM — I50.9 CHF (CONGESTIVE HEART FAILURE) (HCC): Status: ACTIVE | Noted: 2022-07-06

## 2022-07-06 PROBLEM — I35.1 NONRHEUMATIC AORTIC VALVE INSUFFICIENCY: Status: ACTIVE | Noted: 2018-03-19

## 2022-07-06 PROBLEM — R55 SYNCOPE: Status: ACTIVE | Noted: 2018-03-19

## 2022-07-06 PROBLEM — R00.1 BRADYCARDIA: Status: ACTIVE | Noted: 2018-03-19

## 2022-07-06 PROBLEM — J01.90 ACUTE SINUSITIS: Status: ACTIVE | Noted: 2018-11-20

## 2022-07-06 PROBLEM — I25.10 CORONARY ARTERY DISEASE WITHOUT ANGINA PECTORIS: Status: ACTIVE | Noted: 2022-02-23

## 2022-07-06 PROBLEM — S09.93XA FACIAL TRAUMA, INITIAL ENCOUNTER: Status: ACTIVE | Noted: 2021-11-03

## 2022-07-06 PROBLEM — R07.9 CHEST PAIN: Status: ACTIVE | Noted: 2018-03-19

## 2022-07-06 LAB — HCG, PREGNANCY URINE (POC): NEGATIVE

## 2022-07-06 PROCEDURE — 2720000010 HC SURG SUPPLY STERILE: Performed by: PODIATRIST

## 2022-07-06 PROCEDURE — 2500000003 HC RX 250 WO HCPCS: Performed by: PODIATRIST

## 2022-07-06 PROCEDURE — 3700000000 HC ANESTHESIA ATTENDED CARE: Performed by: PODIATRIST

## 2022-07-06 PROCEDURE — 6360000002 HC RX W HCPCS: Performed by: NURSE ANESTHETIST, CERTIFIED REGISTERED

## 2022-07-06 PROCEDURE — 2580000003 HC RX 258: Performed by: ANESTHESIOLOGY

## 2022-07-06 PROCEDURE — 7100000030 HC ASPR PHASE II RECOVERY - FIRST 15 MIN: Performed by: PODIATRIST

## 2022-07-06 PROCEDURE — 7100000010 HC PHASE II RECOVERY - FIRST 15 MIN: Performed by: PODIATRIST

## 2022-07-06 PROCEDURE — 2500000003 HC RX 250 WO HCPCS: Performed by: NURSE ANESTHETIST, CERTIFIED REGISTERED

## 2022-07-06 PROCEDURE — 3600000013 HC SURGERY LEVEL 3 ADDTL 15MIN: Performed by: PODIATRIST

## 2022-07-06 PROCEDURE — 7100000031 HC ASPR PHASE II RECOVERY - ADDTL 15 MIN: Performed by: PODIATRIST

## 2022-07-06 PROCEDURE — 7100000000 HC PACU RECOVERY - FIRST 15 MIN: Performed by: PODIATRIST

## 2022-07-06 PROCEDURE — 3700000001 HC ADD 15 MINUTES (ANESTHESIA): Performed by: PODIATRIST

## 2022-07-06 PROCEDURE — 3600000003 HC SURGERY LEVEL 3 BASE: Performed by: PODIATRIST

## 2022-07-06 PROCEDURE — 81025 URINE PREGNANCY TEST: CPT

## 2022-07-06 PROCEDURE — 7100000001 HC PACU RECOVERY - ADDTL 15 MIN: Performed by: PODIATRIST

## 2022-07-06 PROCEDURE — 7100000011 HC PHASE II RECOVERY - ADDTL 15 MIN: Performed by: PODIATRIST

## 2022-07-06 PROCEDURE — C1713 ANCHOR/SCREW BN/BN,TIS/BN: HCPCS | Performed by: PODIATRIST

## 2022-07-06 PROCEDURE — 2709999900 HC NON-CHARGEABLE SUPPLY: Performed by: PODIATRIST

## 2022-07-06 DEVICE — IMPLANTABLE DEVICE: Type: IMPLANTABLE DEVICE | Site: ANKLE | Status: FUNCTIONAL

## 2022-07-06 RX ORDER — SODIUM CHLORIDE 9 MG/ML
INJECTION, SOLUTION INTRAVENOUS PRN
Status: DISCONTINUED | OUTPATIENT
Start: 2022-07-06 | End: 2022-07-06 | Stop reason: HOSPADM

## 2022-07-06 RX ORDER — FENTANYL CITRATE 50 UG/ML
25 INJECTION, SOLUTION INTRAMUSCULAR; INTRAVENOUS EVERY 5 MIN PRN
Status: DISCONTINUED | OUTPATIENT
Start: 2022-07-06 | End: 2022-07-06 | Stop reason: HOSPADM

## 2022-07-06 RX ORDER — PROPOFOL 10 MG/ML
INJECTION, EMULSION INTRAVENOUS PRN
Status: DISCONTINUED | OUTPATIENT
Start: 2022-07-06 | End: 2022-07-06 | Stop reason: SDUPTHER

## 2022-07-06 RX ORDER — MIDAZOLAM HYDROCHLORIDE 1 MG/ML
INJECTION INTRAMUSCULAR; INTRAVENOUS PRN
Status: DISCONTINUED | OUTPATIENT
Start: 2022-07-06 | End: 2022-07-06 | Stop reason: SDUPTHER

## 2022-07-06 RX ORDER — DEXAMETHASONE SODIUM PHOSPHATE 4 MG/ML
INJECTION, SOLUTION INTRA-ARTICULAR; INTRALESIONAL; INTRAMUSCULAR; INTRAVENOUS; SOFT TISSUE PRN
Status: DISCONTINUED | OUTPATIENT
Start: 2022-07-06 | End: 2022-07-06 | Stop reason: SDUPTHER

## 2022-07-06 RX ORDER — RIVAROXABAN 10 MG/1
10 TABLET, FILM COATED ORAL
Qty: 30 TABLET | Refills: 1 | Status: SHIPPED | OUTPATIENT
Start: 2022-07-06

## 2022-07-06 RX ORDER — SODIUM CHLORIDE 0.9 % (FLUSH) 0.9 %
5-40 SYRINGE (ML) INJECTION PRN
Status: DISCONTINUED | OUTPATIENT
Start: 2022-07-06 | End: 2022-07-06 | Stop reason: HOSPADM

## 2022-07-06 RX ORDER — SODIUM CHLORIDE 0.9 % (FLUSH) 0.9 %
5-40 SYRINGE (ML) INJECTION EVERY 12 HOURS SCHEDULED
Status: DISCONTINUED | OUTPATIENT
Start: 2022-07-06 | End: 2022-07-06 | Stop reason: HOSPADM

## 2022-07-06 RX ORDER — LIDOCAINE HYDROCHLORIDE 10 MG/ML
1 INJECTION, SOLUTION EPIDURAL; INFILTRATION; INTRACAUDAL; PERINEURAL
Status: DISCONTINUED | OUTPATIENT
Start: 2022-07-06 | End: 2022-07-06 | Stop reason: HOSPADM

## 2022-07-06 RX ORDER — LIDOCAINE HYDROCHLORIDE 10 MG/ML
INJECTION, SOLUTION INFILTRATION; PERINEURAL PRN
Status: DISCONTINUED | OUTPATIENT
Start: 2022-07-06 | End: 2022-07-06 | Stop reason: ALTCHOICE

## 2022-07-06 RX ORDER — FENTANYL CITRATE 50 UG/ML
INJECTION, SOLUTION INTRAMUSCULAR; INTRAVENOUS PRN
Status: DISCONTINUED | OUTPATIENT
Start: 2022-07-06 | End: 2022-07-06 | Stop reason: SDUPTHER

## 2022-07-06 RX ORDER — METOCLOPRAMIDE HYDROCHLORIDE 5 MG/ML
10 INJECTION INTRAMUSCULAR; INTRAVENOUS
Status: DISCONTINUED | OUTPATIENT
Start: 2022-07-06 | End: 2022-07-06 | Stop reason: HOSPADM

## 2022-07-06 RX ORDER — OXYCODONE HYDROCHLORIDE AND ACETAMINOPHEN 5; 325 MG/1; MG/1
1 TABLET ORAL EVERY 6 HOURS PRN
Qty: 28 TABLET | Refills: 0 | Status: SHIPPED | OUTPATIENT
Start: 2022-07-06 | End: 2022-07-13

## 2022-07-06 RX ORDER — ROCURONIUM BROMIDE 10 MG/ML
INJECTION, SOLUTION INTRAVENOUS PRN
Status: DISCONTINUED | OUTPATIENT
Start: 2022-07-06 | End: 2022-07-06 | Stop reason: SDUPTHER

## 2022-07-06 RX ORDER — DIPHENHYDRAMINE HYDROCHLORIDE 50 MG/ML
12.5 INJECTION INTRAMUSCULAR; INTRAVENOUS
Status: DISCONTINUED | OUTPATIENT
Start: 2022-07-06 | End: 2022-07-06 | Stop reason: HOSPADM

## 2022-07-06 RX ORDER — SODIUM CHLORIDE, SODIUM LACTATE, POTASSIUM CHLORIDE, CALCIUM CHLORIDE 600; 310; 30; 20 MG/100ML; MG/100ML; MG/100ML; MG/100ML
INJECTION, SOLUTION INTRAVENOUS CONTINUOUS
Status: DISCONTINUED | OUTPATIENT
Start: 2022-07-06 | End: 2022-07-06 | Stop reason: HOSPADM

## 2022-07-06 RX ORDER — ONDANSETRON 2 MG/ML
4 INJECTION INTRAMUSCULAR; INTRAVENOUS
Status: DISCONTINUED | OUTPATIENT
Start: 2022-07-06 | End: 2022-07-06 | Stop reason: HOSPADM

## 2022-07-06 RX ORDER — ONDANSETRON 2 MG/ML
INJECTION INTRAMUSCULAR; INTRAVENOUS PRN
Status: DISCONTINUED | OUTPATIENT
Start: 2022-07-06 | End: 2022-07-06 | Stop reason: SDUPTHER

## 2022-07-06 RX ORDER — LIDOCAINE HYDROCHLORIDE 10 MG/ML
INJECTION, SOLUTION EPIDURAL; INFILTRATION; INTRACAUDAL; PERINEURAL PRN
Status: DISCONTINUED | OUTPATIENT
Start: 2022-07-06 | End: 2022-07-06 | Stop reason: SDUPTHER

## 2022-07-06 RX ORDER — BUPIVACAINE HYDROCHLORIDE 5 MG/ML
INJECTION, SOLUTION EPIDURAL; INTRACAUDAL PRN
Status: DISCONTINUED | OUTPATIENT
Start: 2022-07-06 | End: 2022-07-06 | Stop reason: ALTCHOICE

## 2022-07-06 RX ORDER — CLINDAMYCIN PHOSPHATE 900 MG/50ML
900 INJECTION INTRAVENOUS ONCE
Status: COMPLETED | OUTPATIENT
Start: 2022-07-06 | End: 2022-07-06

## 2022-07-06 RX ADMIN — SODIUM CHLORIDE, POTASSIUM CHLORIDE, SODIUM LACTATE AND CALCIUM CHLORIDE: 600; 310; 30; 20 INJECTION, SOLUTION INTRAVENOUS at 07:08

## 2022-07-06 RX ADMIN — FENTANYL CITRATE 25 MCG: 50 INJECTION, SOLUTION INTRAMUSCULAR; INTRAVENOUS at 08:46

## 2022-07-06 RX ADMIN — SUGAMMADEX 200 MG: 100 INJECTION, SOLUTION INTRAVENOUS at 09:17

## 2022-07-06 RX ADMIN — FENTANYL CITRATE 50 MCG: 50 INJECTION, SOLUTION INTRAMUSCULAR; INTRAVENOUS at 07:59

## 2022-07-06 RX ADMIN — MIDAZOLAM 2 MG: 1 INJECTION INTRAMUSCULAR; INTRAVENOUS at 07:32

## 2022-07-06 RX ADMIN — ONDANSETRON 4 MG: 2 INJECTION INTRAMUSCULAR; INTRAVENOUS at 08:36

## 2022-07-06 RX ADMIN — FENTANYL CITRATE 100 MCG: 50 INJECTION, SOLUTION INTRAMUSCULAR; INTRAVENOUS at 07:36

## 2022-07-06 RX ADMIN — ROCURONIUM BROMIDE 50 MG: 10 INJECTION INTRAVENOUS at 07:36

## 2022-07-06 RX ADMIN — DEXAMETHASONE SODIUM PHOSPHATE 4 MG: 4 INJECTION, SOLUTION INTRAMUSCULAR; INTRAVENOUS at 07:45

## 2022-07-06 RX ADMIN — PROPOFOL 200 MG: 10 INJECTION, EMULSION INTRAVENOUS at 07:36

## 2022-07-06 RX ADMIN — CLINDAMYCIN PHOSPHATE 900 MG: 900 INJECTION, SOLUTION INTRAVENOUS at 07:47

## 2022-07-06 RX ADMIN — FENTANYL CITRATE 25 MCG: 50 INJECTION, SOLUTION INTRAMUSCULAR; INTRAVENOUS at 08:44

## 2022-07-06 RX ADMIN — LIDOCAINE HYDROCHLORIDE 40 MG: 10 INJECTION, SOLUTION EPIDURAL; INFILTRATION; INTRACAUDAL; PERINEURAL at 07:36

## 2022-07-06 ASSESSMENT — ENCOUNTER SYMPTOMS: SHORTNESS OF BREATH: 1

## 2022-07-06 ASSESSMENT — PAIN - FUNCTIONAL ASSESSMENT: PAIN_FUNCTIONAL_ASSESSMENT: 0-10

## 2022-07-06 ASSESSMENT — PAIN SCALES - GENERAL
PAINLEVEL_OUTOF10: 0
PAINLEVEL_OUTOF10: 0

## 2022-07-06 NOTE — ANESTHESIA POSTPROCEDURE EVALUATION
POST- ANESTHESIA EVALUATION       Pt Name: Elaina Zaidi  MRN: [id number]  YOB: 1980  Date of evaluation: 7/6/2022  Time:  10:52 AM      BP (!) 160/93   Pulse 77   Temp 97 °F (36.1 °C) (Infrared)   Resp 16   Ht 5' 2\" (1.575 m)   Wt 243 lb (110.2 kg)   SpO2 94%   BMI 44.45 kg/m²      Consciousness Level  Awake  Cardiopulmonary Status  Stable  Pain Adequately Treated YES  Nausea / Vomiting  NO  Adequate Hydration  YES  Anesthesia Related Complications NONE      Electronically signed by Brooklyn Kam MD on 7/6/2022 at 10:52 AM       Department of Anesthesiology  Postprocedure Note    Patient: Elaina Zaidi  MRN: [id number]  YOB: 1980  Date of evaluation: 7/6/2022      Procedure Summary     Date: 07/06/22 Room / Location: [address] [place] [address] Vladimir Rivera / [facility]: [facility]    Anesthesia Start: 6966 Anesthesia Stop: 7853    Procedure: [address] (Right Ankle) Diagnosis:       Achilles tendinitis of right lower extremity      (RIGHT FOOT ACHILLES TENDONITIS)    Surgeons: Pedro Tineo DPM Responsible Provider: Brooklyn Kam MD    Anesthesia Type: general ASA Status: 3          Anesthesia Type: No value filed.     Mason Phase I: Mason Score: 10    Mason Phase II: Mason Score: 10      Anesthesia Post Evaluation

## 2022-07-06 NOTE — OP NOTE
PODIATRY OP NOTE    PATIENT NAME: Noelle Thomas  YOB: 1980  -  36 y.o. female  MRN: [id number]  DATE: 7/6/2022  BILLING #: [id number]    Surgeon(s):  Vickey Jackson DPM     ASSISTANTS: Derick Box DPM PGY-3    PRE-OP DIAGNOSIS:   Achilles tendinitis, right foot    POST-OP DIAGNOSIS: Same as above. PROCEDURE:   Resection Right Foot Posterior Calcaneal Spur   Repair of Achilles Tendon Right Foot   Application of cast, right foot  Lincoln of cast, right foot. ANESTHESIA: General     HEMOSTASIS: Pneumatic thigh tourniquet at 300 mmHg for 68 minutes    ESTIMATED BLOOD LOSS: Less than 10cc. MATERIALS:   Implant Name Type Inv. Item Serial No.  Lot No. LRB No. Used Action   ANCHOR SUT DIA2. 9MM SFT JUGGERKNOT - [id number]  ANCHOR SUT DIA2. 9MM SFT JUGGERKNOT  [employer]- [id number] 1 Implanted   ANCHOR SUT DIA2. 9MM SFT JUGGERKNOT - [id number]  ANCHOR SUT DIA2. 9MM SFT JUGGERKNOT  DARYL [employer]- [id number] Right 1 Implanted       INJECTABLES:   Pre-op 10cc 1:1 mix of 0.5% marcaine plain and 1% lidocaine plain  Post-op 20cc 0.5% Marcaine plain. SPECIMEN:   * No specimens in log *    COMPLICATIONS: none    FINDINGS: see op note below for detail    The patient was counseled at length about the risks of ignacio Covid-19 during their perioperative period and any recovery window from their procedure. The patient was made aware that ingacio Covid-19  may worsen their prognosis for recovering from their procedure  and lend to a higher morbidity and/or mortality risk. All material risks, benefits, and reasonable alternatives including postponing the procedure were discussed. The patient does wish to proceed with the procedure at this time. Indications for procedure:  Patient has had pain in her heel for years with little relief from conservative treatments.  Patient underwent surgery to the left foot with significant relief and presents today for surgery for the right lower extremity. Patient understands reason for procedure. Risks and benefits discussed no guarantees given or implied. Consent is signed in chart. PROCEDURE IN DETAIL: The patient was transported from pre-op to the operating room and placed on the operating table in the prone position, once adequate anesthesia was given and intubation performed. A safety strap was placed across the lap. A pneumatic thigh tourniquet was placed about the patient's right thigh. The lower extremity was then scrubbed, prepped and draped in the usual aseptic manner. The lower extremity was elevated and exsanguinated using an Esmarch bandage. The tourniquet was then inflated. Attention was then directed to the posterior heel where a linear incision was made overlying the patient's achilles and calcaneus centrally. The incision was deepened down in a layered fashion through skin and subcutaneous layers, superficial and deep fascia and peritenon utilizing a combination of sharp and blunt dissection. Care was taken to retract all vital neurovascular structures. All bleeders were cauterized and ligated as necessary. Once down to the level of the the achilles tendon, the tendon was split midline and reflected medially and laterally. There was noted to be some yellow discoloration in portions of the thickened achilles tendon. These areas were then excised. There was a mild exostosis off of the posterior calcaneus. This was removed using osteotomes. The area was made smooth with a power rasp. The surgical site was irrigated with copious amounts of saline. The Achilles tendon was then reattached using the mark anthony [employer] juggerknott system. This was achieved by placing 2 anchors one centimeter apart into the calcaneus. Each anchor had 2 sutures. 1 suture from each anchor was used to secure the achilles tendon to the calcaneus.  The other suture of each anchor was then advanced proximally through the achilles tendon in a crossing pattern. The sutures were then tied. A 3-0 Vicryl was then used to further close the achilles tendon. The construct appeared strong and intact. Skin was closed in a layered fashion utilizing 3-0 vicryl, 4-0 monocryl and 4-0 prolene. A post op injection of 20cc 0.5% Marcaine plain was administered. Dressings were then applied consisted of xeroform, 4 x 4s, Kerlix, ACE. The tourniquet was deflated and prompt capillary response was noted to the left lower extremity. Next a cast was applied with care taken to keep the foot in gravity equinus near 90 degrees. The patient tolerated the above procedure and anesthesia well without complications. The patient was transported from the operating room to the PACU with vital signs stable and vascular status intact to the right foot. The cast was then bivalved in the PACU unit.      Morgan Gardiner DPM   Podiatric Medicine & Surgery   7/6/2022 at 9:21 AM

## 2022-07-06 NOTE — H&P
PLASMA   Ref Range & Units 2 wk ago   Potassium, Bld 3.5 - 5.0 mmol/L 4.7    Resulting [address] [facility]   Specimen Collected: 06/17/22 12:48 PM Last Resulted: 06/17/22  5:09 PM   Received From: [facility]  Result Received: 06/29/22  4:29 PM    View Encounter        Recent Data from [address] [place] [address] to Potassium  Component 06/17/22 06/14/22 06/09/22 06/06/22 06/06/22 06/03/22    Potassium, Bld 4.7 4.4 3.2 Low  3.2 Low  3.3 Low  3.2 Low    View all related data            Contains abnormal data Liver panel  Specimen:  PLASMA   Ref Range & Units 2 wk ago   Alkaline phosphatase 39 - 130 U/L 105    AST 0 - 41 U/L 24    ALT 0 - 31 U/L 44 High     Total Bilirubin 0.3 - 1.2 mg/dL 0.3    Bilirubin, direct 0.0 - 0.4 mg/dL 0.0    Albumin 3.2 - 5.3 g/dL 4.3    Total Protein 6.0 - 8.0 g/dL 7.4    [address] [facility]   Specimen Collected: 06/17/22 12:48 PM Last Resulted: 06/17/22  5:09 PM   Received From: [facility]  Result Received: 06/24/22 11:22 AM    View Encounter      Recent Data from [facility]  Related to Liver panel  Component 06/17/22 06/14/22 04/08/22 02/23/22 07/06/18 12/19/13   Alkaline phosphatase 105 104 104 -- -- --   AST 24 26 24 15 16 21   ALT 44 38 High  34 High  31 15 19   Total Bilirubin 0.3 0.3 0.4 -- -- --   Bilirubin, direct 0.0 0.1 0.1 -- -- --   Albumin 4.3 4.2 4.6 4.3 4.2 4.0   Total Protein 7.4 7.0 7.9 7.2 7.2 7.6             Potassium  Specimen:  PLASMA   Ref Range & Units 3 wk ago   Potassium, Bld 3.5 - 5.0 mmol/L 4.4    Resulting [address] [facility]   Specimen Collected: 06/14/22  1:16 PM Last Resulted: 06/14/22  7:35 PM   Received From: [facility]  Result Received: 06/29/22  4:29 PM    View Encounter      Recent Data from [facility]  Related to Potassium  Component 06/17/22 06/14/22 06/09/22 06/06/22 06/06/22 06/03/22    Potassium, Bld 4.7 4.4 3.2 Low  3.2 Low  3.3 Low  3.2 Low             CBC auto differential  Order: [id number]   Ref Range & Units 6/6/22 1531   White Blood Cells 4.0 - 11.0 X10E9/L 6.4    RBC count 3.80 - 5.20 X10E12/L 4.42    Hemoglobin 11.7 - 15.5 g/dL 13.9    Hematocrit 35 - 47 % 39.8    MCV 80 - 100 fL 90    MCH 27 - 34 pg 31.4    MCHC 32 - 36 g/dL 35.0    RDW 11.5 - 15.0 % 13.9    Platelets 511 - 603 T29D0/A 253    MPV 7 - 12 fL 7.4    % neutrophils % 51.1    % lymphocytes % 38.1    % monocytes % 9.1    % eosinophils % 1.1    % Basophils % 0.6    Neutrophils Absolute (A) 1.5 - 6.6 X10E9/L 3.3    Lymphocytes Absolute 1.0 - 3.5 X10E9/L 2.4    Monocytes Absolute 0 - 0.9 X10E9/L 0.6    Eosinophils Absolute 0.0 - 0.4 X10E9/L 0.1    Basophils Absolute 0.0 - 0.2 X10E9/L 0.0    Resulting [address]   Specimen Collected: 06/06/22 15:31 Last Resulted: 06/06/22 15:47   Received From: [address] [place] [address] [employer]  Result Received: 06/08/22 14:55     CARDIAC TESTING:  Cardiac catheterization, 5-25-21    Anatomical Region Laterality Modality   -- -- X-Ray Angiography       Narrative  Performed by Elizabeth Waters  Impression:   1. Normal left and right-sided filling pressures   2. Normal LV systolic function with LV hypertrophy and normal LV size.  EF   55%   3. On aortic root injection only mild aortic insufficiency, on JAMES   moderate to severe      ECHO, 3-9-22:  Echo complete W/O contrast    Anatomical Region Laterality Modality   Chest -- Ultrasound       Narrative  Performed by Dawn Flowers  Left Ventricle: Systolic function is normal with an ejection fraction   of 55-60%.   Aortic Valve: There is moderate to severe regurgitation. There is no   evidence of aortic valve stenosis.   If patient has worsening dyspnea exertion, would recommend further   evaluation with JAMES to further assess the aortic regurgitation.      Past Medical History:   Diagnosis Date    Abnormal Pap smear of cervix 02/29/2016    Abnormal stress test 04/05/2018    Achilles tendonitis     Right foot    Anxiety     Aortic regurgitation     mild-mod on echo    Asthma     Bradycardia 03/19/2018    Cancer (HCC)     cervical cancer    Chest pain 03/19/2018    CHF (congestive heart failure) ([facility] 07/06/2022    Coronary artery disease without angina pectoris 02/23/2022    Depression     Dizziness 05/15/2018    GERD (gastroesophageal reflux disease)     Headache(784.0)     Heart murmur     Herpes     Hyperlipidemia     Hypertension     [facility]-Dr. Poonam Nam    Leaky heart valve     Migraine headache     Obesity     Schizophrenia ([facility]     Seasonal allergies     Sleep apnea     no machine    Snoring 03/21/2019    SOB (shortness of breath) 09/16/2019    Syncope 03/19/2018    Thyroid disease     Hypothyroid    Wears partial dentures     upper and lower partial     Past Surgical History:   Procedure Laterality Date    ACHILLES TENDON SURGERY Left 08/27/2021    LEFT DETACH & REATTACH ACHILLES TENDON performed by Ninoska Carrillo DPM at [facility]  05/25/2021    no stents    CERVIX BIOPSY N/A 04/05/2019    COLD KNIFE CONIZATION WITH BIOPSY performed by Bill Cuba DO at [facility] [place], DIAGNOSTIC      EGD    LEEP  06/06/2016    PRE-MALIGNANT / BENIGN SKIN LESION EXCISION Left 05/12/2016    foot     Patient Active Problem List   Diagnosis    Schizophrenia ([facility]    HTN (hypertension)    Anxiety    GERD (gastroesophageal reflux disease)    Hyperlipemia    EVER III (cervical intraepithelial neoplasia III)    Patellofemoral pain syndrome of left knee    Hx LEEP     [facility] 4/5/19    Cervical dysplasia    Abnormal stress test    Acute sinusitis    Allergic rhinitis    Atypical squamous cells cannot exclude high grade squamous intraepithelial lesion on cytologic smear of cervix (ASC-H)    Bradycardia    Chest pain    CHF (congestive heart failure) ([facility]    Coronary artery disease without angina pectoris    Dizziness    Facial trauma, initial encounter    Leg edema    Migraine without aura    Nasal congestion    Odynophagia    DIANA (obstructive sleep apnea)    Postnasal drip    Reflux esophagitis    Nonrheumatic aortic valve insufficiency    Severe aortic regurgitation    Severe obesity (BMI 35.0-39. 9) with comorbidity ([address]    Smoker    Snoring    SOB (shortness of breath)    Syncope     Surgical site was confirmed per myself and the patient. Discussed most recent potassium level of 4.7 on 6-17-22 w/Dr. Harley Gibbons- no supplementation needed today. Notified pre-op RN, Genny Dill. Discussed patient's respiratory assessment w/Dr. Harley Gibbons. See patient's Claudeen Riding chart for further detail regarding medical hx. Electronically signed by SHERRIE Harris CNP on 7/6/2022 at 7:20 AM           SHERRIE Borden CNP   Nurse Practitioner   Internal Medicine   H&P       Cosign Needed   Date of Service:  6/24/2022 12:00 PM         Related encounter: Pre-Admission Testing Visit 30 min from 6/24/2022 in [address] [place] [address] Drive All Collapse All              HISTORY and [address]         NAME:  Wilda Hein  MRN: [id number]   YOB: 1980   Date: 6/24/2022   Age: 36 y.o. Gender: female         COMPLAINT AND PRESENT HISTORY:      Wilda Hein is 36 y.o. , female, Preadmission Testing for RIGHT FOOT ACHILLES TENDONITIS. She is scheduled to have, ACHILLES TENDON LENGTHENING REPAIR ACHILLES TENDON [facility].     Patient reports that she has had pain in her right leg for some time, she was unable to give an onset. Patient denies any prior injury to right leg. Patient states that she has severe pain when walking, prolonged standing at times and it mainly aggravates sxs. She states resting help. She rates her pain at 10/10 when walking.      She denies using any conservative measures except for ICE. Pt admits to numbness and tingling to the right foot. Patient  had achilles tendon surgery Left on 8/27/21and was wearing a brace at that time  Patient reports a recent fall approx 2 weeks ago. She states that she lost sensation in her right foot and upon stepping fell to her face, she denies losing any consciousness.     Significant medical history     Asthma: Takes singulair. Uses symbicort and albuterol inhaler prn. Pt has present productive, harsh cough, she admits to SOB.    CAD- no stents/HTN / HLD / Murmur / Aortic Regurgitation, Leg edema:  Takes lipitor, lasix, losartan, Toprol, Potassium supplements. Patient complains of chest pain periodically.      Thyroid- Synthroid     Sleep apnea: doesn't wear CPAP at night. \" I don't know to use the machine\"       Pt was originally scheduled for this surgery in late May. Pt did require medical clearance prior to but did not obtain due to hypokalemia, SOB and palpitations. Pt PCP requested cardiology clearance.      Pt did see ADEBAYO Coon for cardiology on 06/06/2022. Pt reported to NP she was c/o ongoing left sided chest pressure and felt as though she had an elephant on her chest. Pt was seen in the ED for on 06/06/2022. Troponin <0.01, BNP 16, creatinine 1.13, K 3.2.      Last EKG done 6/6/22 [facility] ED revealed  Sinus rhythm nonspecific IVCD with LAD, left ventricular hypertrophy, abnormal T wave, consider ischemia  Most recent potassium 4.7 on 06/17/2022 per Care Everywhere   Echocardiogram completed on 03/09/2022:  Narrative       Left Ventricle: Systolic function is normal with an ejection fraction   of 55-60%.   Aortic Valve: There is moderate to severe regurgitation. There is no   evidence of aortic valve stenosis.      If patient has worsening dyspnea exertion, would recommend further   evaluation with JAMES to further assess the aortic regurgitation.      Past Surgical History:   Procedure Laterality Date    Cardiac catheterization [facility] prior N/A 5/25/2021   Performed by Efrain Olvera MD at [address] [facility] [address] angiogram and right heart and left ventricular gram/pressure N/A 5/25/2021   Performed [facility] at [facility]    FOOT SURGERY    Inject aortic root complete N/A 5/25/2021   Performed by Efrain Olvera MD at [address] [facility] and Cardiac clearance required. Surgery scheduling will notify Dr. Gloria Will office who will be responsible for making sure the clearance is obtained and is in the chart for surgery.     Patient states that she did see Cardiology this past Friday 6/17/22.          Lab Results   Component Value Date     WBC 7.1 05/12/2022     HGB 14.0 05/12/2022     HCT 39.9 05/12/2022     MCV 90.2 05/12/2022      05/12/2022      Lab Results   Component Value Date      05/12/2022     K 3.0 05/12/2022      05/12/2022     CO2 26 05/12/2022     BUN 8 05/12/2022     CREATININE 1.00 05/12/2022     GLUCOSE 93 05/12/2022     CALCIUM 9.0 05/12/2022            PAST MEDICAL HISTORY      Past Medical History        Past Medical History:   Diagnosis Date    Abnormal Pap smear of cervix [id number]    Anxiety      Aortic regurgitation       mild-mod on echo    Asthma      Cancer (HCC)       cervical cancer    Depression      GERD (gastroesophageal reflux disease)      Headache(784.0)      Heart murmur      Herpes      Hyperlipidemia      Hypertension       [facility]-Dr. Calos Linton    Leaky heart valve      Migraine headache      Obesity      Schizophrenia ([facility]      Seasonal allergies      Sleep apnea       no machine    Thyroid disease       Hypothyroid    Wears partial dentures       upper and lower partial         SURGICAL HISTORY        Past Surgical History[]Expand by Default         Past Surgical History:   Procedure Laterality Date    ACHILLES TENDON SURGERY Left 8/27/2021     LEFT DETACH & REATTACH ACHILLES TENDON performed by Sarah Kam DPM at [facility] [facility]   06/2021     no stents    CERVIX BIOPSY N/A 4/5/2019     COLD KNIFE CONIZATION WITH BIOPSY performed by Neetu Tejeda DO at [facility], [place], DIAGNOSTIC         EGD    LEEP   06/06/16    PRE-MALIGNANT / BENIGN SKIN LESION EXCISION Left 5/12/16     foot            FAMILY HISTORY        Family History[]Expand by Default         Family History   Problem Relation Age of Onset    Cancer Mother           uterine    Diabetes Mother      High Blood Pressure Mother      Endometrial Cancer Mother      Breast Cancer Mother      Diabetes Brother      Heart Disease Brother              SOCIAL HISTORY        Social History   Social History            Socioeconomic History    Marital status: Single       Spouse name: None    Number of children: None    Years of education: None    Highest education level: None   Occupational History    None   Tobacco Use    Smoking status: Current Every Day Smoker       Types: Cigars    Smokeless tobacco: Never Used    Tobacco comment: 1 Black & Mild daily   Vaping Use    Vaping Use: Never used   Substance and Sexual Activity    Alcohol use: No    Drug use: Yes       Frequency: 7.0 times per week       Types: Marijuana Valdene Carvalho)       Comment: daily    Sexual activity: Yes       Partners: Male   Other Topics Concern    None   Social History Narrative    None      Social Determinants of Health          Financial Resource Strain:     Difficulty of Paying Living Expenses: Not on file   Food Insecurity:     Worried About Running Out of Food in the Last Year: Not on file    Gayathri of Food in the Last Year: Not on file   Transportation Needs:     Lack of Transportation (Medical): Not on file    Lack of Transportation (Non-Medical):  Not on file   Physical Activity:     Days of Exercise per Week: Not on file    Minutes of Exercise per Session: Not on file   Stress:     Feeling of Stress : Not on file   Social Connections:     Frequency of Communication with Friends and Family: Not on file    Frequency of Social Gatherings with Friends and Family: Not on file    Attends Voodoo Services: Not on file    Active Member of Clubs or Organizations: Not on file    Attends Club or Organization Meetings: Not on file    Marital Status: Not on file   Intimate Partner Violence:     Fear of Current or Ex-Partner: Not on file    Emotionally Abused: Not on file    Physically Abused: Not on file    Sexually Abused: Not on file   Housing Stability:     Unable to Pay for Housing in the Last Year: Not on file    Number of [place] in the Last Year: Not on file    Unstable Housing in the Last Year: Not on file               REVIEW OF SYSTEMS            Allergies   Allergen Reactions    Penicillins Anaphylaxis    Codeine Hives    Codeine Itching                Current Outpatient Medications on File Prior to Encounter   Medication Sig Dispense Refill    aspirin 81 MG EC tablet Take 81 mg by mouth daily        Prenatal Vit-Fe Fumarate-FA (NIVA-PLUS) 27-1 MG TABS Take 1 tablet by mouth daily 30 tablet 11    diphenhydrAMINE (BENADRYL) 25 MG capsule Take 25 mg by mouth every 6 hours as needed for Itching        OXcarbazepine (TRILEPTAL) 150 MG tablet Take 150 mg by mouth 2 times daily        azelastine (ASTELIN) 0.1 % nasal spray 1 spray by Nasal route 2 times daily Use in each nostril as directed        loratadine (CLARITIN) 10 MG capsule Take 10 mg by mouth daily        potassium chloride (KLOR-CON) 10 MEQ extended release tablet Take 60 mEq by mouth daily         montelukast (SINGULAIR) 10 MG tablet Take 10 mg by mouth nightly        mirtazapine (REMERON) 30 MG tablet Take 30 mg by mouth nightly as needed         metoprolol succinate (TOPROL XL) 25 MG extended release tablet Take 25 mg by mouth daily         losartan (COZAAR) 25 MG tablet Take 25 mg by mouth daily       furosemide (LASIX) 20 MG tablet Take 20 mg by mouth daily        Cholecalciferol (VITAMIN D3 PO) Take 2,000 Units by mouth daily         acetaminophen (TYLENOL) 500 MG tablet Take 2 tablets by mouth 3 times daily for 10 days 60 tablet 0    LATUDA 120 MG tablet Take 120 mg by mouth daily    2    atorvastatin (LIPITOR) 20 MG tablet Take 20 mg by mouth daily   2    levothyroxine (SYNTHROID) 50 MCG tablet Take 50 mcg by mouth daily   2    SUMAtriptan (IMITREX) 50 MG tablet Take 100 mg by mouth once as needed for Migraine        fluticasone (VERAMYST) 27.5 MCG/SPRAY nasal spray 2 sprays by Nasal route 2 times daily        docusate sodium (COLACE) 100 MG capsule Take 1 capsule by mouth 2 times daily 30 capsule 0    econazole nitrate 1 % cream Apply 1 each topically daily as needed         Emollient (LUBRIDERM) LOTN Apply 1 each topically daily         topiramate (TOPAMAX) 25 MG tablet Take 25 mg by mouth daily         Budesonide-Formoterol Fumarate (SYMBICORT IN) Inhale 2 puffs into the lungs daily as needed         cetirizine (ZYRTEC ALLERGY) 10 MG TABS Take 10 mg by mouth daily 30 tablet 0    albuterol (PROVENTIL HFA) 108 (90 BASE) MCG/ACT inhaler Inhale 2 puffs into the lungs every 4 hours as needed for Wheezing.        diphenoxylate-atropine (LOMOTIL) 2.5-0.025 MG per tablet Take 1 tablet by mouth daily as needed         pantoprazole (PROTONIX) 40 MG tablet Take 40 mg by mouth daily.        ondansetron (ZOFRAN) 4 MG tablet Take 1 tablet by mouth every 8 hours as needed for Nausea. 20 tablet 0      No current facility-administered medications on file prior to encounter.         General health:  Fairly good. No fever or chills. Skin:  No itching, redness or rash. HEENT:  No headache, epistaxis or sore throat. Neck:  No pain, stiffness or masses. Cardiovascular/Respiratory system:  No chest pain, palpitation or shortness of breath. Gastrointestinal tract: No abdominal pain, Dysphagia, nausea, vomiting, diarrhea or constipation. Genitourinary:  No burning on micturition. No hesitancy, urgency, frequency or discoloration of urine. Locomotor:  See HPI. Neuropsychiatric:  No referable complaints.                  GENERAL PHYSICAL EXAM:      Vitals: /69   Pulse 90   Temp 97.7 °F (36.5 °C) (Infrared)   Resp 16   Ht 5' 2\" (1.575 m)   Wt 210 lb (95.3 kg)   LMP 06/05/2022 (Approximate)   SpO2 97%   BMI 38.41 kg/m²  Body mass index is 38.41 kg/m².         GENERAL APPEARANCE:   Cece Guerra is 36 y.o., female, moderately obese, nourished, conscious, alert. Does not appear to be distress or pain at this time. SKIN:  Warm, dry, no cyanosis or jaundice. Long fingernails noted. HEAD:  Normocephalic, atraumatic, no swelling or tenderness. EYES:  Pupils equal, reactive to light. EARS:  No discharge, no marked hearing loss. NOSE:  No rhinorrhea, epistaxis or septal deformity. THROAT:  Not congested. No ulceration bleeding or discharge. NECK:  No stiffness, trachea central.                  CHEST:  Symmetrical and equal on expansion. HEART:  RRR S1 > S2. No audible murmurs or gallops. LUNGS:  Equal on expansion, harsh, coarse, productive cough. Coarseness heard in anterior and posterior fields. Patient smells of smoke, is tobacco dependent           ABDOMEN:  Obese. Soft on palpation. No localized tenderness. No guarding or rigidity. LYMPHATICS:  No palpable cervical lymphadenopathy.      LOCOMOTOR, BACK AND SPINE:  No tenderness or deformities. EXTREMITIES:  Symmetrical, no pretibial edema. No calf tenderness.   No discoloration or ulcerations.     NEUROLOGIC:  The patient is conscious, alert, oriented,Cranial nerve II-XII intact, taste and smell were not examined. No apparent focal sensory or motor deficits.                                                                                      PROVISIONAL DIAGNOSES / SURGERY:       ACHILLES TENDON LENGTHENING REPAIR ACHILLES TENDON [address] AND [place]     RIGHT FOOT ACHILLES TENDONITIS           Patient Active Problem List     Diagnosis Date Noted    Schizophrenia ([facility] 09/03/2014    HTN (hypertension) 09/03/2014    Anxiety 09/03/2014    GERD (gastroesophageal reflux disease) 09/03/2014    Hyperlipemia 09/03/2014    Cold [facility]ife [facility] 4/5/19 04/05/2019    Cervical dysplasia      Hx LEEP  02/21/2019    Patellofemoral pain syndrome of left knee 08/15/2017    EVER III (cervical intraepithelial neoplasia III)           ALEXANDRE FERRARI, APRN - CNP on 6/24/2022 at 8:57 PM     Total time spent on encounter- PAT provider minutes: 31-40 minutes

## 2022-07-06 NOTE — ANESTHESIA PRE PROCEDURE
Department of Anesthesiology  Preprocedure Note       Name:  Telma Portillo   Age:  36 y.o.  :  1980                                          MRN:  [id number]         Date:  2022      Surgeon: Stephy Ham):  Aleyda Goodwin DPM    Procedure: Procedure(s):  ACHILLES TENDON LENGTHENING REPAIR ACHILLES TENDON  [facility]    Medications prior to admission:   Prior to Admission medications    Medication Sig Start Date End Date Taking?  Authorizing Provider   aspirin 81 MG EC tablet Take 81 mg by mouth daily    Historical Provider, MD   Prenatal Vit-Fe Fumarate-FA (NIVA-PLUS) 27-1 MG TABS Take 1 tablet by mouth daily 22   Dakota Bishop DO   diphenhydrAMINE (BENADRYL) 25 MG capsule Take 25 mg by mouth every 6 hours as needed for Itching    Historical Provider, MD   OXcarbazepine (TRILEPTAL) 150 MG tablet Take 150 mg by mouth 2 times daily    Historical Provider, MD   azelastine (ASTELIN) 0.1 % nasal spray 1 spray by Nasal route 2 times daily Use in each nostril as directed    Historical Provider, MD   loratadine (CLARITIN) 10 MG capsule Take 10 mg by mouth daily    Historical Provider, MD   potassium chloride (KLOR-CON) 10 MEQ extended release tablet Take 60 mEq by mouth daily     Historical Provider, MD   montelukast (SINGULAIR) 10 MG tablet Take 10 mg by mouth nightly 5/3/21   Historical Provider, MD   mirtazapine (REMERON) 30 MG tablet Take 30 mg by mouth nightly as needed     Historical Provider, MD   metoprolol succinate (TOPROL XL) 25 MG extended release tablet Take 25 mg by mouth daily  21   Historical Provider, MD   losartan (COZAAR) 25 MG tablet Take 25 mg by mouth daily  21   Historical Provider, MD   furosemide (LASIX) 20 MG tablet Take 20 mg by mouth daily 21   Historical Provider, MD   Cholecalciferol (VITAMIN D3 PO) Take 2,000 Units by mouth daily  3/17/21   Historical Provider, MD   acetaminophen (TYLENOL) 500 MG tablet Take 2 tablets by mouth 3 times daily for 10 days 5/11/21 5/24/22  Tanja Luo MD   LATUDA 120 MG tablet Take 120 mg by mouth daily  3/11/19   Historical Provider, MD   atorvastatin (LIPITOR) 20 MG tablet Take 20 mg by mouth daily 3/18/19   Historical Provider, MD   levothyroxine (SYNTHROID) 50 MCG tablet Take 50 mcg by mouth daily 3/18/19   Historical Provider, MD   SUMAtriptan (IMITREX) 50 MG tablet Take 100 mg by mouth once as needed for Migraine    Historical Provider, MD   fluticasone (VERAMYST) 27.5 MCG/SPRAY nasal spray 2 sprays by Nasal route 2 times daily    Historical Provider, MD   docusate sodium (COLACE) 100 MG capsule Take 1 capsule by mouth 2 times daily 6/16/17   Jayy Willis MD   econazole nitrate 1 % cream Apply 1 each topically daily as needed  3/29/16   Historical Provider, MD   Emollient Adia Scriver) LOTN Apply 1 each topically daily  3/30/16   Historical Provider, MD   topiramate (TOPAMAX) 25 MG tablet Take 25 mg by mouth daily  3/30/16   Historical Provider, MD   Budesonide-Formoterol Fumarate (SYMBICORT IN) Inhale 2 puffs into the lungs daily as needed     Historical Provider, MD   cetirizine (ZYRTEC ALLERGY) 10 MG TABS Take 10 mg by mouth daily 7/8/15   Paolo Villegas MD   albuterol (PROVENTIL HFA) 108 (90 BASE) MCG/ACT inhaler Inhale 2 puffs into the lungs every 4 hours as needed for Wheezing. Historical Provider, MD   diphenoxylate-atropine (LOMOTIL) 2.5-0.025 MG per tablet Take 1 tablet by mouth daily as needed  4/18/14   Historical Provider, MD   pantoprazole (PROTONIX) 40 MG tablet Take 40 mg by mouth daily. Historical Provider, MD   ondansetron (ZOFRAN) 4 MG tablet Take 1 tablet by mouth every 8 hours as needed for Nausea.  4/18/14   Bryant Rahman MD       Current medications:    Current Facility-Administered Medications   Medication Dose Route Frequency Provider Last Rate Last Admin    lidocaine PF 1 % injection 1 mL  1 mL IntraDERmal Once PRN Christ Kemp MD        lactated ringers infusion   IntraVENous 3/19/2018    Cancer ([facility]     cervical cancer    Chest pain 3/19/2018    CHF (congestive heart failure) ([facility] 7/6/2022    Coronary artery disease without angina pectoris 2/23/2022    Depression     Dizziness 5/15/2018    GERD (gastroesophageal reflux disease)     Headache(784.0)     Heart murmur     Herpes     Hyperlipidemia     Hypertension     [facility]-Dr. Wilber Johnson    Leaky heart valve     Migraine headache     Obesity     Schizophrenia ([facility]     Seasonal allergies     Sleep apnea     no machine    Snoring 3/21/2019    SOB (shortness of breath) 9/16/2019    Syncope 3/19/2018    Thyroid disease     Hypothyroid    Wears partial dentures     upper and lower partial       Past Surgical History:        Procedure Laterality Date    ACHILLES TENDON SURGERY Left 08/27/2021    LEFT DETACH & REATTACH ACHILLES TENDON performed by Hellen Michael DPM at [address] [place] [address]  05/25/2021    no stents    CERVIX BIOPSY N/A 04/05/2019    COLD KNIFE CONIZATION WITH BIOPSY performed by Jessy Peace DO at [place] [address], [place], DIAGNOSTIC      EGD    LEEP  06/06/2016    PRE-MALIGNANT / BENIGN SKIN LESION EXCISION Left 05/12/2016    foot       Social History:    Social History     Tobacco Use    Smoking status: Current Every Day Smoker     Types: Cigars    Smokeless tobacco: Never Used    Tobacco comment: 1 Black & Mild daily, prior to this cigarette smoker- a long time smoker- unsure how much   Substance Use Topics    Alcohol use:  No                                Ready to quit: Not Answered  Counseling given: Not Answered  Comment: 1 Black & Mild daily, prior to this cigarette smoker- a long time smoker- unsure how much      Vital Signs (Current):   Vitals:    07/06/22 0637   BP: 125/73   Pulse: 68   Resp: 18   Temp: 97.7 °F (36.5 °C)   TempSrc: Infrared   SpO2: 95%   Weight: 243 lb (110.2 kg)   Height: 5' 2\" (1.575 m) BP Readings from Last 3 Encounters:   07/06/22 125/73   06/29/22 138/79   06/24/22 129/69       NPO Status: Time of last liquid consumption: 2359 (sip water with pills this am)                        Time of last solid consumption: 2359                        Date of last liquid consumption: 07/05/22                        Date of last solid food consumption: 07/05/22    BMI:   Wt Readings from Last 3 Encounters:   07/06/22 243 lb (110.2 kg)   06/29/22 239 lb (108.4 kg)   06/24/22 210 lb (95.3 kg)     Body mass index is 44.45 kg/m². CBC:   Lab Results   Component Value Date/Time    WBC 7.1 05/12/2022 12:45 PM    RBC 4.42 05/12/2022 12:45 PM    RBC 3.89 03/20/2012 05:39 AM    HGB 14.0 05/12/2022 12:45 PM    HCT 39.9 05/12/2022 12:45 PM    MCV 90.2 05/12/2022 12:45 PM    RDW 13.7 05/12/2022 12:45 PM     05/12/2022 12:45 PM     03/20/2012 05:39 AM       CMP:   Lab Results   Component Value Date/Time     05/12/2022 12:45 PM    K 3.0 05/12/2022 12:45 PM     05/12/2022 12:45 PM    CO2 26 05/12/2022 12:45 PM    BUN 8 05/12/2022 12:45 PM    CREATININE 1.00 05/12/2022 12:45 PM    GFRAA >60 05/12/2022 12:45 PM    LABGLOM >60 05/12/2022 12:45 PM    GLUCOSE 93 05/12/2022 12:45 PM    PROT 6.8 05/13/2021 10:41 AM    CALCIUM 9.0 05/12/2022 12:45 PM    BILITOT 0.18 05/13/2021 10:41 AM    ALKPHOS 84 05/13/2021 10:41 AM    AST 27 05/13/2021 10:41 AM    ALT 30 05/13/2021 10:41 AM       POC Tests: No results for input(s): POCGLU, POCNA, POCK, POCCL, POCBUN, POCHEMO, POCHCT in the last 72 hours.     Coags: No results found for: PROTIME, INR, APTT    HCG (If Applicable):   Lab Results   Component Value Date    PREGTESTUR negative 05/24/2022    HCG NEGATIVE 08/27/2021    HCGQUANT <1 08/04/2021        ABGs: No results found for: PHART, PO2ART, EOU6FRC, TVH5WQD, BEART, S5IYCXFI     Type & Screen (If Applicable):  No results found for: LABABO, LABRH    Drug/Infectious Status (If Applicable):  Lab Results

## 2022-07-20 ENCOUNTER — OFFICE VISIT (OUTPATIENT)
Dept: OBGYN | Age: 42
End: 2022-07-20
Payer: COMMERCIAL

## 2022-07-20 VITALS
SYSTOLIC BLOOD PRESSURE: 140 MMHG | WEIGHT: 245 LBS | DIASTOLIC BLOOD PRESSURE: 77 MMHG | HEIGHT: 70 IN | HEART RATE: 89 BPM | BODY MASS INDEX: 35.07 KG/M2

## 2022-07-20 DIAGNOSIS — Z98.890 HISTORY OF LOOP ELECTRICAL EXCISION PROCEDURE (LEEP): ICD-10-CM

## 2022-07-20 DIAGNOSIS — Z01.818 PRE-OP EVALUATION: ICD-10-CM

## 2022-07-20 DIAGNOSIS — D06.9 CIN III WITH SEVERE DYSPLASIA: Primary | ICD-10-CM

## 2022-07-20 PROCEDURE — 4004F PT TOBACCO SCREEN RCVD TLK: CPT | Performed by: STUDENT IN AN ORGANIZED HEALTH CARE EDUCATION/TRAINING PROGRAM

## 2022-07-20 PROCEDURE — G8417 CALC BMI ABV UP PARAM F/U: HCPCS | Performed by: STUDENT IN AN ORGANIZED HEALTH CARE EDUCATION/TRAINING PROGRAM

## 2022-07-20 PROCEDURE — 99213 OFFICE O/P EST LOW 20 MIN: CPT | Performed by: STUDENT IN AN ORGANIZED HEALTH CARE EDUCATION/TRAINING PROGRAM

## 2022-07-20 PROCEDURE — G8427 DOCREV CUR MEDS BY ELIG CLIN: HCPCS | Performed by: STUDENT IN AN ORGANIZED HEALTH CARE EDUCATION/TRAINING PROGRAM

## 2022-07-20 NOTE — PROGRESS NOTES
OB/GYN Problem Visit    Alphonso Colón  7/20/2022                       Primary Care Physician: SHERRIE Boyd - CNP    CC:   Chief Complaint   Patient presents with    Pre-op Exam     HYST          HPI: Alphonso Colón is a 36 y.o. female [id number]    Patient is here for a to discuss surgical management of CIN3 Patient's most recent Pap smear 4/8/22 was ASC-US followed by colposcopy 5/24/22 which was notable for EVER 2-3 in the endocervix. Patient has recently had surgery on her right foot for bone spurs for which she received medical and cardiac clearance given patient's significant cardiac Hx of CHF and aortic regurgitation. Patient is also on anticoagulation for this. Patient states she still has the list from her foot surgery regarding which medication she should and should not take prior to her surgery. Requested patient follows these guidelines for her upcoming gynecologic surgery. R/B/A discussed. Patient does note desire hysterectomy unless necessary. Patient consented for [facility]  for EVER 3. Message sent to surgical coordinator.     REVIEW OF SYSTEMS:   Constitutional: negative fever, negative chills   HEENT: negative visual disturbances, negative headaches  Respiratory: negative dyspnea, negative cough  Cardiovascular: negative chest pain,  negative palpitations  Gastrointestinal: negative abdominal pain, negative RUQ pain, negative N/V, negative diarrhea, negative constipation  Genitourinary: negative dysuria, negative vaginal discharge  Dermatological: negative rash  Hematologic: negative bruising  Immunologic/Lymphatic: negative recent illness, negative recent sick contact  Musculoskeletal: negative back pain, negative myalgias, negative arthralgias, positive foot pain  Neurological:  negative dizziness, negative weakness  Behavior/Psych: negative depression, negative anxiety    ________________________________________________________________________    OBSTETRICAL HISTORY:  OB History  Para Term  AB Living   7 7 7 0 0 7   SAB IAB Ectopic Molar Multiple Live Births   0 0 0   0        # Outcome Date GA Lbr Lasha/2nd Weight Sex Delivery Anes PTL Lv   7 Term      Vag-Spont      6 Term      Vag-Spont      5 Term      Vag-Spont      4 Term      Vag-Spont      3 Term      Vag-Spont      2 Term      Vag-Spont      1 Term      Vag-Spont          PAST MEDICAL HISTORY:      Diagnosis Date    Abnormal Pap smear of cervix 2016    Abnormal stress test 2018    Achilles tendonitis     Right foot    Anxiety     Aortic regurgitation     mild-mod on echo    Asthma     Bradycardia 2018    Cancer ([facility]     cervical cancer    Chest pain 2018    CHF (congestive heart failure) ([facility] 2022    Coronary artery disease without angina pectoris 2022    Depression     Dizziness 05/15/2018    GERD (gastroesophageal reflux disease)     Headache(784.0)     Heart murmur     Herpes     Hyperlipidemia     Hypertension     [facility]-Dr. Rebekah Ortiz heart valve     Migraine headache     Obesity     Schizophrenia ([facility]     Seasonal allergies     Sleep apnea     no machine    Snoring 2019    SOB (shortness of breath) 2019    Syncope 2018    Thyroid disease     Hypothyroid    Wears partial dentures     upper and lower partial       PAST SURGICAL HISTORY:                                                                    Procedure Laterality Date    ACHILLES TENDON SURGERY Left 2021    LEFT DETACH & REATTACH ACHILLES TENDON performed by Sherald Dakins, DPM at [address] Right 2022    ACHILLES TENDON LENGTHENING REPAIR ACHILLES TENDON [address] [facility] performed by Sherald Dakins, DPM at [address]  2021    no stents    CERVIX BIOPSY N/A 2019    COLD KNIFE CONIZATION WITH BIOPSY performed by Olen Boeck, DO at [address] [facility] [place], operated by [facility], DIAGNOSTIC      EGD    LEEP 06/06/2016    PRE-MALIGNANT / BENIGN SKIN LESION EXCISION Left 05/12/2016    foot       MEDICATIONS:  Current Outpatient Medications   Medication Sig Dispense Refill    rivaroxaban (XARELTO) 10 MG TABS tablet Take 1 tablet by mouth daily (with breakfast) 30 tablet 1    aspirin 81 MG EC tablet Take 81 mg by mouth daily      Prenatal Vit-Fe Fumarate-FA (NIVA-PLUS) 27-1 MG TABS Take 1 tablet by mouth daily 30 tablet 11    diphenhydrAMINE (BENADRYL) 25 MG capsule Take 25 mg by mouth every 6 hours as needed for Itching      OXcarbazepine (TRILEPTAL) 150 MG tablet Take 150 mg by mouth 2 times daily      azelastine (ASTELIN) 0.1 % nasal spray 1 spray by Nasal route 2 times daily Use in each nostril as directed      loratadine (CLARITIN) 10 MG capsule Take 10 mg by mouth daily      potassium chloride (KLOR-CON) 10 MEQ extended release tablet Take 60 mEq by mouth daily       montelukast (SINGULAIR) 10 MG tablet Take 10 mg by mouth nightly      mirtazapine (REMERON) 30 MG tablet Take 30 mg by mouth nightly as needed       metoprolol succinate (TOPROL XL) 25 MG extended release tablet Take 25 mg by mouth daily       losartan (COZAAR) 25 MG tablet Take 25 mg by mouth daily       furosemide (LASIX) 20 MG tablet Take 20 mg by mouth daily      Cholecalciferol (VITAMIN D3 PO) Take 2,000 Units by mouth daily       LATUDA 120 MG tablet Take 120 mg by mouth daily   2    atorvastatin (LIPITOR) 20 MG tablet Take 20 mg by mouth daily  2    levothyroxine (SYNTHROID) 50 MCG tablet Take 50 mcg by mouth daily  2    SUMAtriptan (IMITREX) 50 MG tablet Take 100 mg by mouth once as needed for Migraine      fluticasone (VERAMYST) 27.5 MCG/SPRAY nasal spray 2 sprays by Nasal route 2 times daily      docusate sodium (COLACE) 100 MG capsule Take 1 capsule by mouth 2 times daily 30 capsule 0    topiramate (TOPAMAX) 25 MG tablet Take 25 mg by mouth daily       Budesonide-Formoterol Fumarate (SYMBICORT IN) Inhale 2 puffs into the lungs daily as needed cetirizine (ZYRTEC ALLERGY) 10 MG TABS Take 10 mg by mouth daily 30 tablet 0    albuterol sulfate HFA (PROVENTIL;VENTOLIN;PROAIR) 108 (90 Base) MCG/ACT inhaler Inhale 2 puffs into the lungs every 4 hours as needed for Wheezing. diphenoxylate-atropine (LOMOTIL) 2.5-0.025 MG per tablet Take 1 tablet by mouth daily as needed       pantoprazole (PROTONIX) 40 MG tablet Take 40 mg by mouth daily. ondansetron (ZOFRAN) 4 MG tablet Take 1 tablet by mouth every 8 hours as needed for Nausea. 20 tablet 0    acetaminophen (TYLENOL) 500 MG tablet Take 2 tablets by mouth 3 times daily for 10 days 60 tablet 0    econazole nitrate 1 % cream Apply 1 each topically daily as needed       Emollient (LUBRIDERM) LOTN Apply 1 each topically daily  (Patient not taking: Reported on 7/20/2022)       No current facility-administered medications for this visit. ALLERGIES:  Allergies as of 07/20/2022 - Fully Reviewed 07/20/2022   Allergen Reaction Noted    Penicillins Anaphylaxis 04/18/2014    Codeine Hives 04/18/2014    Codeine Itching 05/11/2021                                   VITALS:  Vitals:    07/20/22 1312   BP: (!) 140/77   Pulse: 89   Weight: 245 lb (111.1 kg)   Height: 5' 10\" (1.778 m)                                                                                                                                                                     PHYSICAL EXAM:   General Appearance: Appears non-toxic. Alert; in no acute distress. Pleasant. Skin: Normal, no lesions  Lymphatic: No cervical, superclavicular, axillary, or inguinal adenopathy.   HEENT: normocephalic and atraumatic,  Respiratory: clear to auscultation, normal chest wall rise bilaterally  Cardiovascular: regular rate and rhythm  Abdomen: soft, non-tender, non-distended, no right upper quadrant tenderness, and no CVA tenderness  Pelvic Exam: not indicated at this time   Rectal Exam: deferred  Extremities: non-tender BLE and non-edematous  Musculoskeletal: right sided LE in cast consistent with recent surgery. Patient using scooter to assist in walking otherwise no gross abnormalities. Psych: Normal. and Alert and oriented, appropriate affect. DATA:  No results found for this visit on 07/20/22. ASSESSMENT & PLAN:    Liz Toro is a 36 y.o. female [id number]    EVER 3   - ASCUS + HRHPV   - EVER 3 on endocervix 5/24/22   - Plan for management with [facility]   - Patient recently received medical and cardiac clearance for surgery on her foot as documented     7/5/22:   \"Patient's Surgery is 7-6-22. Cardiac and medical clearance requested. Cardiac clearance received. Medical clearance not received. Writer speaks with Dr. Charly Braga regarding medical clearance requested for harsh productive cough. Writer calls patient. Patient reports she no longer has cough and has no other respiratory symptoms. Dr. Charly Braga notified, reports patient may proceed with surgery without medical clearance. \"     - Consent signed, patient to be placed on Dr. Darren Talamantes surgery schedule    Patient Active Problem List    Diagnosis Date Noted    CHF (congestive heart failure) ([facility] 07/06/2022     Priority: Medium    Coronary artery disease without angina pectoris 02/23/2022     Priority: Medium    Facial trauma, initial encounter 11/03/2021     Priority: Medium    Leg edema 08/23/2021     Priority: Medium    Severe aortic regurgitation 05/24/2021     Priority: Medium    SOB (shortness of breath) 09/16/2019     Priority: Medium    DIANA (obstructive sleep apnea) 08/23/2019     Priority: Medium    Snoring 03/21/2019     Priority: Medium    Nasal congestion 12/05/2018     Priority: Medium    Postnasal drip 12/05/2018     Priority: Medium    Acute sinusitis 11/20/2018     Priority: Medium    Reflux esophagitis 09/13/2018     Priority: Medium    Severe obesity (BMI 35.0-39. 9) with comorbidity ([facility] 07/31/2018     Priority: Medium    Dizziness 05/15/2018     Priority: Medium    Abnormal stress test 04/05/2018     Priority: Medium    Bradycardia 03/19/2018     Priority: Medium    Chest pain 03/19/2018     Priority: Medium    Nonrheumatic aortic valve insufficiency 03/19/2018     Priority: Medium    Syncope 03/19/2018     Priority: Medium    Allergic rhinitis 06/22/2016     Priority: Medium    Odynophagia 06/22/2016     Priority: Medium    Smoker 06/22/2016     Priority: Medium    Atypical squamous cells cannot exclude high grade squamous intraepithelial lesion on cytologic smear of cervix (ASC-H) 04/05/2016     Priority: Medium     Formatting of this note might be different from the original.  Overview:   Colposcopy performed 4/5/16      Migraine without aura 10/17/2014     Priority: Medium    Schizophrenia ([address] 09/03/2014     Priority: Low    HTN (hypertension) 09/03/2014     Priority: Low    Anxiety 09/03/2014     Priority: Low    GERD (gastroesophageal reflux disease) 09/03/2014     Priority: Low    Hyperlipemia 09/03/2014     Priority: Low    Cold Knife Cone 4/5/19 04/05/2019    Cervical dysplasia     Hx LEEP  02/21/2019     Cervical Dysplasia History:   Pap smear 9/1/2011: negative for dysplasia   Pap smear 7/2/14: ASCUS HPV +   Colpo 9/3/14: negative ectocervical biopsies and ECC   Pap smear 2/29/16: ASCUS HPV+   Colpo 4/5/16:     ECC: EVER 2-3    6 o'clock biopsy: EVER 2    12 o'clock biopsy: EVER 1   LEEP 6/6/16:     Ectocervical Cone Biopsy: EVER 3 with negative margins    Endocervical Top Hat: EVER 2 with narrowly negative margins   Pap smear 11/28/17: negative for dysplasia   Pap smear 1/15/19: HSIL   Colpo 2/21/19: ECC showing CIN2, 12 o'clock, 3 o'clock biopsies negative   Cold Knife Cone 4/5/19 negative for dysplasia and ECC negative for dysplasia    Pap smear 1/30/20 ASCUS with +HRHPV   Colpo 3/2/20 no biopsies taken, f/u in 1year               Pap smear 4/8/22 ASCUS with other +HRHPV              Colpo 5/24/22 bx @ 11 and 2, ECC **         Patellofemoral pain syndrome of left knee 08/15/2017    EVER III (cervical intraepithelial neoplasia III)      7/2/2014: Pap - ASCUS +HPV  9/3/2014: Colpo - no EVER  2/29/2016: Pap - ASC-H +HPV other  4/5/2016: Colpo - EVER 1,2,3  6/6/2016: LEEP - EVER 2,3 with negative margins  11/28/2017: Pap - negative cytology, +HPV other  1/15/2019: Pap - HSIL, +HPV other  2/21/2019: Colpo - EVER 2  4/5/2019: CKC - chronic cervicitis, negative for dysplasia  1/30/2020: Pap - ASCUS +HPV other  3/2/2020: Colpo - unremarkable, no biopsies or ECC required  6/2021: ASCUS, HPV other +  7/2021: Colpo negative  4/2022: pap smear**           Return in about 4 weeks (around 8/17/2022) for 1 week post-op. Patient was seen with total face to face time of 10 minutes. More than 50% of this visit was on counseling and education regarding her diagnose(s) as listed below and options. She was also counseled on her preventative health maintenance recommendations and follow-up. Diagnosis Orders   1. EVER III with severe dysplasia        2.  Pre-op evaluation            Velia Caldwell DO  Ob/Gyn Resident  [facility], [facility]  7/20/2022, 2:25 PM

## 2022-07-21 NOTE — PROGRESS NOTES
Attending Physician Statement  I have discussed the care of [place], including pertinent history and exam findings,  with the resident. I have seen and examined the patient and the key elements of all parts of the encounter have been performed by me. I agree with the assessment, plan and orders as documented by the resident.   ([facility])    Nadeen Kerr,

## 2022-07-22 ENCOUNTER — TELEPHONE (OUTPATIENT)
Dept: OBGYN | Age: 42
End: 2022-07-22

## 2022-07-27 RX ORDER — SODIUM CHLORIDE, SODIUM LACTATE, POTASSIUM CHLORIDE, CALCIUM CHLORIDE 600; 310; 30; 20 MG/100ML; MG/100ML; MG/100ML; MG/100ML
1000 INJECTION, SOLUTION INTRAVENOUS CONTINUOUS
Status: CANCELLED | OUTPATIENT
Start: 2022-07-27

## 2022-08-02 ENCOUNTER — HOSPITAL ENCOUNTER (OUTPATIENT)
Dept: PREADMISSION TESTING | Age: 42
Discharge: HOME | End: 2022-08-06
Payer: COMMERCIAL

## 2022-08-02 ENCOUNTER — TELEPHONE (OUTPATIENT)
Dept: OBGYN | Age: 42
End: 2022-08-02

## 2022-08-02 VITALS
RESPIRATION RATE: 18 BRPM | SYSTOLIC BLOOD PRESSURE: 147 MMHG | DIASTOLIC BLOOD PRESSURE: 82 MMHG | HEART RATE: 79 BPM | WEIGHT: 240 LBS | OXYGEN SATURATION: 96 % | TEMPERATURE: 97.3 F | BODY MASS INDEX: 34.44 KG/M2

## 2022-08-02 LAB
ABO/RH: NORMAL
ANION GAP SERPL CALCULATED.3IONS-SCNC: 11 MMOL/L (ref 9–17)
ANTIBODY SCREEN: NEGATIVE
ARM BAND NUMBER: NORMAL
BUN BLDV-MCNC: 8 MG/DL (ref 6–20)
CALCIUM SERPL-MCNC: 9.2 MG/DL (ref 8.6–10.4)
CHLORIDE BLD-SCNC: 102 MMOL/L (ref 98–107)
CO2: 26 MMOL/L (ref 20–31)
CREAT SERPL-MCNC: 0.85 MG/DL (ref 0.5–0.9)
EKG ATRIAL RATE: 69 BPM
EKG P AXIS: 60 DEGREES
EKG P-R INTERVAL: 174 MS
EKG Q-T INTERVAL: 454 MS
EKG QRS DURATION: 122 MS
EKG QTC CALCULATION (BAZETT): 486 MS
EKG R AXIS: -38 DEGREES
EKG T AXIS: -6 DEGREES
EKG VENTRICULAR RATE: 69 BPM
EXPIRATION DATE: NORMAL
GFR AFRICAN AMERICAN: >60 ML/MIN
GFR NON-AFRICAN AMERICAN: >60 ML/MIN
GFR SERPL CREATININE-BSD FRML MDRD: ABNORMAL ML/MIN/{1.73_M2}
GLUCOSE BLD-MCNC: 98 MG/DL (ref 70–99)
HCT VFR BLD CALC: 42.2 % (ref 36.3–47.1)
HEMOGLOBIN: 14.7 G/DL (ref 11.9–15.1)
MCH RBC QN AUTO: 32.2 PG (ref 25.2–33.5)
MCHC RBC AUTO-ENTMCNC: 34.8 G/DL (ref 28.4–34.8)
MCV RBC AUTO: 92.5 FL (ref 82.6–102.9)
NRBC AUTOMATED: 0 PER 100 WBC
PDW BLD-RTO: 13.3 % (ref 11.8–14.4)
PLATELET # BLD: 287 K/UL (ref 138–453)
PMV BLD AUTO: 9.8 FL (ref 8.1–13.5)
POTASSIUM SERPL-SCNC: 3.5 MMOL/L (ref 3.7–5.3)
RBC # BLD: 4.56 M/UL (ref 3.95–5.11)
SODIUM BLD-SCNC: 139 MMOL/L (ref 135–144)
WBC # BLD: 6.3 K/UL (ref 3.5–11.3)

## 2022-08-02 PROCEDURE — 86901 BLOOD TYPING SEROLOGIC RH(D): CPT

## 2022-08-02 PROCEDURE — 93010 ELECTROCARDIOGRAM REPORT: CPT | Performed by: INTERNAL MEDICINE

## 2022-08-02 PROCEDURE — 86900 BLOOD TYPING SEROLOGIC ABO: CPT

## 2022-08-02 PROCEDURE — 85027 COMPLETE CBC AUTOMATED: CPT

## 2022-08-02 PROCEDURE — 80048 BASIC METABOLIC PNL TOTAL CA: CPT

## 2022-08-02 PROCEDURE — 93005 ELECTROCARDIOGRAM TRACING: CPT | Performed by: OBSTETRICS & GYNECOLOGY

## 2022-08-02 PROCEDURE — 36415 COLL VENOUS BLD VENIPUNCTURE: CPT

## 2022-08-02 PROCEDURE — 86850 RBC ANTIBODY SCREEN: CPT

## 2022-08-02 RX ORDER — ACETAMINOPHEN 325 MG/1
325 TABLET, COATED ORAL 2 TIMES DAILY PRN
COMMUNITY
Start: 2022-07-15

## 2022-08-02 RX ORDER — POTASSIUM CHLORIDE 20 MEQ/1
60 TABLET, EXTENDED RELEASE ORAL DAILY
COMMUNITY
Start: 2022-07-06

## 2022-08-02 NOTE — DISCHARGE INSTRUCTIONS
Preoperative Instructions:    Stop eating solid foods at midnight the night prior to surgery. Stop drinking clear liquids at midnight the night prior to surgery. (Follow bowel prep instructions if instructed by your surgeon.)    Cyrus Peters at the surgery center (Entrance B) by 6:00 on 8/15/2022  (or as directed by your surgeon's office). Please stop any blood thinning medications as directed by your surgeon or prescribing physician. Failure to stop certain medications may interfere with your scheduled surgery. These may include:  Aspirin, Warfarin (Coumadin), Clopidogrel (Plavix), Ibuprofen (Motrin, Advil), Naproxen (Aleve), Meloxicam (Mobic), Celecoxib (Celebrex), Eliquis, Pradaxa, Xarelto, Effient, Fish Oil, Herbal supplements. You may continue the rest of your medications through the night before surgery unless instructed otherwise. Please take only the following medication(s) the day of surgery with a small sip of water:  Toprol/metoprolol, losartan/cozaar, levothyroxine, pantoprazole/protonix    Please use and bring inhalers the day of surgery. Please bring CPAP the day of surgery. ____________________________  ____________________________  Signature (Patient)           Signature/date(Provider)      REMINDERS:  ** If you are going home the day of your procedure, you will need a friend or family member to drive you home after your procedure. Your  must be 25years of age or older and able to sign off on your discharge instructions. Taxi cabs or any form of public transportation is not acceptable. ** It is preferable that the friend or family member stay at the hospital throughout your procedure. ** If you are going home the same day as your procedure, someone must remain with you for the first 24 hours after your surgery if you receive anesthesia or sedation. If you do not have someone to stay with you, your procedure may be cancelled.   **  Please do not wear any jewelry or body piercings the day of surgery. PREPARING FOR YOUR SURGERY:     Before surgery, you can play an important role in your own health. Because skin is not sterile, we need to be sure that your skin is as free of germs as possible before surgery by carefully washing before surgery. Preparing or prepping skin before surgery can reduce the risk of a surgical site infection.   Do not shave the area of your body where your surgery will be performed unless you received specific permission from your physician. You will need to shower at home the night before surgery and the morning of surgery with a special soap called chlorhexidine gluconate (CHG*). *Not to be used by people allergic to Chlorhexidine Gluconate (CHG). Following these instructions will help you be sure that your skin is clean before surgery. Instructions on cleaning your skin before surgery: The night before your surgery: You will need to shower with warm water (not hot) and the CHG soap. Use a clean wash cloth and a clean towel. Have clean clothes available to put on after the shower. First wash your hair with regular shampoo. Rinse your hair and body thoroughly to remove the shampoo. Wash your face with your regular soap or water only. Thoroughly rinse your body with warm water from the neck down. Turn water off to prevent rinsing the soap off too soon. With a clean wet washcloth and half of the CHG soap in the bottle, lather your entire body from the neck down. Do not use CHG soap near your eyes or ears to avoid injury to those areas. Wash thoroughly, paying special attention to the area where your surgery will be performed. Wash your body gently for five (5) minutes. Avoid scrubbing your skin too hard. Turn the water back on and rinse your body thoroughly. Pat yourself dry with a clean, soft towel. Do not apply lotion, cream or powder. Dress with clean freshly washed clothes.     The morning of surgery:    Repeat shower following steps above  - using remaining half of CHG soap in bottle. If you have any questions, call the Pre-Admission Testing Unit at [phone]. Day of Surgery/Procedure    As a patient at [facility] you can expect quality medical and nursing care that is centered on your individual needs. Our goal is to make your surgical experience as comfortable as possible  . Directions to the [address] [place] [address] is located at [address] [facility], [address]. Please pull into the Emergency [address] parking lot (Entrance B) and park in that lot. We also have additional parking across the street. You will enter the facility following the [address] sign. Please stop at the reception desk where you will be checked in by the staff. If you have any questions please call [phone]. Transportation after your procedure. You will need a friend or family member to drive you home after your procedure. Your  must be 25years of age or older and able to sign off on your discharge instructions. Taxi cabs or any form of public transportation is not acceptable. It is preferable that the friend or family member stay at the hospital throughout your procedure. Someone must remain at home with you for the first 24 hours after your surgery if you receive anesthesia or sedation. If you do not have someone to stay with you, your procedure may be cancelled. Patient Instructions    If you are having any type of anesthesia you are to have nothing to eat or drink after midnight the night before your surgery. This includes gum, mints, water or smoking or chewing tobacco.  The only exception to this is a small sip of water to take with any morning dose of heart, blood pressure, or seizure medications.     Bring a list of all medications you take, along with the dose of the medications and how often you take it.  If more convenient bring the pharmacy bottles in a zip lock bag. Please shower the night before and the morning of surgery with an antibacterial soap. Please use the wipes given to you the night before your surgery after your shower. Unless otherwise told by your physician, please do not shave legs or any part of your body below your neck the night before or day of your surgery. You may shave your face or neck. Brush your teeth but do not swallow water. Bring your inhaler if you are currently using one. Bring your eyeglasses and case with you. No contacts are to be worn the day of surgery. You also may bring your hearing aids. Bring your blood band if one has been given to you. Please do not close the clasp. If you are on C-PAP or Bi-PAP at home and plan on staying in the hospital overnight for your surgery please bring the machine with you. Do not wear any jewelry or body piercings day of surgery. Also, NO lotion, perfume or deodorant to be used the day of surgery. Do not bring any valuables, such as jewelry, cash or credit cards. If you are staying overnight with us, please bring a SMALL bag of personal items. We cannot accommodate large items, like suitcases. Please wear loose, comfortable clothing. If you are potentially going to have a cast or brace bring clothing that will fit over them. In case of illness - If you have cold or flu like symptoms (high fever, runny nose, sore throat, cough, etc.) rash, nausea, vomiting, loose stools, and/or recent contact with someone who has a contagious disease (chicken pox, measles, etc.) Please call your doctor before coming to the hospital.      If your child is having surgery please make arrangements for any other children to be cared for at home on the day of surgery.   Other children are not permitted in recovery room and we want you to be able to spend time with the patient. If other arrangements are not available then we suggest that you have a second adult to stay in the waiting room.       If you have any other questions regarding your procedure or the day of surgery, please call [phone], or [phone]

## 2022-08-02 NOTE — TELEPHONE ENCOUNTER
Called patient to inform her she needs to see cardiology prior to surgery. If she is able to make an appointment before the 15 th and is cleared we can go ahead with surgery. If she is unable to have an appointment prior to 13 th, her surgery will need to be rescheduled. Patient understood and will call back once she has an appointment date. SHANNON Orozco/Name of Chaperone

## 2022-08-02 NOTE — TELEPHONE ENCOUNTER
[facility] pre op called stating patient needs cardiac clearance for her surgery and to inform us her potassium was 3.5

## 2022-08-02 NOTE — TELEPHONE ENCOUNTER
Patient called stating she has an Appt 8/9 at 12:25 with her cardiologist on [facility]

## 2022-08-02 NOTE — PROGRESS NOTES
Anesthesia Focused Assessment    Has patient ever tested positive for COVID? No    STOP-BANG Sleep Apnea Questionnaire    SNORE loudly (heard through closed doors)? Yes  TIRED, fatigued, sleepy during daytime? No  OBSERVED stopping breathing during sleep? Yes  High blood PRESSURE being treated? Yes    BMI over 35? No  AGE over 48? No  NECK circumference over 16\"? No  GENDER (male)? No             Total 3  High risk 5-8  Intermediate risk 3-4  Low risk 0-2    Obstructive Sleep Apnea: per chart  If YES, machine used: no cpap     Type 1 DM:   no  T2DM:  no    Coronary Artery Disease:  yes, follows with [address] [facility]. Hypertension:  yes    Active smoker:  smokes cigars  Drinks Alcohol:  no  Marijuana yes    Dentition: upper and lower partial dentures    Defib / AICD / Pacemaker: no      Renal Failure/dialysis:  no    Patient was evaluated in PAT & anesthesia guidelines were applied. NPO guidelines, medication instructions and scheduled arrival time were reviewed with patient. I advised patient to please contact the surgeon's office, ahead of time if possible, if any new signs or symptoms of illness, infection, rash, etc    Hx of anesthesia complications:  no  Family hx of anesthesia complications:  no                                                                                                                     Anesthesia contacted:   no  Medical or cardiac clearance ordered: updated cardiac clearance will be requested, clearance in chart was for podiatry surgery last month. Patient is also currently on xarelto, prescribed by podiatry Dr. Sherin Pollard. Virginia THORNTON has reached out to podiatry for perioperative instructions regarding xarelto. The office will call triage in PAT with instructions when available.     Brett Moya PA-C  8/2/22  10:40 AM

## 2022-08-03 NOTE — PROGRESS NOTES
Dr Mikey Ragsdale (podiatry) office phoned writer re: Sukhdev Ziegler instructions prior to upcoming procedure. Office states Chantelle no longer needs to take this medication from podiatry standpoint and may stop any time. Writer then called Chantelle to inform her of this. Verbalizes understanding.

## 2022-08-15 ENCOUNTER — ANESTHESIA (OUTPATIENT)
Dept: OPERATING ROOM | Age: 42
End: 2022-08-15
Payer: COMMERCIAL

## 2022-08-15 ENCOUNTER — HOSPITAL ENCOUNTER (OUTPATIENT)
Age: 42
Setting detail: OUTPATIENT SURGERY
Discharge: HOME | End: 2022-08-15
Attending: OBSTETRICS & GYNECOLOGY | Admitting: OBSTETRICS & GYNECOLOGY
Payer: COMMERCIAL

## 2022-08-15 ENCOUNTER — ANESTHESIA EVENT (OUTPATIENT)
Dept: OPERATING ROOM | Age: 42
End: 2022-08-15
Payer: COMMERCIAL

## 2022-08-15 VITALS
SYSTOLIC BLOOD PRESSURE: 145 MMHG | BODY MASS INDEX: 44.16 KG/M2 | WEIGHT: 240 LBS | OXYGEN SATURATION: 92 % | DIASTOLIC BLOOD PRESSURE: 96 MMHG | RESPIRATION RATE: 15 BRPM | HEIGHT: 62 IN | TEMPERATURE: 96.8 F | HEART RATE: 86 BPM

## 2022-08-15 DIAGNOSIS — D06.9 CARCINOMA IN SITU OF CERVIX, UNSPECIFIED LOCATION: ICD-10-CM

## 2022-08-15 DIAGNOSIS — G89.18 POSTOPERATIVE PAIN: Primary | ICD-10-CM

## 2022-08-15 DIAGNOSIS — R87.610 ASCUS WITH POSITIVE HIGH RISK HPV CERVICAL: ICD-10-CM

## 2022-08-15 DIAGNOSIS — R87.810 ASCUS WITH POSITIVE HIGH RISK HPV CERVICAL: ICD-10-CM

## 2022-08-15 PROBLEM — Z98.890 HISTORY OF CERVICAL BIOPSY: Status: ACTIVE | Noted: 2022-08-15

## 2022-08-15 LAB
HCG, PREGNANCY URINE (POC): NEGATIVE
POC POTASSIUM: 3.4 MMOL/L (ref 3.5–4.5)

## 2022-08-15 PROCEDURE — 2709999900 HC NON-CHARGEABLE SUPPLY: Performed by: OBSTETRICS & GYNECOLOGY

## 2022-08-15 PROCEDURE — 2500000003 HC RX 250 WO HCPCS: Performed by: OBSTETRICS & GYNECOLOGY

## 2022-08-15 PROCEDURE — 3600000002 HC SURGERY LEVEL 2 BASE: Performed by: OBSTETRICS & GYNECOLOGY

## 2022-08-15 PROCEDURE — 88305 TISSUE EXAM BY PATHOLOGIST: CPT

## 2022-08-15 PROCEDURE — 3700000000 HC ANESTHESIA ATTENDED CARE: Performed by: OBSTETRICS & GYNECOLOGY

## 2022-08-15 PROCEDURE — 6360000002 HC RX W HCPCS

## 2022-08-15 PROCEDURE — 7100000010 HC PHASE II RECOVERY - FIRST 15 MIN: Performed by: OBSTETRICS & GYNECOLOGY

## 2022-08-15 PROCEDURE — 2580000003 HC RX 258: Performed by: ANESTHESIOLOGY

## 2022-08-15 PROCEDURE — 57520 CONIZATION OF CERVIX: CPT | Performed by: OBSTETRICS & GYNECOLOGY

## 2022-08-15 PROCEDURE — 7100000011 HC PHASE II RECOVERY - ADDTL 15 MIN: Performed by: OBSTETRICS & GYNECOLOGY

## 2022-08-15 PROCEDURE — 3700000001 HC ADD 15 MINUTES (ANESTHESIA): Performed by: OBSTETRICS & GYNECOLOGY

## 2022-08-15 PROCEDURE — 81025 URINE PREGNANCY TEST: CPT

## 2022-08-15 PROCEDURE — 3600000012 HC SURGERY LEVEL 2 ADDTL 15MIN: Performed by: OBSTETRICS & GYNECOLOGY

## 2022-08-15 PROCEDURE — 84132 ASSAY OF SERUM POTASSIUM: CPT

## 2022-08-15 PROCEDURE — 2580000003 HC RX 258: Performed by: OBSTETRICS & GYNECOLOGY

## 2022-08-15 PROCEDURE — 2500000003 HC RX 250 WO HCPCS

## 2022-08-15 PROCEDURE — 88307 TISSUE EXAM BY PATHOLOGIST: CPT

## 2022-08-15 RX ORDER — MIDAZOLAM HYDROCHLORIDE 1 MG/ML
INJECTION INTRAMUSCULAR; INTRAVENOUS PRN
Status: DISCONTINUED | OUTPATIENT
Start: 2022-08-15 | End: 2022-08-15 | Stop reason: SDUPTHER

## 2022-08-15 RX ORDER — OXYCODONE HYDROCHLORIDE 5 MG/1
5 TABLET ORAL EVERY 6 HOURS PRN
Qty: 12 TABLET | Refills: 0 | Status: SHIPPED | OUTPATIENT
Start: 2022-08-15 | End: 2022-08-24 | Stop reason: SDUPTHER

## 2022-08-15 RX ORDER — ACETAMINOPHEN 500 MG
1000 TABLET ORAL 3 TIMES DAILY
Qty: 60 TABLET | Refills: 0 | Status: SHIPPED | OUTPATIENT
Start: 2022-08-15 | End: 2022-08-25

## 2022-08-15 RX ORDER — PROPOFOL 10 MG/ML
INJECTION, EMULSION INTRAVENOUS CONTINUOUS PRN
Status: DISCONTINUED | OUTPATIENT
Start: 2022-08-15 | End: 2022-08-15 | Stop reason: SDUPTHER

## 2022-08-15 RX ORDER — SODIUM CHLORIDE 0.9 % (FLUSH) 0.9 %
5-40 SYRINGE (ML) INJECTION EVERY 12 HOURS SCHEDULED
Status: CANCELLED | OUTPATIENT
Start: 2022-08-15

## 2022-08-15 RX ORDER — FENTANYL CITRATE 50 UG/ML
25 INJECTION, SOLUTION INTRAMUSCULAR; INTRAVENOUS EVERY 5 MIN PRN
Status: CANCELLED | OUTPATIENT
Start: 2022-08-15

## 2022-08-15 RX ORDER — SODIUM CHLORIDE, SODIUM LACTATE, POTASSIUM CHLORIDE, CALCIUM CHLORIDE 600; 310; 30; 20 MG/100ML; MG/100ML; MG/100ML; MG/100ML
1000 INJECTION, SOLUTION INTRAVENOUS CONTINUOUS
Status: DISCONTINUED | OUTPATIENT
Start: 2022-08-15 | End: 2022-08-15 | Stop reason: HOSPADM

## 2022-08-15 RX ORDER — BUPIVACAINE HYDROCHLORIDE AND EPINEPHRINE 5; 5 MG/ML; UG/ML
INJECTION, SOLUTION EPIDURAL; INTRACAUDAL; PERINEURAL PRN
Status: DISCONTINUED | OUTPATIENT
Start: 2022-08-15 | End: 2022-08-15 | Stop reason: ALTCHOICE

## 2022-08-15 RX ORDER — SODIUM CHLORIDE 0.9 % (FLUSH) 0.9 %
5-40 SYRINGE (ML) INJECTION PRN
Status: CANCELLED | OUTPATIENT
Start: 2022-08-15

## 2022-08-15 RX ORDER — FENTANYL CITRATE 50 UG/ML
50 INJECTION, SOLUTION INTRAMUSCULAR; INTRAVENOUS EVERY 5 MIN PRN
Status: CANCELLED | OUTPATIENT
Start: 2022-08-15

## 2022-08-15 RX ORDER — PROPOFOL 10 MG/ML
INJECTION, EMULSION INTRAVENOUS PRN
Status: DISCONTINUED | OUTPATIENT
Start: 2022-08-15 | End: 2022-08-15 | Stop reason: SDUPTHER

## 2022-08-15 RX ORDER — MAGNESIUM HYDROXIDE 1200 MG/15ML
LIQUID ORAL CONTINUOUS PRN
Status: COMPLETED | OUTPATIENT
Start: 2022-08-15 | End: 2022-08-15

## 2022-08-15 RX ORDER — KETOROLAC TROMETHAMINE 30 MG/ML
INJECTION, SOLUTION INTRAMUSCULAR; INTRAVENOUS PRN
Status: DISCONTINUED | OUTPATIENT
Start: 2022-08-15 | End: 2022-08-15 | Stop reason: SDUPTHER

## 2022-08-15 RX ORDER — GLYCOPYRROLATE 0.2 MG/ML
INJECTION INTRAMUSCULAR; INTRAVENOUS PRN
Status: DISCONTINUED | OUTPATIENT
Start: 2022-08-15 | End: 2022-08-15 | Stop reason: SDUPTHER

## 2022-08-15 RX ORDER — SODIUM CHLORIDE 9 MG/ML
INJECTION, SOLUTION INTRAVENOUS PRN
Status: CANCELLED | OUTPATIENT
Start: 2022-08-15

## 2022-08-15 RX ORDER — FENTANYL CITRATE 50 UG/ML
INJECTION, SOLUTION INTRAMUSCULAR; INTRAVENOUS PRN
Status: DISCONTINUED | OUTPATIENT
Start: 2022-08-15 | End: 2022-08-15 | Stop reason: SDUPTHER

## 2022-08-15 RX ORDER — ACETAMINOPHEN 500 MG
1000 TABLET ORAL 3 TIMES DAILY
Qty: 540 TABLET | Refills: 1 | Status: SHIPPED | OUTPATIENT
Start: 2022-08-15

## 2022-08-15 RX ADMIN — FENTANYL CITRATE 25 MCG: 50 INJECTION, SOLUTION INTRAMUSCULAR; INTRAVENOUS at 07:34

## 2022-08-15 RX ADMIN — MIDAZOLAM HYDROCHLORIDE 2 MG: 2 INJECTION, SOLUTION INTRAMUSCULAR; INTRAVENOUS at 07:23

## 2022-08-15 RX ADMIN — FENTANYL CITRATE 25 MCG: 50 INJECTION, SOLUTION INTRAMUSCULAR; INTRAVENOUS at 07:31

## 2022-08-15 RX ADMIN — FENTANYL CITRATE 25 MCG: 50 INJECTION, SOLUTION INTRAMUSCULAR; INTRAVENOUS at 07:55

## 2022-08-15 RX ADMIN — FENTANYL CITRATE 25 MCG: 50 INJECTION, SOLUTION INTRAMUSCULAR; INTRAVENOUS at 07:57

## 2022-08-15 RX ADMIN — PROPOFOL 100 MCG/KG/MIN: 10 INJECTION, EMULSION INTRAVENOUS at 07:26

## 2022-08-15 RX ADMIN — PROPOFOL 30 MG: 10 INJECTION, EMULSION INTRAVENOUS at 07:36

## 2022-08-15 RX ADMIN — SODIUM CHLORIDE, POTASSIUM CHLORIDE, SODIUM LACTATE AND CALCIUM CHLORIDE 1000 ML: 600; 310; 30; 20 INJECTION, SOLUTION INTRAVENOUS at 06:50

## 2022-08-15 RX ADMIN — GLYCOPYRROLATE 0.2 MG: 0.2 INJECTION INTRAMUSCULAR; INTRAVENOUS at 07:38

## 2022-08-15 RX ADMIN — KETOROLAC TROMETHAMINE 30 MG: 30 INJECTION, SOLUTION INTRAMUSCULAR at 08:06

## 2022-08-15 ASSESSMENT — ENCOUNTER SYMPTOMS: SHORTNESS OF BREATH: 1

## 2022-08-15 ASSESSMENT — PAIN - FUNCTIONAL ASSESSMENT: PAIN_FUNCTIONAL_ASSESSMENT: 0-10

## 2022-08-15 NOTE — PROGRESS NOTES
When RN was asking pt her home medications she wasn't sure of what she takes but said she took everything a few days ago. She stated she took two medications this morning but she's unsure of what they were she said she thinks they were her heart medications. RN asked her the last time she took her xarelto and she said she doesn't know but probably a few days ago. RN sent perfect serve to, Dr. Génesis Benavidez, to update her on blood thinner.

## 2022-08-15 NOTE — ANESTHESIA POSTPROCEDURE EVALUATION
Department of Anesthesiology  Postprocedure Note    Patient: Kate Aceves  MRN: [id number]  YOB: 1980  Date of evaluation: 8/15/2022      Procedure Summary     Date: 08/15/22 Room / Location: [address]    Anesthesia Start: 4645 Anesthesia Stop: 8792    Procedure: COLD KNIFE CONE (Cervix) Diagnosis:       ASCUS with positive high risk HPV cervical      Carcinoma in situ of cervix, unspecified location      (ASCUS WITH POSITIVE HIGH RISK HPV, EVER 3)    Surgeons: Bill Cuba DO Responsible Provider: Devorah Zuñiga MD    Anesthesia Type: MAC ASA Status: 3          Anesthesia Type: No value filed.     Mason Phase I:      Mason Phase II: Mason Score: 10      Anesthesia Post Evaluation    Patient location during evaluation: PACU  Patient participation: complete - patient participated  Level of consciousness: awake and alert  Pain score: 1  Airway patency: patent  Nausea & Vomiting: no nausea and no vomiting  Complications: no  Cardiovascular status: hemodynamically stable  Respiratory status: acceptable  Hydration status: euvolemic

## 2022-08-15 NOTE — PROGRESS NOTES
RN called and verified pts ride home, friend- Margaret Aguirre, needs 15 min [facility]  [phone]

## 2022-08-15 NOTE — H&P
OB/GYN Pre-Op H&P  [facility]    Patient Name: Theodora Verdin     Patient : 1980  Room/Bed: [address] [facility]  Admission Date/Time: 8/15/2022  6:02 AM  Primary Care Physician: SHERRIE Wheat CNP  MRN: [id number]    Date: 8/15/2022  Time: 7:16 AM    The patient was seen in pre-op holding. She is here for [facility]. Patient is a 36year old female  with a history of EVER 3. Her most recent pap smear was on 22 showing ASCUS and a colposcopy on 22 came back with EVER 2-3 in the endocervix. She has received medical and cardiac clearance for surgery. Patient previously consented. The procedure risks and complications were reviewed. The labs, Consent, and H&P were reviewed and updated. The patient was counseled on the possibility of  the need of a second surgery. The patient voiced understanding and had all of her questions answered. The possibility of incomplete removal of abnormal tissue was discussed.     OBSTETRICAL HISTORY:   OB History    Para Term  AB Living   7 7 7 0 0 7   SAB IAB Ectopic Molar Multiple Live Births   0 0 0 0 0 0      # Outcome Date GA Lbr Lasha/2nd Weight Sex Delivery Anes PTL Lv   7 Term      Vag-Spont      6 Term      Vag-Spont      5 Term      Vag-Spont      4 Term      Vag-Spont      3 Term      Vag-Spont      2 Term      Vag-Spont      1 Term      Vag-Spont          PAST MEDICAL HISTORY:   has a past medical history of Abnormal Pap smear of cervix, Abnormal stress test, Achilles tendonitis, Anxiety, Aortic regurgitation, Asthma, Bradycardia, Cancer ([facility]), Chest pain, CHF (congestive heart failure) ([facility]), Coronary artery disease without angina pectoris, Depression, Dizziness, GERD (gastroesophageal reflux disease), Headache(784.0), Heart murmur, Herpes, Hyperlipidemia, Hypertension, Leaky heart valve, Migraine headache, Obesity, Poor historian, Schizophrenia ([facility] [address] Seasonal allergies, Sleep apnea, Snoring, SOB (shortness of breath), Syncope, Thyroid disease, Under care of service provider, Under care of service provider, Under care of service provider, Under care of service provider, and Wears partial dentures. PAST SURGICAL HISTORY:   has a past surgical history that includes pre-malignant / benign skin lesion excision (Left, 05/12/2016); LEEP (06/06/2016); Cervix biopsy (N/A, 04/05/2019); Cardiac catheterization (05/25/2021); Achilles tendon surgery (Left, 08/27/2021); Endoscopy, colon, diagnostic; and Achilles tendon surgery (Right, 7/6/2022). ALLERGIES:  Allergies as of 07/21/2022 - Fully Reviewed 07/20/2022   Allergen Reaction Noted    Penicillins Anaphylaxis 04/18/2014    Codeine Hives 04/18/2014    Codeine Itching 05/11/2021       MEDICATIONS:  Current Facility-Administered Medications   Medication Dose Route Frequency Provider Last Rate Last Admin    lactated ringers infusion 1,000 mL  1,000 mL IntraVENous Continuous Nisreen Rose MD 50 mL/hr at 08/15/22 0650 1,000 mL at 08/15/22 0650       FAMILY HISTORY:  family history includes Breast Cancer in her mother; Cancer in her mother; Diabetes in her brother and mother; Endometrial Cancer in her mother; Heart Disease in her brother; High Blood Pressure in her mother. SOCIAL HISTORY:   reports that she has been smoking cigars. She has never used smokeless tobacco. She reports current drug use. Frequency: 7.00 times per week. Drug: Marijuana Vancleve Brandon). She reports that she does not drink alcohol.     VITALS:  Vitals:    08/15/22 0638   BP: 132/89   Pulse: 88   Resp: 18   Temp: 97.5 °F (36.4 °C)   TempSrc: Temporal   SpO2: 95%   Weight: 240 lb (108.9 kg)   Height: 5' 2\" (1.575 m)                                                                                                                               PHYSICAL EXAM:     Unchanged from Prior H&P  CONSTITUTIONAL:  Alert and oriented, no acute distress  HEAD: normocephalic, atraumatic  EYES: Pupils equal and reactive to light, Extraocular muscles intact, sclera non icteric  ENT: Mucus membranes moist, No otorrhea, no rhinorrhea  NECK:  supple, symmetrical, trachea midline   LUNGS:  Good air movement bilaterally, unlabored respirations, no wheezes or rhonchi  CARDIOVASCULAR: Regular rate and rhythm, no murmurs rubs or gallops  ABDOMEN: soft, non tender, non distended, no rebound or guarding, no hernias, no hepatomegaly, no splenomegly  MUSCULOSKELETAL:  Equal strength bilaterally, normal muscle tone  SKIN: No abscess or rash  NEUROLOGIC:  Cranial nerves 2-12 grossly intact, no focal deficits  PSYCH: affect appropriate  Pelvic Exam: deferred to OR      LAB RESULTS:  Admission on 08/15/2022   Component Date Value Ref Range Status    POC Potassium 08/15/2022 3.4 (A) 3.5 - 4.5 mmol/L Final   Hospital Outpatient Visit on 08/02/2022   Component Date Value Ref Range Status    WBC 08/02/2022 6.3  3.5 - 11.3 k/uL Final    RBC 08/02/2022 4.56  3.95 - 5.11 m/uL Final    Hemoglobin 08/02/2022 14.7  11.9 - 15.1 g/dL Final    Hematocrit 08/02/2022 42.2  36.3 - 47.1 % Final    MCV 08/02/2022 92.5  82.6 - 102.9 fL Final    MCH 08/02/2022 32.2  25.2 - 33.5 pg Final    MCHC 08/02/2022 34.8  28.4 - 34.8 g/dL Final    RDW 08/02/2022 13.3  11.8 - 14.4 % Final    Platelets 92/43/0496 287  138 - 453 k/uL Final    MPV 08/02/2022 9.8  8.1 - 13.5 fL Final    NRBC Automated 08/02/2022 0.0  0.0 per 100 WBC Final    Glucose 08/02/2022 98  70 - 99 mg/dL Final    BUN 08/02/2022 8  6 - 20 mg/dL Final    Creatinine 08/02/2022 0.85  0.50 - 0.90 mg/dL Final    Calcium 08/02/2022 9.2  8.6 - 10.4 mg/dL Final    Sodium 08/02/2022 139  135 - 144 mmol/L Final    Potassium 08/02/2022 3.5 (A) 3.7 - 5.3 mmol/L Final    Chloride 08/02/2022 102  98 - 107 mmol/L Final    CO2 08/02/2022 26  20 - 31 mmol/L Final    Anion Gap 08/02/2022 11  9 - 17 mmol/L Final    GFR Non- 08/02/2022 >60  >60 mL/min Final    GFR  08/02/2022 >60  >60

## 2022-08-15 NOTE — ANESTHESIA PRE PROCEDURE
Department of Anesthesiology  Preprocedure Note       Name:  Silva Chiu   Age:  36 y.o.  :  1980                                          MRN:  [id number]         Date:  8/15/2022      Surgeon: Soila Bella):  Jessy Peace DO    Procedure: Procedure(s):  COLD KNIFE CONE    Medications prior to admission:   Prior to Admission medications    Medication Sig Start Date End Date Taking? Authorizing Provider   SM PAIN RELIEVER 325 MG tablet Take 325 mg by mouth 2 times daily as needed 7/15/22   Historical Provider, MD   potassium chloride (KLOR-CON M) 20 MEQ extended release tablet Take 60 mEq by mouth in the morning. 22   Historical Provider, MD   diclofenac sodium (VOLTAREN) 1 % GEL Apply 2 g topically in the morning and 2 g at noon and 2 g before bedtime.     Historical Provider, MD   rivaroxaban (XARELTO) 10 MG TABS tablet Take 1 tablet by mouth daily (with breakfast) 22   Michael Jj DPM   aspirin 81 MG EC tablet Take 81 mg by mouth daily    Historical Provider, MD   Prenatal Vit-Fe Fumarate-FA (NIVA-PLUS) 27-1 MG TABS Take 1 tablet by mouth daily 22   Fernando Guerra DO   azelastine (ASTELIN) 0.1 % nasal spray 1 spray by Nasal route 2 times daily Use in each nostril as directed    Historical Provider, MD   loratadine (CLARITIN) 10 MG capsule Take 10 mg by mouth daily    Historical Provider, MD   montelukast (SINGULAIR) 10 MG tablet Take 10 mg by mouth nightly 5/3/21   Historical Provider, MD   metoprolol succinate (TOPROL XL) 25 MG extended release tablet Take 25 mg by mouth daily  21   Historical Provider, MD   losartan (COZAAR) 25 MG tablet Take 25 mg by mouth daily  21   Historical Provider, MD   furosemide (LASIX) 20 MG tablet Take 20 mg by mouth daily 21   Historical Provider, MD   Cholecalciferol (VITAMIN D3 PO) Take 2,000 Units by mouth daily  3/17/21   Historical Provider, MD   acetaminophen (TYLENOL) 500 MG tablet Take 2 tablets by mouth 3 times daily for 10 days 5/11/21 5/24/22  Mitzi Jones MD   LATUDA 120 MG tablet Take 120 mg by mouth daily  3/11/19   Historical Provider, MD   atorvastatin (LIPITOR) 20 MG tablet Take 20 mg by mouth daily 3/18/19   Historical Provider, MD   levothyroxine (SYNTHROID) 50 MCG tablet Take 50 mcg by mouth daily 3/18/19   Historical Provider, MD   SUMAtriptan (IMITREX) 50 MG tablet Take 100 mg by mouth once as needed for Migraine    Historical Provider, MD   fluticasone (VERAMYST) 27.5 MCG/SPRAY nasal spray 2 sprays by Nasal route 2 times daily    Historical Provider, MD   Budesonide-Formoterol Fumarate (SYMBICORT IN) Inhale 2 puffs into the lungs daily as needed     Historical Provider, MD   albuterol sulfate HFA (PROVENTIL;VENTOLIN;PROAIR) 108 (90 Base) MCG/ACT inhaler Inhale 2 puffs into the lungs every 4 hours as needed for Wheezing. Historical Provider, MD   pantoprazole (PROTONIX) 40 MG tablet Take 40 mg by mouth daily. Historical Provider, MD       Current medications:    No current facility-administered medications for this visit. No current outpatient medications on file. Facility-Administered Medications Ordered in Other Visits   Medication Dose Route Frequency Provider Last Rate Last Admin    lactated ringers infusion 1,000 mL  1,000 mL IntraVENous Continuous Theodora Matias MD           Allergies:     Allergies   Allergen Reactions    Penicillins Anaphylaxis    Codeine Hives       Problem List:    Patient Active Problem List   Diagnosis Code    Schizophrenia ([facility] [address] F20.9    HTN (hypertension) I10    Anxiety F41.9    GERD (gastroesophageal reflux disease) K21.9    Hyperlipemia E78.5    EVER III (cervical intraepithelial neoplasia III) D06.9    Patellofemoral pain syndrome of left knee M22.2X2    Hx LEEP  Z98.890    Cold Knife Cone 4/5/19 Z98.890    Cervical dysplasia N87.9    Abnormal stress test R94.39    Acute sinusitis J01.90    Allergic rhinitis J30.9    Atypical squamous cells cannot exclude high june 2022    Under care of service provider 08/02/2022    ymrnvudo-ntdxevpz-bcqunq [facility] ankle-katie-last visit aug 2022   Ernie Apple Wears partial dentures     upper and lower partial       Past Surgical History:        Procedure Laterality Date    ACHILLES TENDON SURGERY Left 08/27/2021    LEFT DETACH & REATTACH ACHILLES TENDON performed by Veronica Ndiaye DPM at [place] [address] Right 7/6/2022    ACHILLES TENDON LENGTHENING REPAIR ACHILLES TENDON [address] performed by Veronica Ndiaye DPM at [address]  05/25/2021    no stents    CERVIX BIOPSY N/A 04/05/2019    COLD KNIFE CONIZATION WITH BIOPSY performed by Nadeen Kerr DO at [address] [facility] DIAGNOSTIC      EGD    LEEP  06/06/2016    PRE-MALIGNANT / BENIGN SKIN LESION EXCISION Left 05/12/2016    foot       Social History:    Social History     Tobacco Use    Smoking status: Every Day     Types: Cigars    Smokeless tobacco: Never    Tobacco comments:     1 Black & Mild daily, prior to this cigarette smoker- a long time smoker- unsure how much   Substance Use Topics    Alcohol use: No                                Ready to quit: Not Answered  Counseling given: Not Answered  Tobacco comments: 1 Black & Mild daily, prior to this cigarette smoker- a long time smoker- unsure how much      Vital Signs (Current): There were no vitals filed for this visit.                                            BP Readings from Last 3 Encounters:   08/15/22 132/89   08/02/22 (!) 147/82   07/20/22 (!) 140/77       NPO Status:                                                                                 BMI:   Wt Readings from Last 3 Encounters:   08/15/22 240 lb (108.9 kg)   08/02/22 240 lb (108.9 kg)   07/20/22 245 lb (111.1 kg)     There is no height or weight on file to calculate BMI.    CBC:   Lab Results   Component Value Date/Time    WBC 6.3 08/02/2022 11:55 AM    RBC 4.56 08/02/2022 11:55 AM RBC 3.89 03/20/2012 05:39 AM    HGB 14.7 08/02/2022 11:55 AM    HCT 42.2 08/02/2022 11:55 AM    MCV 92.5 08/02/2022 11:55 AM    RDW 13.3 08/02/2022 11:55 AM     08/02/2022 11:55 AM     03/20/2012 05:39 AM       CMP:   Lab Results   Component Value Date/Time     08/02/2022 11:55 AM    K 3.5 08/02/2022 11:55 AM     08/02/2022 11:55 AM    CO2 26 08/02/2022 11:55 AM    BUN 8 08/02/2022 11:55 AM    CREATININE 0.85 08/02/2022 11:55 AM    GFRAA >60 08/02/2022 11:55 AM    LABGLOM >60 08/02/2022 11:55 AM    GLUCOSE 98 08/02/2022 11:55 AM    PROT 6.8 05/13/2021 10:41 AM    CALCIUM 9.2 08/02/2022 11:55 AM    BILITOT 0.18 05/13/2021 10:41 AM    ALKPHOS 84 05/13/2021 10:41 AM    AST 27 05/13/2021 10:41 AM    ALT 30 05/13/2021 10:41 AM       POC Tests: No results for input(s): POCGLU, POCNA, POCK, POCCL, POCBUN, POCHEMO, POCHCT in the last 72 hours.     Coags: No results found for: PROTIME, INR, APTT    HCG (If Applicable):   Lab Results   Component Value Date    PREGTESTUR negative 05/24/2022    HCG NEGATIVE 07/06/2022    HCGQUANT <1 08/04/2021        ABGs: No results found for: PHART, PO2ART, CKB6HIK, RHD8SEK, BEART, H9SPTBLO     Type & Screen (If Applicable):  No results found for: LABABO, LABRH    Drug/Infectious Status (If Applicable):  Lab Results   Component Value Date/Time    HEPCAB NONREACTIVE 11/06/2020 09:54 AM       COVID-19 Screening (If Applicable):   Lab Results   Component Value Date/Time    COVID19 Not Detected 08/23/2021 05:49 AM           Anesthesia Evaluation  Patient summary reviewed and Nursing notes reviewed no history of anesthetic complications:   Airway: Mallampati: III  TM distance: >3 FB   Neck ROM: full  Mouth opening: > = 3 FB   Dental: normal exam         Pulmonary: breath sounds clear to auscultation  (+) shortness of breath: chronic,  sleep apnea:  asthma:                            Cardiovascular:    (+) hypertension:, CAD:, CHF:,       ECG reviewed  Rhythm: regular  Rate: normal  Echocardiogram reviewed               ROS comment: Echo: 20-18  Normal LV size and wall thickness. No obvious wall motion abnormality seen. Normal LV systolic function with LVEF >55%. Normal RV size and function. RVSP 33 mmHg  Normal size RA. LA appears mildly dilated. No obvious significant structural and valvular abnormality noted. Mild to moderate AR noted. Normal aortic root dimension. No significant pericardial effusion. No obvious intra-cardiac mass or shunt noted. Neuro/Psych:   (+) headaches:, psychiatric history: stable with treatmentdepression/anxiety             GI/Hepatic/Renal:   (+) GERD:,           Endo/Other:    (+) hypothyroidism::., .                 Abdominal:   (+) obese,           Vascular: negative vascular ROS. Other Findings:             Anesthesia Plan      MAC     ASA 3       Induction: intravenous. MIPS: Postoperative opioids intended and Prophylactic antiemetics administered. Anesthetic plan and risks discussed with patient. Plan discussed with CRNA.                     Yuriy Salazar MD   8/15/2022

## 2022-08-15 NOTE — DISCHARGE INSTRUCTIONS
No alcoholic beverages, no driving or operating machinery, no making important decisions for 24 hours. Children should maintain quiet play ( games, movies, books ) for 24 hours. You may have a normal diet but should eat lightly day of surgery. Drink plenty of fluids.   Urinate within 8 hours after surgery, if unable to urinate call your doctor

## 2022-08-15 NOTE — OP NOTE
were kept long and held by hemostats to manipulate the cervix. The endocervical canal orientation was identified. 11 blade scalpel, a circumscribing incision was made to create a cone biopsy with a 2-to-3mm border around the entire lesion. An Anastasia Rocio was utilized to retract the ectocervix during cone creation. Lambert scissors and 11 blade scalpel were utilized to cut the deep tip of the cone and release the specimen. The cone specimen was cut at 3 o'clock and sent to pathology. Following removal of the cone specimen, endocervical curettage was carried out with sharp curettage. This was sent as a separate specimen for evaluation. Ball electrocautery was used for hemostasis at the site of the excision. Hemostasis was appreciated. Gelfoam was placed in the cervix. Single-toothed tenaculum was then removed and hemostasis was visualized at the tenaculum site on the cervix. The previously placed stay sutures were then tied together for further hemostasis. All instruments were then removed. All instruments were then removed from the vagina. Instrument, sponge, and needle counts were correct at the conclusion of the case. SCDs for DVT prophylaxis remain in place for the post operative period. Dr. Guero Hernandez was present for the entire operation. Findings:  Normal appearing external genitalia without lesions. Normal appearing vagina, with a grade 2 cystocele noted, and cervix without lesions.    Estimated Blood Loss: Minimalml  Drains:  None  Total IV Fluids: 500ml  Urine output: Unable to be calculated  Specimens:  Ectocervix and endocervical curettings  Instrument and Sponge Count: Correct  Complications: none  Condition: stable, transfer to post anesthesia recovery    Davina Izaguirre DO, PGY3  Ob/Gyn Resident  [address]  08/15/22

## 2022-08-16 LAB — SURGICAL PATHOLOGY REPORT: NORMAL

## 2022-08-24 ENCOUNTER — OFFICE VISIT (OUTPATIENT)
Dept: OBGYN | Age: 42
End: 2022-08-24
Payer: COMMERCIAL

## 2022-08-24 VITALS
SYSTOLIC BLOOD PRESSURE: 154 MMHG | BODY MASS INDEX: 44.63 KG/M2 | HEART RATE: 77 BPM | WEIGHT: 244 LBS | DIASTOLIC BLOOD PRESSURE: 97 MMHG

## 2022-08-24 DIAGNOSIS — G89.18 POSTOPERATIVE PAIN: ICD-10-CM

## 2022-08-24 DIAGNOSIS — Z98.890: Primary | ICD-10-CM

## 2022-08-24 DIAGNOSIS — D06.9 CARCINOMA IN SITU OF CERVIX, UNSPECIFIED LOCATION: ICD-10-CM

## 2022-08-24 PROCEDURE — G8417 CALC BMI ABV UP PARAM F/U: HCPCS | Performed by: STUDENT IN AN ORGANIZED HEALTH CARE EDUCATION/TRAINING PROGRAM

## 2022-08-24 PROCEDURE — 99213 OFFICE O/P EST LOW 20 MIN: CPT | Performed by: STUDENT IN AN ORGANIZED HEALTH CARE EDUCATION/TRAINING PROGRAM

## 2022-08-24 PROCEDURE — 4004F PT TOBACCO SCREEN RCVD TLK: CPT | Performed by: STUDENT IN AN ORGANIZED HEALTH CARE EDUCATION/TRAINING PROGRAM

## 2022-08-24 PROCEDURE — G8427 DOCREV CUR MEDS BY ELIG CLIN: HCPCS | Performed by: STUDENT IN AN ORGANIZED HEALTH CARE EDUCATION/TRAINING PROGRAM

## 2022-08-24 RX ORDER — OXYCODONE HYDROCHLORIDE 5 MG/1
5 TABLET ORAL EVERY 8 HOURS PRN
Qty: 6 TABLET | Refills: 0 | Status: SHIPPED | OUTPATIENT
Start: 2022-08-24 | End: 2022-08-27

## 2022-08-24 NOTE — PROGRESS NOTES
Postoperative Visit    Telma Portillo is a 36 y.o. female [id number], 1 week Post Operative s/p Cold Knife conization of the cervix on 8/15/22    The patient was seen. She reports she has been having abnormal discharge and vaginal spotting since the procedure. She denies heavy bleeding soaking through multiple pads. She also took the Roxicodone and Tylenol and is still having pain. She is tolerating oral intake. She denies any dizziness or shortness of breath or chest pain. Her bowels are regular and she denies any urinary tract symptomology. Patient denies headache, nausea, vomiting, fever, chills, abdominal pain, diarrhea, dysuria or, hematuria. Patient Active Problem List   Diagnosis    Schizophrenia ([facility] [address]    HTN (hypertension)    Anxiety    GERD (gastroesophageal reflux disease)    Hyperlipemia    EVER III (cervical intraepithelial neoplasia III)    Patellofemoral pain syndrome of left knee    Hx LEEP     Cold Knife Cone 4/5/19    Cervical dysplasia    Abnormal stress test    Acute sinusitis    Allergic rhinitis    Atypical squamous cells cannot exclude high grade squamous intraepithelial lesion on cytologic smear of cervix (ASC-H)    Bradycardia    Chest pain    CHF (congestive heart failure) (HCC)    Coronary artery disease without angina pectoris    Dizziness    Facial trauma, initial encounter    Leg edema    Migraine without aura    Nasal congestion    Odynophagia    DIANA (obstructive sleep apnea)    Postnasal drip    Reflux esophagitis    Nonrheumatic aortic valve insufficiency    Severe aortic regurgitation    Severe obesity (BMI 35.0-39. 9) with comorbidity (HCC)    Smoker    Snoring    SOB (shortness of breath)    Syncope    ASCUS with positive high risk HPV cervical    Cold Knife Cone 8/15/22       Vitals:   Blood pressure (!) 154/97, pulse 77, weight 244 lb (110.7 kg), not currently breastfeeding.     Physical Exam:  Chaperone for Intimate Exam: Chaperone was present for entire exam, Chaperone Name: Wild Medel MA    General:  no apparent distress, alert, and cooperative  Lungs:  Nonlabored respirations, no conversational dyspnea  Heart:  Regular rate, distal pulses intact    Abdomen: Soft, nontender, no peritoneal signs  Sterile Speculum Exam: Thin yellow discharge tinged with old blood. Remaining ectocervix pink without erythema or abnormal lesions. Site showing approximated sutures with Gelfoam.  Extremities:  no calf tenderness, non edematous        Assessment:  Alphonso Colón is a 36 y.o. female [id number], 1 week Post Operative s/p CKC on 8/15/22   - Doing well, continue routine post operative care   - Pelvic exam performed due to discharge and vaginal bleeding, no blood noted on exam and normal postoperative discharge noted    - Pain uncontrolled with just Tylenol at this time. Patient given a refill of Roxicodone #6 and was informed she would not be receiving any more narcotics as it should not be required for this procedure. She reported understanding.   - Discussed continued pelvic rest, pads for discharge. Discussed normal postoperative expectations    - Patient is interested in hysterectomy but would like to heal from this procedure first. Discussed that this would be prudent given persistent high grade cervical dysplasia and multiple excisional procedures. Will readdress at next post-op visit.    - Return in 5 weeks for 6 week post-op check    Patient Active Problem List    Diagnosis Date Noted    ASCUS with positive high risk HPV cervical 08/15/2022     Priority: Medium    Cold Knife Cone 8/15/22 08/15/2022     Priority: Medium    CHF (congestive heart failure) ([facility] [address] 07/06/2022     Priority: Medium    Coronary artery disease without angina pectoris 02/23/2022     Priority: Medium    Facial trauma, initial encounter 11/03/2021     Priority: Medium    Leg edema 08/23/2021     Priority: Medium    Severe aortic regurgitation 05/24/2021     Priority: Medium    SOB (shortness of breath) 09/16/2019 Priority: Medium    DIANA (obstructive sleep apnea) 08/23/2019     Priority: Medium    Snoring 03/21/2019     Priority: Medium    Nasal congestion 12/05/2018     Priority: Medium    Postnasal drip 12/05/2018     Priority: Medium    Acute sinusitis 11/20/2018     Priority: Medium    Reflux esophagitis 09/13/2018     Priority: Medium    Severe obesity (BMI 35.0-39. 9) with comorbidity ([address] 07/31/2018     Priority: Medium    Dizziness 05/15/2018     Priority: Medium    Abnormal stress test 04/05/2018     Priority: Medium    Bradycardia 03/19/2018     Priority: Medium    Chest pain 03/19/2018     Priority: Medium    Nonrheumatic aortic valve insufficiency 03/19/2018     Priority: Medium    Syncope 03/19/2018     Priority: Medium    Allergic rhinitis 06/22/2016     Priority: Medium    Odynophagia 06/22/2016     Priority: Medium    Smoker 06/22/2016     Priority: Medium    Atypical squamous cells cannot exclude high grade squamous intraepithelial lesion on cytologic smear of cervix (ASC-H) 04/05/2016     Priority: Medium     Formatting of this note might be different from the original.  Overview:   Colposcopy performed 4/5/16      Migraine without aura 10/17/2014     Priority: Medium    Schizophrenia ([facility] [address] 09/03/2014     Priority: Low    HTN (hypertension) 09/03/2014     Priority: Low    Anxiety 09/03/2014     Priority: Low    GERD (gastroesophageal reflux disease) 09/03/2014     Priority: Low    Hyperlipemia 09/03/2014     Priority: Low    Cold Knife Cone 4/5/19 04/05/2019    Cervical dysplasia     Hx LEEP  02/21/2019     Cervical Dysplasia History:   Pap smear 9/1/2011: negative for dysplasia   Pap smear 7/2/14: ASCUS HPV +   Colpo 9/3/14: negative ectocervical biopsies and ECC   Pap smear 2/29/16: ASCUS HPV+   Colpo 4/5/16:     ECC: EVER 2-3    6 o'clock biopsy: EVER 2    12 o'clock biopsy: EVER 1   LEEP 6/6/16:     Ectocervical Cone Biopsy: EVER 3 with negative margins    Endocervical Top Hat: EVER 2 with narrowly negative

## 2022-08-29 NOTE — PROGRESS NOTES
Attending Physician Statement  I have discussed the care of [place], including pertinent history and exam findings,  with the resident. I have reviewed the key elements of all parts of the encounter with the resident. I agree with the assessment, plan and orders as documented by the resident.   (GE Modifier)    Denise Espinosa,

## 2022-08-30 ENCOUNTER — TELEPHONE (OUTPATIENT)
Dept: OBGYN | Age: 42
End: 2022-08-30

## 2022-08-30 ENCOUNTER — HOSPITAL ENCOUNTER (EMERGENCY)
Age: 42
Discharge: HOME | End: 2022-08-30
Attending: EMERGENCY MEDICINE
Payer: COMMERCIAL

## 2022-08-30 VITALS
DIASTOLIC BLOOD PRESSURE: 103 MMHG | BODY MASS INDEX: 44.53 KG/M2 | OXYGEN SATURATION: 97 % | WEIGHT: 242 LBS | HEIGHT: 62 IN | TEMPERATURE: 99 F | SYSTOLIC BLOOD PRESSURE: 173 MMHG | HEART RATE: 88 BPM | RESPIRATION RATE: 18 BRPM

## 2022-08-30 DIAGNOSIS — N76.0 BACTERIAL VAGINOSIS: ICD-10-CM

## 2022-08-30 DIAGNOSIS — B96.89 BACTERIAL VAGINOSIS: ICD-10-CM

## 2022-08-30 DIAGNOSIS — N93.9 VAGINAL BLEEDING: Primary | ICD-10-CM

## 2022-08-30 LAB
ABSOLUTE EOS #: 0.04 K/UL (ref 0–0.44)
ABSOLUTE IMMATURE GRANULOCYTE: 0.07 K/UL (ref 0–0.3)
ABSOLUTE LYMPH #: 2.47 K/UL (ref 1.1–3.7)
ABSOLUTE MONO #: 0.59 K/UL (ref 0.1–1.2)
BASOPHILS # BLD: 1 % (ref 0–2)
BASOPHILS ABSOLUTE: 0.04 K/UL (ref 0–0.2)
CANDIDA SPECIES, DNA PROBE: NEGATIVE
EOSINOPHILS RELATIVE PERCENT: 1 % (ref 1–4)
GARDNERELLA VAGINALIS, DNA PROBE: POSITIVE
HCT VFR BLD CALC: 43.1 % (ref 36.3–47.1)
HEMOGLOBIN: 14.7 G/DL (ref 11.9–15.1)
IMMATURE GRANULOCYTES: 1 %
LYMPHOCYTES # BLD: 29 % (ref 24–43)
MCH RBC QN AUTO: 31.1 PG (ref 25.2–33.5)
MCHC RBC AUTO-ENTMCNC: 34.1 G/DL (ref 28.4–34.8)
MCV RBC AUTO: 91.3 FL (ref 82.6–102.9)
MONOCYTES # BLD: 7 % (ref 3–12)
NRBC AUTOMATED: 0 PER 100 WBC
PDW BLD-RTO: 13.3 % (ref 11.8–14.4)
PLATELET # BLD: 319 K/UL (ref 138–453)
PMV BLD AUTO: 9.6 FL (ref 8.1–13.5)
RBC # BLD: 4.72 M/UL (ref 3.95–5.11)
SEG NEUTROPHILS: 61 % (ref 36–65)
SEGMENTED NEUTROPHILS ABSOLUTE COUNT: 5.37 K/UL (ref 1.5–8.1)
SOURCE: ABNORMAL
TRICHOMONAS VAGINALIS DNA: NEGATIVE
WBC # BLD: 8.6 K/UL (ref 3.5–11.3)

## 2022-08-30 PROCEDURE — 2580000003 HC RX 258: Performed by: HEALTH CARE PROVIDER

## 2022-08-30 PROCEDURE — 99231 SBSQ HOSP IP/OBS SF/LOW 25: CPT | Performed by: OBSTETRICS & GYNECOLOGY

## 2022-08-30 PROCEDURE — 87591 N.GONORRHOEAE DNA AMP PROB: CPT

## 2022-08-30 PROCEDURE — 85025 COMPLETE CBC W/AUTO DIFF WBC: CPT

## 2022-08-30 PROCEDURE — 87491 CHLMYD TRACH DNA AMP PROBE: CPT

## 2022-08-30 PROCEDURE — 87660 TRICHOMONAS VAGIN DIR PROBE: CPT

## 2022-08-30 PROCEDURE — 6370000000 HC RX 637 (ALT 250 FOR IP): Performed by: HEALTH CARE PROVIDER

## 2022-08-30 PROCEDURE — 87510 GARDNER VAG DNA DIR PROBE: CPT

## 2022-08-30 PROCEDURE — 87480 CANDIDA DNA DIR PROBE: CPT

## 2022-08-30 PROCEDURE — 99284 EMERGENCY DEPT VISIT MOD MDM: CPT

## 2022-08-30 RX ORDER — CYCLOBENZAPRINE HCL 10 MG
10 TABLET ORAL 3 TIMES DAILY PRN
Qty: 15 TABLET | Refills: 0 | Status: SHIPPED | OUTPATIENT
Start: 2022-08-30 | End: 2022-09-04

## 2022-08-30 RX ORDER — SODIUM CHLORIDE, SODIUM LACTATE, POTASSIUM CHLORIDE, AND CALCIUM CHLORIDE .6; .31; .03; .02 G/100ML; G/100ML; G/100ML; G/100ML
1000 INJECTION, SOLUTION INTRAVENOUS ONCE
Status: COMPLETED | OUTPATIENT
Start: 2022-08-30 | End: 2022-08-30

## 2022-08-30 RX ORDER — METRONIDAZOLE 500 MG/1
500 TABLET ORAL ONCE
Status: COMPLETED | OUTPATIENT
Start: 2022-08-30 | End: 2022-08-30

## 2022-08-30 RX ORDER — METRONIDAZOLE 500 MG/1
500 TABLET ORAL 2 TIMES DAILY
Qty: 13 TABLET | Refills: 0 | Status: SHIPPED | OUTPATIENT
Start: 2022-08-30

## 2022-08-30 RX ADMIN — SODIUM CHLORIDE, POTASSIUM CHLORIDE, SODIUM LACTATE AND CALCIUM CHLORIDE 1000 ML: 600; 310; 30; 20 INJECTION, SOLUTION INTRAVENOUS at 15:08

## 2022-08-30 RX ADMIN — METRONIDAZOLE 500 MG: 500 TABLET ORAL at 17:38

## 2022-08-30 ASSESSMENT — PAIN SCALES - GENERAL: PAINLEVEL_OUTOF10: 8

## 2022-08-30 ASSESSMENT — ENCOUNTER SYMPTOMS
CONSTIPATION: 0
VOMITING: 0
DIARRHEA: 0
ABDOMINAL PAIN: 0
SORE THROAT: 0
NAUSEA: 0
SHORTNESS OF BREATH: 0

## 2022-08-30 ASSESSMENT — PAIN - FUNCTIONAL ASSESSMENT: PAIN_FUNCTIONAL_ASSESSMENT: 0-10

## 2022-08-30 ASSESSMENT — PAIN DESCRIPTION - LOCATION: LOCATION: ABDOMEN

## 2022-08-30 ASSESSMENT — PAIN DESCRIPTION - ORIENTATION: ORIENTATION: LOWER;MID

## 2022-08-30 NOTE — TELEPHONE ENCOUNTER
Patient had cold knife cone procedure on 8/14/22. She states she started having heavy bleeding with blood clots since 8/28/22. States she is going through 3-4 panty liners and hour and is feeling dizzy.  Please advise

## 2022-08-30 NOTE — ED PROVIDER NOTES
[facility]     Emergency Department     Faculty Note/ Attestation      Pt Name: Cece Guerra                                       MRN: [id number]  [place] 1980  Date of evaluation: 8/30/2022    Patients PCP:    SHERRIE Coronel - [facility]      Attestation  I performed a history and physical examination of the patient and discussed management with the resident. I reviewed the residents note and agree with the documented findings and plan of care. Any areas of disagreement are noted on the chart. I was personally present for the key portions of any procedures. I have documented in the chart those procedures where I was not present during the key portions. I have reviewed the emergency nurses triage note. I agree with the chief complaint, past medical history, past surgical history, allergies, medications, social and family history as documented unless otherwise noted below. For Physician Assistant/ Nurse Practitioner cases/documentation I have personally evaluated this patient and have completed at least one if not all key elements of the E/M (history, physical exam, and MDM). Additional findings are as noted.       Initial Screens:        Jair Coma Scale  Eye Opening: Spontaneous  Best Verbal Response: Oriented  Best Motor Response: Obeys commands  Jair Coma Scale Score: 15    Vitals:    Vitals:    08/30/22 1339   BP: (!) 173/103   Pulse: 88   Resp: 18   Temp: 99 °F (37.2 °C)   TempSrc: Oral   SpO2: 97%   Weight: 242 lb (109.8 kg)   Height: 5' 2\" (1.575 m)       CHIEF COMPLAINT       Chief Complaint   Patient presents with    Vaginal Bleeding     Partial hysterectomy aug 15             DIAGNOSTIC RESULTS             RADIOLOGY:   No orders to display         LABS:  Labs Reviewed   VAGINITIS DNA PROBE - Abnormal; Notable for the following components:       Result Value    GARDNERELLA VAGINALIS, DNA PROBE POSITIVE (*)     All other components within normal limits   CBC WITH AUTO DIFFERENTIAL - Abnormal; Notable for the following components:    Immature Granulocytes 1 (*)     All other components within normal limits   C.TRACHOMATIS N.GONORRHOEAE DNA         EMERGENCY DEPARTMENT COURSE:     -------------------------  BP: (!) 173/103, Temp: 99 °F (37.2 °C), Heart Rate: 88, Resp: 18      Comments    Vaginal bleeding after cold cone to cervix, at postop appointment had no bleeding however developed bleeding last several days, OB has evaluated, hemoglobin stable, BV on vaginitis probe however no obvious life-threatening bleeding. Patient is stable, vital signs are stable, will have her follow-up with GYN outpatient, treat for BV, return precautions.       (Please note that portions of this note were completed with a voice recognition program.  Efforts were made to edit the dictations but occasionally words are mis-transcribed.)      Sonja Harrell MD,, MD  Attending Emergency Physician         Sonja Harrell MD  08/30/22 1714

## 2022-08-30 NOTE — TELEPHONE ENCOUNTER
[facility] and dizziness. Thanks.

## 2022-08-30 NOTE — TELEPHONE ENCOUNTER
Spoke to patient and advised to go to the ER for evaluation per Dr. Yvan Rodríguez. She was agreeable with plan.

## 2022-08-30 NOTE — ED NOTES
Report to given to [facility]. All questions answered.       Dragan Acosta RN  08/30/22 [id number]

## 2022-08-30 NOTE — CONSULTS
[facility]    Patient Name: Tc Winn     Patient : 1980  Room/Bed:   Admission Date/Time: 2022  1:51 PM  Primary Care Physician: SHERRIE Aceves CNP    Consulting Provider: Dr. Yossi Henry  Reason for Consult: 37 yo F s/p cold knife cone procedure on 22, worsening vaginal bleeding x3-4 days    CC:   Chief Complaint   Patient presents with    Vaginal Bleeding     Partial hysterectomy aug 15                HPI: Tc Winn is a 36 y.o. female [id number] presents to the ED, c/o increased vaginal bleeding and pelvic pain. She recently underwent a cold knife cone procedure on  for a history of EVER 3. She reports increased vaginal bleeding that started on  that has required 3-4 pads per day. However, she reports saturating 1 pad per hour every hour. She denies passing any blood clots. She has tried Tylenol for the pelvic pain without relief. She denies any fever, chills, severe abdominal pain, dysuria, or recent intercourse. Patient reports regular heavy periods with her last period at the beginning of August.     No LMP recorded (lmp unknown). (Menstrual status: Irregular periods).      REVIEW OF SYSTEMS:  Constitutional: negative fever, negative chills  HEENT: negative visual disturbances, negative headaches  Respiratory: negative dyspnea, negative cough  Cardiovascular: negative chest pain,  negative palpitations  Gastrointestinal: negative abdominal pain, negative RUQ pain, negative N/V, negative diarrhea, negative constipation  Genitourinary: negative dysuria, negative vaginal discharge, +vaginal bleeding  Dermatological: negative rash  Hematologic: negative bruising  Immunologic/Lymphatic: negative recent illness, negative recent sick contact  Musculoskeletal: negative back pain, negative myalgias, negative arthralgias  Neurological:  negative dizziness, negative weakness  Behavior/Psych: negative depression, negative anxiety  _______________________________________________________________________    OBSTETRIC HISTORY:   OB History    Para Term  AB Living   7 7 7 0 0 7   SAB IAB Ectopic Molar Multiple Live Births   0 0 0 0 0 0      # Outcome Date GA Lbr Lasha/2nd Weight Sex Delivery Anes PTL Lv   7 Term      Vag-Spont      6 Term      Vag-Spont      5 Term      Vag-Spont      4 Term      Vag-Spont      3 Term      Vag-Spont      2 Term      Vag-Spont      1 Term      Vag-Spont          PAST MEDICAL HISTORY:   has a past medical history of Abnormal Pap smear of cervix, Abnormal stress test, Achilles tendonitis, Anxiety, Aortic regurgitation, Asthma, Bradycardia, Cancer ([facility]), Chest pain, CHF (congestive heart failure) ([facility]), Coronary artery disease without angina pectoris, Depression, Dizziness, GERD (gastroesophageal reflux disease), Headache(784.0), Heart murmur, Herpes, Hyperlipidemia, Hypertension, Leaky heart valve, Migraine headache, Obesity, Poor historian, Schizophrenia ([facility] [address] Seasonal allergies, Sleep apnea, Snoring, SOB (shortness of breath), Syncope, Thyroid disease, Under care of service provider, Under care of service provider, Under care of service provider, Under care of service provider, and Wears partial dentures. PAST SURGICAL HISTORY:   has a past surgical history that includes pre-malignant / benign skin lesion excision (Left, 2016); LEEP (2016); Cervix biopsy (N/A, 2019); Cardiac catheterization (2021); Achilles tendon surgery (Left, 2021); Endoscopy, colon, diagnostic; Achilles tendon surgery (Right, 2022); other surgical history (08/15/2022); and Cervix biopsy (N/A, 8/15/2022). ALLERGIES:  Allergies as of 2022 - Fully Reviewed 2022   Allergen Reaction Noted    Penicillins Anaphylaxis 2014    Codeine Hives 2014       MEDICATIONS:  No current facility-administered medications for this encounter.      Current Outpatient Medications tablet Take 100 mg by mouth once as needed for Migraine      fluticasone (VERAMYST) 27.5 MCG/SPRAY nasal spray 2 sprays by Nasal route 2 times daily      Budesonide-Formoterol Fumarate (SYMBICORT IN) Inhale 2 puffs into the lungs daily as needed       albuterol sulfate HFA (PROVENTIL;VENTOLIN;PROAIR) 108 (90 Base) MCG/ACT inhaler Inhale 2 puffs into the lungs every 4 hours as needed for Wheezing. pantoprazole (PROTONIX) 40 MG tablet Take 40 mg by mouth daily. FAMILY HISTORY:  Family History of Breast, Ovarian, Colon or Uterine Cancer: No   family history includes Breast Cancer in her mother; Cancer in her mother; Diabetes in her brother and mother; Endometrial Cancer in her mother; Heart Disease in her brother; High Blood Pressure in her mother. SOCIAL HISTORY:   reports that she has been smoking cigars. She has never used smokeless tobacco. She reports current drug use. Frequency: 7.00 times per week. Drug: Marijuana Kanchan Mustard). She reports that she does not drink alcohol.  ________________________________________________________________________                                    Cat Oatesrlich:  Vitals:    08/30/22 1339   BP: (!) 173/103   Pulse: 88   Resp: 18   Temp: 99 °F (37.2 °C)   TempSrc: Oral   SpO2: 97%   Weight: 242 lb (109.8 kg)   Height: 5' 2\" (1.575 m)                                                  INPUT/OUTPUT:  I/O this shift:  In: 600 [IV Piggyback:600]  Out: -   In: 600   Out: -                                                                                                                                PHYSICAL EXAM:     General Appearance: Appears healthy. Alert; in no acute distress. Pleasant.   Respiratory: no increased work of breathing  Cardiovascular: normal, regular rate and rhythm  Abdomen: soft, non-tender, and non-distended, no rebound, guarding, or rigidity  Pelvic Exam:   Chaperone for Intimate Exam: Chaperone was present for entire exam, Chaperone Name: Jamil Teixeira RN  External genitalia: General appearance; normal, Hair distribution; normal, Lesions absent  Urinary system: urethral meatus normal  Vaginal: normal mucosa, mild amount of red blood in posterior vault that was easily removed with lowe swabs, no active bleeding noted, vaginal spotting noted on pad during admission   Cervix: unable to be fully visualized   Uterus: normal single, nontender  Musculoskeletal: no gross abnormalities  Extremities: non-tender BLE and non-edematous  Psych:  argumentative       LAB RESULTS:  Results for orders placed or performed during the hospital encounter of 08/30/22   Vaginitis DNA Probe    Specimen: Vaginal   Result Value Ref Range    Source . VAGINAL SWAB     Trichomonas Vaginalis DNA NEGATIVE NEGATIVE    GARDNERELLA VAGINALIS, DNA PROBE POSITIVE (A) NEGATIVE    CANDIDA SPECIES, DNA PROBE NEGATIVE NEGATIVE   CBC with Auto Differential   Result Value Ref Range    WBC 8.6 3.5 - 11.3 k/uL    RBC 4.72 3.95 - 5.11 m/uL    Hemoglobin 14.7 11.9 - 15.1 g/dL    Hematocrit 43.1 36.3 - 47.1 %    MCV 91.3 82.6 - 102.9 fL    MCH 31.1 25.2 - 33.5 pg    MCHC 34.1 28.4 - 34.8 g/dL    RDW 13.3 11.8 - 14.4 %    Platelets 243 292 - 971 k/uL    MPV 9.6 8.1 - 13.5 fL    NRBC Automated 0.0 0.0 per 100 WBC    Seg Neutrophils 61 36 - 65 %    Lymphocytes 29 24 - 43 %    Monocytes 7 3 - 12 %    Eosinophils % 1 1 - 4 %    Basophils 1 0 - 2 %    Immature Granulocytes 1 (H) 0 %    Segs Absolute 5.37 1.50 - 8.10 k/uL    Absolute Lymph # 2.47 1.10 - 3.70 k/uL    Absolute Mono # 0.59 0.10 - 1.20 k/uL    Absolute Eos # 0.04 0.00 - 0.44 k/uL    Basophils Absolute 0.04 0.00 - 0.20 k/uL    Absolute Immature Granulocyte 0.07 0.00 - 0.30 k/uL         DIAGNOSTICS:  Recent Results (from the past 24 hour(s))   CBC with Auto Differential    Collection Time: 08/30/22  2:25 PM   Result Value Ref Range    WBC 8.6 3.5 - 11.3 k/uL    RBC 4.72 3.95 - 5.11 m/uL    Hemoglobin 14.7 11.9 - 15.1 g/dL    Hematocrit 43.1 36.3 - 47.1 %    MCV 91.3 82.6 - 102.9 fL    MCH 31.1 25.2 - 33.5 pg    MCHC 34.1 28.4 - 34.8 g/dL    RDW 13.3 11.8 - 14.4 %    Platelets 739 807 - 469 k/uL    MPV 9.6 8.1 - 13.5 fL    NRBC Automated 0.0 0.0 per 100 WBC    Seg Neutrophils 61 36 - 65 %    Lymphocytes 29 24 - 43 %    Monocytes 7 3 - 12 %    Eosinophils % 1 1 - 4 %    Basophils 1 0 - 2 %    Immature Granulocytes 1 (H) 0 %    Segs Absolute 5.37 1.50 - 8.10 k/uL    Absolute Lymph # 2.47 1.10 - 3.70 k/uL    Absolute Mono # 0.59 0.10 - 1.20 k/uL    Absolute Eos # 0.04 0.00 - 0.44 k/uL    Basophils Absolute 0.04 0.00 - 0.20 k/uL    Absolute Immature Granulocyte 0.07 0.00 - 0.30 k/uL   Vaginitis DNA Probe    Collection Time: 08/30/22  2:56 PM    Specimen: Vaginal   Result Value Ref Range    Source . VAGINAL SWAB     Trichomonas Vaginalis DNA NEGATIVE NEGATIVE    GARDNERELLA VAGINALIS, DNA PROBE POSITIVE (A) NEGATIVE    CANDIDA SPECIES, DNA PROBE NEGATIVE NEGATIVE       ASSESSMENT & PLAN:    Tiburcio Rashid is a 36 y.o. female [id number] with vaginal bleeding   - VSS, hypertensive    - POD#16 s/p cold knife cone 2/2 EVER III   - Patient was seen on 8/24 for post op appt and no vaginal bleeding noted with well healing ectocervix. Patient received short refill of roxicodone at the time.    - SSE: mild amount of red blood in posterior vault, cervix incompletely visualized however no active bleeding noted    - Suspect menstrual bleeding and BV    - Encouraged Tylenol 1000mg, Flexeril, heating pad for symptomatic relief    - Vaginitis + for BV; Flagyl 500mg BID x7 days   - Recommend continued pelvic rest until 6 weeks post op    - Patient is stable for discharge home. Given return precautions for vaginal bleeding or increased pain.     - Follow up at [facility] clinic 9/28 or sooner as needed       Patient Active Problem List    Diagnosis Date Noted    ASCUS with positive high risk HPV cervical 08/15/2022     Priority: Medium    Cold Knife Cone 8/15/22 08/15/2022     Priority: Medium    CHF (congestive heart failure) ([facility] 07/06/2022     Priority: Medium    Coronary artery disease without angina pectoris 02/23/2022     Priority: Medium    Facial trauma, initial encounter 11/03/2021     Priority: Medium    Leg edema 08/23/2021     Priority: Medium    Severe aortic regurgitation 05/24/2021     Priority: Medium    SOB (shortness of breath) 09/16/2019     Priority: Medium    DIANA (obstructive sleep apnea) 08/23/2019     Priority: Medium    Snoring 03/21/2019     Priority: Medium    Nasal congestion 12/05/2018     Priority: Medium    Postnasal drip 12/05/2018     Priority: Medium    Acute sinusitis 11/20/2018     Priority: Medium    Reflux esophagitis 09/13/2018     Priority: Medium    Severe obesity (BMI 35.0-39. 9) with comorbidity ([facility] 07/31/2018     Priority: Medium    Dizziness 05/15/2018     Priority: Medium    Abnormal stress test 04/05/2018     Priority: Medium    Bradycardia 03/19/2018     Priority: Medium    Chest pain 03/19/2018     Priority: Medium    Nonrheumatic aortic valve insufficiency 03/19/2018     Priority: Medium    Syncope 03/19/2018     Priority: Medium    Allergic rhinitis 06/22/2016     Priority: Medium    Odynophagia 06/22/2016     Priority: Medium    Smoker 06/22/2016     Priority: Medium    Atypical squamous cells cannot exclude high grade squamous intraepithelial lesion on cytologic smear of cervix (ASC-H) 04/05/2016     Priority: Medium     Formatting of this note might be different from the original.  Overview:   Colposcopy performed 4/5/16      Migraine without aura 10/17/2014     Priority: Medium    Schizophrenia ([facility] 09/03/2014     Priority: Low    HTN (hypertension) 09/03/2014     Priority: Low    Anxiety 09/03/2014     Priority: Low    GERD (gastroesophageal reflux disease) 09/03/2014     Priority: Low    Hyperlipemia 09/03/2014     Priority: Low    Cold Knife Cone 4/5/19 04/05/2019    Cervical dysplasia     Hx LEEP  02/21/2019     Cervical Dysplasia History:   Pap smear 9/1/2011: negative for dysplasia   Pap smear 7/2/14: ASCUS HPV +   Colpo 9/3/14: negative ectocervical biopsies and ECC   Pap smear 2/29/16: ASCUS HPV+   Colpo 4/5/16:     ECC: EVER 2-3    6 o'clock biopsy: EVER 2    12 o'clock biopsy: EVER 1   LEEP 6/6/16:     Ectocervical Cone Biopsy: EVER 3 with negative margins    Endocervical Top Hat: EVER 2 with narrowly negative margins   Pap smear 11/28/17: negative for dysplasia   Pap smear 1/15/19: HSIL   Colpo 2/21/19: ECC showing CIN2, 12 o'clock, 3 o'clock biopsies negative   Cold Knife Cone 4/5/19 negative for dysplasia and ECC negative for dysplasia    Pap smear 1/30/20 ASCUS with +HRHPV   Colpo 3/2/20 no biopsies taken, f/u in 1year               Pap smear 4/8/22 ASCUS with other +HRHPV              Colpo 5/24/22 bx @ 11 and 2, ECC CIN3         Patellofemoral pain syndrome of left knee 08/15/2017    EVER III (cervical intraepithelial neoplasia III)      7/2/2014: Pap - ASCUS +HPV  9/3/2014: Colpo - no EVER  2/29/2016: Pap - ASC-H +HPV other  4/5/2016: Colpo - EVER 1,2,3  6/6/2016: LEEP - EVER 2,3 with negative margins  11/28/2017: Pap - negative cytology, +HPV other  1/15/2019: Pap - HSIL, +HPV other  2/21/2019: Colpo - EVER 2  4/5/2019: CKC - chronic cervicitis, negative for dysplasia  1/30/2020: Pap - ASCUS +HPV other  3/2/2020: Colpo - unremarkable, no biopsies or ECC required  6/2021: ASCUS, HPV other +  7/2021: Colpo negative  4/2022: pap smear**           Plan discussed with Dr. Candice Martins, who is agreeable.      Attending's Name: Dr. Kyle Douglas DO  Ob/Gyn Resident   [address]  8/30/2022, 6:22 PM

## 2022-08-30 NOTE — ED PROVIDER NOTES
[facility] ED  Emergency Department  Faculty Sign-Out Addendum     Care of Telma Portillo was assumed from previous attending and is being seen for Vaginal Bleeding (Partial hysterectomy aug 15)  . The patient's initial evaluation and plan have been discussed with the prior provider who initially evaluated the patient. Handoff taken on the following patient from prior Attending Physician:    Agustín Dunlap    I was available and discussed any additional care issues that arose and coordinated the management plans with the resident(s) caring for the patient during my duty period. Any areas of disagreement with residents documentation of care or procedures are noted on the chart. I was personally present for the key portions of any/all procedures during my duty period. I have documented in the chart those procedures where I was not present during the key portions. EMERGENCY DEPARTMENT COURSE / MEDICAL DECISION MAKING:       MEDICATIONS GIVEN:  Orders Placed This Encounter   Medications    lactated ringers bolus       LABS / RADIOLOGY:     Labs Reviewed   VAGINITIS DNA PROBE - Abnormal; Notable for the following components:       Result Value    GARDNERELLA VAGINALIS, DNA PROBE POSITIVE (*)     All other components within normal limits   CBC WITH AUTO DIFFERENTIAL - Abnormal; Notable for the following components:    Immature Granulocytes 1 (*)     All other components within normal limits   C.TRACHOMATIS N.GONORRHOEAE DNA       No results found. RECENT VITALS:     Temp: 99 °F (37.2 °C),  Heart Rate: 88, Resp: 18, BP: (!) 173/103, SpO2: 97 %    This patient is a 36 y.o. Female with vaginal bleeding. Recent cold cone procedure 1.5 weeks ago. OB involved. Labs wnl.  D/C with abx    OUTSTANDING TASKS / RECOMMENDATIONS:    D/C      Brandyn Salcido MD, Jesse López  Attending Emergency Physician  [facility] ED        Melody Meier MD  08/30/22 [id number]

## 2022-08-30 NOTE — ED TRIAGE NOTES
Pt presents to ED from home c/o vaginal bleeding after having a partial hysterectomy on 15 AUG 2022. Pt had a planned surgery for hysterectomy after multiple abnormal pap smears. Pt rates pain as 8/10 currently, denies n/v/d, LOC, SOB, Syncope or any other sx. Pt is A&OX4, placed on monitor, IV established, labs drawn. Pt is changed into gown, given warm blankets.

## 2022-08-30 NOTE — DISCHARGE INSTRUCTIONS
Thank you for visiting [facility] Emergency Department. You were seen and evaluated for concerns of vaginal bleeding. You were seen by our OB/GYN team here and will need to follow-up with them as previously scheduled. You are being discharged on a medication called Flagyl for  bacterial vaginosis. Please take the full course as directed. You need to call SHERRIE Abreu CNP to make an appointment as directed for follow up. Should you have any questions regarding your care or further treatment, please call [facility] at [phone]. Take any medications as prescribed, if given any, otherwise for pain Use ibuprofen or Tylenol (unless prescribed medications that have Tylenol in it). You can take over the counter Ibuprofen (advil) tablets (4 tablets every 8 hours or 3 tablets every 6 hours or 2 tablets every 4 hours)    PLEASE RETURN TO THE ED IMMEDIATELY for worsening symptoms, or if you develop any concerning symptoms such as: high fever not relieved by tylenol and/or motrin, chills, shortness of breath, chest pain, persistent nausea and/or vomiting, numbness, weakness or tingling in the arms or legs or change in color of the extremities, changes in mental status, persistent headache, blurry vision, inability to urinate, unable to follow up with your physician, or other any other  Care or concern.

## 2022-08-30 NOTE — ED PROVIDER NOTES
[facility] ED  Emergency Department Encounter  Emergency Medicine Resident     Pt Name: Adan Barahona  MRN: [id number]  [place] 1980  Date of evaluation: 8/30/22  PCP:  SHERRIE Cross CNP    CHIEF COMPLAINT       Chief Complaint   Patient presents with    Vaginal Bleeding     Partial hysterectomy aug 15       HISTORY OFPRESENT ILLNESS  (Location/Symptom, Timing/Onset, Context/Setting, Quality, Duration, Modifying Factors,Severity.)      Adan Barahona is a 36 y.o. female who presents with concerns for vaginal bleeding and some pelvic pain. Patient had underwent a cold knife cone procedure on 8/14/2022 with OB for history of CIN3. Patient states that she has had some persistent vaginal bleeding since procedure, worsened over the last 3 to 4 days. States that she has been soaking 3-4 pads an hour over the last couple of hours. Also reports some very mild abdominal pain as well as some lightheadedness when standing up. Patient otherwise denies any chest pain, shortness of breath, nausea vomiting, diarrhea constipation, upper or lower extremity numbness, weakness, paresthesias. Denies any other dysuria, hematuria, vaginal discharge. Patient has taken some Tylenol for pain, last dose yesterday.     PAST MEDICAL / SURGICAL / SOCIAL / FAMILY HISTORY      has a past medical history of Abnormal Pap smear of cervix, Abnormal stress test, Achilles tendonitis, Anxiety, Aortic regurgitation, Asthma, Bradycardia, Cancer (HCC), Chest pain, CHF (congestive heart failure) ([address] Coronary artery disease without angina pectoris, Depression, Dizziness, GERD (gastroesophageal reflux disease), Headache(784.0), Heart murmur, Herpes, Hyperlipidemia, Hypertension, Leaky heart valve, Migraine headache, Obesity, Poor historian, Schizophrenia ([address] Seasonal allergies, Sleep apnea, Snoring, SOB (shortness of breath), Syncope, Thyroid disease, Under care of service provider, Under care of service provider, Under care of service provider, Under care of service provider, and Wears partial dentures. has a past surgical history that includes pre-malignant / benign skin lesion excision (Left, 05/12/2016); LEEP (06/06/2016); Cervix biopsy (N/A, 04/05/2019); Cardiac catheterization (05/25/2021); Achilles tendon surgery (Left, 08/27/2021); Endoscopy, colon, diagnostic; Achilles tendon surgery (Right, 07/06/2022); other surgical history (08/15/2022); and Cervix biopsy (N/A, 8/15/2022).     Social History     Socioeconomic History    Marital status: Single     Spouse name: Not on file    Number of children: Not on file    Years of education: Not on file    Highest education level: Not on file   Occupational History    Not on file   Tobacco Use    Smoking status: Every Day     Types: Cigars    Smokeless tobacco: Never    Tobacco comments:     1 Black & Mild daily, prior to this cigarette smoker- a long time smoker- unsure how much   Vaping Use    Vaping Use: Never used   Substance and Sexual Activity    Alcohol use: No    Drug use: Yes     Frequency: 7.0 times per week     Types: Marijuana (Weed)     Comment: daily (smokes)- last use was 7-5-22 as of 7-6-22    Sexual activity: Yes     Partners: Male   Other Topics Concern    Not on file   Social History Narrative    Not on file     Social Determinants of Health     Financial Resource Strain: Not on file   Food Insecurity: Not on file   Transportation Needs: Not on file   Physical Activity: Not on file   Stress: Not on file   Social Connections: Not on file   Intimate Partner Violence: Not on file   Housing Stability: Not on file       Family History   Problem Relation Age of Onset    Cancer Mother         uterine    Diabetes Mother     High Blood Pressure Mother     Endometrial Cancer Mother     Breast Cancer Mother     Diabetes Brother     Heart Disease Brother        Allergies:  Penicillins and Codeine    Home Medications:  Prior to Admission medications Medication Sig Start Date End Date Taking? Authorizing Provider   metroNIDAZOLE (FLAGYL) 500 MG tablet Take 1 tablet by mouth in the morning and 1 tablet in the evening. Take with Food. Do NOT drink alcohol. . 8/30/22  Yes Delaney Human, DO   cyclobenzaprine (FLEXERIL) 10 MG tablet Take 1 tablet by mouth 3 times daily as needed for Muscle spasms 8/30/22 9/4/22 Yes Wapiti Human, DO   acetaminophen (TYLENOL) 500 MG tablet Take 2 tablets by mouth in the morning and 2 tablets at noon and 2 tablets before bedtime. 8/15/22   Marianne Nares, DO   acetaminophen (TYLENOL) 500 MG tablet Take 2 tablets by mouth in the morning and 2 tablets at noon and 2 tablets before bedtime. Do all this for 10 days. 8/15/22 8/25/22  Marianne Nares, DO   SM PAIN RELIEVER 325 MG tablet Take 325 mg by mouth 2 times daily as needed 7/15/22   Historical Provider, MD   potassium chloride (KLOR-CON M) 20 MEQ extended release tablet Take 60 mEq by mouth in the morning. 7/6/22   Historical Provider, MD   diclofenac sodium (VOLTAREN) 1 % GEL Apply 2 g topically in the morning and 2 g at noon and 2 g before bedtime.     Historical Provider, MD   rivaroxaban (XARELTO) 10 MG TABS tablet Take 1 tablet by mouth daily (with breakfast) 7/6/22   Robert Robert DPM   aspirin 81 MG EC tablet Take 81 mg by mouth daily    Historical Provider, MD   Prenatal Vit-Fe Fumarate-FA (NIVA-PLUS) 27-1 MG TABS Take 1 tablet by mouth daily 5/24/22   Mira Zepeda DO   azelastine (ASTELIN) 0.1 % nasal spray 1 spray by Nasal route 2 times daily Use in each nostril as directed    Historical Provider, MD   loratadine (CLARITIN) 10 MG capsule Take 10 mg by mouth daily    Historical Provider, MD   montelukast (SINGULAIR) 10 MG tablet Take 10 mg by mouth nightly 5/3/21   Historical Provider, MD   metoprolol succinate (TOPROL XL) 25 MG extended release tablet Take 25 mg by mouth daily  5/18/21   Historical Provider, MD   losartan (COZAAR) 25 MG tablet Take 25 mg by mouth daily (1.575 m) and weight is 242 lb (109.8 kg). Her oral temperature is 99 °F (37.2 °C). Her blood pressure is 173/103 (abnormal) and her pulse is 88. Her respiration is 18 and oxygen saturation is 97%. Physical Exam  Vitals and nursing note reviewed. Exam conducted with a chaperone present. Constitutional:       General: She is not in acute distress. Appearance: She is well-developed. She is not diaphoretic. HENT:      Head: Normocephalic and atraumatic. Right Ear: External ear normal.      Left Ear: External ear normal.      Nose: Nose normal.   Eyes:      Conjunctiva/sclera: Conjunctivae normal.   Neck:      Trachea: No tracheal deviation. Cardiovascular:      Rate and Rhythm: Normal rate and regular rhythm. Heart sounds: Normal heart sounds. No murmur heard. No friction rub. No gallop. Pulmonary:      Effort: Pulmonary effort is normal. No respiratory distress. Breath sounds: Normal breath sounds. Abdominal:      General: Bowel sounds are normal.      Palpations: Abdomen is soft. Tenderness: There is no abdominal tenderness. Genitourinary:     Vagina: Bleeding present. Cervix: Cervical bleeding present. Comments: Chaperoned by Naseem Trejo RN  Small amount of blood in vaginal vault  Musculoskeletal:         General: No tenderness. Normal range of motion. Cervical back: Neck supple. Skin:     General: Skin is warm and dry. Capillary Refill: Capillary refill takes less than 2 seconds. Neurological:      Mental Status: She is alert and oriented to person, place, and time. Motor: No abnormal muscle tone.        DIFFERENTIAL  DIAGNOSIS     PLAN (LABS / IMAGING / EKG):  Orders Placed This Encounter   Procedures    C.trachomatis N.gonorrhoeae DNA    Vaginitis DNA Probe    CBC with Auto Differential    Vaginal exam    Inpatient consult to Obstetrics / Gynecology    Insert peripheral IV    Saline lock IV       MEDICATIONS ORDERED:  Orders Placed This Encounter Medications    lactated ringers bolus    metroNIDAZOLE (FLAGYL) tablet 500 mg     Order Specific Question:   Antimicrobial Indications     Answer:   OB/Gyn Infection    metroNIDAZOLE (FLAGYL) 500 MG tablet     Sig: Take 1 tablet by mouth in the morning and 1 tablet in the evening. Take with Food. Do NOT drink alcohol. .     Dispense:  13 tablet     Refill:  0    cyclobenzaprine (FLEXERIL) 10 MG tablet     Sig: Take 1 tablet by mouth 3 times daily as needed for Muscle spasms     Dispense:  15 tablet     Refill:  0       DDX: vaginal bleeding    Initial MDM/Plan: 36 y.o. female who presents with concerns for vaginal bleeding. Time of initial examination patient was in no acute distress, vital signs were stable she was not tachycardic with a heart rate of 88, blood pressure on the hypertensive side with systolic in the 915K. Physical exam as above. Will obtain CBC to evaluate hemoglobin, will send off for vaginitis and GC/committee probes, will plan to reach out to discuss with OB/GYN team.    DIAGNOSTIC RESULTS / [address] [place] [address] / [facility]     LABS:  Labs Reviewed   VAGINITIS DNA PROBE - Abnormal; Notable for the following components:       Result Value    GARDNERELLA VAGINALIS, DNA PROBE POSITIVE (*)     All other components within normal limits   CBC WITH AUTO DIFFERENTIAL - Abnormal; Notable for the following components:    Immature Granulocytes 1 (*)     All other components within normal limits   C.TRACHOMATIS N.GONORRHOEAE DNA         RADIOLOGY:  No results found. EKG    All EKG's are interpreted by the Emergency Department Physician who either signs or Co-signs this chart in the absence of a cardiologist.    EMERGENCY DEPARTMENT COURSE:  ED Course as of 08/30/22 2249   Tue Aug 30, 2022   1459 Hemoglobin Quant: 14.7 [JS]   1716 Patient was seen and evaluated by the OB/GYN team, okay for discharge at this time. Patient can follow-up with them as previously scheduled.   Patient's vaginitis probe was positive for Gardnerella. Will give first dose of Flagyl while she is here and discharged with a course of Flagyl. Will discharge on Flexeril as well for pain as recommended by OB team. Follow up plans and ER return precautions discussed. Patient verbalized understanding and had no further questions at time of discharge. [JS]      ED Course User Index  [JS] Anika Martin DO     PROCEDURES:  None    CONSULTS:  IP CONSULT TO OB GYN    CRITICAL CARE:  Please see attending note    FINAL IMPRESSION      1. Vaginal bleeding    2. Bacterial vaginosis         DISPOSITION / PLAN     DISPOSITION Decision To Discharge 08/30/2022 05:10:26 PM    PATIENTREFERRED TO:  Ludger Mortimer, APRN - CNP  [address] [facility] [place]   [phone]    Call in 1 day  To discuss this emergency room visit and any further follow-up needed    [facility] ED  [address]  Go to   As needed, If symptoms worsen    DISCHARGE MEDICATIONS:  Discharge Medication List as of 8/30/2022  5:16 PM        START taking these medications    Details   metroNIDAZOLE (FLAGYL) 500 MG tablet Take 1 tablet by mouth in the morning and 1 tablet in the evening. Take with Food. Do NOT drink alcohol. ., Disp-13 tablet, R-0Print      cyclobenzaprine (FLEXERIL) 10 MG tablet Take 1 tablet by mouth 3 times daily as needed for Muscle spasms, Disp-15 tablet, R-0Print             Nieves Thomas DO  EmergencyMedicine Resident    (Please note that portions of this note were completed with a voice recognition program.  Efforts were made to edit the dictations but occasionally words are mis-transcribed.)       Anika Martin DO  Resident  08/30/22 8305

## 2022-08-30 NOTE — ED NOTES
The following labs were labeled with appropriate pt sticker and tubed to lab:     [] Blue     [x] Lavender   [] on ice  [x] Green/yellow  [] Green/black [] on ice  [] Henery Lei  [] on ice  [] Yellow  [x] Red  [] Pink  [] ABG  [] VBG    [] COVID-19 swab    [] Rapid  [] PCR  [] Flu swab  [] Peds Viral Panel     [] Urine Sample  [x] Pelvic Cultures  [] Blood Cultures  [] X 2  [] STREP Cultures          Turner Homans, RN  08/30/22 [address], RN  08/30/22 7834

## 2022-08-31 PROBLEM — N93.9 VAGINAL BLEEDING: Status: ACTIVE | Noted: 2022-08-31

## 2022-09-28 ENCOUNTER — OFFICE VISIT (OUTPATIENT)
Dept: OBGYN | Age: 42
End: 2022-09-28

## 2022-09-28 VITALS
HEART RATE: 84 BPM | DIASTOLIC BLOOD PRESSURE: 84 MMHG | SYSTOLIC BLOOD PRESSURE: 132 MMHG | BODY MASS INDEX: 43.35 KG/M2 | WEIGHT: 237 LBS

## 2022-09-28 DIAGNOSIS — D06.9 CARCINOMA IN SITU OF CERVIX, UNSPECIFIED LOCATION: ICD-10-CM

## 2022-09-28 PROCEDURE — 99024 POSTOP FOLLOW-UP VISIT: CPT | Performed by: OBSTETRICS & GYNECOLOGY

## 2022-09-28 NOTE — PROGRESS NOTES
Chantelle Maddox  9/28/2022  10:23 AM      Chantelle Maddox  Procedure: Cold Knife Cone on 8/15/22      Wilda Hein is a 36 y.o. female [id number]      The patient was seen, she denies any complaints. She denied any shortness of breath, chest pain or dizziness. She denied any nausea, vomiting, or diarrhea. There is no fever, chills, or rigors. The patient denies any vaginal bleeding, discharge or odor. Pt states she just started her normal period. She denies any concerns today. We had a lengthy discussion today regarding her history of abnormal pap smears requiring a LEEP and 2 CKC procedures in the past.  Pt was counseled on options of close surveillance with a repeat pap/colpo in 6 months versus proceeding with a hysterectomy. Pt does NOT want any more surgery at this time because she states \"my body doesn't handle surgeries well. \"  She would like to repeat the pap and colpo in 6 months and see if there are still any remaining abnormal cells - if the abnormal cells persist, then she will likely want to proceed with hysterectomy. I stressed the importance of close follow up and returning for her colpo/pap in 6 months, pt voiced her understanding. Blood pressure 132/84, pulse 84, weight 237 lb (107.5 kg), not currently breastfeeding. Abdominal Exam: soft non-tender. Good bowel sounds. No guarding, rebound or rigidity. No costal vertebral angle tenderness bilateral. No hernias    Extremities: No edema or calf pain noted bilaterally. Pelvic Exam: pt declined since she is having no issues/concerns. Results for orders placed or performed during the hospital encounter of 08/30/22   C.trachomatis N.gonorrhoeae DNA    Specimen: Cervix   Result Value Ref Range    Specimen Description . CERVIX     C. trachomatis DNA NEGATIVE NEGATIVE    N. gonorrhoeae DNA NEGATIVE NEGATIVE   Vaginitis DNA Probe    Specimen: Vaginal   Result Value Ref Range    Source . VAGINAL SWAB     Trichomonas Vaginalis DNA NEGATIVE NEGATIVE    GARDNERELLA VAGINALIS, DNA PROBE POSITIVE (A) NEGATIVE    CANDIDA SPECIES, DNA PROBE NEGATIVE NEGATIVE   CBC with Auto Differential   Result Value Ref Range    WBC 8.6 3.5 - 11.3 k/uL    RBC 4.72 3.95 - 5.11 m/uL    Hemoglobin 14.7 11.9 - 15.1 g/dL    Hematocrit 43.1 36.3 - 47.1 %    MCV 91.3 82.6 - 102.9 fL    MCH 31.1 25.2 - 33.5 pg    MCHC 34.1 28.4 - 34.8 g/dL    RDW 13.3 11.8 - 14.4 %    Platelets 165 222 - 406 k/uL    MPV 9.6 8.1 - 13.5 fL    NRBC Automated 0.0 0.0 per 100 WBC    Seg Neutrophils 61 36 - 65 %    Lymphocytes 29 24 - 43 %    Monocytes 7 3 - 12 %    Eosinophils % 1 1 - 4 %    Basophils 1 0 - 2 %    Immature Granulocytes 1 (H) 0 %    Segs Absolute 5.37 1.50 - 8.10 k/uL    Absolute Lymph # 2.47 1.10 - 3.70 k/uL    Absolute Mono # 0.59 0.10 - 1.20 k/uL    Absolute Eos # 0.04 0.00 - 0.44 k/uL    Basophils Absolute 0.04 0.00 - 0.20 k/uL    Absolute Immature Granulocyte 0.07 0.00 - 0.30 k/uL           Assessment:      Diagnosis Orders   1.  Carcinoma in situ of cervix, unspecified location             Patient Active Problem List    Diagnosis Date Noted    Vaginal bleeding 08/31/2022     Priority: Medium    ASCUS with positive high risk HPV cervical 08/15/2022     Priority: Medium    Cold Knife Cone 8/15/22 08/15/2022     Priority: Medium    CHF (congestive heart failure) ([facility] 07/06/2022     Priority: Medium    Coronary artery disease without angina pectoris 02/23/2022     Priority: Medium    Facial trauma, initial encounter 11/03/2021     Priority: Medium    Leg edema 08/23/2021     Priority: Medium    Severe aortic regurgitation 05/24/2021     Priority: Medium    SOB (shortness of breath) 09/16/2019     Priority: Medium    DIANA (obstructive sleep apnea) 08/23/2019     Priority: Medium    Snoring 03/21/2019     Priority: Medium    Nasal congestion 12/05/2018     Priority: Medium    Postnasal drip 12/05/2018     Priority: Medium    Acute sinusitis 11/20/2018     Priority: Medium Reflux esophagitis 09/13/2018     Priority: Medium    Severe obesity (BMI 35.0-39. 9) with comorbidity ([facility] 07/31/2018     Priority: Medium    Dizziness 05/15/2018     Priority: Medium    Abnormal stress test 04/05/2018     Priority: Medium    Bradycardia 03/19/2018     Priority: Medium    Chest pain 03/19/2018     Priority: Medium    Nonrheumatic aortic valve insufficiency 03/19/2018     Priority: Medium    Syncope 03/19/2018     Priority: Medium    Allergic rhinitis 06/22/2016     Priority: Medium    Odynophagia 06/22/2016     Priority: Medium    Smoker 06/22/2016     Priority: Medium    Atypical squamous cells cannot exclude high grade squamous intraepithelial lesion on cytologic smear of cervix (ASC-H) 04/05/2016     Priority: Medium     Formatting of this note might be different from the original.  Overview:   Colposcopy performed 4/5/16      Migraine without aura 10/17/2014     Priority: Medium    Sch[facility]ia ([facility] 09/03/2014     Priority: Low    HTN (hypertension) 09/03/2014     Priority: Low    Anxiety 09/03/2014     Priority: Low    GERD (gastroesophageal reflux disease) 09/03/2014     Priority: Low    Hyperlipemia 09/03/2014     Priority: Low    Cold Knife Cone 4/5/19 04/05/2019    Cervical dysplasia     Hx LEEP  02/21/2019     Cervical Dysplasia History:   Pap smear 9/1/2011: negative for dysplasia   Pap smear 7/2/14: ASCUS HPV +   Colpo 9/3/14: negative ectocervical biopsies and ECC   Pap smear 2/29/16: ASCUS HPV+   Colpo 4/5/16:     ECC: EVER 2-3    6 o'clock biopsy: EVER 2    12 o'clock biopsy: EVER 1   LEEP 6/6/16:     Ectocervical Cone Biopsy: EVER 3 with negative margins    Endocervical Top Hat: EVER 2 with narrowly negative margins   Pap smear 11/28/17: negative for dysplasia   Pap smear 1/15/19: HSIL   Colpo 2/21/19: ECC showing CIN2, 12 o'clock, 3 o'clock biopsies negative   Cold Knife Cone 4/5/19 negative for dysplasia and ECC negative for dysplasia    Pap smear 1/30/20 ASCUS with +HRHPV   Colpo 3/2/20 no biopsies taken, f/u in 1year               Pap smear 4/8/22 ASCUS with other +HRHPV              Colpo 5/24/22 bx @ 11 and 2, ECC CIN3         Patellofemoral pain syndrome of left knee 08/15/2017    EVER III (cervical intraepithelial neoplasia III)      7/2/2014: Pap - ASCUS +HPV  9/3/2014: Colpo - no EVER  2/29/2016: Pap - ASC-H +HPV other  4/5/2016: Colpo - EVER 1,2,3  6/6/2016: LEEP - EVER 2,3 with negative margins  11/28/2017: Pap - negative cytology, +HPV other  1/15/2019: Pap - HSIL, +HPV other  2/21/2019: Colpo - EVER 2  4/5/2019: CKC - chronic cervicitis, negative for dysplasia  1/30/2020: Pap - ASCUS +HPV other  3/2/2020: Colpo - unremarkable, no biopsies or ECC required  6/2021: ASCUS, HPV other +  7/2021: Colpo negative  4/2022: pap smear - ASCUS, +HPV (other)  5/2022: Colpo - EVER-2,3 on ECC  8/15/2022: CKC - EVER-1,2 and margins uninvolved              POD# 6 weeks   Procedure: see above   Stable   Pathology reviewed with patient and we discussed how the margins were negative on her CKC. Plan:  Pt counseled as above - she has opted for close surveillance - will need pap/colpo in 6 months. Return in about 6 months (around 3/28/2023) for colposcopy and pap smear.       Danya Turner DO

## 2022-12-12 ENCOUNTER — TELEPHONE (OUTPATIENT)
Dept: BURN CARE | Age: 42
End: 2022-12-12

## 2022-12-12 NOTE — TELEPHONE ENCOUNTER
Jesse Kunz from [facility] called regarding why the patient has been placed on Xarelto. She states that an order was sent to the pharmacy in November. She can be reached at [phone]. Please review and advise.

## 2023-01-31 RX ORDER — RIVAROXABAN 10 MG/1
10 TABLET, FILM COATED ORAL
Qty: 30 TABLET | Refills: 1 | Status: SHIPPED | OUTPATIENT
Start: 2023-01-31

## 2023-03-17 RX ORDER — RIVAROXABAN 10 MG/1
10 TABLET, FILM COATED ORAL
Qty: 30 TABLET | Refills: 1 | Status: SHIPPED | OUTPATIENT
Start: 2023-03-17

## 2023-03-22 RX ORDER — RIVAROXABAN 10 MG/1
10 TABLET, FILM COATED ORAL
Qty: 30 TABLET | Refills: 1 | OUTPATIENT
Start: 2023-03-22

## 2023-06-06 ENCOUNTER — HOSPITAL ENCOUNTER (OUTPATIENT)
Dept: MAMMOGRAPHY | Age: 43
Discharge: HOME | End: 2023-06-08
Payer: COMMERCIAL

## 2023-06-06 ENCOUNTER — HOSPITAL ENCOUNTER (OUTPATIENT)
Age: 43
Discharge: HOME | End: 2023-06-06
Payer: COMMERCIAL

## 2023-06-06 DIAGNOSIS — Z12.31 BREAST CANCER SCREENING BY MAMMOGRAM: ICD-10-CM

## 2023-06-06 LAB
25(OH)D3 SERPL-MCNC: 45.5 NG/ML
ALBUMIN SERPL-MCNC: 4 G/DL (ref 3.5–5.2)
ALBUMIN/GLOB SERPL: 1.2 {RATIO} (ref 1–2.5)
ALP SERPL-CCNC: 107 U/L (ref 35–104)
ALT SERPL-CCNC: 22 U/L (ref 5–33)
ANION GAP SERPL CALCULATED.3IONS-SCNC: 11 MMOL/L (ref 9–17)
AST SERPL-CCNC: 27 U/L
BACTERIA URNS QL MICRO: ABNORMAL
BASOPHILS # BLD: <0.03 K/UL (ref 0–0.2)
BASOPHILS NFR BLD: 0 % (ref 0–2)
BILIRUB SERPL-MCNC: 0.3 MG/DL (ref 0.3–1.2)
BILIRUB UR QL STRIP: NEGATIVE
BUN SERPL-MCNC: 12 MG/DL (ref 6–20)
CALCIUM SERPL-MCNC: 8.9 MG/DL (ref 8.6–10.4)
CASTS #/AREA URNS LPF: ABNORMAL /LPF (ref 0–2)
CASTS #/AREA URNS LPF: ABNORMAL /LPF (ref 0–2)
CHLORIDE SERPL-SCNC: 100 MMOL/L (ref 98–107)
CHOLEST SERPL-MCNC: 171 MG/DL
CHOLESTEROL/HDL RATIO: 6.3
CLARITY UR: ABNORMAL
CO2 SERPL-SCNC: 26 MMOL/L (ref 20–31)
COLOR UR: ABNORMAL
CREAT SERPL-MCNC: 0.97 MG/DL (ref 0.5–0.9)
EOSINOPHIL # BLD: 0.06 K/UL (ref 0–0.44)
EOSINOPHILS RELATIVE PERCENT: 1 % (ref 1–4)
EPI CELLS #/AREA URNS HPF: ABNORMAL /HPF (ref 0–5)
ERYTHROCYTE [DISTWIDTH] IN BLOOD BY AUTOMATED COUNT: 13.7 % (ref 11.8–14.4)
EST. AVERAGE GLUCOSE BLD GHB EST-MCNC: 123 MG/DL
GFR SERPL CREATININE-BSD FRML MDRD: >60 ML/MIN/1.73M2
GLUCOSE SERPL-MCNC: 82 MG/DL (ref 70–99)
GLUCOSE UR STRIP-MCNC: NEGATIVE MG/DL
HBA1C MFR BLD: 5.9 % (ref 4–6)
HCT VFR BLD AUTO: 42.9 % (ref 36.3–47.1)
HDLC SERPL-MCNC: 27 MG/DL
HGB BLD-MCNC: 14.4 G/DL (ref 11.9–15.1)
HGB UR QL STRIP.AUTO: ABNORMAL
HIV 1+2 AB+HIV1 P24 AG SERPL QL IA: NONREACTIVE
IMM GRANULOCYTES # BLD AUTO: 0.03 K/UL (ref 0–0.3)
IMM GRANULOCYTES NFR BLD: 0 %
KETONES UR STRIP-MCNC: NEGATIVE MG/DL
LDLC SERPL CALC-MCNC: 117 MG/DL (ref 0–130)
LEUKOCYTE ESTERASE UR QL STRIP: ABNORMAL
LYMPHOCYTES # BLD: 36 % (ref 24–43)
LYMPHOCYTES NFR BLD: 2.64 K/UL (ref 1.1–3.7)
MCH RBC QN AUTO: 29.7 PG (ref 25.2–33.5)
MCHC RBC AUTO-ENTMCNC: 33.6 G/DL (ref 28.4–34.8)
MCV RBC AUTO: 88.5 FL (ref 82.6–102.9)
MONOCYTES NFR BLD: 0.75 K/UL (ref 0.1–1.2)
MONOCYTES NFR BLD: 10 % (ref 3–12)
MUCOUS THREADS URNS QL MICRO: ABNORMAL
NEUTROPHILS NFR BLD: 53 % (ref 36–65)
NEUTS SEG NFR BLD: 3.83 K/UL (ref 1.5–8.1)
NITRITE UR QL STRIP: NEGATIVE
NRBC AUTOMATED: 0 PER 100 WBC
PH UR STRIP: 6.5 [PH] (ref 5–8)
PLATELET # BLD AUTO: 312 K/UL (ref 138–453)
PMV BLD AUTO: 9.9 FL (ref 8.1–13.5)
POTASSIUM SERPL-SCNC: 4.2 MMOL/L (ref 3.7–5.3)
PROT SERPL-MCNC: 7.4 G/DL (ref 6.4–8.3)
PROT UR STRIP-MCNC: ABNORMAL MG/DL
RBC # BLD AUTO: 4.85 M/UL (ref 3.95–5.11)
RBC #/AREA URNS HPF: ABNORMAL /HPF (ref 0–2)
SODIUM SERPL-SCNC: 137 MMOL/L (ref 135–144)
SP GR UR STRIP: 1.02 (ref 1–1.03)
T PALLIDUM AB SER QL IA: NONREACTIVE
T4 FREE SERPL-MCNC: 0.9 NG/DL (ref 0.9–1.7)
TRIGL SERPL-MCNC: 136 MG/DL
TSH SERPL-MCNC: 2.14 UIU/ML (ref 0.3–5)
UROBILINOGEN UR STRIP-ACNC: NORMAL
WBC #/AREA URNS HPF: ABNORMAL /HPF (ref 0–5)
WBC OTHER # BLD: 7.3 K/UL (ref 3.5–11.3)

## 2023-06-06 PROCEDURE — 80053 COMPREHEN METABOLIC PANEL: CPT

## 2023-06-06 PROCEDURE — 36415 COLL VENOUS BLD VENIPUNCTURE: CPT

## 2023-06-06 PROCEDURE — 81001 URINALYSIS AUTO W/SCOPE: CPT

## 2023-06-06 PROCEDURE — 77063 BREAST TOMOSYNTHESIS BI: CPT

## 2023-06-06 PROCEDURE — 82306 VITAMIN D 25 HYDROXY: CPT

## 2023-06-06 PROCEDURE — 87389 HIV-1 AG W/HIV-1&-2 AB AG IA: CPT

## 2023-06-06 PROCEDURE — 86780 TREPONEMA PALLIDUM: CPT

## 2023-06-06 PROCEDURE — 80074 ACUTE HEPATITIS PANEL: CPT

## 2023-06-06 PROCEDURE — 85027 COMPLETE CBC AUTOMATED: CPT

## 2023-06-06 PROCEDURE — 80061 LIPID PANEL: CPT

## 2023-06-06 PROCEDURE — 83036 HEMOGLOBIN GLYCOSYLATED A1C: CPT

## 2023-06-06 PROCEDURE — 84439 ASSAY OF FREE THYROXINE: CPT

## 2023-06-06 PROCEDURE — 84443 ASSAY THYROID STIM HORMONE: CPT

## 2023-06-07 LAB
HAV IGM SERPL QL IA: NONREACTIVE
HBV CORE IGM SERPL QL IA: NONREACTIVE
HBV SURFACE AG SERPL QL IA: NONREACTIVE
HCV AB SERPL QL IA: NONREACTIVE

## 2023-06-07 RX ORDER — RIVAROXABAN 10 MG/1
10 TABLET, FILM COATED ORAL
Qty: 30 TABLET | Refills: 1 | Status: SHIPPED | OUTPATIENT
Start: 2023-06-07

## 2023-06-17 NOTE — TELEPHONE ENCOUNTER
Attempted to reach [facility]  multiple times with no success.  for iPad and translation line were unable to find any . Patient's family only speaks K[place], no other close languages. RN used non-verbal cues & simple words while communicating with family.   [employer] [facility]   Hereditary Cancer Risk Assessment     Name: Cherylene Hem  YOB: 1980  Date of Results Disclosure: 09/07/21      HISTORY   Ms. Nanci Salomon was seen for genetic counseling at the request of Jefferson Bloom DO due to her family history of cancer. At that time, Ms. Maddox chose to pursue genetic testing via the CancerNext Expanded + RNA gene panel. These results were discussed with Ms. Maddox via telephone. A summary of Ms. Maddox's results and recommendations are below. RESULTS  BFKW DoublePositive CancerNext-Expanded Panel + RNAinsight: NEGATIVE - NO CLINICALLY SIGNIFICANT MUTATIONS DETECTED   This panel included the analysis of 77 genes associated with hereditary cancer including: AIP, ALK, APC, VINCENT, BAP1, BARD1, BLM, BMPR1A, BRCA1, BRCA2, BRIP1, CDC73, CDH1, CDK4, CDKN1B, CDKN2A, CHEK2, CTNNA1, DICER1, EGFR, EGLN1, EPCAM, FANCC, FH, FLCN, GALNT12, GREM1, HOXB13, KIF1B, KIT1, LZTR1, MAX, MEN1, MET, MITF, MLH1, MSH2, MSH3, MSH6, MUTYH, NBN, NF1, NF2, NTHL1, PALB2, PDGFRA, PHOX2B, PMS2, POLD1, POLE, POT1, RRDFG6D, PTCH1, PTEN, RAD51C, RAD51D, RB1, RECQL, RET, SDHA, SDHAF2, SDHB, SDHC, ,SDHD, SMAD4, SMARCA4, SMARCB1, SMARCE1, STK11, SUFU, HLOH837, TP53, TSC1, TSC2, VHL, and XRCC2. In addition, no clinically relevant aberrant RNA transcripts were detected in select genes. Please refer to genetic test report for technical details. Additional findings:   VARIANT OF UNCERTAIN SIGNIFICANCE - AIP p.[id number]   VARIANT OF UNCERTAIN SIGNIFICANCE - BLM K.[id number]  VARIANT OF UNCERTAIN SIGNIFICANCE - SMARCA4 p.[id number]   A variant of uncertain significance (VUS) occurs when the laboratory does not have enough data to determine whether the genetic variant is benign (not associated with cancer) or pathogenic (associated with cancer). Because the significance of the AIP, BLM, and SMARCA4 gene VUS is unknown, medical management must be based on Ms. Maddox's personal history and family history of cancer. We discussed that Ms. Maddox's negative test result greatly reduces the likelihood that she carries a hereditary gene mutation. However, it is possible that her family history of cancer is due to a hereditary mutation which she did not inherit. It is also possible that her family history of cancer may be due to a gene for which testing was not performed or which has yet to be discovered. RECOMMENDATIONS  1) While Ms. Maddox does not carry a known hereditary gene mutation, her risk for breast cancer may still be elevated due to her remaining family history of breast cancer. Based on Ms. Maddox's personal risk factors and family history of breast cancer, her estimated lifetime risk for breast cancer is 14.6% according to the Britt Mejia risk model. At this level of risk,  She should continue annual mammograms or surveillance as directed by her physicians. 2) Ms. Maddox should continue general population cancer screening guidelines as directed by her physicians. RECOMMENDATIONS FOR FAMILY MEMBERS   1) Genetic testing is not recommended for Ms. Maddox's children based on her negative test results. However, this recommendation does not take into consideration any family history of cancer in their paternal family. 2) It is possible that the cancers in Ms. Maddox's family are due to a hereditary gene mutation that she did not inherit. Therefore, her relatives (particularly those with a current or previous cancer diagnosis) may consider genetic counseling and testing to clarify this possibility. Relatives may contact the [facility] [facility] [employer] at [phone] or locate a genetic counselor at www. [email or web address].     3) We encourage Ms. Maddox's relatives to discuss their family history of cancer with their physicians to determine the most appropriate cancer screening recommendations.  MsAndrea Portillo Romney female relatives may benefit from a formal breast cancer risk assessment by the Luanne [place] or Noel Bees risk models to determine if additional breast cancer screening is warranted. SUMMARY & PLAN   1) Ms. Maddox's genetic test results are negative meaning there were no clinically significant mutations detected in the 77 genes analyzed. 2) A variant of uncertain significance was detected in the AIP, BLM and SMARCA4 gene(s). We will contact Ms. Maddox when there is additional information available regarding the classification of the genetic variant(s). 3) Ms. Maddox's lifetime risk for breast cancer is 14.6% according to the Noel Bees risk model. She should continue annual mammograms or screening as directed by her physicians. 4) There are no changes in medical management for Ms. Maddox based on her negative genetic test results. 5) We encourage Ms. Maddox to contact us every 1-2 years to determine if there are any new genetic testing or research options available. 6) We encourage Ms. Maddox to contact us with updates to her personal and/or familys cancer history as this information may alter our assessment and/or recommendations. The [address] [place] [address] National Program would be glad to offer our assistance should you have any questions or concerns about this information. Please feel free to contact us at [phone]. Cookie Singer.  Kristen Rose, MS, [facility]   Licensed Genetic Counselor         CC:  Ms. Zhao Emery

## 2023-06-22 ENCOUNTER — OFFICE VISIT (OUTPATIENT)
Dept: OBGYN | Age: 43
End: 2023-06-22
Payer: COMMERCIAL

## 2023-06-22 ENCOUNTER — HOSPITAL ENCOUNTER (OUTPATIENT)
Age: 43
Setting detail: SPECIMEN
Discharge: HOME | End: 2023-06-22

## 2023-06-22 VITALS — DIASTOLIC BLOOD PRESSURE: 83 MMHG | SYSTOLIC BLOOD PRESSURE: 129 MMHG | HEART RATE: 76 BPM

## 2023-06-22 DIAGNOSIS — Z12.39 BREAST CANCER SCREENING, HIGH RISK PATIENT: ICD-10-CM

## 2023-06-22 DIAGNOSIS — Z01.419 WELL WOMAN EXAM WITH ROUTINE GYNECOLOGICAL EXAM: ICD-10-CM

## 2023-06-22 DIAGNOSIS — D06.9 CIN III (CERVICAL INTRAEPITHELIAL NEOPLASIA III): ICD-10-CM

## 2023-06-22 DIAGNOSIS — Z01.419 WELL WOMAN EXAM WITH ROUTINE GYNECOLOGICAL EXAM: Primary | ICD-10-CM

## 2023-06-22 PROCEDURE — 99396 PREV VISIT EST AGE 40-64: CPT | Performed by: OBSTETRICS & GYNECOLOGY

## 2023-06-22 PROCEDURE — 3074F SYST BP LT 130 MM HG: CPT | Performed by: OBSTETRICS & GYNECOLOGY

## 2023-06-22 PROCEDURE — 3079F DIAST BP 80-89 MM HG: CPT | Performed by: OBSTETRICS & GYNECOLOGY

## 2023-06-22 RX ORDER — QUETIAPINE FUMARATE 100 MG/1
100 TABLET, FILM COATED ORAL 2 TIMES DAILY
COMMUNITY

## 2023-06-22 NOTE — PROGRESS NOTES
Chantelle CARNEY White  6/22/2023              39 y.o. Chief Complaint   Patient presents with    Annual Exam     No concerns        No LMP recorded. (Menstrual status: regular periods). Primary Care Physician: SHERRIE Wilkes CNP    The patient was seen and examined. She has no chief complaint today and this visit was scheduled as her annual exam.  Her bowels are regular. There are no voiding complaints. She denies any bloating. She denies vaginal discharge , but does complain of blood clots in her menstrual bleeding, states that her periods are regular,no menorrhagia , take tylenol for occasional period craps    HPI : Olesya  is a 39 y.o. female [id number]    History of abnormal pap smears requiring a LEEP and 2 CKC procedures in the past.  Last visit -Pt was counseled on options of close surveillance with a repeat pap/colpo in 6 months versus proceeding with a hysterectomy. Pap and colposcopy were due 2/2023. Today she agrees to get pap smear done , will come again for colposcopy.       Cold Knife Cone 4/5/19 04/05/2019    Cervical dysplasia      Hx LEEP  02/21/2019       Cervical Dysplasia History:              Pap smear 9/1/2011: negative for dysplasia              Pap smear 7/2/14: ASCUS HPV +              Colpo 9/3/14: negative ectocervical biopsies and ECC              Pap smear 2/29/16: ASCUS HPV+              Colpo 4/5/16:                           ECC: EVER 2-3                          6 o'clock biopsy: EVER 2                          12 o'clock biopsy: EVER 1              LEEP 6/6/16:                           Ectocervical Cone Biopsy: EVER 3 with negative margins                          Endocervical Top Hat: EVER 2 with narrowly negative margins              Pap smear 11/28/17: negative for dysplasia              Pap smear 1/15/19: HSIL              Colpo 2/21/19: ECC showing CIN2, 12 o'clock, 3 o'clock biopsies negative              Cold Knife Cone 4/5/19 negative for dysplasia and

## 2023-06-23 LAB
C TRACH DNA SPEC QL PROBE+SIG AMP: NEGATIVE
N GONORRHOEA DNA SPEC QL PROBE+SIG AMP: NEGATIVE
SPECIMEN DESCRIPTION: NORMAL

## 2023-06-24 LAB
HPV I/H RISK 4 DNA CVX QL NAA+PROBE: NOT DETECTED
HPV SAMPLE: NORMAL
HPV, INTERPRETATION: NORMAL
HPV16 DNA CVX QL NAA+PROBE: NOT DETECTED
HPV18 DNA CVX QL NAA+PROBE: NOT DETECTED
SPECIMEN DESCRIPTION: NORMAL

## 2023-06-26 DIAGNOSIS — B96.89 BV (BACTERIAL VAGINOSIS): Primary | ICD-10-CM

## 2023-06-26 DIAGNOSIS — N76.0 BV (BACTERIAL VAGINOSIS): Primary | ICD-10-CM

## 2023-06-26 RX ORDER — METRONIDAZOLE 500 MG/1
500 TABLET ORAL 2 TIMES DAILY
Qty: 14 TABLET | Refills: 0 | Status: SHIPPED | OUTPATIENT
Start: 2023-06-26 | End: 2023-07-03

## 2023-06-27 LAB — CYTOLOGY REPORT: NORMAL

## 2023-07-10 ENCOUNTER — TELEPHONE (OUTPATIENT)
Dept: FAMILY MEDICINE CLINIC | Age: 43
End: 2023-07-10

## 2023-07-25 ENCOUNTER — APPOINTMENT (OUTPATIENT)
Dept: GENERAL RADIOLOGY | Age: 43
End: 2023-07-25
Payer: COMMERCIAL

## 2023-07-25 ENCOUNTER — HOSPITAL ENCOUNTER (EMERGENCY)
Age: 43
Discharge: HOME | End: 2023-07-25
Attending: EMERGENCY MEDICINE
Payer: COMMERCIAL

## 2023-07-25 VITALS
TEMPERATURE: 97.3 F | DIASTOLIC BLOOD PRESSURE: 70 MMHG | HEART RATE: 81 BPM | RESPIRATION RATE: 16 BRPM | OXYGEN SATURATION: 97 % | BODY MASS INDEX: 40.79 KG/M2 | WEIGHT: 223 LBS | SYSTOLIC BLOOD PRESSURE: 115 MMHG

## 2023-07-25 DIAGNOSIS — M25.532 LEFT WRIST PAIN: Primary | ICD-10-CM

## 2023-07-25 PROCEDURE — 6370000000 HC RX 637 (ALT 250 FOR IP)

## 2023-07-25 PROCEDURE — 73110 X-RAY EXAM OF WRIST: CPT

## 2023-07-25 PROCEDURE — 99283 EMERGENCY DEPT VISIT LOW MDM: CPT

## 2023-07-25 RX ORDER — ACETAMINOPHEN 325 MG/1
650 TABLET ORAL ONCE
Status: COMPLETED | OUTPATIENT
Start: 2023-07-25 | End: 2023-07-25

## 2023-07-25 RX ORDER — ACETAMINOPHEN 325 MG/1
650 TABLET ORAL EVERY 6 HOURS PRN
Qty: 30 TABLET | Refills: 0 | Status: SHIPPED | OUTPATIENT
Start: 2023-07-25

## 2023-07-25 RX ADMIN — ACETAMINOPHEN 650 MG: 325 TABLET ORAL at 14:50

## 2023-07-25 ASSESSMENT — PAIN DESCRIPTION - LOCATION
LOCATION: HAND
LOCATION: HAND

## 2023-07-25 ASSESSMENT — PAIN SCALES - GENERAL
PAINLEVEL_OUTOF10: 10
PAINLEVEL_OUTOF10: 10

## 2023-07-25 ASSESSMENT — ENCOUNTER SYMPTOMS
DIARRHEA: 0
NAUSEA: 0
ABDOMINAL PAIN: 0
SHORTNESS OF BREATH: 0
VOMITING: 0

## 2023-07-25 ASSESSMENT — PAIN DESCRIPTION - ORIENTATION
ORIENTATION: LEFT
ORIENTATION: LEFT

## 2023-07-25 ASSESSMENT — PAIN DESCRIPTION - DESCRIPTORS: DESCRIPTORS: ACHING;DISCOMFORT

## 2023-07-25 NOTE — ED PROVIDER NOTES
[address] HANDOFF       Handoff taken on the following patient from prior Attending Physician: Dr. Anjelica Lyons  Pt Name: Sandy Ni  PCP:  SHERRIE Newman CNP    Attestation  I was available and discussed any additional care issues that arose and coordinated the management plans with the resident(s) caring for the patient during my duty period. Any areas of disagreement with resident's documentation of care or procedures are noted on the chart. I was personally present for the key portions of any/all procedures during my duty period. I have documented in the chart those procedures where I was not present during the key portions. CHIEF COMPLAINT       Chief Complaint   Patient presents with    Hand Pain     left         CURRENT MEDICATIONS     Previous Medications  Previous Medications    ACETAMINOPHEN (TYLENOL) 500 MG TABLET    Take 2 tablets by mouth in the morning and 2 tablets at noon and 2 tablets before bedtime. ACETAMINOPHEN (TYLENOL) 500 MG TABLET    Take 2 tablets by mouth in the morning and 2 tablets at noon and 2 tablets before bedtime. Do all this for 10 days.     ALBUTEROL SULFATE HFA (PROVENTIL;VENTOLIN;PROAIR) 108 (90 BASE) MCG/ACT INHALER    Inhale 2 puffs into the lungs every 4 hours as needed for Wheezing    ASPIRIN 81 MG EC TABLET    Take 1 tablet by mouth daily    ATORVASTATIN (LIPITOR) 20 MG TABLET    Take 1 tablet by mouth daily    AZELASTINE (ASTELIN) 0.1 % NASAL SPRAY    1 spray by Nasal route 2 times daily Use in each nostril as directed    BUDESONIDE-FORMOTEROL FUMARATE (SYMBICORT IN)    Inhale 2 puffs into the lungs daily as needed     CHOLECALCIFEROL (VITAMIN D3 PO)    Take 2,000 Units by mouth daily     DICLOFENAC SODIUM (VOLTAREN) 1 % GEL    Apply 2 g topically 3 times daily    FLUTICASONE (VERAMYST) 27.5 MCG/SPRAY NASAL SPRAY    2 sprays by Nasal route 2 times daily    FUROSEMIDE (LASIX) 20 MG TABLET    Take 1 tablet by mouth daily

## 2023-07-25 NOTE — ED NOTES
Pt arrived to ED via triage C/O left hand pain. Patient states pain started when she woke up this morning at 6am.   No precipitating factor per pt. A + O x 4, RR even and non-labored, call light within reach.       Warren Martin RN  07/25/23 [id number]

## 2023-07-25 NOTE — ED NOTES
Pt called out, reports tylenol did not work and is requesting more pain medication. Dr. Nasra Lim notified.       Olivia Nguyen RN  07/25/23 [id number]

## 2023-07-25 NOTE — DISCHARGE INSTRUCTIONS
----- Message from Wilton Block MD sent at 8/12/2021 10:31 AM CDT -----  Monse, not sure what Isabelle mentioned to Kenia, but I would suggest she see Endocrinology to consider treatment (if she hasn't seen them in the past for this).  candace   You were seen today in the emergency department for your wrist pain. We now feel you are safe for discharge home. Please wear the wrist splint at night. Please call the hand surgeons for follow up. Please return to the emergency department immediately if develop any new or worsening concerns including chest pain, shortness of breath, abdominal pain, nausea, vomiting, diarrhea, weakness, loss consciousness, fever, chills, or any other concerns. Please call your PCP and schedule appointment within the next 24 to 48 hours for follow-up.

## 2023-07-25 NOTE — ED PROVIDER NOTES
[facility] ED  Emergency Department Encounter  Emergency Medicine Resident     Pt Name:Chantelle Hooper  MRN: [id number]  Birthdate 1980  Date of evaluation: 7/25/23  PCP:  SHERRIE Dorsey CNP  Note Started: 2:42 PM EDT      CHIEF COMPLAINT       Chief Complaint   Patient presents with    Hand Pain     left       HISTORY OF PRESENT ILLNESS  (Location/Symptom, Timing/Onset, Context/Setting, Quality, Duration, Modifying Factors, Severity.)      Charis Rosenberg is a 39 y.o. female who presents with left hand pain. Patient states she woke with left hand pain starting at the wrist radiating down to her thumb and index finger. She describes sensation as electric-like. She describes that has never had these problems before. She denies any trauma to the hand. Patient states it feels like numbness in those 2 fingers as well. She has full range of motion of the wrist and hand. Patient states she has not take anything for the pain yet. PAST MEDICAL / SURGICAL / SOCIAL / FAMILY HISTORY      has a past medical history of Abnormal Pap smear of cervix, Abnormal stress test, Achilles tendonitis, Anxiety, Aortic regurgitation, Asthma, Bradycardia, Cancer (HCC), Chest pain, CHF (congestive heart failure) ([address]), Coronary artery disease without angina pectoris, Depression, Dizziness, GERD (gastroesophageal reflux disease), Headache(784.0), Heart murmur, Herpes, Hyperlipidemia, Hypertension, Leaky heart valve, Migraine headache, Obesity, Poor historian, Schizophrenia ([address]), Seasonal allergies, Sleep apnea, Snoring, SOB (shortness of breath), Syncope, Thyroid disease, Under care of service provider, Under care of service provider, Under care of service provider, Under care of service provider, and Wears partial dentures. has a past surgical history that includes pre-malignant / benign skin lesion excision (Left, 05/12/2016); LEEP (06/06/2016); Cervix biopsy (N/A, 04/05/2019);  Cardiac

## 2023-07-27 ENCOUNTER — HOSPITAL ENCOUNTER (OUTPATIENT)
Age: 43
Setting detail: SPECIMEN
Discharge: HOME | End: 2023-07-27

## 2023-07-27 ENCOUNTER — PROCEDURE VISIT (OUTPATIENT)
Dept: OBGYN | Age: 43
End: 2023-07-27
Payer: COMMERCIAL

## 2023-07-27 VITALS
WEIGHT: 228 LBS | HEART RATE: 73 BPM | DIASTOLIC BLOOD PRESSURE: 75 MMHG | BODY MASS INDEX: 41.96 KG/M2 | HEIGHT: 62 IN | SYSTOLIC BLOOD PRESSURE: 118 MMHG

## 2023-07-27 DIAGNOSIS — Z98.890: ICD-10-CM

## 2023-07-27 DIAGNOSIS — D06.9 CIN III (CERVICAL INTRAEPITHELIAL NEOPLASIA III): Primary | ICD-10-CM

## 2023-07-27 PROBLEM — S09.93XA FACIAL TRAUMA, INITIAL ENCOUNTER: Status: RESOLVED | Noted: 2021-11-03 | Resolved: 2023-07-27

## 2023-07-27 PROBLEM — R87.810 ASCUS WITH POSITIVE HIGH RISK HPV CERVICAL: Status: RESOLVED | Noted: 2022-08-15 | Resolved: 2023-07-27

## 2023-07-27 PROBLEM — R87.610 ASCUS WITH POSITIVE HIGH RISK HPV CERVICAL: Status: RESOLVED | Noted: 2022-08-15 | Resolved: 2023-07-27

## 2023-07-27 LAB
CONTROL: YES
PREGNANCY TEST URINE, POC: NEGATIVE

## 2023-07-27 PROCEDURE — 81025 URINE PREGNANCY TEST: CPT | Performed by: OBSTETRICS & GYNECOLOGY

## 2023-07-27 PROCEDURE — 99211 OFF/OP EST MAY X REQ PHY/QHP: CPT | Performed by: STUDENT IN AN ORGANIZED HEALTH CARE EDUCATION/TRAINING PROGRAM

## 2023-07-27 PROCEDURE — 57455 BIOPSY OF CERVIX W/SCOPE: CPT | Performed by: OBSTETRICS & GYNECOLOGY

## 2023-07-27 NOTE — PROGRESS NOTES
[facility]   Procedure Note    Brandon Kruse  7/27/2023                       Primary Care Physician: Jacey Wright, APRN - CNP      Subjective:   Brandon Kruse 39 y.o. female [id number] is here for previously scheduled colposcopy due to history of CKC, EVER-2. Patient's last menstrual period was 07/07/2023. Valaria Daft She has no complaints today. Vitals:   Blood pressure 118/75, pulse 73, height 5' 2\" (1.575 m), weight 228 lb (103.4 kg), last menstrual period 07/07/2023, not currently breastfeeding. Procedure: Colposcopy with biopsy    Indications: Post-CKC surveillance     Procedure Details:   Chaperone for Intimate Exam: Chaperone was present for entire exam, Chaperone Name: Belem Riggs MA     The patient was counseled on the procedure. Risks, benefits and alternatives were reviewed. The patient is aware that this is diagnostic and not curative and a second procedure may be needed. A consent was reviewed and obtained. The patient was positioned comfortably on the exam table. A sterile speculum was placed into the vagina and the cervix was identified. The colposcope was positioned and the cervix was examined revealing no visible lesions. Green light was used to evaluate the vessels, revealing  no visible lesions. Acetoacetic acid was applied to the cervix, revealing acetowhite lesion(s) noted at 12 o'clock. Lugol's Iodine was applied to the cervix revealing  decreased uptake at 12 o;clock. The exam was considered to be satisfactory with the transformation zone visualized. A biopsy was taken at 12 o'clock. Pressure was used for hemostasis. Good hemostasis was observed. Biopsy tissue was sent to pathology. Impression: EVER-1, colposcopy satisfactory     The patient tolerated the procedure well. Post procedure restrictions were reviewed and given to the patient. All counts and instruments were correct at the end of the procedure.        Lab:  Pregnancy Test:  Lab Results   Component Value Date

## 2023-07-28 NOTE — PROGRESS NOTES
Attending Physician Statement  I have discussed the care of Barrera Pate, including pertinent history and exam findings,  with the resident. I have seen and examined the patient and the key elements of all parts of the encounter have been performed by me. I agree with the assessment, plan and orders as documented by the resident.   (GC Modifier)

## 2023-08-01 LAB — SURGICAL PATHOLOGY REPORT: NORMAL

## 2023-09-01 ENCOUNTER — HOSPITAL ENCOUNTER (EMERGENCY)
Age: 43
Discharge: HOME | End: 2023-09-01
Attending: EMERGENCY MEDICINE
Payer: COMMERCIAL

## 2023-09-01 VITALS
HEART RATE: 85 BPM | DIASTOLIC BLOOD PRESSURE: 86 MMHG | WEIGHT: 227.07 LBS | BODY MASS INDEX: 41.53 KG/M2 | SYSTOLIC BLOOD PRESSURE: 158 MMHG | TEMPERATURE: 97.2 F | RESPIRATION RATE: 18 BRPM | OXYGEN SATURATION: 99 %

## 2023-09-01 DIAGNOSIS — T63.481A INSECT STINGS, ACCIDENTAL OR UNINTENTIONAL, INITIAL ENCOUNTER: Primary | ICD-10-CM

## 2023-09-01 PROCEDURE — 6360000002 HC RX W HCPCS: Performed by: STUDENT IN AN ORGANIZED HEALTH CARE EDUCATION/TRAINING PROGRAM

## 2023-09-01 PROCEDURE — 99284 EMERGENCY DEPT VISIT MOD MDM: CPT

## 2023-09-01 PROCEDURE — 96372 THER/PROPH/DIAG INJ SC/IM: CPT

## 2023-09-01 RX ORDER — DIPHENHYDRAMINE HYDROCHLORIDE 50 MG/ML
25 INJECTION INTRAMUSCULAR; INTRAVENOUS ONCE
Status: COMPLETED | OUTPATIENT
Start: 2023-09-01 | End: 2023-09-01

## 2023-09-01 RX ORDER — DIPHENHYDRAMINE HCL 25 MG
25 CAPSULE ORAL EVERY 4 HOURS PRN
Qty: 10 CAPSULE | Refills: 0 | Status: SHIPPED | OUTPATIENT
Start: 2023-09-01 | End: 2023-09-11

## 2023-09-01 RX ORDER — DIAPER,BRIEF,INFANT-TODD,DISP
EACH MISCELLANEOUS
Qty: 20 G | Refills: 0 | Status: SHIPPED | OUTPATIENT
Start: 2023-09-01

## 2023-09-01 RX ADMIN — DIPHENHYDRAMINE HYDROCHLORIDE 25 MG: 50 INJECTION INTRAMUSCULAR; INTRAVENOUS at 17:36

## 2023-09-01 ASSESSMENT — ENCOUNTER SYMPTOMS
SHORTNESS OF BREATH: 0
BACK PAIN: 0
SORE THROAT: 0

## 2023-09-01 ASSESSMENT — PAIN - FUNCTIONAL ASSESSMENT: PAIN_FUNCTIONAL_ASSESSMENT: NONE - DENIES PAIN

## 2023-09-01 NOTE — DISCHARGE INSTRUCTIONS
Thank you for coming to [facility] [place] emergency department! You were seen today for insect sting. You were prescribed hydrocortisone and benadryl, please pick these medications up at the pharmacy. Follow up with your primary care doctor. If you have any worsening of symptoms or any other concerns, please return to the emergency department. We are always available!

## 2023-10-09 ENCOUNTER — APPOINTMENT (OUTPATIENT)
Dept: CT IMAGING | Age: 43
End: 2023-10-09
Payer: COMMERCIAL

## 2023-10-09 ENCOUNTER — HOSPITAL ENCOUNTER (EMERGENCY)
Age: 43
Discharge: HOME | End: 2023-10-10
Attending: EMERGENCY MEDICINE
Payer: COMMERCIAL

## 2023-10-09 VITALS
SYSTOLIC BLOOD PRESSURE: 173 MMHG | TEMPERATURE: 97.7 F | DIASTOLIC BLOOD PRESSURE: 107 MMHG | HEART RATE: 96 BPM | OXYGEN SATURATION: 97 % | RESPIRATION RATE: 18 BRPM

## 2023-10-09 DIAGNOSIS — N76.4 LABIAL ABSCESS: Primary | ICD-10-CM

## 2023-10-09 LAB
ANION GAP SERPL CALCULATED.3IONS-SCNC: 11 MMOL/L (ref 9–17)
BASOPHILS # BLD: 0.03 K/UL (ref 0–0.2)
BASOPHILS NFR BLD: 0 % (ref 0–2)
BUN SERPL-MCNC: 6 MG/DL (ref 6–20)
CALCIUM SERPL-MCNC: 8.7 MG/DL (ref 8.6–10.4)
CHLORIDE SERPL-SCNC: 104 MMOL/L (ref 98–107)
CO2 SERPL-SCNC: 25 MMOL/L (ref 20–31)
CREAT SERPL-MCNC: 0.8 MG/DL (ref 0.5–0.9)
EOSINOPHIL # BLD: 0.06 K/UL (ref 0–0.44)
EOSINOPHILS RELATIVE PERCENT: 1 % (ref 1–4)
ERYTHROCYTE [DISTWIDTH] IN BLOOD BY AUTOMATED COUNT: 13.1 % (ref 11.8–14.4)
GFR SERPL CREATININE-BSD FRML MDRD: >60 ML/MIN/1.73M2
GLUCOSE SERPL-MCNC: 102 MG/DL (ref 70–99)
HCG SERPL QL: NEGATIVE
HCT VFR BLD AUTO: 40.9 % (ref 36.3–47.1)
HGB BLD-MCNC: 13.6 G/DL (ref 11.9–15.1)
IMM GRANULOCYTES # BLD AUTO: <0.03 K/UL (ref 0–0.3)
IMM GRANULOCYTES NFR BLD: 0 %
LYMPHOCYTES NFR BLD: 2.2 K/UL (ref 1.1–3.7)
LYMPHOCYTES RELATIVE PERCENT: 28 % (ref 24–43)
MCH RBC QN AUTO: 29.7 PG (ref 25.2–33.5)
MCHC RBC AUTO-ENTMCNC: 33.3 G/DL (ref 28.4–34.8)
MCV RBC AUTO: 89.3 FL (ref 82.6–102.9)
MONOCYTES NFR BLD: 0.73 K/UL (ref 0.1–1.2)
MONOCYTES NFR BLD: 9 % (ref 3–12)
NEUTROPHILS NFR BLD: 62 % (ref 36–65)
NEUTS SEG NFR BLD: 4.96 K/UL (ref 1.5–8.1)
NRBC BLD-RTO: 0 PER 100 WBC
PLATELET # BLD AUTO: 303 K/UL (ref 138–453)
PMV BLD AUTO: 9.7 FL (ref 8.1–13.5)
POTASSIUM SERPL-SCNC: 3.7 MMOL/L (ref 3.7–5.3)
RBC # BLD AUTO: 4.58 M/UL (ref 3.95–5.11)
SODIUM SERPL-SCNC: 140 MMOL/L (ref 135–144)
WBC OTHER # BLD: 8 K/UL (ref 3.5–11.3)

## 2023-10-09 PROCEDURE — 6360000004 HC RX CONTRAST MEDICATION: Performed by: STUDENT IN AN ORGANIZED HEALTH CARE EDUCATION/TRAINING PROGRAM

## 2023-10-09 PROCEDURE — 84703 CHORIONIC GONADOTROPIN ASSAY: CPT

## 2023-10-09 PROCEDURE — 99285 EMERGENCY DEPT VISIT HI MDM: CPT | Performed by: EMERGENCY MEDICINE

## 2023-10-09 PROCEDURE — 6360000002 HC RX W HCPCS: Performed by: STUDENT IN AN ORGANIZED HEALTH CARE EDUCATION/TRAINING PROGRAM

## 2023-10-09 PROCEDURE — 85025 COMPLETE CBC W/AUTO DIFF WBC: CPT

## 2023-10-09 PROCEDURE — 96374 THER/PROPH/DIAG INJ IV PUSH: CPT | Performed by: EMERGENCY MEDICINE

## 2023-10-09 PROCEDURE — 74177 CT ABD & PELVIS W/CONTRAST: CPT

## 2023-10-09 PROCEDURE — 6370000000 HC RX 637 (ALT 250 FOR IP): Performed by: STUDENT IN AN ORGANIZED HEALTH CARE EDUCATION/TRAINING PROGRAM

## 2023-10-09 PROCEDURE — 80048 BASIC METABOLIC PNL TOTAL CA: CPT

## 2023-10-09 RX ORDER — OXYCODONE HYDROCHLORIDE AND ACETAMINOPHEN 5; 325 MG/1; MG/1
1 TABLET ORAL ONCE
Status: COMPLETED | OUTPATIENT
Start: 2023-10-09 | End: 2023-10-09

## 2023-10-09 RX ORDER — FENTANYL CITRATE 50 UG/ML
50 INJECTION, SOLUTION INTRAMUSCULAR; INTRAVENOUS ONCE
Status: COMPLETED | OUTPATIENT
Start: 2023-10-09 | End: 2023-10-09

## 2023-10-09 RX ADMIN — IOPAMIDOL 75 ML: 755 INJECTION, SOLUTION INTRAVENOUS at 21:32

## 2023-10-09 RX ADMIN — OXYCODONE AND ACETAMINOPHEN 1 TABLET: 5; 325 TABLET ORAL at 19:46

## 2023-10-09 RX ADMIN — FENTANYL CITRATE 50 MCG: 50 INJECTION INTRAMUSCULAR; INTRAVENOUS at 22:25

## 2023-10-09 ASSESSMENT — ENCOUNTER SYMPTOMS
SHORTNESS OF BREATH: 0
PHOTOPHOBIA: 0
ANAL BLEEDING: 0
CONSTIPATION: 0
RHINORRHEA: 0
NAUSEA: 0
ABDOMINAL PAIN: 0
SORE THROAT: 0
VOMITING: 0
CHEST TIGHTNESS: 0
ABDOMINAL DISTENTION: 0
DIARRHEA: 0

## 2023-10-09 ASSESSMENT — PAIN DESCRIPTION - LOCATION
LOCATION: GROIN;LEG
LOCATION: GROIN

## 2023-10-09 ASSESSMENT — PAIN SCALES - GENERAL
PAINLEVEL_OUTOF10: 9
PAINLEVEL_OUTOF10: 8
PAINLEVEL_OUTOF10: 4
PAINLEVEL_OUTOF10: 9
PAINLEVEL_OUTOF10: 8

## 2023-10-09 ASSESSMENT — PAIN - FUNCTIONAL ASSESSMENT: PAIN_FUNCTIONAL_ASSESSMENT: 0-10

## 2023-10-09 NOTE — ED NOTES
Pt came into the ed via triage due to having an abscess and pain in the groin area above the right side of the labia. Pt has no other c/o  at this time and stated that she has had this before. Pt is Aox4 and ambulatory. RR are equal and regular.       Inna Calix RN  10/09/23 [id number]

## 2023-10-09 NOTE — ED PROVIDER NOTES
[facility] ED  Emergency Department Encounter  Emergency Medicine Resident     Pt Name:Chantelle Carroll  MRN: [id number]  Birthdate 1980  Date of evaluation: 10/9/23  PCP:  SHERRIE Olivier CNP  Note Started: 7:23 PM EDT      CHIEF COMPLAINT       Chief Complaint   Patient presents with    Abscess       HISTORY OF PRESENT ILLNESS  (Location/Symptom, Timing/Onset, Context/Setting, Quality, Duration, Modifying Factors, Severity.)      Brandon Kruse is a 39 y.o. female who presents with Patient with pain and swelling in her right labia region. She states she thinks she has an abscess. It is been worsening over the last several days she has had some yellow drainage come out of it. She denies any abdominal pain vaginal discharge fevers or chills. She denies any history of diabetes. PAST MEDICAL / SURGICAL / SOCIAL / FAMILY HISTORY      has a past medical history of Abnormal Pap smear of cervix, Abnormal stress test, Achilles tendonitis, Anxiety, Aortic regurgitation, Asthma, Bradycardia, Cancer (HCC), Chest pain, CHF (congestive heart failure) ([address]), Coronary artery disease without angina pectoris, Depression, Dizziness, GERD (gastroesophageal reflux disease), Headache(784.0), Heart murmur, Herpes, Hyperlipidemia, Hypertension, Leaky heart valve, Migraine headache, Obesity, Poor historian, Schizophrenia ([address]), Seasonal allergies, Sleep apnea, Snoring, SOB (shortness of breath), Syncope, Thyroid disease, Under care of service provider, Under care of service provider, Under care of service provider, Under care of service provider, and Wears partial dentures. has a past surgical history that includes pre-malignant / benign skin lesion excision (Left, 05/12/2016); LEEP (06/06/2016); Cervix biopsy (N/A, 04/05/2019); Cardiac catheterization (05/25/2021); Achilles tendon surgery (Left, 08/27/2021);  Endoscopy, colon, diagnostic; Achilles tendon surgery (Right, 07/06/2022); other

## 2023-10-10 PROCEDURE — 6370000000 HC RX 637 (ALT 250 FOR IP): Performed by: STUDENT IN AN ORGANIZED HEALTH CARE EDUCATION/TRAINING PROGRAM

## 2023-10-10 RX ORDER — ACETAMINOPHEN 500 MG
1000 TABLET ORAL EVERY 8 HOURS PRN
Qty: 21 TABLET | Refills: 0 | Status: SHIPPED | OUTPATIENT
Start: 2023-10-10 | End: 2023-10-17

## 2023-10-10 RX ORDER — IBUPROFEN 800 MG/1
800 TABLET ORAL EVERY 8 HOURS PRN
Qty: 21 TABLET | Refills: 0 | Status: SHIPPED | OUTPATIENT
Start: 2023-10-10 | End: 2023-10-17

## 2023-10-10 RX ORDER — METHOCARBAMOL 750 MG/1
750 TABLET, FILM COATED ORAL 4 TIMES DAILY
Qty: 40 TABLET | Refills: 0 | Status: SHIPPED | OUTPATIENT
Start: 2023-10-10 | End: 2023-10-20

## 2023-10-10 RX ORDER — DOXYCYCLINE HYCLATE 100 MG
100 TABLET ORAL ONCE
Status: COMPLETED | OUTPATIENT
Start: 2023-10-10 | End: 2023-10-10

## 2023-10-10 RX ORDER — DOXYCYCLINE HYCLATE 100 MG
100 TABLET ORAL 2 TIMES DAILY
Qty: 14 TABLET | Refills: 0 | Status: SHIPPED | OUTPATIENT
Start: 2023-10-10 | End: 2023-10-17

## 2023-10-10 RX ADMIN — DOXYCYCLINE HYCLATE 100 MG: 100 TABLET, COATED ORAL at 00:15

## 2023-11-02 RX ORDER — METFORMIN HYDROCHLORIDE 500 MG/1
1 TABLET, EXTENDED RELEASE ORAL DAILY
Qty: 30 TABLET | Refills: 11 | Status: SHIPPED | OUTPATIENT
Start: 2023-11-02

## 2024-01-21 ENCOUNTER — APPOINTMENT (OUTPATIENT)
Dept: MRI IMAGING | Age: 44
DRG: 054 | End: 2024-01-21
Payer: COMMERCIAL

## 2024-01-21 ENCOUNTER — HOSPITAL ENCOUNTER (INPATIENT)
Age: 44
LOS: 1 days | Discharge: HOME | DRG: 054 | End: 2024-01-26
Attending: EMERGENCY MEDICINE | Admitting: PSYCHIATRY & NEUROLOGY
Payer: COMMERCIAL

## 2024-01-21 ENCOUNTER — APPOINTMENT (OUTPATIENT)
Dept: CT IMAGING | Age: 44
DRG: 054 | End: 2024-01-21
Payer: COMMERCIAL

## 2024-01-21 DIAGNOSIS — G44.89 OTHER HEADACHE SYNDROME: Primary | ICD-10-CM

## 2024-01-21 PROBLEM — G43.E09 CHRONIC MIGRAINE WITH AURA WITHOUT STATUS MIGRAINOSUS, NOT INTRACTABLE: Status: ACTIVE | Noted: 2024-01-21

## 2024-01-21 PROBLEM — R51.0 HEADACHE DUE TO LOW CEREBROSPINAL FLUID PRESSURE: Status: ACTIVE | Noted: 2024-01-21

## 2024-01-21 PROBLEM — G93.2 IDIOPATHIC INTRACRANIAL HYPERTENSION: Status: ACTIVE | Noted: 2024-01-21

## 2024-01-21 LAB
ALBUMIN SERPL-MCNC: 4.2 G/DL (ref 3.5–5.2)
ALBUMIN/GLOB SERPL: 1 {RATIO} (ref 1–2.5)
ALP SERPL-CCNC: 113 U/L (ref 35–104)
ALT SERPL-CCNC: 19 U/L (ref 10–35)
ANION GAP SERPL CALCULATED.3IONS-SCNC: 12 MMOL/L (ref 9–16)
AST SERPL-CCNC: 24 U/L (ref 10–35)
BASOPHILS # BLD: 0.05 K/UL (ref 0–0.2)
BASOPHILS NFR BLD: 1 % (ref 0–2)
BILIRUB SERPL-MCNC: <0.2 MG/DL (ref 0–1.2)
BUN SERPL-MCNC: 10 MG/DL (ref 6–20)
CALCIUM SERPL-MCNC: 9 MG/DL (ref 8.6–10.4)
CHLORIDE SERPL-SCNC: 110 MMOL/L (ref 98–107)
CO2 SERPL-SCNC: 17 MMOL/L (ref 20–31)
CREAT SERPL-MCNC: 1 MG/DL (ref 0.5–0.9)
EOSINOPHIL # BLD: 0.1 K/UL (ref 0–0.44)
EOSINOPHILS RELATIVE PERCENT: 1 % (ref 1–4)
ERYTHROCYTE [DISTWIDTH] IN BLOOD BY AUTOMATED COUNT: 14.9 % (ref 11.8–14.4)
GFR SERPL CREATININE-BSD FRML MDRD: >60 ML/MIN/1.73M2
GLUCOSE SERPL-MCNC: 88 MG/DL (ref 74–99)
HCT VFR BLD AUTO: 40.2 % (ref 36.3–47.1)
HGB BLD-MCNC: 12.9 G/DL (ref 11.9–15.1)
IMM GRANULOCYTES # BLD AUTO: 0.06 K/UL (ref 0–0.3)
IMM GRANULOCYTES NFR BLD: 1 %
LYMPHOCYTES NFR BLD: 3.04 K/UL (ref 1.1–3.7)
LYMPHOCYTES RELATIVE PERCENT: 38 % (ref 24–43)
MCH RBC QN AUTO: 29.5 PG (ref 25.2–33.5)
MCHC RBC AUTO-ENTMCNC: 32.1 G/DL (ref 28.4–34.8)
MCV RBC AUTO: 91.8 FL (ref 82.6–102.9)
MONOCYTES NFR BLD: 0.72 K/UL (ref 0.1–1.2)
MONOCYTES NFR BLD: 9 % (ref 3–12)
NEUTROPHILS NFR BLD: 50 % (ref 36–65)
NEUTS SEG NFR BLD: 3.95 K/UL (ref 1.5–8.1)
NRBC BLD-RTO: 0 PER 100 WBC
PLATELET # BLD AUTO: 339 K/UL (ref 138–453)
PMV BLD AUTO: 9.3 FL (ref 8.1–13.5)
POTASSIUM SERPL-SCNC: 3.6 MMOL/L (ref 3.7–5.3)
PROT SERPL-MCNC: 7.4 G/DL (ref 6.6–8.7)
RBC # BLD AUTO: 4.38 M/UL (ref 3.95–5.11)
RBC # BLD: ABNORMAL 10*6/UL
SODIUM SERPL-SCNC: 139 MMOL/L (ref 136–145)
TROPONIN I SERPL HS-MCNC: <6 NG/L (ref 0–14)
WBC OTHER # BLD: 7.9 K/UL (ref 3.5–11.3)

## 2024-01-21 PROCEDURE — 96366 THER/PROPH/DIAG IV INF ADDON: CPT

## 2024-01-21 PROCEDURE — 96375 TX/PRO/DX INJ NEW DRUG ADDON: CPT

## 2024-01-21 PROCEDURE — 6360000002 HC RX W HCPCS: Performed by: NURSE PRACTITIONER

## 2024-01-21 PROCEDURE — 99223 1ST HOSP IP/OBS HIGH 75: CPT | Performed by: PSYCHIATRY & NEUROLOGY

## 2024-01-21 PROCEDURE — 72157 MRI CHEST SPINE W/O & W/DYE: CPT

## 2024-01-21 PROCEDURE — 72156 MRI NECK SPINE W/O & W/DYE: CPT

## 2024-01-21 PROCEDURE — 96365 THER/PROPH/DIAG IV INF INIT: CPT

## 2024-01-21 PROCEDURE — 85025 COMPLETE CBC W/AUTO DIFF WBC: CPT

## 2024-01-21 PROCEDURE — 6370000000 HC RX 637 (ALT 250 FOR IP): Performed by: NURSE PRACTITIONER

## 2024-01-21 PROCEDURE — 80053 COMPREHEN METABOLIC PANEL: CPT

## 2024-01-21 PROCEDURE — A9576 INJ PROHANCE MULTIPACK: HCPCS | Performed by: NURSE PRACTITIONER

## 2024-01-21 PROCEDURE — 96374 THER/PROPH/DIAG INJ IV PUSH: CPT

## 2024-01-21 PROCEDURE — G0378 HOSPITAL OBSERVATION PER HR: HCPCS

## 2024-01-21 PROCEDURE — 99285 EMERGENCY DEPT VISIT HI MDM: CPT

## 2024-01-21 PROCEDURE — 6360000004 HC RX CONTRAST MEDICATION: Performed by: NURSE PRACTITIONER

## 2024-01-21 PROCEDURE — 2580000003 HC RX 258: Performed by: NURSE PRACTITIONER

## 2024-01-21 PROCEDURE — 6360000002 HC RX W HCPCS: Performed by: STUDENT IN AN ORGANIZED HEALTH CARE EDUCATION/TRAINING PROGRAM

## 2024-01-21 PROCEDURE — 70450 CT HEAD/BRAIN W/O DYE: CPT

## 2024-01-21 PROCEDURE — 70553 MRI BRAIN STEM W/O & W/DYE: CPT

## 2024-01-21 PROCEDURE — 99222 1ST HOSP IP/OBS MODERATE 55: CPT | Performed by: NURSE PRACTITIONER

## 2024-01-21 PROCEDURE — 84484 ASSAY OF TROPONIN QUANT: CPT

## 2024-01-21 RX ORDER — ONDANSETRON 2 MG/ML
4 INJECTION INTRAMUSCULAR; INTRAVENOUS EVERY 6 HOURS PRN
Status: DISCONTINUED | OUTPATIENT
Start: 2024-01-21 | End: 2024-01-26 | Stop reason: HOSPADM

## 2024-01-21 RX ORDER — FUROSEMIDE 20 MG/1
20 TABLET ORAL DAILY
Status: DISCONTINUED | OUTPATIENT
Start: 2024-01-22 | End: 2024-01-26 | Stop reason: HOSPADM

## 2024-01-21 RX ORDER — ACETAZOLAMIDE 250 MG/1
500 TABLET ORAL 2 TIMES DAILY
Status: DISCONTINUED | OUTPATIENT
Start: 2024-01-21 | End: 2024-01-26 | Stop reason: HOSPADM

## 2024-01-21 RX ORDER — ATORVASTATIN CALCIUM 20 MG/1
20 TABLET, FILM COATED ORAL DAILY
Status: DISCONTINUED | OUTPATIENT
Start: 2024-01-21 | End: 2024-01-26 | Stop reason: HOSPADM

## 2024-01-21 RX ORDER — LOSARTAN POTASSIUM 25 MG/1
25 TABLET ORAL DAILY
Status: DISCONTINUED | OUTPATIENT
Start: 2024-01-21 | End: 2024-01-21

## 2024-01-21 RX ORDER — METOPROLOL SUCCINATE 25 MG/1
25 TABLET, EXTENDED RELEASE ORAL DAILY
Status: DISCONTINUED | OUTPATIENT
Start: 2024-01-21 | End: 2024-01-21

## 2024-01-21 RX ORDER — SODIUM CHLORIDE 0.9 % (FLUSH) 0.9 %
5-40 SYRINGE (ML) INJECTION EVERY 12 HOURS SCHEDULED
Status: DISCONTINUED | OUTPATIENT
Start: 2024-01-21 | End: 2024-01-26 | Stop reason: HOSPADM

## 2024-01-21 RX ORDER — ENOXAPARIN SODIUM 100 MG/ML
40 INJECTION SUBCUTANEOUS DAILY
Status: DISCONTINUED | OUTPATIENT
Start: 2024-01-21 | End: 2024-01-21

## 2024-01-21 RX ORDER — FUROSEMIDE 20 MG/1
20 TABLET ORAL DAILY
Status: DISCONTINUED | OUTPATIENT
Start: 2024-01-21 | End: 2024-01-21

## 2024-01-21 RX ORDER — LOSARTAN POTASSIUM 50 MG/1
25 TABLET ORAL DAILY
Status: DISCONTINUED | OUTPATIENT
Start: 2024-01-22 | End: 2024-01-26 | Stop reason: HOSPADM

## 2024-01-21 RX ORDER — QUETIAPINE FUMARATE 25 MG/1
100 TABLET, FILM COATED ORAL 2 TIMES DAILY
Status: DISCONTINUED | OUTPATIENT
Start: 2024-01-21 | End: 2024-01-22

## 2024-01-21 RX ORDER — LEVOTHYROXINE SODIUM 0.05 MG/1
50 TABLET ORAL DAILY
Status: DISCONTINUED | OUTPATIENT
Start: 2024-01-21 | End: 2024-01-26 | Stop reason: HOSPADM

## 2024-01-21 RX ORDER — KETOROLAC TROMETHAMINE 15 MG/ML
15 INJECTION, SOLUTION INTRAMUSCULAR; INTRAVENOUS EVERY 6 HOURS PRN
Status: DISCONTINUED | OUTPATIENT
Start: 2024-01-21 | End: 2024-01-26 | Stop reason: HOSPADM

## 2024-01-21 RX ORDER — PROCHLORPERAZINE EDISYLATE 5 MG/ML
10 INJECTION INTRAMUSCULAR; INTRAVENOUS ONCE
Status: COMPLETED | OUTPATIENT
Start: 2024-01-21 | End: 2024-01-21

## 2024-01-21 RX ORDER — ROSUVASTATIN CALCIUM 20 MG/1
40 TABLET, COATED ORAL NIGHTLY
Status: DISCONTINUED | OUTPATIENT
Start: 2024-01-21 | End: 2024-01-21

## 2024-01-21 RX ORDER — LORAZEPAM 2 MG/ML
2 INJECTION INTRAMUSCULAR ONCE
Status: DISCONTINUED | OUTPATIENT
Start: 2024-01-21 | End: 2024-01-26 | Stop reason: HOSPADM

## 2024-01-21 RX ORDER — DIPHENHYDRAMINE HYDROCHLORIDE 50 MG/ML
25 INJECTION INTRAMUSCULAR; INTRAVENOUS EVERY 6 HOURS PRN
Status: DISCONTINUED | OUTPATIENT
Start: 2024-01-21 | End: 2024-01-26 | Stop reason: HOSPADM

## 2024-01-21 RX ORDER — KETOROLAC TROMETHAMINE 30 MG/ML
30 INJECTION, SOLUTION INTRAMUSCULAR; INTRAVENOUS ONCE
Status: COMPLETED | OUTPATIENT
Start: 2024-01-21 | End: 2024-01-21

## 2024-01-21 RX ORDER — ASPIRIN 81 MG/1
81 TABLET, CHEWABLE ORAL DAILY
Status: DISCONTINUED | OUTPATIENT
Start: 2024-01-21 | End: 2024-01-26 | Stop reason: HOSPADM

## 2024-01-21 RX ORDER — SODIUM CHLORIDE 0.9 % (FLUSH) 0.9 %
5-40 SYRINGE (ML) INJECTION PRN
Status: DISCONTINUED | OUTPATIENT
Start: 2024-01-21 | End: 2024-01-26 | Stop reason: HOSPADM

## 2024-01-21 RX ORDER — POLYETHYLENE GLYCOL 3350 17 G/17G
17 POWDER, FOR SOLUTION ORAL DAILY PRN
Status: DISCONTINUED | OUTPATIENT
Start: 2024-01-21 | End: 2024-01-26 | Stop reason: HOSPADM

## 2024-01-21 RX ORDER — BUDESONIDE AND FORMOTEROL FUMARATE DIHYDRATE 80; 4.5 UG/1; UG/1
2 AEROSOL RESPIRATORY (INHALATION)
Status: DISCONTINUED | OUTPATIENT
Start: 2024-01-22 | End: 2024-01-26 | Stop reason: HOSPADM

## 2024-01-21 RX ORDER — SODIUM CHLORIDE 0.9 % (FLUSH) 0.9 %
10 SYRINGE (ML) INJECTION PRN
Status: DISCONTINUED | OUTPATIENT
Start: 2024-01-21 | End: 2024-01-26 | Stop reason: HOSPADM

## 2024-01-21 RX ORDER — ASPIRIN 300 MG/1
300 SUPPOSITORY RECTAL DAILY
Status: DISCONTINUED | OUTPATIENT
Start: 2024-01-21 | End: 2024-01-26 | Stop reason: HOSPADM

## 2024-01-21 RX ORDER — METOPROLOL SUCCINATE 25 MG/1
25 TABLET, EXTENDED RELEASE ORAL DAILY
Status: DISCONTINUED | OUTPATIENT
Start: 2024-01-22 | End: 2024-01-26 | Stop reason: HOSPADM

## 2024-01-21 RX ORDER — ACETAZOLAMIDE 250 MG/1
500 TABLET ORAL EVERY 12 HOURS SCHEDULED
Qty: 120 TABLET | Refills: 0 | COMMUNITY
Start: 2024-01-06 | End: 2024-02-05

## 2024-01-21 RX ORDER — ONDANSETRON 4 MG/1
4 TABLET, ORALLY DISINTEGRATING ORAL EVERY 8 HOURS PRN
Status: DISCONTINUED | OUTPATIENT
Start: 2024-01-21 | End: 2024-01-26 | Stop reason: HOSPADM

## 2024-01-21 RX ORDER — SODIUM CHLORIDE 9 MG/ML
INJECTION, SOLUTION INTRAVENOUS PRN
Status: DISCONTINUED | OUTPATIENT
Start: 2024-01-21 | End: 2024-01-26 | Stop reason: HOSPADM

## 2024-01-21 RX ADMIN — GADOTERIDOL 18 ML: 279.3 INJECTION, SOLUTION INTRAVENOUS at 19:22

## 2024-01-21 RX ADMIN — ACETAZOLAMIDE 500 MG: 250 TABLET ORAL at 19:56

## 2024-01-21 RX ADMIN — PROCHLORPERAZINE EDISYLATE 10 MG: 5 INJECTION INTRAMUSCULAR; INTRAVENOUS at 16:19

## 2024-01-21 RX ADMIN — METHYLPREDNISOLONE SODIUM SUCCINATE 1000 MG: 1 INJECTION INTRAMUSCULAR; INTRAVENOUS at 21:48

## 2024-01-21 RX ADMIN — KETOROLAC TROMETHAMINE 30 MG: 30 INJECTION, SOLUTION INTRAMUSCULAR; INTRAVENOUS at 16:19

## 2024-01-21 RX ADMIN — DIPHENHYDRAMINE HYDROCHLORIDE 25 MG: 50 INJECTION INTRAMUSCULAR; INTRAVENOUS at 16:19

## 2024-01-21 NOTE — CONSULTS
NEUROLOGY INPATIENT CONSULT NOTE          1/21/2024      Reason for consult: Worsening headaches with vision changes; Hx IIH  Requesting physician:  Mirza Talbot     Chief complaint: Worsening headaches with vision changes      HPI: I had the pleasure of seeing your patient in neurology consultation. As you would recall Chantelle Maddox is a 43 y.o. female with H/O IIH (diagnosed on 1/6/24; on Diamox 500 mg BID), HTN, HLD, CAD, CHF, migraine headaches, moderate-to-severe AR, who was admitted on 1/21/2024 with worsening headaches.  Patient reported being diagnosed with intracranial hypertension s/p LP on 1/6/2024.  She was started on Diamox 500 mg BID.  She stayed headache-free for only 2 to 3 days after the LP.  Since then she has had almost constant headaches that have been worsening over time.  Pain is mostly in the forehead & behind her eyes, described as nonradiating, moderately severe, 7/10 in intensity, pressure, like \"someone is sitting on her forehead\", associated with bilateral blurred vision, \"jumping eyes\", photophobia & phonophobia.  Her blurred vision is the same as before she underwent LP.  Patient has been unable to sleep properly due to these worsening headaches.  She feels better temporarily by using an ice pack on the forehead.  Headache has no association with laying, sitting or standing position.  She denied nausea, vomiting, weakness, numbness, LOC, fever or chills.  She reported history of migraine headaches for which she took prophylactic medications for some time.  Does not take anything currently as her doctor retired/moved away.  Upon further questioning patient reported that with her regular migraine headaches she used to \"sleep-off\" & felt better when she woke up. However \"these headaches are not going away\".  She has not taking any OTC painkillers.  She did not go back to the hospital where she had LP done.  Patient presented to [facility] ED for evaluation.    No current facility-administered  medications on file prior to encounter.     Current Outpatient Medications on File Prior to Encounter   Medication Sig Dispense Refill    acetaZOLAMIDE (DIAMOX) 250 MG tablet Take 2 tablets by mouth every 12 hours 120 tablet 0    Prenatal Vit-Fe Fumarate-FA (NIVA-PLUS) 27-1 MG TABS Take 1 tablet by mouth daily 30 tablet 11    acetaminophen (TYLENOL) 500 MG tablet Take 2 tablets by mouth every 8 hours as needed for Pain 21 tablet 0    ibuprofen (ADVIL;MOTRIN) 800 MG tablet Take 1 tablet by mouth every 8 hours as needed for Pain 21 tablet 0    hydrocortisone 1 % cream Apply topically 2 -3 times daily for 5 - 7 days for itching. 20 g 0    acetaminophen (TYLENOL) 325 MG tablet Take 2 tablets by mouth every 6 hours as needed for Pain 30 tablet 0    QUEtiapine (SEROQUEL) 100 MG tablet Take 1 tablet by mouth 2 times daily      XARELTO 10 MG TABS tablet TAKE 1 TABLET BY MOUTH DAILY (WITH BREAKFAST) 30 tablet 1    potassium chloride (KLOR-CON M) 20 MEQ extended release tablet Take 3 tablets by mouth daily      diclofenac sodium (VOLTAREN) 1 % GEL Apply 2 g topically 3 times daily      aspirin 81 MG EC tablet Take 1 tablet by mouth daily      azelastine (ASTELIN) 0.1 % nasal spray 1 spray by Nasal route 2 times daily Use in each nostril as directed      loratadine (CLARITIN) 10 MG capsule Take 1 capsule by mouth daily      montelukast (SINGULAIR) 10 MG tablet Take 1 tablet by mouth nightly      metoprolol succinate (TOPROL XL) 25 MG extended release tablet Take 1 tablet by mouth daily      losartan (COZAAR) 25 MG tablet Take 1 tablet by mouth daily      furosemide (LASIX) 20 MG tablet Take 1 tablet by mouth daily      Cholecalciferol (VITAMIN D3 PO) Take 2,000 Units by mouth daily       LATUDA 120 MG tablet Take 1 tablet by mouth daily  2    atorvastatin (LIPITOR) 20 MG tablet Take 1 tablet by mouth daily  2    levothyroxine (SYNTHROID) 50 MCG tablet Take 1 tablet by mouth daily  2    SUMAtriptan (IMITREX) 50 MG tablet Take 2

## 2024-01-21 NOTE — ED NOTES
Pt to ed in wheel chair from triage.   Pt states early January she was evaluated by her eye doctor, sent to the ED after she was told she has inflammation in her eyes. Pt states she had an MRI, was told she had fluid on her brain and had a lumbar puncture. Pt dx with idiopathic intracranial hypertension  Pt states since then she has had a headache, worse than normal. She states her balance is off, she is dizzy, unsteady gait, vision changes. Pt states symptoms are just worsening.   Pt denies chest pain, sob. Pt states headache is 7/10.

## 2024-01-21 NOTE — ED PROVIDER NOTES
[facility] ED  eMERGENCY dEPARTMENT eNCOUnter   Attending Attestation     Pt Name: Chantelle Maddox  MRN: [id number]  Birthdate 1980  Date of evaluation: 1/21/24       Chantelle Maddox is a 43 y.o. female who presents with Dizziness (\"Room spinning\" since surgery on Jan 6th \"water removed from my brain\")      4:50 PM EST      History: Patient presents with intracranial hypertension and worsening headache.  Patient says that there is room spinning sensation as well.  Patient had a LP on the sixth.  She says she felt well just after this however she has just been getting worse since then.  Patient says her headache is severe.    Exam: Heart rate and rhythm are regular.  Lungs are clear to auscultation bilaterally.  Abdomen soft, nontender.  Patient is awake and alert and acting appropriate.     Will obtain CT brain, will plan for labs, will discuss with neurology.    I performed a history and physical examination of the patient and discussed management with the resident. I reviewed the resident’s note and agree with the documented findings and plan of care. Any areas of disagreement are noted on the chart. I was personally present for the key portions of any procedures. I have documented in the chart those procedures where I was not present during the key portions. I have personally reviewed all images and agree with the resident's interpretation. I have reviewed the emergency nurses triage note. I agree with the chief complaint, past medical history, past surgical history, allergies, medications, social and family history as documented unless otherwise noted below. Documentation of the HPI, Physical Exam and Medical Decision Making performed by medical students or scribes is based on my personal performance of the HPI, PE and MDM. For Phys Assistant/ Nurse Practitioner cases/documentation I have had a face to face evaluation of this patient and have completed at least one if not all key elements of the

## 2024-01-21 NOTE — ED NOTES
Patient resting comfortably and in no acute distress. Respirations even and nonlabored. Patient denies any needs at this time. Bed in lowest position. Call light within reach. Will continue to monitor.

## 2024-01-21 NOTE — ED PROVIDER NOTES
[facility] MED SURG  Emergency Department Encounter  Emergency Medicine Resident     Pt Name:Chantelle Maddox  MRN: [id number]  Birthdate 1980  Date of evaluation: 1/21/24  PCP:  Letty Orozco APRN - CNP  Note Started: 2:47 PM EST      CHIEF COMPLAINT       Chief Complaint   Patient presents with    Dizziness     \"Room spinning\" since surgery on Jan 6th \"water removed from my brain\"       HISTORY OF PRESENT ILLNESS  (Location/Symptom, Timing/Onset, Context/Setting, Quality, Duration, Modifying Factors, Severity.)      Chantelle Maddox is a 43 y.o. female who presents with dizziness.  Patient has a history of recently diagnosed benign intracranial hypertension.  Received the therapeutic lumbar puncture at [facility] at the beginning of this month.  Patient states that she initially felt better but then since has been having gradually worsening headache and worsening dizziness.  She denies any recent injury or trauma.  Denies any numbness or tingling or weakness in the extremities.  Patient states she is Taking her home medications as prescribed without change.  She denies any other complaints at this time.    PAST MEDICAL / SURGICAL / SOCIAL / FAMILY HISTORY      has a past medical history of Abnormal Pap smear of cervix, Abnormal stress test, Achilles tendonitis, Anxiety, Aortic regurgitation, Asthma, Bradycardia, Cancer ([facility]), Chest pain, CHF (congestive heart failure) ([facility]), Coronary artery disease without angina pectoris, Depression, Dizziness, GERD (gastroesophageal reflux disease), Headache(784.0), Heart murmur, Herpes, Hyperlipidemia, Hypertension, Leaky heart valve, Migraine headache, Obesity, Poor historian, Schizophrenia ([facility]), Seasonal allergies, Sleep apnea, Snoring, SOB (shortness of breath), Syncope, Thyroid disease, Under care of service provider, Under care of service provider, Under care of service provider, Under care of service provider, and Wears partial dentures.       has a past

## 2024-01-21 NOTE — ED NOTES
Patient had a near syncopal episode in triage after EKG when transferring from cot to wheelchair.

## 2024-01-22 PROBLEM — G43.E19 INTRACTABLE CHRONIC MIGRAINE WITH AURA AND WITHOUT STATUS MIGRAINOSUS: Status: ACTIVE | Noted: 2024-01-21

## 2024-01-22 LAB
ANION GAP SERPL CALCULATED.3IONS-SCNC: 12 MMOL/L (ref 9–16)
BUN SERPL-MCNC: 16 MG/DL (ref 6–20)
CALCIUM SERPL-MCNC: 8.9 MG/DL (ref 8.6–10.4)
CHLORIDE SERPL-SCNC: 105 MMOL/L (ref 98–107)
CHOLEST SERPL-MCNC: 231 MG/DL (ref 0–199)
CHOLESTEROL/HDL RATIO: 7
CO2 SERPL-SCNC: 18 MMOL/L (ref 20–31)
CREAT SERPL-MCNC: 1.3 MG/DL (ref 0.5–0.9)
ERYTHROCYTE [DISTWIDTH] IN BLOOD BY AUTOMATED COUNT: 14.6 % (ref 11.8–14.4)
EST. AVERAGE GLUCOSE BLD GHB EST-MCNC: 114 MG/DL
GFR SERPL CREATININE-BSD FRML MDRD: 51 ML/MIN/1.73M2
GLUCOSE SERPL-MCNC: 233 MG/DL (ref 74–99)
HBA1C MFR BLD: 5.6 % (ref 4–6)
HCT VFR BLD AUTO: 40 % (ref 36.3–47.1)
HDLC SERPL-MCNC: 35 MG/DL
HGB BLD-MCNC: 13.2 G/DL (ref 11.9–15.1)
LDLC SERPL CALC-MCNC: 176 MG/DL (ref 0–100)
MCH RBC QN AUTO: 29.3 PG (ref 25.2–33.5)
MCHC RBC AUTO-ENTMCNC: 33 G/DL (ref 28.4–34.8)
MCV RBC AUTO: 88.9 FL (ref 82.6–102.9)
NRBC BLD-RTO: 0 PER 100 WBC
PLATELET # BLD AUTO: 359 K/UL (ref 138–453)
PMV BLD AUTO: 9.5 FL (ref 8.1–13.5)
POTASSIUM SERPL-SCNC: 3.7 MMOL/L (ref 3.7–5.3)
RBC # BLD AUTO: 4.5 M/UL (ref 3.95–5.11)
SODIUM SERPL-SCNC: 135 MMOL/L (ref 136–145)
TRIGL SERPL-MCNC: 100 MG/DL
VLDLC SERPL CALC-MCNC: 20 MG/DL
WBC OTHER # BLD: 8.6 K/UL (ref 3.5–11.3)

## 2024-01-22 PROCEDURE — 80048 BASIC METABOLIC PNL TOTAL CA: CPT

## 2024-01-22 PROCEDURE — 94640 AIRWAY INHALATION TREATMENT: CPT

## 2024-01-22 PROCEDURE — G0378 HOSPITAL OBSERVATION PER HR: HCPCS

## 2024-01-22 PROCEDURE — 80061 LIPID PANEL: CPT

## 2024-01-22 PROCEDURE — 97166 OT EVAL MOD COMPLEX 45 MIN: CPT

## 2024-01-22 PROCEDURE — 97535 SELF CARE MNGMENT TRAINING: CPT

## 2024-01-22 PROCEDURE — 85027 COMPLETE CBC AUTOMATED: CPT

## 2024-01-22 PROCEDURE — 97530 THERAPEUTIC ACTIVITIES: CPT

## 2024-01-22 PROCEDURE — 6370000000 HC RX 637 (ALT 250 FOR IP)

## 2024-01-22 PROCEDURE — 99232 SBSQ HOSP IP/OBS MODERATE 35: CPT | Performed by: PSYCHIATRY & NEUROLOGY

## 2024-01-22 PROCEDURE — 36415 COLL VENOUS BLD VENIPUNCTURE: CPT

## 2024-01-22 PROCEDURE — 83036 HEMOGLOBIN GLYCOSYLATED A1C: CPT

## 2024-01-22 PROCEDURE — 97162 PT EVAL MOD COMPLEX 30 MIN: CPT

## 2024-01-22 PROCEDURE — 6370000000 HC RX 637 (ALT 250 FOR IP): Performed by: NURSE PRACTITIONER

## 2024-01-22 PROCEDURE — 2580000003 HC RX 258

## 2024-01-22 RX ORDER — AMITRIPTYLINE HYDROCHLORIDE 25 MG/1
25 TABLET, FILM COATED ORAL NIGHTLY
Status: DISCONTINUED | OUTPATIENT
Start: 2024-01-22 | End: 2024-01-26 | Stop reason: HOSPADM

## 2024-01-22 RX ORDER — QUETIAPINE FUMARATE 200 MG/1
200 TABLET, FILM COATED ORAL NIGHTLY
Status: DISCONTINUED | OUTPATIENT
Start: 2024-01-23 | End: 2024-01-26 | Stop reason: HOSPADM

## 2024-01-22 RX ADMIN — RIVAROXABAN 10 MG: 10 TABLET, FILM COATED ORAL at 17:40

## 2024-01-22 RX ADMIN — ACETAZOLAMIDE 500 MG: 250 TABLET ORAL at 20:38

## 2024-01-22 RX ADMIN — ACETAZOLAMIDE 500 MG: 250 TABLET ORAL at 08:28

## 2024-01-22 RX ADMIN — LEVOTHYROXINE SODIUM 50 MCG: 50 TABLET ORAL at 08:29

## 2024-01-22 RX ADMIN — SODIUM CHLORIDE, PRESERVATIVE FREE 10 ML: 5 INJECTION INTRAVENOUS at 20:32

## 2024-01-22 RX ADMIN — FUROSEMIDE 20 MG: 20 TABLET ORAL at 08:29

## 2024-01-22 RX ADMIN — ATORVASTATIN CALCIUM 20 MG: 20 TABLET, FILM COATED ORAL at 08:29

## 2024-01-22 RX ADMIN — ASPIRIN 81 MG 81 MG: 81 TABLET ORAL at 08:28

## 2024-01-22 RX ADMIN — AMITRIPTYLINE HYDROCHLORIDE 25 MG: 25 TABLET, FILM COATED ORAL at 20:31

## 2024-01-22 RX ADMIN — BUDESONIDE AND FORMOTEROL FUMARATE DIHYDRATE 2 PUFF: 80; 4.5 AEROSOL RESPIRATORY (INHALATION) at 09:52

## 2024-01-22 RX ADMIN — SODIUM CHLORIDE, PRESERVATIVE FREE 10 ML: 5 INJECTION INTRAVENOUS at 08:30

## 2024-01-22 RX ADMIN — METOPROLOL SUCCINATE 25 MG: 25 TABLET, EXTENDED RELEASE ORAL at 08:29

## 2024-01-22 ASSESSMENT — ENCOUNTER SYMPTOMS
COUGH: 0
BACK PAIN: 1
WHEEZING: 0
SHORTNESS OF BREATH: 0
VOMITING: 0
ABDOMINAL PAIN: 0
EYE PAIN: 0
SINUS PAIN: 1
NAUSEA: 0
CONSTIPATION: 0
SORE THROAT: 0
DIARRHEA: 0
SINUS PRESSURE: 0
BACK PAIN: 0

## 2024-01-22 ASSESSMENT — PAIN SCALES - GENERAL
PAINLEVEL_OUTOF10: 0
PAINLEVEL_OUTOF10: 5
PAINLEVEL_OUTOF10: 0

## 2024-01-22 NOTE — PLAN OF CARE
Problem: Chronic Conditions and Co-morbidities  Goal: Patient's chronic conditions and co-morbidity symptoms are monitored and maintained or improved  Outcome: Progressing     Problem: Safety - Adult  Goal: Free from fall injury  Outcome: Progressing     Problem: Pain  Goal: Verbalizes/displays adequate comfort level or baseline comfort level  Outcome: Progressing

## 2024-01-22 NOTE — PROGRESS NOTES
Occupational Therapy  Facility/Department: [address] MED SURG  Occupational Therapy Initial Assessment    Name: Chantelle Maddox  : 1980  MRN: [id number]  Date of Service: 2024  Chief Complaint   Patient presents with    Dizziness     \"Room spinning\" since surgery on  \"water removed from my brain\"         Discharge Recommendations:Further therapy recommended at discharge.The patient should be able to tolerate at least 3 hours of therapy per day over 5 days or 15 hours over 7 days.   This patient may benefit from a Physical Medicine and Rehab consult.     Patient would benefit from continued therapy after discharge  OT Equipment Recommendations  Equipment Needed: Yes  Mobility Devices: ADL Assistive Devices;Walker;Wheelchair  Walker: Rolling  Wheelchair: Standard  ADL Assistive Devices: Transfer Tub Bench       Patient Diagnosis(es): There were no encounter diagnoses.  Past Medical History:  has a past medical history of Abnormal Pap smear of cervix, Abnormal stress test, Achilles tendonitis, Anxiety, Aortic regurgitation, Asthma, Bradycardia, Cancer (HCC), Chest pain, CHF (congestive heart failure) ([facility]), Coronary artery disease without angina pectoris, Depression, Dizziness, GERD (gastroesophageal reflux disease), Headache(784.0), Heart murmur, Herpes, Hyperlipidemia, Hypertension, Leaky heart valve, Migraine headache, Obesity, Poor historian, Schizophrenia (HCC), Seasonal allergies, Sleep apnea, Snoring, SOB (shortness of breath), Syncope, Thyroid disease, Under care of service provider, Under care of service provider, Under care of service provider, Under care of service provider, and Wears partial dentures.  Past Surgical History:  has a past surgical history that includes pre-malignant / benign skin lesion excision (Left, 2016); LEEP (2016); Cervix biopsy (N/A, 2019); Cardiac catheterization (2021); Achilles tendon surgery (Left, 2021); Endoscopy, colon, diagnostic;  functional (4+/5 grossly at [facility])  Coordination: Within functional limits  Tone: Normal  Sensation: Intact  ADL  Feeding: Modified independent ;Setup  Grooming: Stand by assistance  UE Bathing: Minimal assistance;Increased time to complete  LE Bathing: Moderate assistance;Increased time to complete  UE Dressing: Minimal assistance;Increased time to complete  LE Dressing: Moderate assistance;Increased time to complete. Pt would be MOD A for pants/underwear management d/t bridging and shaky in legs.   Toileting: Moderate assistance;Increased time to complete  Additional Comments: Pt found supine in bed. Pt completed bed mobility to sit EOB to answer writers questions. Pt ws able to don/doff sock using figure 4 position when sitting EOB. Pt completed sit>stand transfer standing at EOB with RW and gait belt. Upon standing pt reported feeling shaky and needed to sit back onto bed. Pt completed stand>sit transfer, and rested to catch breath before standing again. Pt completed second sit>stand transfer to stand EOB. While standing writer noticed pt was inverting her feet and standing on outsides of feet. Pt able to complete 3 marches in place and one side step. Pt sat back down on EOB. Pt had SOB from standing/marching. Pt sat EOB to complete oral care. Pt then completed bed bath at EOB. PT able to wash upper body. Pt was able to stand to wipe philly region with washcloth without assistance from writer. Following bed bath pt donned new gown and completed bed mobility to sit EOB on the other side of bed near bedside commode. Pt completed seated pivot transfer to sit on bedside commode and complete Toileting activity. Pt then completed seated pivot back to bed, completed bed mobility, and retired supine in bed.  Skin Care: Soap and water;Bath wipes     Activity Tolerance  Activity Tolerance: Patient tolerated treatment well        Vision  Vision: Impaired  Vision Exceptions: Wears glasses at all times  Hearing  Hearing: Within

## 2024-01-22 NOTE — H&P
[facility] Neurology   IN-PATIENT SERVICE   [facility]    HISTORY AND PHYSICAL EXAMINATION            Date:   1/22/2024  Patient name:  Chantelle Maddox  Date of admission:  1/21/2024  2:40 PM  MRN:   [id number]  Account:  [id number]  YOB: 1980  PCP:    Letty Orozco APRN - VERNA  Requesting physician  Mirza Talbot    Room:   0338/0338-02  Code Status:    Full Code    Chief Complaint:   Worsening headaches.       History Obtained From:     Patient & electronic medical record    History of Present Illness:     The patient is a 43 y.o. Non- / non  female with H/O IIH (diagnosed on 1/6/24; on Diamox 500 mg BID), HTN, HLD, CAD, CHF, migraine headaches, moderate-to-severe AR, who presented to the hospital on 1/21/2024 with worsening headaches.  Patient reported being diagnosed with intracranial hypertension s/p LP on 1/6/2024.  She was started on Diamox 500 mg BID.  She stayed headache-free for only 2 to 3 days after the LP.  Since then she has had almost constant headaches that have been worsening over time.  Pain is mostly in the forehead & behind her eyes, described as nonradiating, moderately severe, 7/10 in intensity, pressure, like \"someone is sitting on her forehead\", associated with bilateral blurred vision, \"jumping eyes\", photophobia & phonophobia.  Her blurred vision is the same as before she underwent LP.  Patient has been unable to sleep properly due to these worsening headaches.  She feels better temporarily by using an ice pack on the forehead.  Headache has no association with laying, sitting or standing position.  She denied nausea, vomiting, weakness, numbness, LOC, fever or chills.  She reported history of migraine headaches for which she took prophylactic medications for some time.  Does not take anything currently as her doctor retired/moved away.  Upon further questioning patient reported that with her regular migraine headaches she used to  Anion Gap 12 9 - 16 mmol/L    Glucose 88 74 - 99 mg/dL    BUN 10 6 - 20 mg/dL    Creatinine 1.0 (H) 0.50 - 0.90 mg/dL    Est, Glom Filt Rate >60 >60 mL/min/1.73m2    Calcium 9.0 8.6 - 10.4 mg/dL    Total Protein 7.4 6.6 - 8.7 g/dL    Albumin 4.2 3.5 - 5.2 g/dL    Albumin/Globulin Ratio 1.0 1.0 - 2.5    Total Bilirubin <0.2 0.00 - 1.20 mg/dL    Alkaline Phosphatase 113 (H) 35 - 104 U/L    ALT 19 10 - 35 U/L    AST 24 10 - 35 U/L   Troponin    Collection Time: 01/21/24  3:02 PM   Result Value Ref Range    Troponin, High Sensitivity <6 0 - 14 ng/L   Basic Metabolic Panel w/ Reflex to MG    Collection Time: 01/22/24  8:08 AM   Result Value Ref Range    Sodium 135 (L) 136 - 145 mmol/L    Potassium 3.7 3.7 - 5.3 mmol/L    Chloride 105 98 - 107 mmol/L    CO2 18 (L) 20 - 31 mmol/L    Anion Gap 12 9 - 16 mmol/L    Glucose 233 (H) 74 - 99 mg/dL    BUN 16 6 - 20 mg/dL    Creatinine 1.3 (H) 0.50 - 0.90 mg/dL    Est, Glom Filt Rate 51 (L) >60 mL/min/1.73m2    Calcium 8.9 8.6 - 10.4 mg/dL   CBC    Collection Time: 01/22/24  8:08 AM   Result Value Ref Range    WBC 8.6 3.5 - 11.3 k/uL    RBC 4.50 3.95 - 5.11 m/uL    Hemoglobin 13.2 11.9 - 15.1 g/dL    Hematocrit 40.0 36.3 - 47.1 %    MCV 88.9 82.6 - 102.9 fL    MCH 29.3 25.2 - 33.5 pg    MCHC 33.0 28.4 - 34.8 g/dL    RDW 14.6 (H) 11.8 - 14.4 %    Platelets 359 138 - 453 k/uL    MPV 9.5 8.1 - 13.5 fL    NRBC Automated 0.0 0.0 per 100 WBC   Lipid Panel    Collection Time: 01/22/24  8:08 AM   Result Value Ref Range    Cholesterol 231 (H) 0 - 199 mg/dL    HDL 35 (L) >40 mg/dL    LDL Cholesterol 176 (H) 0 - 100 mg/dL    Chol/HDL Ratio 7.0     Triglycerides 100 <150 mg/dL    VLDL 20 mg/dL   Hemoglobin A1c    Collection Time: 01/22/24  8:08 AM   Result Value Ref Range    Hemoglobin A1C 5.6 4.0 - 6.0 %    Estimated Avg Glucose 114 mg/dL     Lab Results   Component Value Date    ALT 19 01/21/2024    AST 24 01/21/2024    CO2 18 (L) 01/22/2024    TSH 2.14 06/06/2023    LABA1C 5.6 01/22/2024

## 2024-01-22 NOTE — CARE COORDINATION
DISCHARGE PLANNING EVALUATION: OP/OBSERVATION        1/22/24, 5:46 PM QUEENIE Maddox         Location: 0338/0338-02   Reason for hospitalization: Idiopathic intracranial hypertension [G93.2]     CM Services requested for transitional needs.     PCP: Letty Orozco APRN - CNP    Transportation/Food Security/Housekeeping Addressed:  No issues identified.     Equipment needs: n/a    Transition plan: plan to return home independent.  Will need medical cab.

## 2024-01-22 NOTE — ED NOTES
ACHILLES TENDON LENGTHENING REPAIR ACHILLES TENDON DETATCH AND REATTATCH performed by Christian Cody DPM at [facility] OR    CARDIAC CATHETERIZATION  05/25/2021    no stents    CERVIX BIOPSY N/A 04/05/2019    COLD KNIFE CONIZATION WITH BIOPSY performed by Ludmila Bolivar DO at [facility] OR    CERVIX BIOPSY N/A 8/15/2022    COLD KNIFE CONE performed by Ludmila Bolivar DO at [facility] OR    ENDOSCOPY, COLON, DIAGNOSTIC      EGD    LEEP  06/06/2016    OTHER SURGICAL HISTORY  08/15/2022    cold knife biopsy    PRE-MALIGNANT / BENIGN SKIN LESION EXCISION Left 05/12/2016    foot       PAST MEDICAL HISTORY       Past Medical History:   Diagnosis Date    Abnormal Pap smear of cervix 02/29/2016    Abnormal stress test 04/05/2018    Achilles tendonitis     Right foot    Anxiety     Aortic regurgitation     mild-mod on echo    Asthma     Bradycardia 03/19/2018    Cancer ([facility])     cervical cancer    Chest pain 03/19/2018    CHF (congestive heart failure) ([facility]) 07/06/2022    Coronary artery disease without angina pectoris 02/23/2022    Depression     Dizziness 05/15/2018    GERD (gastroesophageal reflux disease)     Headache(784.0)     Heart murmur     Herpes     Hyperlipidemia     Hypertension     [facility]-Dr. Mcginnis    Leakze heart valve     Migraine headache     Obesity     Poor historian     Schizophrenia ([facility])     Seasonal allergies     Sleep apnea     no machine    Snoring 03/21/2019    SOB (shortness of breath) 09/16/2019    Syncope 03/19/2018    Thyroid disease     Hypothyroid    Under care of service provider 08/02/2022    cardiolgy-[place]-aug 2021    Under care of service provider 08/02/2022    mlslzxuqtp-koreprcvan-rdfninott-last visit mar 2022    Under care of service provider 08/02/2022    kyj-zcithaqaqp-ncrjg-last visit june 2022    Under care of service provider 08/02/2022    mwkeuqip-nizosxcu-pqwivn [place] [facility]-katie-last visit aug 2022    Wears partial dentures     upper and lower partial        Labs:  Labs Reviewed   CBC WITH AUTO DIFFERENTIAL - Abnormal; Notable for the following components:       Result Value    RDW 14.9 (*)     Immature Granulocytes 1 (*)     All other components within normal limits   COMPREHENSIVE METABOLIC PANEL - Abnormal; Notable for the following components:    Potassium 3.6 (*)     Chloride 110 (*)     CO2 17 (*)     Creatinine 1.0 (*)     Alkaline Phosphatase 113 (*)     All other components within normal limits   TROPONIN   CBC   LIPID PANEL   BASIC METABOLIC PANEL W/ REFLEX TO MG FOR LOW K   HEMOGLOBIN A1C       Electronically signed by Kristian Ellis RN on 1/21/2024 at 8:55 PM

## 2024-01-22 NOTE — ED NOTES
Dr Kat consulted about Solumedrol order. Dr Kat states pt should get 1000mg Solumedrol over 30 minutes.

## 2024-01-22 NOTE — PLAN OF CARE
Problem: Chronic Conditions and Co-morbidities  Goal: Patient's chronic conditions and co-morbidity symptoms are monitored and maintained or improved  1/22/2024 1844 by Sarah Salcedo RN  Outcome: Progressing  1/22/2024 0521 by Stephanie Lake RN  Outcome: Progressing     Problem: Safety - Adult  Goal: Free from fall injury  1/22/2024 1844 by Sarah Salcedo RN  Outcome: Progressing  1/22/2024 0521 by Stephanie Lake RN  Outcome: Progressing     Problem: Pain  Goal: Verbalizes/displays adequate comfort level or baseline comfort level  1/22/2024 1844 by Sarah Salcedo RN  Outcome: Progressing  1/22/2024 0521 by Stephanie Lake RN  Outcome: Progressing      [facility]  Pulmonary, Critical Care, and Sleep Medicine      Pulmonary Office Progress Notes    Name: Jose Apodaca     : 1944     Date: 2020        Subjective:     2020     Patient states that she has had a cough since July-initially was productive of yellow-green mucus which was treated with antibiotics doxycycline and a taper of prednisone as well as Symbicort added to her current regime. She now has persistent daily cough which she describes as intense, seems to come from deep inside her chest and productive of whitish foamy mucus. She was prescribed a taper of prednisone-feels that it did not help. She was subsequently started on inhaled budesonide-Pulmicort but could not get it filled due to coverage issues with insurance. Add given her samples of formoterol-Perforomist which she has been using. She has been on antibiotics and apparently developed a reaction with swelling of her lower extremities when she took levofloxacin. Inhaled COREY was also prescribed but once again not covered by insurance  Currently it appears that she is using Perforomist.  She could not feel DuoNeb due to cost  Denies any wheezing but does get tight in her chest.  Denies chest pain  Remains on Eliquis for PE  Denies orthopnea or paroxysmal nocturnal dyspnea  She had CT scan of her chest completed with recommendations for follow-up in March    HPI    Jose Apodaca  is a 67 y.o. female with PMH of DVT and pneumonia who presents for evaluation of cough. She was last seen here on 2019 s/p hospitalization and reported significant improvement in symptoms of shortness of breath. Today she appears comfortable, in  no distress however she states that she had a persisting cough for 2 weeks. She describes the cough as daily and productive of yellow/green sputum. She states that she has some increased shortness of breath/wheezing with activity. She denies chest pain or hemoptysis.   No fever, chills or orthopnea;  no decreased appetite or weight loss. She has no reports of nasal congestion/drainage or sinus pressure/pain. She complains of chronic back and LE pain/swelling due to osteoarthritis. Her last PFTs were on 1/24/2019 and demonstrated normal flow volume loop with reduced diffusion capacity. She is a former smoker of cigarettes with a 2-pack-year history and quit in 1974. Past Medical History:   Diagnosis Date    Adopted     Arthritis     Balance problems     MCBRIDE (dyspnea on exertion) 2/28/2019    GERD (gastroesophageal reflux disease)     Hemochromatosis     High cholesterol     Hypertension     Left knee pain     Left shoulder pain     Lumbar pain     Pain of left sacroiliac joint     Shoulder impingement, right, with rotator cuff strain        Allergies   Allergen Reactions    Ciprofloxacin Other (comments)     Achilles tendonitis/leg pain and swelling    Indomethacin Anaphylaxis, Hives and Swelling    Tomato Other (comments)     Heart burn       Current Outpatient Medications   Medication Sig Dispense Refill    olmesartan (BENICAR) 20 mg tablet Take 1 Tab by mouth daily. 30 Tab 1    furosemide (LASIX) 20 mg tablet Take 1 Tab by mouth daily. May take 1 additional tablet daily as needed for increased edema 60 Tab 3    budesonide (PULMICORT) 0.5 mg/2 mL nbsp 2 mL by Nebulization route two (2) times a day. 60 Each 3    albuterol (PROVENTIL VENTOLIN) 2.5 mg /3 mL (0.083 %) nebu 3 mL by Nebulization route every six (6) hours as needed for Wheezing or Shortness of Breath. 120 Nebule 3    montelukast (SINGULAIR) 10 mg tablet Take 1 Tab by mouth nightly. 30 Tab 3    potassium chloride (KLOR-CON M20) 20 mEq tablet Take 1 Tab by mouth daily. 90 Tab 6    allopurinol (ZYLOPRIM) 100 mg tablet Take 1 Tab by mouth two (2) times a day.  180 Tab 11    ELIQUIS 5 mg tablet TAKE 1 TABLET BY MOUTH EVERY 12 HOURS (Patient taking differently: 2.5 mg.) 90 Tab 0    acetaminophen (TYLENOL) 325 mg tablet Take 2 Tabs by mouth every six (6) hours as needed for Pain or Fever. 30 Tab 0    levothyroxine (SYNTHROID) 25 mcg tablet Take 1 Tab by mouth Daily (before breakfast). 90 Tab 6    inhalational spacing device (ACE AEROSOL CLOUD ENHANCER) 1 Each by Does Not Apply route as needed. 1 Device 3    cholecalciferol, vitamin D3, 2,000 unit tab TAKE 1 TABLET BY MOUTH EVERY DAY  3    Omeprazole delayed release (PRILOSEC D/R) 20 mg tablet Take 20 mg by mouth daily.  simvastatin (ZOCOR) 40 mg tablet Take 1 Tab by mouth nightly. 90 Tab 1    diclofenac (VOLTAREN) 1 % gel Apply 2 g to affected area every six (6) hours. Indications: chronic shoulder pain 100 g 0       Review of Systems:    HEENT: No epistaxis, no nasal drainage, no difficulty in swallowing, no redness in eyes  Respiratory: As stated above in HPI  Cardiovascular: no chest pain, no palpitations, no chronic leg edema, no syncope  Gastrointestinal: no abd pain, no vomiting, no diarrhea, no bleeding symptoms  Genitourinary: No urinary symptoms or hematuria  Integument/breast: No ulcers or rashes  Musculoskeletal: Chronic back and bilateral LE pain due to osteoarthritis  Neurological: No focal weakness, no seizures, no headaches  Behvioral/Psych: No anxiety, no depression  Constitutional: No fever, chills or night sweats. No decreased appetite or weight loss     Objective:     Visit Vitals  /71 (BP 1 Location: Right arm, BP Patient Position: Sitting)   Pulse 92   Temp 95.6 °F (35.3 °C) (Oral)   Resp 20   Ht 5' 1\" (1.549 m)   Wt 97.8 kg (215 lb 9.6 oz)   SpO2 97%   BMI 40.74 kg/m²        PHYSICAL EXAM      General: Oriented to person, place, and time. Well-developed, well-nourished, and in no distress. Obese      Head:   Normocephalic, without obvious abnormality, atraumatic       Eyes:   Pupils reactive, conjunctivae / corneas clear. EOM's intact, no scleral icterus       Nose:   Nares normal, no drainage.       Throat:    Lips, mucosa and tongue normal. Teeth and gums normal       Neck:   Supple, symmetrical, trachea midline. No adenopathy or thyroid swelling; no carotid bruit or JVD. CVS:    Regular rate and rhythm. S1S2 normal,  no murmurs       RS:      Symmetrical chest rise, good AE bilaterally. No wheezing no rales or rhonchi, no accessory muscle use      Abd:     Soft, non-tender. No hepatosplenomegaly                                                     Neuro:   non focal, awake, alert and oriented to person, place, time and situation    Extrm:   No clubbing or cyanosis. Chronic bilateral LE edema, +1       Skin:   no rash    Data review:         PULMONARY FUNCTION TESTS     Date FVC FEV1  FEV1/FVC [id number] TLC RV RV/TLC VC DLCO   1/23/2019  95%  97%  76%  92%  97%  98%  101%  96%  74%                                                                       Imaging:  I have personally reviewed the patients radiographs and have reviewed the reports:  XR Results (most recent):  Results from Hospital Encounter encounter on 12/25/19   XR CHEST SNGL V    Narrative AP CHEST, PORTABLE    CPT CODE: 96640    INDICATION: Above. Dyspnea. Shortness of breath, onset/duration not provided. COMPARISON: 11/11/2019. TECHNIQUE: Portable AP chest radiograph is reviewed. FINDINGS:    Subtle reticulonodular prominence of the lung markings, with the numerous  pulmonary nodules described on recent chest CT much better visualized on the CT. No evidence of larger focal pulmonary consolidation or pulmonary edema. No  evidence of pneumothorax. The costophrenic sulci appear sharp. The cardiac silhouette is within normal limits in size. EKG leads/wires overlie the patient. Diffuse radiographic osteopenia. Degenerative changes in the thoracic spine and shoulders. Surgical clips again  project over the right upper quadrant medially.       Impression IMPRESSION:     Subtle vague reticulonodular prominence of the lung markings, with the numerous  pulmonary nodules described on chest CT of 12/3/2019 much better demonstrated on  the CT. No new acute findings compared to the CT. CT Results (most recent):  Results from Hospital Encounter encounter on 12/03/19   CT CHEST WO CONT    Narrative CT CHEST WITHOUT IV CONTRAST      COMPARISON: cough, hemoptysis, history of bronchitis. INDICATIONS: Female. TECHNIQUE: Volumetric data acquisition was performed through the chest with a  multislice scanner. Reconstructions were created in the axial, coronal, and  sagittal planes. All CT scans at this facility are performed using dose optimization technique as  appropriate to the performed exam, to include automated exposure control,  adjustment of the mA and/or kV according to patient's size (Including  appropriate matching for site-specific examinations), or use of iterative  reconstruction technique. FINDINGS:     Thyroid/Base Of Neck:  Unremarkable  . Lungs:   No consolidation or mass. .   Patent trachea and central bronchi. No significant bronchiectasis or bronchial  wall thickening. Multiple small linear scars in the bilateral lung bases. .  Numerous solid or part solid bilateral pulmonary nodules ranging in size from to  millimeter the largest 5 mm in the left upper lobe (image 13). Cluster of  ill-defined groundglass nodularity in the superior right lower lobe (images  21-24)    Pleural Spaces: There is no pneumothorax or pleural fluid evident. No pleural plaques are seen    Lymph Nodes:   Axillae: Right axilla 2 x 2 x 3 cm soft tissue density with internal lateral fat  density. Otherwise no enlarged lymph nodes. Mediastinum / Yakelin: Limited evaluation without IV contrast. No gross  enlargement. .    Mediastinum, Great Vessels And Heart: The heart is not enlarged. No pericardial effusion. Mild atherosclerosis in the  aorta and coronary arteries. The aorta is nonaneurysmal..    Abdomen Structures Included:  Cholecystectomy.  No diagnostic abnormalities in the visualized upper abdomen. Candance Huger    Osseous Structures: Unremarkable for age. Impression IMPRESSION:     1. No mass or focal consolidation. 2. Cluster of ill-defined groundglass nodularity in the superior right lower  lobe concerning for infectious bronchiolar disease, possibly  mycobacterial/atypical mycobacterial or fungal.  -Otherwise there are numerous scattered bilateral pulmonary nodules measuring up  to 5 mm in size, possibly similar process but given the diffuse bilateral  distribution differential also include granulomatous disease and cannot exclude  metastatic disease although no known primary malignancy.  -Follow-up chest CT in 3 months after appropriate treatment is recommended  3. Right axilla 3 cm mixed fat and soft tissue density nodule may represent  enlarged lymph node although atypical appearance, attention on follow-up  imaging. Also recommend mammogram if not obtained elsewhere.         Patient Active Problem List   Diagnosis Code    Shoulder impingement, right, with rotator cuff strain M75.40    Spinal stenosis M48.00    Sacroiliitis ([address] M46.1    High cholesterol E78.00    Hypertension I10    Hemochromatosis E83.119    Obesity, morbid ([address] E66.01    MCBRIDE (dyspnea on exertion) R06.09    CAP (community acquired pneumonia) J18.9    Sepsis ([address] A41.9    Hypotension I95.9    Atrial fibrillation with RVR (HCC) I48.91       IMPRESSION:   · Persistent cough with features suggestive of reactive airways and bronchiolitis likely postinfectious inflammatory cough with slow improvement due to inability to consistently continue prescribed treatment-insurance and cost issues  · Abnormal CT scan of the chest findings-cluster of ill-defined groundglass nodularity in the superior right lower lobe-question bronchiolar disease and cannot exclude atypical Mycobacterium  · Asthma-moderate persistent secondary to above  · History of PE/ bilateral DVT currently on Eliquis 5 mg twice daily  · ObesityBMI 44.92  · Hemochromatosis      RECOMMENDATIONS:     · Will step up treatment with nebulized medications -patient may benefit from longer acting agents which are better penetrated. Add a trial of YUPELERI once a day and budesonide twice daily-provided her with samples of Dennis Mixon with refills filed in the specialty pharmacy.   Also new prescription placed for budesonide under Medicare part B hoping for coverage  · Albuterol as needed  · Will consider adding macrolides if response to above measures and adequate  · Continue Eliquis 5 mg twice daily  · Increase p.o. fluids, take OTC Mucinex as needed for cough  · Discussed recommendations for preventative immunizations when symptoms resolve  · Follow up in pulmonary clinic in 2 months or sooner with worsening of symptoms  · Recommend healthy diet / weight / exercise as tolerated  · Written instructions given to patient and caregiver about all of the above        Jenny MD [place]

## 2024-01-22 NOTE — PROGRESS NOTES
[facility] Neurology   IN-PATIENT SERVICE   [facility]    Progress Note             Date:   1/22/2024  Patient name:  Chantelle Maddox  Date of admission:  1/21/2024  2:40 PM  MRN:   [id number]  Account:  [id number]  YOB: 1980  PCP:    Letty Orozco APRN - CNP  Room:   [address] [place] [address]  Code Status:    Full Code    Chief Complaint:     Chief Complaint   Patient presents with    Dizziness     \"Room spinning\" since surgery on Jan 6th \"water removed from my brain\"     Frontal headache. States this headache started 3 days after she had a lumbar puncture (01/06) at admission to [facility]. After she woke up from a nap, she felt unbalanced, LE weakness, presyncope, and an uncontrollable headache. States that she was feeling a lot of pressure behind her eyes, pain over maxillary sinuses, and a headache that spanned across forehead.       Interval hx:     The patient was seen and examined at bedside. Is vitally stable, alert and oriented x 3. No acute events overnight.  The patient stated that after receiving medications in ER she no longer has a headache. States that she is still feeling off-balance. This feeling worsens with light is turned on. Denies changes in vision, blurry vision, denies pain or exacerbation of symptoms with eye movements. Denies nausea.         Brief History of Present Illness:     The patient is a 43 y.o.  Non- / non  female who presents with Dizziness (\"Room spinning\" since surgery on Jan 6th \"water removed from my brain\")   and she is admitted to the hospital for worsening frontal headache.   Patient states she has syncopal episodes. Does not know the exact start of these episodes, but states they started before she was diagnosed with IIH. Also states has a history of migraine. Used follow with neurologist for migraines but has not been for awhile after neurologist left practice. Was previously prescribed Amitrax PRN, but no longer taking after  stopped seeing neurologist outpatient. Also states that she has a history of blood clots, previous DVT, and was taking a blood thinner.    Past Medical History:         Past Medical History:   Diagnosis Date    Abnormal Pap smear of cervix 02/29/2016    Abnormal stress test 04/05/2018    Achilles tendonitis     Right foot    Anxiety     Aortic regurgitation     mild-mod on echo    Asthma     Bradycardia 03/19/2018    Cancer ([facility])     cervical cancer    Chest pain 03/19/2018    CHF (congestive heart failure) ([facility]) 07/06/2022    Coronary artery disease without angina pectoris 02/23/2022    Depression     Dizziness 05/15/2018    GERD (gastroesophageal reflux disease)     Headache(784.0)     Heart murmur     Herpes     Hyperlipidemia     Hypertension     [facility]-Dr. Mcginnis    Leaky heart valve     Migraine headache     Obesity     Poor historian     Schizophrenia ([facility])     Seasonal allergies     Sleep apnea     no machine    Snoring 03/21/2019    SOB (shortness of breath) 09/16/2019    Syncope 03/19/2018    Thyroid disease     Hypothyroid    Under care of service provider 08/02/2022    cardiolgy-[facility]-aug 2021    Under care of service provider 08/02/2022    zwaxelprdx-yursxbehnk-hyszlhyoe-last visit mar 2022    Under care of service provider 08/02/2022    kib-kkkxfjabcv-gvtdm-last visit june 2022    Under care of service provider 08/02/2022    auipwetg-vwclzhvi-hgooyk [place] [facility]-katie-last visit aug 2022    Wears partial dentures     upper and lower partial        Past Surgical History:     Past Surgical History:   Procedure Laterality Date    ACHILLES TENDON SURGERY Left 08/27/2021    LEFT DETACH & REATTACH ACHILLES TENDON performed by Christian Cody DPM at [facility] OR    ACHILLES TENDON SURGERY Right 07/06/2022    ACHILLES TENDON LENGTHENING REPAIR ACHILLES TENDON DETATCH AND REATTATCH performed by Christian Cody DPM at [facility] OR    CARDIAC CATHETERIZATION  05/25/2021    no stents    CERVIX

## 2024-01-22 NOTE — ED NOTES
ED to inpatient nurses report      Chief Complaint:  Chief Complaint   Patient presents with    Dizziness     \"Room spinning\" since surgery on Jan 6th \"water removed from my brain\"     Present to ED from: home    MOA:     LOC: alert and orientated to name, place, date  Mobility: Requires assistance * 1  Oxygen Baseline: 96    Current needs required: none   Pending ED orders: none  Present condition: stable    Why did the patient come to the ED? Pt to ed in wheel chair from triage.   Pt states early January she was evaluated by her eye doctor, sent to the ED after she was told she has inflammation in her eyes. Pt states she had an MRI, was told she had fluid on her brain and had a lumbar puncture. Pt dx with idiopathic intracranial hypertension  Pt states since then she has had a headache, worse than normal. She states her balance is off, she is dizzy, unsteady gait, vision changes. Pt states symptoms are just worsening.   Pt denies chest pain, sob. Pt states headache is 7/10.  What is the plan? Neuro admission  Any procedures or intervention occur? Meds, labs, imaging  Any safety concerns??    Mental Status:  Level of Consciousness: Alert (0)    Psych Assessment:   Psychosocial  Psychosocial (WDL): Within Defined Limits  Vital signs   Vitals:    01/22/24 0245 01/22/24 0300 01/22/24 0315 01/22/24 0330   BP:       Pulse: 66 63 62 66   Resp:   18 16   Temp:       TempSrc:       SpO2: 92% 93% 94% 93%        Vitals:  Patient Vitals for the past 24 hrs:   BP Temp Temp src Pulse Resp SpO2   01/22/24 0330 -- -- -- 66 16 93 %   01/22/24 0315 -- -- -- 62 18 94 %   01/22/24 0300 -- -- -- 63 -- 93 %   01/22/24 0245 -- -- -- 66 -- 92 %   01/22/24 0230 -- -- -- 65 18 94 %   01/22/24 0115 -- -- -- 62 18 94 %   01/22/24 0100 -- -- -- 66 -- 95 %   01/22/24 0045 -- -- -- 65 -- 94 %   01/22/24 0030 -- -- -- 66 -- 94 %   01/22/24 0015 -- -- -- 65 -- 95 %   01/22/24 0000 -- -- -- 67 19 95 %   01/21/24 2345 -- -- -- 58 18 94 %   01/21/24  suppository 300 mg    atorvastatin (LIPITOR) tablet 20 mg    budesonide-formoterol (SYMBICORT) 80-4.5 MCG/ACT inhaler 2 puff    DISCONTD: furosemide (LASIX) tablet 20 mg    levothyroxine (SYNTHROID) tablet 50 mcg    DISCONTD: losartan (COZAAR) tablet 25 mg    DISCONTD: metoprolol succinate (TOPROL XL) extended release tablet 25 mg    QUEtiapine (SEROQUEL) tablet 100 mg    rivaroxaban (XARELTO) tablet 10 mg     Order Specific Question:   Indication of Use     Answer:   History of DVT/PE (indefinite)    furosemide (LASIX) tablet 20 mg    losartan (COZAAR) tablet 25 mg    metoprolol succinate (TOPROL XL) extended release tablet 25 mg       SURGICAL HISTORY       Past Surgical History:   Procedure Laterality Date    ACHILLES TENDON SURGERY Left 08/27/2021    LEFT DETACH & REATTACH ACHILLES TENDON performed by Christian Cody DPM at [facility] OR    ACHILLES TENDON SURGERY Right 07/06/2022    ACHILLES TENDON LENGTHENING REPAIR ACHILLES TENDON DETATCH AND REATTATCH performed by Christian Cody DPM at [facility] OR    CARDIAC CATHETERIZATION  05/25/2021    no stents    CERVIX BIOPSY N/A 04/05/2019    COLD KNIFE CONIZATION WITH BIOPSY performed by Ludmila Bolivar DO at [facility] OR    CERVIX BIOPSY N/A 8/15/2022    COLD KNIFE CONE performed by Ludmila Bolivar DO at [facility] OR    ENDOSCOPY, COLON, DIAGNOSTIC      EGD    LEEP  06/06/2016    OTHER SURGICAL HISTORY  08/15/2022    cold knife biopsy    PRE-MALIGNANT / BENIGN SKIN LESION EXCISION Left 05/12/2016    foot       PAST MEDICAL HISTORY       Past Medical History:   Diagnosis Date    Abnormal Pap smear of cervix 02/29/2016    Abnormal stress test 04/05/2018    Achilles tendonitis     Right foot    Anxiety     Aortic regurgitation     mild-mod on echo    Asthma     Bradycardia 03/19/2018    Cancer (HCC)     cervical cancer    Chest pain 03/19/2018    CHF (congestive heart failure) ([facility]) 07/06/2022    Coronary artery disease without angina pectoris 02/23/2022    Depression     Dizziness

## 2024-01-22 NOTE — H&P
PM      Please note that pt was initially seen in ED on the evening of 1/21/2024 as a consult. Later pt got admitted under neurology service. Consult note was converted to H&P the next day under yesterday's date.       Copy sent to Letty Mandel, SHERRIE - CNP    Please note that this note was generated using a voice recognition dictation software. Although every effort was made to ensure the accuracy of this automated transcription, some errors in transcription may have occurred.

## 2024-01-23 ENCOUNTER — APPOINTMENT (OUTPATIENT)
Dept: GENERAL RADIOLOGY | Age: 44
DRG: 054 | End: 2024-01-23
Payer: COMMERCIAL

## 2024-01-23 LAB
ANION GAP SERPL CALCULATED.3IONS-SCNC: 11 MMOL/L (ref 9–17)
B PARAP IS1001 DNA NPH QL NAA+NON-PROBE: NOT DETECTED
B PERT DNA SPEC QL NAA+PROBE: NOT DETECTED
BACTERIA URNS QL MICRO: NORMAL
BILIRUB UR QL STRIP: NEGATIVE
BUN SERPL-MCNC: 15 MG/DL (ref 6–20)
C PNEUM DNA NPH QL NAA+NON-PROBE: NOT DETECTED
CALCIUM SERPL-MCNC: 9.3 MG/DL (ref 8.6–10.4)
CASTS #/AREA URNS LPF: NORMAL /LPF (ref 0–8)
CHLORIDE SERPL-SCNC: 109 MMOL/L (ref 98–107)
CLARITY UR: ABNORMAL
CO2 SERPL-SCNC: 21 MMOL/L (ref 20–31)
COLOR UR: YELLOW
CREAT SERPL-MCNC: 1 MG/DL (ref 0.5–0.9)
EPI CELLS #/AREA URNS HPF: NORMAL /HPF (ref 0–5)
ERYTHROCYTE [DISTWIDTH] IN BLOOD BY AUTOMATED COUNT: 14.8 % (ref 11.8–14.4)
FLUAV RNA NPH QL NAA+NON-PROBE: NOT DETECTED
FLUBV RNA NPH QL NAA+NON-PROBE: NOT DETECTED
GFR SERPL CREATININE-BSD FRML MDRD: >60 ML/MIN/1.73M2
GLUCOSE SERPL-MCNC: 122 MG/DL (ref 70–99)
GLUCOSE UR STRIP-MCNC: NEGATIVE MG/DL
HADV DNA NPH QL NAA+NON-PROBE: NOT DETECTED
HCOV 229E RNA NPH QL NAA+NON-PROBE: NOT DETECTED
HCOV HKU1 RNA NPH QL NAA+NON-PROBE: NOT DETECTED
HCOV NL63 RNA NPH QL NAA+NON-PROBE: NOT DETECTED
HCOV OC43 RNA NPH QL NAA+NON-PROBE: NOT DETECTED
HCT VFR BLD AUTO: 40 % (ref 36.3–47.1)
HGB BLD-MCNC: 12.9 G/DL (ref 11.9–15.1)
HGB UR QL STRIP.AUTO: ABNORMAL
HMPV RNA NPH QL NAA+NON-PROBE: NOT DETECTED
HPIV1 RNA NPH QL NAA+NON-PROBE: NOT DETECTED
HPIV2 RNA NPH QL NAA+NON-PROBE: NOT DETECTED
HPIV3 RNA NPH QL NAA+NON-PROBE: NOT DETECTED
HPIV4 RNA NPH QL NAA+NON-PROBE: NOT DETECTED
KETONES UR STRIP-MCNC: NEGATIVE MG/DL
LACTIC ACID, WHOLE BLOOD: 0.9 MMOL/L (ref 0.7–2.1)
LEUKOCYTE ESTERASE UR QL STRIP: NEGATIVE
M PNEUMO DNA NPH QL NAA+NON-PROBE: NOT DETECTED
MCH RBC QN AUTO: 29.8 PG (ref 25.2–33.5)
MCHC RBC AUTO-ENTMCNC: 32.3 G/DL (ref 28.4–34.8)
MCV RBC AUTO: 92.4 FL (ref 82.6–102.9)
NITRITE UR QL STRIP: NEGATIVE
NRBC BLD-RTO: 0 PER 100 WBC
PH UR STRIP: 6.5 [PH] (ref 5–8)
PLATELET # BLD AUTO: 376 K/UL (ref 138–453)
PMV BLD AUTO: 9.5 FL (ref 8.1–13.5)
POTASSIUM SERPL-SCNC: 3.9 MMOL/L (ref 3.7–5.3)
PROCALCITONIN SERPL-MCNC: 0.02 NG/ML
PROT UR STRIP-MCNC: NEGATIVE MG/DL
RBC # BLD AUTO: 4.33 M/UL (ref 3.95–5.11)
RBC #/AREA URNS HPF: NORMAL /HPF (ref 0–4)
RSV RNA NPH QL NAA+NON-PROBE: NOT DETECTED
RV+EV RNA NPH QL NAA+NON-PROBE: NOT DETECTED
SARS-COV-2 RNA NPH QL NAA+NON-PROBE: NOT DETECTED
SODIUM SERPL-SCNC: 141 MMOL/L (ref 135–144)
SP GR UR STRIP: 1.02 (ref 1–1.03)
SPECIMEN DESCRIPTION: NORMAL
UROBILINOGEN UR STRIP-ACNC: NORMAL EU/DL (ref 0–1)
WBC #/AREA URNS HPF: NORMAL /HPF (ref 0–5)
WBC OTHER # BLD: 22.1 K/UL (ref 3.5–11.3)

## 2024-01-23 PROCEDURE — 2580000003 HC RX 258

## 2024-01-23 PROCEDURE — 85027 COMPLETE CBC AUTOMATED: CPT

## 2024-01-23 PROCEDURE — 99232 SBSQ HOSP IP/OBS MODERATE 35: CPT | Performed by: PSYCHIATRY & NEUROLOGY

## 2024-01-23 PROCEDURE — 71045 X-RAY EXAM CHEST 1 VIEW: CPT

## 2024-01-23 PROCEDURE — 81001 URINALYSIS AUTO W/SCOPE: CPT

## 2024-01-23 PROCEDURE — 6370000000 HC RX 637 (ALT 250 FOR IP): Performed by: NURSE PRACTITIONER

## 2024-01-23 PROCEDURE — G0378 HOSPITAL OBSERVATION PER HR: HCPCS

## 2024-01-23 PROCEDURE — 6370000000 HC RX 637 (ALT 250 FOR IP)

## 2024-01-23 PROCEDURE — 94640 AIRWAY INHALATION TREATMENT: CPT

## 2024-01-23 PROCEDURE — 97530 THERAPEUTIC ACTIVITIES: CPT

## 2024-01-23 PROCEDURE — 36415 COLL VENOUS BLD VENIPUNCTURE: CPT

## 2024-01-23 PROCEDURE — 97535 SELF CARE MNGMENT TRAINING: CPT

## 2024-01-23 PROCEDURE — 83605 ASSAY OF LACTIC ACID: CPT

## 2024-01-23 PROCEDURE — 80048 BASIC METABOLIC PNL TOTAL CA: CPT

## 2024-01-23 PROCEDURE — 84145 PROCALCITONIN (PCT): CPT

## 2024-01-23 PROCEDURE — 0202U NFCT DS 22 TRGT SARS-COV-2: CPT

## 2024-01-23 RX ORDER — AMITRIPTYLINE HYDROCHLORIDE 25 MG/1
25 TABLET, FILM COATED ORAL NIGHTLY
Qty: 30 TABLET | Refills: 3 | Status: SHIPPED | OUTPATIENT
Start: 2024-01-23

## 2024-01-23 RX ADMIN — BUDESONIDE AND FORMOTEROL FUMARATE DIHYDRATE 2 PUFF: 80; 4.5 AEROSOL RESPIRATORY (INHALATION) at 08:16

## 2024-01-23 RX ADMIN — ACETAZOLAMIDE 500 MG: 250 TABLET ORAL at 08:14

## 2024-01-23 RX ADMIN — METOPROLOL SUCCINATE 25 MG: 25 TABLET, EXTENDED RELEASE ORAL at 08:15

## 2024-01-23 RX ADMIN — SODIUM CHLORIDE, PRESERVATIVE FREE 10 ML: 5 INJECTION INTRAVENOUS at 20:52

## 2024-01-23 RX ADMIN — LOSARTAN POTASSIUM 25 MG: 50 TABLET, FILM COATED ORAL at 08:14

## 2024-01-23 RX ADMIN — ACETAZOLAMIDE 500 MG: 250 TABLET ORAL at 21:09

## 2024-01-23 RX ADMIN — ATORVASTATIN CALCIUM 20 MG: 20 TABLET, FILM COATED ORAL at 08:16

## 2024-01-23 RX ADMIN — FUROSEMIDE 20 MG: 20 TABLET ORAL at 08:15

## 2024-01-23 RX ADMIN — LEVOTHYROXINE SODIUM 50 MCG: 50 TABLET ORAL at 08:15

## 2024-01-23 RX ADMIN — RIVAROXABAN 10 MG: 10 TABLET, FILM COATED ORAL at 19:31

## 2024-01-23 RX ADMIN — ASPIRIN 81 MG 81 MG: 81 TABLET ORAL at 08:14

## 2024-01-23 RX ADMIN — QUETIAPINE FUMARATE 200 MG: 200 TABLET ORAL at 21:09

## 2024-01-23 RX ADMIN — AMITRIPTYLINE HYDROCHLORIDE 25 MG: 25 TABLET, FILM COATED ORAL at 21:09

## 2024-01-23 RX ADMIN — SODIUM CHLORIDE, PRESERVATIVE FREE 10 ML: 5 INJECTION INTRAVENOUS at 08:17

## 2024-01-23 ASSESSMENT — ENCOUNTER SYMPTOMS
SHORTNESS OF BREATH: 1
VOMITING: 1
PHOTOPHOBIA: 1
NAUSEA: 1

## 2024-01-23 NOTE — CARE COORDINATION
Post Acute Facility/Agency List     Provided patient with the following list, the list includes the overall star ratings obtained from CMS per the Medicare Web site (www.Medicare.gov):     [] Long Term Acute Care Facilities  [x] Acute Inpatient Rehabilitation Facilities  [] Skilled Nursing Facilities  [] Home Care    Referral to [facility] per patient preference.    PS team for PM&R consult.

## 2024-01-23 NOTE — PLAN OF CARE
Problem: Chronic Conditions and Co-morbidities  Goal: Patient's chronic conditions and co-morbidity symptoms are monitored and maintained or improved  1/23/2024 0418 by Stephanie Lake RN  Outcome: Progressing  1/22/2024 1844 by Sarah Salcedo RN  Outcome: Progressing     Problem: Safety - Adult  Goal: Free from fall injury  1/23/2024 0418 by Stephanie Lake RN  Outcome: Progressing  1/22/2024 1844 by Sarah Salcedo RN  Outcome: Progressing     Problem: Pain  Goal: Verbalizes/displays adequate comfort level or baseline comfort level  1/23/2024 0418 by Stephanie Lake RN  Outcome: Progressing  1/22/2024 1844 by Sarah Salcedo RN  Outcome: Progressing

## 2024-01-23 NOTE — PLAN OF CARE
Problem: Respiratory - Adult  Goal: Achieves optimal ventilation and oxygenation  Outcome: Progressing  Flowsheets (Taken 1/23/2024 0821)  Achieves optimal ventilation and oxygenation:   Assess for changes in respiratory status   Oxygen supplementation based on oxygen saturation or arterial blood gases   Assess the need for suctioning and aspirate as needed   Assess for changes in mentation and behavior   Initiate smoking cessation protocol as indicated   Assess and instruct to report shortness of breath or any respiratory difficulty   Respiratory therapy support as indicated   Encourage broncho-pulmonary hygiene including cough, deep breathe, incentive spirometry   Position to facilitate oxygenation and minimize respiratory effort

## 2024-01-23 NOTE — PROGRESS NOTES
CLINICAL PHARMACY NOTE: MEDS TO BEDS    Total # of Prescriptions Filled: 1   The following medications were delivered to the patient:  amitriptyline    Additional Documentation:  Delivered to bedside no copay

## 2024-01-23 NOTE — PROGRESS NOTES
[facility] Neurology   IN-PATIENT SERVICE   [facility]    Progress Note             Date:   1/23/2024  Patient name:  Chantelle Maddox  Date of admission:  1/21/2024  2:40 PM  MRN:   [id number]  Account:  [id number]  YOB: 1980  PCP:    Letty Orozco APRN - CNP  Room:   [address] [place] [address]  Code Status:    Full Code    Chief Complaint:     Chief Complaint   Patient presents with    Dizziness     \"Room spinning\" since surgery on Jan 6th \"water removed from my brain\"       Interval hx:     The patient was seen and examined at bedside. Is vitally stable, alert and oriented x 3.   The patient stated that she is still feeling LE weakness. States while working with PT yesterday, she almost fell twice because her legs gave out. States that she is having some paresthesias on b/l LE. States it feels a bit like her migraine symptoms without headache pain. Patient states that she continues to feel \"off balance\", but denies changes in vision, headache, or tinnitus. Patient states she had 2 episodes of post-cough emesis. States she has a cough regularly at home, but has never experienced vomiting from cough previously. Also states that she is feeling short of breath regardless of activity level. States she has additionally been belching more.     Patient was started on Elavil 25 mg last night.   Patient has an elevated WBC of 22.1. 01/22 was 8.6.      Brief History of Present Illness:     The patient is a 43 y.o.  Non- / non  female who presents with Dizziness (\"Room spinning\" since surgery on Jan 6th \"water removed from my brain\")   and she is admitted to the hospital for the management of  headache. The patient has a history of HTN, HLD, CAD, CHF, migraine headaches, moderate-to-severe AR, who presented to the hospital on 1/21/2024 with worsening headaches. Patient received LP on 1/6/24 at [facility], was headache free for 2-3 days, and then started having progressive headache  sinusitis.  No signs of intracranial  hypotension.  - MRI thoracic spine: degenerative endplate changes   - MRI cervical spine: Question mildly increased T2 signal in the left posterior aspect of cervical  spinal cord at C2, may be related to artifacts versus demyelinating disease  - MRV head W WO contrast (obtained in [facility] on 1/4, only report is available): No evidence of sinus thrombosis.  Narrowing of the bilateral transverse sigmoid junctions which can be seen in the setting of a idiopathic intracranial hypertension   - Start amitriptyline 25 mg for migraine prophylaxis   - Continue Diamox 500mg BID for IIH  - Continue to monitor lower extremity weakness   - Possible discharge today after working with pt therapy. Establish outpatient neurological care    Basic infection workup for raise in WBC  Procalcitonin -0.02  Lactic acid-0.9  Respiratory panel  Chest Xray-ordered    @[id number]@      Follow-up further recommendations after discussing case with the attending.  The plan was discussed with the patient, patient's family and the medical staff.   Consultations:   IP CONSULT TO NEUROLOGY    Patient is admitted as inpatient status because of co-morbidities listed above, severity of signs and symptoms as outlined, requirement for current medical therapies and most importantly because of direct risk to patient if care not provided in a hospital setting.    Germaine Patel  Medical Student, OMS-III  1/23/2024  8:04 AM    Copy sent to Letty Mandel, APRN - CNP    I agree with the assessment, plan and orders as documented by the resident with changes made to the note as needed.     Quincy Kat MD  Into medicine resident, PGY 2  [facility], [place]  01/23/24 2:27 PM

## 2024-01-24 PROBLEM — G44.89 OTHER HEADACHE SYNDROME: Status: ACTIVE | Noted: 2024-01-21

## 2024-01-24 LAB
ANION GAP SERPL CALCULATED.3IONS-SCNC: 9 MMOL/L (ref 9–17)
BUN SERPL-MCNC: 19 MG/DL (ref 6–20)
CALCIUM SERPL-MCNC: 8.2 MG/DL (ref 8.6–10.4)
CHLORIDE SERPL-SCNC: 112 MMOL/L (ref 98–107)
CO2 SERPL-SCNC: 17 MMOL/L (ref 20–31)
CREAT SERPL-MCNC: 1.1 MG/DL (ref 0.5–0.9)
ERYTHROCYTE [DISTWIDTH] IN BLOOD BY AUTOMATED COUNT: 15.7 % (ref 11.8–14.4)
GFR SERPL CREATININE-BSD FRML MDRD: >60 ML/MIN/1.73M2
GLUCOSE SERPL-MCNC: 95 MG/DL (ref 70–99)
HCT VFR BLD AUTO: 38.9 % (ref 36.3–47.1)
HGB BLD-MCNC: 12.5 G/DL (ref 11.9–15.1)
MCH RBC QN AUTO: 29.9 PG (ref 25.2–33.5)
MCHC RBC AUTO-ENTMCNC: 32.1 G/DL (ref 28.4–34.8)
MCV RBC AUTO: 93.1 FL (ref 82.6–102.9)
NRBC BLD-RTO: 0 PER 100 WBC
PLATELET # BLD AUTO: 306 K/UL (ref 138–453)
PMV BLD AUTO: 9.3 FL (ref 8.1–13.5)
POTASSIUM SERPL-SCNC: 3.8 MMOL/L (ref 3.7–5.3)
RBC # BLD AUTO: 4.18 M/UL (ref 3.95–5.11)
SODIUM SERPL-SCNC: 138 MMOL/L (ref 135–144)
WBC OTHER # BLD: 10.8 K/UL (ref 3.5–11.3)

## 2024-01-24 PROCEDURE — 2580000003 HC RX 258

## 2024-01-24 PROCEDURE — 97530 THERAPEUTIC ACTIVITIES: CPT

## 2024-01-24 PROCEDURE — 97116 GAIT TRAINING THERAPY: CPT

## 2024-01-24 PROCEDURE — 85027 COMPLETE CBC AUTOMATED: CPT

## 2024-01-24 PROCEDURE — G0378 HOSPITAL OBSERVATION PER HR: HCPCS

## 2024-01-24 PROCEDURE — 80048 BASIC METABOLIC PNL TOTAL CA: CPT

## 2024-01-24 PROCEDURE — 99232 SBSQ HOSP IP/OBS MODERATE 35: CPT | Performed by: PSYCHIATRY & NEUROLOGY

## 2024-01-24 PROCEDURE — 6370000000 HC RX 637 (ALT 250 FOR IP)

## 2024-01-24 PROCEDURE — 6370000000 HC RX 637 (ALT 250 FOR IP): Performed by: NURSE PRACTITIONER

## 2024-01-24 PROCEDURE — 36415 COLL VENOUS BLD VENIPUNCTURE: CPT

## 2024-01-24 PROCEDURE — 94664 DEMO&/EVAL PT USE INHALER: CPT

## 2024-01-24 PROCEDURE — 94640 AIRWAY INHALATION TREATMENT: CPT

## 2024-01-24 PROCEDURE — 99254 IP/OBS CNSLTJ NEW/EST MOD 60: CPT | Performed by: PHYSICAL MEDICINE & REHABILITATION

## 2024-01-24 RX ADMIN — ACETAZOLAMIDE 500 MG: 250 TABLET ORAL at 08:20

## 2024-01-24 RX ADMIN — BUDESONIDE AND FORMOTEROL FUMARATE DIHYDRATE 2 PUFF: 80; 4.5 AEROSOL RESPIRATORY (INHALATION) at 08:37

## 2024-01-24 RX ADMIN — LEVOTHYROXINE SODIUM 50 MCG: 50 TABLET ORAL at 08:21

## 2024-01-24 RX ADMIN — ACETAZOLAMIDE 500 MG: 250 TABLET ORAL at 20:06

## 2024-01-24 RX ADMIN — FUROSEMIDE 20 MG: 20 TABLET ORAL at 08:23

## 2024-01-24 RX ADMIN — ATORVASTATIN CALCIUM 20 MG: 20 TABLET, FILM COATED ORAL at 08:20

## 2024-01-24 RX ADMIN — ASPIRIN 81 MG 81 MG: 81 TABLET ORAL at 08:21

## 2024-01-24 RX ADMIN — SODIUM CHLORIDE, PRESERVATIVE FREE 10 ML: 5 INJECTION INTRAVENOUS at 08:23

## 2024-01-24 RX ADMIN — RIVAROXABAN 10 MG: 10 TABLET, FILM COATED ORAL at 18:30

## 2024-01-24 RX ADMIN — LOSARTAN POTASSIUM 25 MG: 50 TABLET, FILM COATED ORAL at 08:21

## 2024-01-24 RX ADMIN — QUETIAPINE FUMARATE 200 MG: 200 TABLET ORAL at 20:06

## 2024-01-24 RX ADMIN — AMITRIPTYLINE HYDROCHLORIDE 25 MG: 25 TABLET, FILM COATED ORAL at 20:06

## 2024-01-24 RX ADMIN — SODIUM CHLORIDE, PRESERVATIVE FREE 10 ML: 5 INJECTION INTRAVENOUS at 20:06

## 2024-01-24 RX ADMIN — METOPROLOL SUCCINATE 25 MG: 25 TABLET, EXTENDED RELEASE ORAL at 08:21

## 2024-01-24 ASSESSMENT — ENCOUNTER SYMPTOMS
COUGH: 0
SHORTNESS OF BREATH: 0
VOMITING: 0
PHOTOPHOBIA: 0
ABDOMINAL PAIN: 0

## 2024-01-24 NOTE — CONSULTS
\"BILITOT\", \"ALKPHOS\" in the last 72 hours.       Radiology:    XR CHEST PORTABLE    Result Date: 1/23/2024  EXAMINATION: ONE XRAY VIEW OF THE CHEST 1/23/2024 11:30 am COMPARISON: Chest CT 09/14/2021, chest radiograph 09/14/2021 HISTORY: coughing FINDINGS: The cardiac silhouette is enlarged.  The mediastinal and hilar silhouettes appear unremarkable. Scattered pulmonary opacities bilateral mid and lower lungs.  Low lung volumes.  No dense pulmonary consolidation is seen.  No definite pleural effusion evident. No pneumothorax is seen. No acute osseous abnormality is identified.     1. Nonspecific pulmonary infiltrates are commonly seen with atypical/viral pneumonia. 2. Cardiomegaly.     MRI THORACIC SPINE W WO CONTRAST    Result Date: 1/21/2024  EXAMINATION: MRI OF THE THORACIC SPINE WITHOUT AND WITH CONTRAST; MRI OF THE CERVICAL SPINE WITHOUT AND WITH CONTRAST  1/21/2024 5:59 pm TECHNIQUE: Multiplanar multisequence MRI of the thoracic spine was performed without and with the administration of intravenous contrast.; Multiplanar multisequence MRI of the cervical spine was performed without and with the administration of intravenous contrast. COMPARISON: None HISTORY: ORDERING SYSTEM PROVIDED HISTORY: Intracranial hypotension? TECHNOLOGIST PROVIDED HISTORY: Intracranial hypotension? Reason for Exam: Intracranial hypotension? FINDINGS: MRI cervical spine: BONES/ALIGNMENT: There is straightening of normal cervical lordosis.  There is normal alignment of the cervical spine.  The vertebral body heights are maintained. The bone marrow signal appears unremarkable. There is no evidence of acute fracture or destructive osseous lesion. There is degenerative disc disease with disc desiccation and mild endplate changes.  The intervertebral disc heights are grossly maintained. There is no abnormal enhancement in the cervical spine. SPINAL CORD: There is question mildly increased T2 signal in the left posterior aspect of cervical      Physical Exam:  BP (!) 106/57   Pulse 62   Temp 98.1 °F (36.7 °C) (Oral)   Resp 16   Ht 1.575 m (5' 2\")   Wt 99.8 kg (220 lb 0.3 oz)   SpO2 98%   BMI 40.24 kg/m²     GEN: Well developed, well nourished, no acute distress.  HEENT: NCAT, PERRL, EOMI, mucous membranes pink and moist.  RESP: Lungs clear to auscultation. No rales. Diffuse scattered rhonchi. Respirations WNL and unlabored.  CV: Regular rate rhythm. No murmurs, rubs, or gallops.  ABD: soft, non-distended, non-tender. BS+ and equal.  NEURO: A&O x 3. Sensation intact to light touch. Having N/V and ataxia with continued headaches.   MSK: Functional ROM. Muscle tone and bulk are normal bilaterally. Strength 4+/5 key muscles all extremities  EXT: No calf tenderness to palpation or edema BLEs.  SKIN: Warm dry and intact with good turgor.  PSYCH: Mood WNL. Affect WNL. Appropriately interactive.    Impression:    Headache due to intracranial hypertension: Recent lumbar puncture treatment. on Diamox, Solumedrol, amitriptyline  Ataxia  T2 cervical spine cord possible demyelination vs artifact  Asthma  Drug induced leukocytosis - CXR, lactic acid and procalcitonin WNL. Neurology checking respiratory panel    Recommendations:    Diagnosis:  Headache due to intracranial hypertension  Therapy: Has PT needs  Medical Necessity: As above  Support: Primary caregiver for her 4 children  Rehab Recommendation: Patient has PT needs but minimal OT needs ( she is modified independent/CGA for ADLs). She does not currently meet criteria for inpatient rehabilitation. Will follow for any change in condition but if she is unable to discharge home recommend SNF  DVT Prophylaxis: on Xarelto    It was my pleasure to evaluate Chantelledieudonne Maddox today.  Please call with questions.    NITIN ESCALANTE MD        This note is created with the assistance of a speech recognition program.  While intending to generate a document that actually reflects the content of the visit, the document

## 2024-01-24 NOTE — CARE COORDINATION
Vm message left with [facility] to verify receipt of referral. Await PMR consult. Appears patient could be ready for discharge if accepted. Would require precert.

## 2024-01-24 NOTE — PROGRESS NOTES
[facility] Neurology   IN-PATIENT SERVICE   [facility]    Progress Note             Date:   1/24/2024  Patient name:  Chantelle Maddox  Date of admission:  1/21/2024  2:40 PM  MRN:   [id number]  Account:  [id number]  YOB: 1980  PCP:    Letty Orozco APRN - CNP  Room:   [address] [place] [address]  Code Status:    Full Code    Chief Complaint:     Chief Complaint   Patient presents with    Dizziness     \"Room spinning\" since surgery on Jan 6th \"water removed from my brain\"       Interval hx:     The patient was seen and examined at bedside. Is vitally stable, alert and oriented x 3.   The patient stated that she is still feeling LE weakness.   PMNR has been consulted.  Denies headache, vomiting, dizziness- neuralgically stable      Brief History of Present Illness:     The patient is a 43 y.o.  Non- / non  female who presents with Dizziness (\"Room spinning\" since surgery on Jan 6th \"water removed from my brain\")   and she is admitted to the hospital for the management of  headache. The patient has a history of HTN, HLD, CAD, CHF, migraine headaches, moderate-to-severe AR, who presented to the hospital on 1/21/2024 with worsening headaches. Patient received LP on 1/6/24 at [facility], was headache free for 2-3 days, and then started having progressive headache symptoms. Pain is mostly behind eyes and forehead feeling like pressure and pain, stating it, \"felt like her head may burst\".        Past Medical History:     Past Medical History:   Diagnosis Date    Abnormal Pap smear of cervix 02/29/2016    Abnormal stress test 04/05/2018    Achilles tendonitis     Right foot    Anxiety     Aortic regurgitation     mild-mod on echo    Asthma     Bradycardia 03/19/2018    Cancer (HCC)     cervical cancer    Chest pain 03/19/2018    CHF (congestive heart failure) ([facility]) 07/06/2022    Coronary artery disease without angina pectoris 02/23/2022    Depression     Dizziness 05/15/2018    GERD  20 MG tablet Take 1 tablet by mouth daily 3/18/19   Glenn Lopez MD   levothyroxine (SYNTHROID) 50 MCG tablet Take 1 tablet by mouth daily 3/18/19   Glenn Lopez MD   fluticasone (VERAMYST) 27.5 MCG/SPRAY nasal spray 2 sprays by Nasal route 2 times daily    Glenn Lopez MD   Budesonide-Formoterol Fumarate (SYMBICORT IN) Inhale 2 puffs into the lungs daily as needed     Glenn Lopez MD   albuterol sulfate HFA (PROVENTIL;VENTOLIN;PROAIR) 108 (90 Base) MCG/ACT inhaler Inhale 2 puffs into the lungs every 4 hours as needed for Wheezing    Glenn Lopez MD   pantoprazole (PROTONIX) 40 MG tablet Take 1 tablet by mouth daily    Glenn Lopez MD        Allergies:     Penicillins and Codeine    Social History:     Tobacco:    reports that she has been smoking cigars. She has never used smokeless tobacco.  Alcohol:      reports no history of alcohol use.  Drug Use:  reports current drug use. Frequency: 7.00 times per week. Drug: Marijuana (Weed).    Family History:     Family History   Problem Relation Age of Onset    Cancer Mother         uterine    Diabetes Mother     High Blood Pressure Mother     Endometrial Cancer Mother     Breast Cancer Mother     Diabetes Brother     Heart Disease Brother        Review of Systems:     Review of Systems   Constitutional:  Negative for chills, diaphoresis and fatigue.   HENT:  Negative for ear pain, hearing loss and tinnitus.    Eyes:  Negative for photophobia and visual disturbance.   Respiratory:  Negative for cough and shortness of breath.    Gastrointestinal:  Negative for abdominal pain and vomiting.   Neurological:  Positive for weakness and numbness. Negative for dizziness.         Physical Exam:   /64   Pulse 59   Temp 98 °F (36.7 °C) (Oral)   Resp 16   Ht 1.575 m (5' 2\")   Wt 99.8 kg (220 lb 0.3 oz)   SpO2 97%   BMI 40.24 kg/m²   Temp (24hrs), Av.2 °F (36.8 °C), Min:98 °F (36.7 °C), Max:98.3 °F (36.8 °C)    No

## 2024-01-24 NOTE — PLAN OF CARE
Problem: Chronic Conditions and Co-morbidities  Goal: Patient's chronic conditions and co-morbidity symptoms are monitored and maintained or improved  Outcome: Progressing     Problem: Safety - Adult  Goal: Free from fall injury  Outcome: Progressing  Flowsheets (Taken 1/23/2024 1938)  Free From Fall Injury: Instruct family/caregiver on patient safety     Problem: Pain  Goal: Verbalizes/displays adequate comfort level or baseline comfort level  Outcome: Progressing     Problem: Respiratory - Adult  Goal: Achieves optimal ventilation and oxygenation  Outcome: Progressing     Problem: Skin/Tissue Integrity  Goal: Absence of new skin breakdown  Description: 1.  Monitor for areas of redness and/or skin breakdown  2.  Assess vascular access sites hourly  3.  Every 4-6 hours minimum:  Change oxygen saturation probe site  4.  Every 4-6 hours:  If on nasal continuous positive airway pressure, respiratory therapy assess nares and determine need for appliance change or resting period.  Outcome: Progressing

## 2024-01-24 NOTE — PROGRESS NOTES
Physical Therapy  Facility/Department: [address] MED SURG  Physical Therapy Daily Treatment Note    Name: Chantelle Maddox  : 1980  MRN: [id number]  Date of Service: 2024    Discharge Recommendations:  Patient would benefit from continued therapy after discharge   PT Equipment Recommendations  Equipment Needed: No (Pt is currently unsafe to return to prior living arrangements at this time)      Patient Diagnosis(es): The encounter diagnosis was Other headache syndrome.  Past Medical History:  has a past medical history of Abnormal Pap smear of cervix, Abnormal stress test, Achilles tendonitis, Anxiety, Aortic regurgitation, Asthma, Bradycardia, Cancer ([facility]), Chest pain, CHF (congestive heart failure) ([facility]), Coronary artery disease without angina pectoris, Depression, Dizziness, GERD (gastroesophageal reflux disease), Headache(784.0), Heart murmur, Herpes, Hyperlipidemia, Hypertension, Leaky heart valve, Migraine headache, Obesity, Poor historian, Schizophrenia ([facility]), Seasonal allergies, Sleep apnea, Snoring, SOB (shortness of breath), Syncope, Thyroid disease, Under care of service provider, Under care of service provider, Under care of service provider, Under care of service provider, and Wears partial dentures.  Past Surgical History:  has a past surgical history that includes pre-malignant / benign skin lesion excision (Left, 2016); LEEP (2016); Cervix biopsy (N/A, 2019); Cardiac catheterization (2021); Achilles tendon surgery (Left, 2021); Endoscopy, colon, diagnostic; Achilles tendon surgery (Right, 2022); other surgical history (08/15/2022); and Cervix biopsy (N/A, 8/15/2022).    Assessment   Body Structures, Functions, Activity Limitations Requiring Skilled Therapeutic Intervention: Decreased functional mobility ;Decreased strength;Decreased posture;Decreased ROM;Decreased sensation;Decreased endurance;Decreased balance;Increased pain  Assessment: Pt ambulated 3ft

## 2024-01-25 ENCOUNTER — APPOINTMENT (OUTPATIENT)
Dept: MRI IMAGING | Age: 44
DRG: 054 | End: 2024-01-25
Payer: COMMERCIAL

## 2024-01-25 PROBLEM — G93.2 IDIOPATHIC INTRACRANIAL HYPERTENSION: Status: ACTIVE | Noted: 2024-01-25

## 2024-01-25 PROCEDURE — A9576 INJ PROHANCE MULTIPACK: HCPCS | Performed by: PSYCHIATRY & NEUROLOGY

## 2024-01-25 PROCEDURE — 6370000000 HC RX 637 (ALT 250 FOR IP): Performed by: NURSE PRACTITIONER

## 2024-01-25 PROCEDURE — 1200000000 HC SEMI PRIVATE

## 2024-01-25 PROCEDURE — 6360000004 HC RX CONTRAST MEDICATION: Performed by: PSYCHIATRY & NEUROLOGY

## 2024-01-25 PROCEDURE — G0378 HOSPITAL OBSERVATION PER HR: HCPCS

## 2024-01-25 PROCEDURE — 6370000000 HC RX 637 (ALT 250 FOR IP)

## 2024-01-25 PROCEDURE — 2580000003 HC RX 258: Performed by: PSYCHIATRY & NEUROLOGY

## 2024-01-25 PROCEDURE — 99232 SBSQ HOSP IP/OBS MODERATE 35: CPT | Performed by: PSYCHIATRY & NEUROLOGY

## 2024-01-25 PROCEDURE — 97116 GAIT TRAINING THERAPY: CPT

## 2024-01-25 PROCEDURE — 97535 SELF CARE MNGMENT TRAINING: CPT

## 2024-01-25 PROCEDURE — 72158 MRI LUMBAR SPINE W/O & W/DYE: CPT

## 2024-01-25 PROCEDURE — 2580000003 HC RX 258

## 2024-01-25 PROCEDURE — 97530 THERAPEUTIC ACTIVITIES: CPT

## 2024-01-25 RX ORDER — SODIUM CHLORIDE 0.9 % (FLUSH) 0.9 %
10 SYRINGE (ML) INJECTION PRN
Status: DISCONTINUED | OUTPATIENT
Start: 2024-01-25 | End: 2024-01-26 | Stop reason: HOSPADM

## 2024-01-25 RX ADMIN — METOPROLOL SUCCINATE 25 MG: 25 TABLET, EXTENDED RELEASE ORAL at 08:04

## 2024-01-25 RX ADMIN — ACETAZOLAMIDE 500 MG: 250 TABLET ORAL at 08:04

## 2024-01-25 RX ADMIN — ATORVASTATIN CALCIUM 20 MG: 20 TABLET, FILM COATED ORAL at 08:04

## 2024-01-25 RX ADMIN — RIVAROXABAN 10 MG: 10 TABLET, FILM COATED ORAL at 18:34

## 2024-01-25 RX ADMIN — ACETAZOLAMIDE 500 MG: 250 TABLET ORAL at 20:44

## 2024-01-25 RX ADMIN — LOSARTAN POTASSIUM 25 MG: 50 TABLET, FILM COATED ORAL at 08:04

## 2024-01-25 RX ADMIN — FUROSEMIDE 20 MG: 20 TABLET ORAL at 08:04

## 2024-01-25 RX ADMIN — ASPIRIN 81 MG 81 MG: 81 TABLET ORAL at 08:03

## 2024-01-25 RX ADMIN — QUETIAPINE FUMARATE 200 MG: 200 TABLET ORAL at 20:44

## 2024-01-25 RX ADMIN — SODIUM CHLORIDE, PRESERVATIVE FREE 10 ML: 5 INJECTION INTRAVENOUS at 08:09

## 2024-01-25 RX ADMIN — GADOTERIDOL 20 ML: 279.3 INJECTION, SOLUTION INTRAVENOUS at 19:35

## 2024-01-25 RX ADMIN — AMITRIPTYLINE HYDROCHLORIDE 25 MG: 25 TABLET, FILM COATED ORAL at 20:44

## 2024-01-25 RX ADMIN — SODIUM CHLORIDE, PRESERVATIVE FREE 10 ML: 5 INJECTION INTRAVENOUS at 20:44

## 2024-01-25 RX ADMIN — LEVOTHYROXINE SODIUM 50 MCG: 50 TABLET ORAL at 08:04

## 2024-01-25 RX ADMIN — SODIUM CHLORIDE, PRESERVATIVE FREE 10 ML: 5 INJECTION INTRAVENOUS at 19:35

## 2024-01-25 ASSESSMENT — PAIN SCALES - GENERAL: PAINLEVEL_OUTOF10: 0

## 2024-01-25 ASSESSMENT — ENCOUNTER SYMPTOMS
VOMITING: 0
COUGH: 0
SHORTNESS OF BREATH: 0
ABDOMINAL PAIN: 0
NAUSEA: 0
SORE THROAT: 0
RHINORRHEA: 0

## 2024-01-25 NOTE — CARE COORDINATION
Post Acute Facility/Agency List     Provided patient with the following list, the list includes the overall star ratings obtained from CMS per the Medicare Web site (www.Medicare.gov):     [] Long Term Acute Care Facilities  [] Acute Inpatient Rehabilitation Facilities  [] Skilled Nursing Facilities  [x] Home Care    Provided verbal instructions on how to utilize the QR Code to obtain additional detailed star ratings from www.Medicare.gov     offered to print and provide the detailed list:    []Accepted   [x]Declined    No preference for HHC.  Referrals to [facility], [facility] & [facility].    PS team for rolling walker.    16:40  CB from [facility].  They can accept.  Fax order & CARL to [phone]    17:00  Reached out to team for order for HHC, CARL completion, rolling walker order with supporting f2f note.

## 2024-01-25 NOTE — PROGRESS NOTES
[facility] Neurology   IN-PATIENT SERVICE   [facility]    Progress Note             Date:   1/25/2024  Patient name:  Chantelle Maddox  Date of admission:  1/21/2024  2:40 PM  MRN:   [id number]  Account:  [id number]  YOB: 1980  PCP:    Letty Orozco APRN - CNP  Room:   [address] [place] [address]  Code Status:    Full Code    Chief Complaint:     Chief Complaint   Patient presents with    Dizziness     \"Room spinning\" since surgery on Jan 6th \"water removed from my brain\"       Interval hx:       Seen and examined at bedside, no headache this morning    Patient vocalizes that she wants to go home    Patient has PT needs but no OT is, will await for OT reevaluation today.  Possibly discharge home with home care as patient does not qualify for ARU      Brief History of Present Illness:     The patient is a 43 y.o.  Non- / non  female who presents with Dizziness (\"Room spinning\" since surgery on Jan 6th \"water removed from my brain\")   and she is admitted to the hospital for the management of  headache. The patient has a history of HTN, HLD, CAD, CHF, migraine headaches, moderate-to-severe AR, who presented to the hospital on 1/21/2024 with worsening headaches. Patient received LP on 1/6/24 at [facility], was headache free for 2-3 days, and then started having progressive headache symptoms. Pain is mostly behind eyes and forehead feeling like pressure and pain, stating it, \"felt like her head may burst\".    Patient was diagnosed with IIH from her last LP, she was  Medrol 1 g with improvement.  MRI brain done here with and without contrast did not show any acute abnormalities MRI cervical spine did show concerns for some artifact versus demyelinating disease at the level of C2.,  And MRV ruled out any venous sinus thrombosis      Patient now taking acetazolamide 500mg twice daily, amitriptyline 25 mg, since her admission her headache has resolved but she has been requiring increased  PT needs due to not being able to walk consistently with PT.           Past Medical History:     Past Medical History:   Diagnosis Date    Abnormal Pap smear of cervix 02/29/2016    Abnormal stress test 04/05/2018    Achilles tendonitis     Right foot    Anxiety     Aortic regurgitation     mild-mod on echo    Asthma     Bradycardia 03/19/2018    Cancer ([facility])     cervical cancer    Chest pain 03/19/2018    CHF (congestive heart failure) ([facility]) 07/06/2022    Coronary artery disease without angina pectoris 02/23/2022    Depression     Dizziness 05/15/2018    GERD (gastroesophageal reflux disease)     Headache(784.0)     Heart murmur     Herpes     Hyperlipidemia     Hypertension     [facility]-Dr. Mcginnis    Leaky heart valve     Migraine headache     Obesity     Poor historian     Schizophrenia ([facility])     Seasonal allergies     Sleep apnea     no machine    Snoring 03/21/2019    SOB (shortness of breath) 09/16/2019    Syncope 03/19/2018    Thyroid disease     Hypothyroid    Under care of service provider 08/02/2022    cardiolgy-roly-[place]-aug 2021    Under care of service provider 08/02/2022    ufdhcsxpoi-gavjurbeci-hjvfluaou-last visit mar 2022    Under care of service provider 08/02/2022    ixq-rfpnymfnuh-rytew-last visit june 2022    Under care of service provider 08/02/2022    uhyzjdut-fvijbzza-dttwzy [place] ankle[facility]atie-last visit aug 2022    Wears partial dentures     upper and lower partial        Past Surgical History:     Past Surgical History:   Procedure Laterality Date    ACHILLES TENDON SURGERY Left 08/27/2021    LEFT DETACH & REATTACH ACHILLES TENDON performed by Christian Cody DPM at [facility] OR    ACHILLES TENDON SURGERY Right 07/06/2022    ACHILLES TENDON LENGTHENING REPAIR ACHILLES TENDON DETATCH AND REATTATCH performed by Christian Cody DPM at [facility] OR    CARDIAC CATHETERIZATION  05/25/2021    no stents    CERVIX BIOPSY N/A 04/05/2019    COLD KNIFE CONIZATION WITH BIOPSY performed by

## 2024-01-25 NOTE — PROGRESS NOTES
Physical Therapy  Facility/Department: [address] MED SURG  Physical Therapy Daily Treatment Note    Name: Chantelle Maddox  : 1980  MRN: [id number]  Date of Service: 2024    Discharge Recommendations:  Patient would benefit from continued therapy after discharge   PT Equipment Recommendations  Equipment Needed: Yes  Mobility Devices: Walker  Walker: Rolling    Equipment recommendations listed below are based on what the patient would need if they were able to return to prior living arrangements at the time of discharge.    Patient Diagnosis(es): The encounter diagnosis was Other headache syndrome.  Past Medical History:  has a past medical history of Abnormal Pap smear of cervix, Abnormal stress test, Achilles tendonitis, Anxiety, Aortic regurgitation, Asthma, Bradycardia, Cancer ([facility]), Chest pain, CHF (congestive heart failure) ([facility]), Coronary artery disease without angina pectoris, Depression, Dizziness, GERD (gastroesophageal reflux disease), Headache(784.0), Heart murmur, Herpes, Hyperlipidemia, Hypertension, Leaky heart valve, Migraine headache, Obesity, Poor historian, Schizophrenia ([facility]), Seasonal allergies, Sleep apnea, Snoring, SOB (shortness of breath), Syncope, Thyroid disease, Under care of service provider, Under care of service provider, Under care of service provider, Under care of service provider, and Wears partial dentures.  Past Surgical History:  has a past surgical history that includes pre-malignant / benign skin lesion excision (Left, 2016); LEEP (2016); Cervix biopsy (N/A, 2019); Cardiac catheterization (2021); Achilles tendon surgery (Left, 2021); Endoscopy, colon, diagnostic; Achilles tendon surgery (Right, 2022); other surgical history (08/15/2022); and Cervix biopsy (N/A, 8/15/2022).    Assessment   Body Structures, Functions, Activity Limitations Requiring Skilled Therapeutic Intervention: Decreased functional mobility ;Decreased  independently  Short Term Goal 3: Pt to ambulate 250ft w/ least restrictive AD CGA  Short Term Goal 4: Demonstrate standing dynamic balance of good - to decrease fall risk  Short Term Goal 5: Ascend/descend 12 stairs with one rail to simulate home environment independently  Patient Goals   Patient Goals : To get better  Education  Patient Education  Education Given To: Patient  Education Provided: Role of Therapy;Plan of Care;Transfer Training;Equipment  Education Method: Verbal  Barriers to Learning: None  Education Outcome: Verbalized understanding      Therapy Time   Individual Concurrent Group Co-treatment   Time In 1138         Time Out 1155         Minutes 17         Timed Code Treatment Minutes: 15 Minutes       Joan Thompson, PT

## 2024-01-25 NOTE — PROGRESS NOTES
[facility] Neurology   IN-PATIENT SERVICE   [facility]    Progress Note             Date:   1/25/2024  Patient name:  Chantelle Maddox  Date of admission:  1/21/2024  2:40 PM  MRN:   [id number]  Account:  [id number]  YOB: 1980  PCP:    Letty Orozco APRN - CNP  Room:   [address] [place] [address]  Code Status:    Full Code    Chief Complaint:     Chief Complaint   Patient presents with    Dizziness     \"Room spinning\" since surgery on Jan 6th \"water removed from my brain\"   ***    Interval hx:     The patient was seen and examined at bedside. Is vitally stable, alert and oriented x ***. No acute events overnight.  The patient stated that       Brief History of Present Illness:     The patient is a 43 y.o.  Non- / non  female who presents with Dizziness (\"Room spinning\" since surgery on Jan 6th \"water removed from my brain\")   and she is admitted to the hospital for the management of  ***        Past Medical History:     Past Medical History:   Diagnosis Date    Abnormal Pap smear of cervix 02/29/2016    Abnormal stress test 04/05/2018    Achilles tendonitis     Right foot    Anxiety     Aortic regurgitation     mild-mod on echo    Asthma     Bradycardia 03/19/2018    Cancer ([facility])     cervical cancer    Chest pain 03/19/2018    CHF (congestive heart failure) ([facility]) 07/06/2022    Coronary artery disease without angina pectoris 02/23/2022    Depression     Dizziness 05/15/2018    GERD (gastroesophageal reflux disease)     Headache(784.0)     Heart murmur     Herpes     Hyperlipidemia     Hypertension     [facility]-Dr. Mcginnis    Leaky heart valve     Migraine headache     Obesity     Poor historian     Schizophrenia ([facility])     Seasonal allergies     Sleep apnea     no machine    Snoring 03/21/2019    SOB (shortness of breath) 09/16/2019    Syncope 03/19/2018    Thyroid disease     Hypothyroid    Under care of service provider 08/02/2022    cardiolgy-roly-aly Joffre-aug

## 2024-01-25 NOTE — DISCHARGE INSTR - COC
Continuity of Care Form    Patient Name: Chantelle Maddox   :  1980  MRN:  [id number]    Admit date:  2024  Discharge date:  24    Code Status Order: Full Code   Advance Directives:     Admitting Physician:  Kip Khan DO  PCP: Letty Orozco, SHERRIE - CNP    Discharging Nurse: HILLARY Franco  Discharging Hospital Unit/Room#: 0338/0338-02  Discharging Unit Phone Number: [phone]    Emergency Contact:   Extended Emergency Contact Information  Primary Emergency Contact: Dolly Maddox  Home Phone: [phone]  Relation: Aunt/Uncle  Preferred language: English   needed? No  Secondary Emergency Contact: Aristeo Maddox  Home Phone: [phone]  Relation: Brother/Sister  Preferred language: English   needed? No    Past Surgical History:  Past Surgical History:   Procedure Laterality Date    ACHILLES TENDON SURGERY Left 2021    LEFT DETACH & REATTACH ACHILLES TENDON performed by Christian Cody DPM at [facility] OR    ACHILLES TENDON SURGERY Right 2022    ACHILLES TENDON LENGTHENING REPAIR ACHILLES TENDON DETATCH AND REATTATCH performed by Christian Cody DPM at [facility] OR    CARDIAC CATHETERIZATION  2021    no stents    CERVIX BIOPSY N/A 2019    COLD KNIFE CONIZATION WITH BIOPSY performed by Ludmila Bolivar DO at [facility] OR    CERVIX BIOPSY N/A 8/15/2022    COLD KNIFE CONE performed by Ludmila Bolivar DO at [facility] OR    ENDOSCOPY, COLON, DIAGNOSTIC      EGD    LEEP  2016    OTHER SURGICAL HISTORY  08/15/2022    cold knife biopsy    PRE-MALIGNANT / BENIGN SKIN LESION EXCISION Left 2016    foot       Immunization History:   Immunization History   Administered Date(s) Administered    COVID-19, PFIZER PURPLE top, DILUTE for use, (age 12 y+), 30mcg/0.3mL 2021, 2021    HPV, GARDASIL 9, (age 9y-45y), IM, 0.5mL 2021, 2021, 2021    Hep A, HAVRIX, VAQTA, (age 19y+), IM, 1mL 10/20/2018    Influenza A (C7T6-31) Vaccine PF IM 2010     Readings from Last 1 Encounters:   01/22/24 99.8 kg (220 lb 0.3 oz)     Mental Status:  oriented and alert    IV Access:  - None    Nursing Mobility/ADLs:  Walking   Independent  Transfer  Independent  Bathing  Independent  Dressing  Independent  Toileting  Independent  Feeding  Independent  Med Admin  Independent  Med Delivery   whole    Wound Care Documentation and Therapy:  Incision 07/06/22 Ankle Posterior (Active)   Number of days: 568        Elimination:  Continence:   Bowel: Yes  Bladder: Yes  Urinary Catheter: None   Colostomy/Ileostomy/Ileal Conduit: No       Date of Last BM:     Intake/Output Summary (Last 24 hours) at 1/25/2024 1657  Last data filed at 1/24/2024 1843  Gross per 24 hour   Intake 1100 ml   Output 920 ml   Net 180 ml     I/O last 3 completed shifts:  In: 1100 [P.O.:1100]  Out: 1270 [Urine:1270]    Safety Concerns:     At Risk for Falls    Impairments/Disabilities:      None    Nutrition Therapy:  Current Nutrition Therapy:   Regular Diet- General    Routes of Feeding: Oral  Liquids: Thin Liquids  Daily Fluid Restriction: no  Last Modified Barium Swallow with Video (Video Swallowing Test): not done    Treatments at the Time of Hospital Discharge:   Respiratory Treatments:   Oxygen Therapy:  is not on home oxygen therapy.  Ventilator:        Rehab Therapies: Physical Therapy and Occupational Therapy  Weight Bearing Status/Restrictions: No weight bearing restrictions  Other Medical Equipment (for information only, NOT a DME order):  walker  Other Treatments:     Patient's personal belongings (please select all that are sent with patient):  None    RN SIGNATURE:  Electronically signed by Joan Bee RN on 1/26/24 at 9:29 AM EST    CASE MANAGEMENT/SOCIAL WORK SECTION    Inpatient Status Date: ***    Readmission Risk Assessment Score:  Readmission Risk              Risk of Unplanned Readmission:  15           Discharging to Facility/ Agency   Name: [facility]  Address:  Phone:  Fax:    Dialysis

## 2024-01-25 NOTE — PLAN OF CARE
Problem: Chronic Conditions and Co-morbidities  Goal: Patient's chronic conditions and co-morbidity symptoms are monitored and maintained or improved  Outcome: Progressing     Problem: Safety - Adult  Goal: Free from fall injury  Outcome: Progressing     Problem: Pain  Goal: Verbalizes/displays adequate comfort level or baseline comfort level  Outcome: Progressing     Problem: Skin/Tissue Integrity  Goal: Absence of new skin breakdown  Description: 1.  Monitor for areas of redness and/or skin breakdown  2.  Assess vascular access sites hourly  3.  Every 4-6 hours minimum:  Change oxygen saturation probe site  4.  Every 4-6 hours:  If on nasal continuous positive airway pressure, respiratory therapy assess nares and determine need for appliance change or resting period.  Outcome: Progressing

## 2024-01-25 NOTE — PROGRESS NOTES
Pharmacy Intern New Medication Counseling Note    Medication counseling provided to Chantelle Maddox  New medications reviewed: Amitriptyline    Handouts provided to patient include: Medication Side Effects brochure, Pill organizer, and contact card for Pharmacy Services Medication Hotline    Discussed recently initiated medication therapy with patient/caregiver utilizing teachback method.  Reviewed uses and possible side effects of medication and answered all medication-related questions.  Patient/caregiver verbalized understanding.    Dina Preciado Pharm Intern

## 2024-01-25 NOTE — PROGRESS NOTES
Occupational Therapy  Facility/Department: [address] MED SURG  Occupational Therapy Daily Treatment Note    Name: Chantelle Maddox  : 1980  MRN: [id number]  Date of Service: 2024    Discharge Recommendations:  Patient would benefit from continued therapy after discharge  OT Equipment Recommendations  Walker: Rolling  Wheelchair: Standard  ADL Assistive Devices: Transfer Tub Bench;Long-handled Sponge       Patient Diagnosis(es): The encounter diagnosis was Other headache syndrome.  Past Medical History:  has a past medical history of Abnormal Pap smear of cervix, Abnormal stress test, Achilles tendonitis, Anxiety, Aortic regurgitation, Asthma, Bradycardia, Cancer ([facility]), Chest pain, CHF (congestive heart failure) ([facility]), Coronary artery disease without angina pectoris, Depression, Dizziness, GERD (gastroesophageal reflux disease), Headache(784.0), Heart murmur, Herpes, Hyperlipidemia, Hypertension, Leaky heart valve, Migraine headache, Obesity, Poor historian, Schizophrenia ([facility]), Seasonal allergies, Sleep apnea, Snoring, SOB (shortness of breath), Syncope, Thyroid disease, Under care of service provider, Under care of service provider, Under care of service provider, Under care of service provider, and Wears partial dentures.  Past Surgical History:  has a past surgical history that includes pre-malignant / benign skin lesion excision (Left, 2016); LEEP (2016); Cervix biopsy (N/A, 2019); Cardiac catheterization (2021); Achilles tendon surgery (Left, 2021); Endoscopy, colon, diagnostic; Achilles tendon surgery (Right, 2022); other surgical history (08/15/2022); and Cervix biopsy (N/A, 8/15/2022).           Assessment   Performance deficits / Impairments: Decreased functional mobility ;Decreased endurance;Decreased ADL status;Decreased strength;Decreased high-level IADLs;Decreased balance  Assessment: Pt sat EOB to complete bathing/dressing/grooming tasks this date. Pt MOD I for

## 2024-01-26 VITALS
HEIGHT: 62 IN | BODY MASS INDEX: 40.49 KG/M2 | TEMPERATURE: 98.1 F | SYSTOLIC BLOOD PRESSURE: 100 MMHG | DIASTOLIC BLOOD PRESSURE: 55 MMHG | RESPIRATION RATE: 16 BRPM | WEIGHT: 220.02 LBS | OXYGEN SATURATION: 100 % | HEART RATE: 66 BPM

## 2024-01-26 PROCEDURE — 94640 AIRWAY INHALATION TREATMENT: CPT

## 2024-01-26 PROCEDURE — 6370000000 HC RX 637 (ALT 250 FOR IP)

## 2024-01-26 PROCEDURE — 99239 HOSP IP/OBS DSCHRG MGMT >30: CPT | Performed by: PSYCHIATRY & NEUROLOGY

## 2024-01-26 PROCEDURE — 6370000000 HC RX 637 (ALT 250 FOR IP): Performed by: NURSE PRACTITIONER

## 2024-01-26 RX ADMIN — BUDESONIDE AND FORMOTEROL FUMARATE DIHYDRATE 2 PUFF: 80; 4.5 AEROSOL RESPIRATORY (INHALATION) at 07:53

## 2024-01-26 RX ADMIN — METOPROLOL SUCCINATE 25 MG: 25 TABLET, EXTENDED RELEASE ORAL at 08:17

## 2024-01-26 RX ADMIN — ASPIRIN 81 MG 81 MG: 81 TABLET ORAL at 08:17

## 2024-01-26 RX ADMIN — LEVOTHYROXINE SODIUM 50 MCG: 50 TABLET ORAL at 08:17

## 2024-01-26 RX ADMIN — ATORVASTATIN CALCIUM 20 MG: 20 TABLET, FILM COATED ORAL at 08:17

## 2024-01-26 RX ADMIN — LOSARTAN POTASSIUM 25 MG: 50 TABLET, FILM COATED ORAL at 08:17

## 2024-01-26 RX ADMIN — FUROSEMIDE 20 MG: 20 TABLET ORAL at 08:18

## 2024-01-26 RX ADMIN — ACETAZOLAMIDE 500 MG: 250 TABLET ORAL at 08:17

## 2024-01-26 ASSESSMENT — ENCOUNTER SYMPTOMS
RHINORRHEA: 0
VOMITING: 0
COUGH: 0
NAUSEA: 0
ABDOMINAL PAIN: 0
SORE THROAT: 0
SHORTNESS OF BREATH: 0

## 2024-01-26 NOTE — PROGRESS NOTES
Physician Progress Note      PATIENT:               CONRADO SUMMERS  CSN #:                  [id number]  :                       1980  ADMIT DATE:       2024 2:40 PM  DISCH DATE:        2024 11:36 AM  RESPONDING  PROVIDER #:        Kip BALES DO          QUERY TEXT:    Pt admitted with headache and has CHF documented in H&P. Per medical record   review Echo on 10/2 shows Left?Ventricle: Systolic function is normal with an   ejection fraction of 60-65%.left ventricle wall thickness is noted and cxr   shows cardiomegaly. home medication includes Lasix  If possible, please   document in progress notes and discharge summary further specificity regarding   the type and acuity of CHF:    The medical record reflects the following:  Risk Factors: age, morbid obesity, CAD  Clinical Indicators: admitted with headache and has CHF documented in H&P. Per   medical record review Echo on 10/2 shows Left?Ventricle: Systolic function is   normal with an ejection fraction of 60-65%.left ventricle wall thickness is   noted. home medication includes Lasix  Treatment: po Lasix, EKG, CXR    Thank You Dean THORNTON BSN CCDS  Email [email or web address]  Cell [phone]  office hours M-F 6am to 2:30p  Options provided:  -- Chronic Diastolic CHF/HFpEF  -- Chronic Systolic and Diastolic CHF  -- Other - I will add my own diagnosis  -- Disagree - Not applicable / Not valid  -- Disagree - Clinically unable to determine / Unknown  -- Refer to Clinical Documentation Reviewer    PROVIDER RESPONSE TEXT:    Provider disagreed with this query.    Query created by: Luz Thomas on 2024 9:17 AM      Electronically signed by:  Kip BALES DO 2024 4:13 PM

## 2024-01-26 NOTE — PROGRESS NOTES
Chantelle Maddox was evaluated today and a DME order was entered for a wheeled walker because she requires this to successfully complete daily living tasks of ambulating.  A wheeled walker is necessary due to the patient's unsteady gait, upper body weakness, and inability to  an ambulation device; and she can ambulate only by pushing a walker instead of a lesser assistive device such as a cane, crutch, or standard walker.  The need for this equipment was discussed with the patient and she understands and is in agreement.       Ede Grover MD  Neurology Resident PGY-4  1/26/2024  9:02 AM

## 2024-01-26 NOTE — PROGRESS NOTES
[facility] Neurology   IN-PATIENT SERVICE   [facility]    Progress Note             Date:   1/26/2024  Patient name:  Chantelle Maddox  Date of admission:  1/21/2024  2:40 PM  MRN:   [id number]  Account:  [id number]  YOB: 1980  PCP:    Letty Orozco APRN - CNP  Room:   [address] [place] [address]  Code Status:    Prior    Chief Complaint:     Chief Complaint   Patient presents with    Dizziness     \"Room spinning\" since surgery on Jan 6th \"water removed from my brain\"       Interval hx:       Seen and examined at bedside    Patient will go to home seen and examined at bedside    Patient will go to home with home care, orders complete for  wheeled walker    Brief History of Present Illness:     The patient is a 43 y.o.  Non- / non  female who presents with Dizziness (\"Room spinning\" since surgery on Jan 6th \"water removed from my brain\")   and she is admitted to the hospital for the management of  headache. The patient has a history of HTN, HLD, CAD, CHF, migraine headaches, moderate-to-severe AR, who presented to the hospital on 1/21/2024 with worsening headaches. Patient received LP on 1/6/24 at [facility], was headache free for 2-3 days, and then started having progressive headache symptoms. Pain is mostly behind eyes and forehead feeling like pressure and pain, stating it, \"felt like her head may burst\".    Patient was diagnosed with IIH from her last LP, she was  Medrol 1 g with improvement.  MRI brain done here with and without contrast did not show any acute abnormalities MRI cervical spine did show concerns for some artifact versus demyelinating disease at the level of C2.,  And MRV ruled out any venous sinus thrombosis      Patient now taking acetazolamide 500mg twice daily, amitriptyline 25 mg, since her admission her headache has resolved but she has been requiring increased PT needs due to not being able to walk consistently with PT.          Past Medical History:  extremities, no Babinski sign   STATION and GAIT Walked more with PT yesterday and today       Investigations:      Laboratory Testing:  No results found for this or any previous visit (from the past 24 hour(s)).  Recent Labs     01/24/24  0558   WBC 10.8   RBC 4.18   HGB 12.5   HCT 38.9   MCV 93.1   MCH 29.9   MCHC 32.1   RDW 15.7*      MPV 9.3       Recent Labs     01/24/24  0558      K 3.8   *   CO2 17*   BUN 19   CREATININE 1.1*   GLUCOSE 95   CALCIUM 8.2*       Hemoglobin A1C   Date Value Ref Range Status   01/22/2024 5.6 4.0 - 6.0 % Final       Assessment :      Primary Problem  IIH (idiopathic intracranial hypertension)    Active Hospital Problems    Diagnosis Date Noted    Idiopathic intracranial hypertension [G93.2] 01/25/2024    Other headache syndrome [G44.89] 01/21/2024    Intractable chronic migraine with aura and without status migrainosus [G43.E19] 01/21/2024    IIH (idiopathic intracranial hypertension) [G93.2] 01/21/2024       Patient is a 43 y.o. female recently diagnosed with IIH by LP (01/06/24) with a history of HTN, HLD, CAD, CHF presents to ER for worsening headache and dizziness.    Plan:       Headache, likely multifactorial due to IIH, migraine and sinusitis  - improving  - received IV Solu-medrol 1g with improvement on admission  - MRI brain W WO contrast: No acute disease.  Right maxillary sinusitis.  No signs of intracranial  hypotension.  - MRI thoracic spine: degenerative endplate changes   - MRI cervical spine: Question mildly increased T2 signal in the left posterior aspect of cervical  spinal cord at C2, may be related to artifacts versus demyelinating disease  - MRV head W WO contrast (obtained in [facility] on 1/4, only report is available): No evidence of sinus thrombosis.  Narrowing of the bilateral transverse sigmoid junctions which can be seen in the setting of a idiopathic intracranial hypertension   - on amitriptyline 25 mg for migraine prophylaxis   -

## 2024-01-26 NOTE — CARE COORDINATION
Transitional Planning:  Order for rolling walker and F2F note faxed to [facility].  TO be delivered to patient's home.    PS team to complete REGINA.    11:10  [facility] order & regina faxed to [facility].

## 2024-01-26 NOTE — PLAN OF CARE
Problem: Respiratory - Adult  Goal: Achieves optimal ventilation and oxygenation  1/26/2024 0815 by Sariah Vaz RCP  Outcome: Progressing

## 2024-02-19 ENCOUNTER — OFFICE VISIT (OUTPATIENT)
Dept: OBGYN | Age: 44
End: 2024-02-19
Payer: COMMERCIAL

## 2024-02-19 VITALS
DIASTOLIC BLOOD PRESSURE: 92 MMHG | WEIGHT: 214 LBS | HEART RATE: 87 BPM | BODY MASS INDEX: 39.14 KG/M2 | SYSTOLIC BLOOD PRESSURE: 143 MMHG

## 2024-02-19 DIAGNOSIS — N93.9 ABNORMAL UTERINE BLEEDING (AUB): Primary | ICD-10-CM

## 2024-02-19 DIAGNOSIS — N93.9 VAGINAL BLEEDING: ICD-10-CM

## 2024-02-19 PROCEDURE — 99214 OFFICE O/P EST MOD 30 MIN: CPT | Performed by: STUDENT IN AN ORGANIZED HEALTH CARE EDUCATION/TRAINING PROGRAM

## 2024-02-19 RX ORDER — POLYETHYLENE GLYCOL 3350 17 G/17G
POWDER, FOR SOLUTION ORAL
COMMUNITY
Start: 2024-02-12

## 2024-02-19 RX ORDER — CHOLECALCIFEROL (VITAMIN D3) 125 MCG
CAPSULE ORAL
COMMUNITY
Start: 2024-01-26

## 2024-02-19 RX ORDER — CYCLOBENZAPRINE HCL 10 MG
TABLET ORAL
COMMUNITY
Start: 2024-02-12

## 2024-02-19 RX ORDER — QUETIAPINE FUMARATE 200 MG/1
200 TABLET, FILM COATED ORAL EVERY EVENING
COMMUNITY
Start: 2024-01-31

## 2024-02-19 RX ORDER — LATANOPROST 50 UG/ML
1 SOLUTION/ DROPS OPHTHALMIC NIGHTLY
COMMUNITY
Start: 2023-12-12

## 2024-02-19 RX ORDER — BUDESONIDE AND FORMOTEROL FUMARATE DIHYDRATE 160; 4.5 UG/1; UG/1
AEROSOL RESPIRATORY (INHALATION)
COMMUNITY
Start: 2024-01-07

## 2024-02-19 RX ORDER — TIMOLOL MALEATE 5 MG/ML
SOLUTION/ DROPS OPHTHALMIC
COMMUNITY
Start: 2024-01-09

## 2024-02-19 RX ORDER — ASPIRIN 81 MG
TABLET,CHEWABLE ORAL
COMMUNITY
Start: 2024-02-12

## 2024-02-19 RX ORDER — ATORVASTATIN CALCIUM 40 MG/1
TABLET, FILM COATED ORAL
COMMUNITY
Start: 2024-02-12

## 2024-02-19 RX ORDER — DESVENLAFAXINE 50 MG/1
1 TABLET, EXTENDED RELEASE ORAL DAILY
COMMUNITY

## 2024-02-19 NOTE — PROGRESS NOTES
Attending Physician Statement  I have discussed the care of Chantelle Maddox, including pertinent history and exam findings, with the resident. I have reviewed the key elements of all parts of the encounter with the resident.  I agree with the assessment, plan and orders as documented by the resident.  (GE Modifier)    Teresa Sharp [facility]  2/19/2024, 4:41 PM    
                                                                                                                                              PHYSICAL EXAM:   Chaperone for Intimate Exam: Chaperone was present for entire exam    General Appearance: Appears healthy.  Alert; in no acute distress.  Pleasant.  Skin: Normal  Lymphatic: No cervical, superclavicular, axillary, or inguinal adenopathy.  HEENT: normocephalic and atraumatic, Thyroid normal to inspection and palpation  Respiratory: clear to auscultation, no wheezes, rales or rhonchi, symmetric air entry  Cardiovascular: normal, regular rate and rhythm, no murmurs, rubs, or gallops  Abdomen: soft, non-tender, non-distended, no right upper quadrant tenderness, and no CVA tenderness  Pelvic Exam: patient declined  Rectal Exam: deferred  Extremities: non-tender BLE and non-edematous  Musculoskeletal: no gross abnormalities  Psych: Normal.      DATA:  No results found for this visit on 24.    ASSESSMENT & PLAN:    Chantelle Maddox is a 43 y.o. female  AUB   - Patient has been having spotting after intercourse since her CKC   - Patient counseled that this could likely be a lesion on her cervix that could be fixed with simple in office exam and silver nitrate   - Patient is not ready for an exam today and is declining exam   - Patient states that she has been having heavier periods since that time as well   - TVUS ordered, CBC and TSH ordered   - Will have patient return after her TVUS for results review and pelvic exam at that time   - Follow up after TVUS     Chronic medical conditions   - Patient has multiple co-morbidities and understands that nay procedure in the OR will come with high risk   - She is on a blood thinner for a history of DVT and follows with cardiology for CHF   - She also has IIH and was recently admitted to the hospital      Patient Active Problem List    Diagnosis Date Noted    Vaginal bleeding 2022     Priority: Medium    Cold

## 2024-02-23 NOTE — PROGRESS NOTES
OB/GYN Problem Visit    Chantelle Maddox  2024                       Primary Care Physician: Letty Orozco, APRN - CNP    CC:   Chief Complaint   Patient presents with    Results    Gynecologic Exam         HPI: Chantelle Maddox is a 43 y.o. female     The patient was seen and examined. She is here for follow up after being seen for bleeding after intercourse. She had a TVUS that showed      Uterus: Visualized uterus has a heterogeneous myometrial echotexture.  There  is a heterogeneous slightly hypoechoic posterior uterine body mass compatible  with a fibroid measuring up to 5.2 x 4.4 x 5.8 cm.     Endometrial stripe: Endometrium is somewhat difficult to visualize.  Measured  endometrial stripe thickness approaches upper limits of normal for a  premenopausal female.     Right Ovary: Right ovary was not visualized.     Left Ovary:  Left ovary is prominent in size with few small follicles.     Free Fluid: No evidence of free fluid.     IMPRESSION:  Fibroid uterus.     Prominent left ovary.  Right ovary was not visualized.    Her last visit she declined an exam. Patient is amenable to an exam today.      REVIEW OF SYSTEMS:   Constitutional: negative fever, negative chills, negative weight changes   HEENT: negative visual disturbances, negative headaches, negative dizziness, negative hearing loss  Breast: Negative breast abnormalities, negative breast lumps, negative nipple discharge  Respiratory: negative dyspnea, negative cough, negative SOB  Cardiovascular: negative chest pain,  negative palpitations, negative arrhythmia, negative syncope   Gastrointestinal: negative abdominal pain, negative RUQ pain, negative N/V, negative diarrhea, negative constipation, negative bowel changes, negative heartburn   Genitourinary: negative dysuria, negative hematuria, negative urinary incontinence, negative vaginal discharge, positive vaginal bleeding or spotting  Dermatological: negative rash, negative pruritis,

## 2024-02-26 ENCOUNTER — OFFICE VISIT (OUTPATIENT)
Dept: OBGYN | Age: 44
End: 2024-02-26
Payer: COMMERCIAL

## 2024-02-26 ENCOUNTER — HOSPITAL ENCOUNTER (OUTPATIENT)
Age: 44
Setting detail: SPECIMEN
Discharge: HOME | End: 2024-02-26

## 2024-02-26 VITALS
HEART RATE: 81 BPM | WEIGHT: 217 LBS | SYSTOLIC BLOOD PRESSURE: 125 MMHG | BODY MASS INDEX: 39.93 KG/M2 | DIASTOLIC BLOOD PRESSURE: 79 MMHG | HEIGHT: 62 IN

## 2024-02-26 DIAGNOSIS — Z11.3 SCREENING EXAMINATION FOR STD (SEXUALLY TRANSMITTED DISEASE): ICD-10-CM

## 2024-02-26 DIAGNOSIS — N93.9 ABNORMAL UTERINE BLEEDING (AUB): Primary | ICD-10-CM

## 2024-02-26 PROCEDURE — G8427 DOCREV CUR MEDS BY ELIG CLIN: HCPCS | Performed by: STUDENT IN AN ORGANIZED HEALTH CARE EDUCATION/TRAINING PROGRAM

## 2024-02-26 PROCEDURE — 3074F SYST BP LT 130 MM HG: CPT | Performed by: STUDENT IN AN ORGANIZED HEALTH CARE EDUCATION/TRAINING PROGRAM

## 2024-02-26 PROCEDURE — 99211 OFF/OP EST MAY X REQ PHY/QHP: CPT | Performed by: STUDENT IN AN ORGANIZED HEALTH CARE EDUCATION/TRAINING PROGRAM

## 2024-02-26 PROCEDURE — 99213 OFFICE O/P EST LOW 20 MIN: CPT | Performed by: STUDENT IN AN ORGANIZED HEALTH CARE EDUCATION/TRAINING PROGRAM

## 2024-02-26 PROCEDURE — G8417 CALC BMI ABV UP PARAM F/U: HCPCS | Performed by: STUDENT IN AN ORGANIZED HEALTH CARE EDUCATION/TRAINING PROGRAM

## 2024-02-26 PROCEDURE — 99214 OFFICE O/P EST MOD 30 MIN: CPT | Performed by: STUDENT IN AN ORGANIZED HEALTH CARE EDUCATION/TRAINING PROGRAM

## 2024-02-26 PROCEDURE — 3078F DIAST BP <80 MM HG: CPT | Performed by: STUDENT IN AN ORGANIZED HEALTH CARE EDUCATION/TRAINING PROGRAM

## 2024-02-26 RX ORDER — SPIRONOLACTONE 25 MG/1
25 TABLET ORAL DAILY
COMMUNITY
Start: 2024-02-21

## 2024-02-27 DIAGNOSIS — Z11.3 SCREENING EXAMINATION FOR STD (SEXUALLY TRANSMITTED DISEASE): ICD-10-CM

## 2024-02-27 LAB
CANDIDA SPECIES: NEGATIVE
GARDNERELLA VAGINALIS: POSITIVE
SOURCE: ABNORMAL
TRICHOMONAS: NEGATIVE

## 2024-02-27 RX ORDER — METRONIDAZOLE 500 MG/1
500 TABLET ORAL 2 TIMES DAILY
Qty: 14 TABLET | Refills: 0 | Status: SHIPPED | OUTPATIENT
Start: 2024-02-27 | End: 2024-03-05

## 2024-02-28 NOTE — PROGRESS NOTES
Attending Physician Statement  I have discussed the care of Chantelle Maddox, including pertinent history and exam findings,  with the resident. I have reviewed the key elements of all parts of the encounter with the resident.  I agree with the assessment, plan and orders as documented by the resident.  (GE Modifier)    Ludmila Bolivar,

## 2024-04-15 ENCOUNTER — TELEPHONE (OUTPATIENT)
Dept: OBGYN | Age: 44
End: 2024-04-15

## 2024-04-15 NOTE — TELEPHONE ENCOUNTER
Patient called the office wanting to know if we received her cardiology and medical clearance letter

## 2024-04-17 NOTE — TELEPHONE ENCOUNTER
I have Cardiac Clearance but waiting on Medical Clearance.  Per Letty Orozco NP office the Cardiac clearance letter was mailed to her.  Awaiting Medical Clearance    Left a voice message for patient to notify her that she will be contacted once clearance is obtained.

## 2024-04-19 ENCOUNTER — TELEPHONE (OUTPATIENT)
Dept: OBGYN | Age: 44
End: 2024-04-19

## 2024-04-19 NOTE — TELEPHONE ENCOUNTER
Patient was called regarding surgical appointment dates.  I informed patient of her pre-admission testing appointment place, date, and time. She understands if she doesn't show for this appointment her surgery may be cancelled.   She was informed of the place, date, arrival time, and time of surgery. She was also told not to eat or drink anything after midnight the evening before her surgery.    The patient's surgery was discussed at her appointment.  Questions were answered and patient was encouraged to contact the office if she had any other questions.  Written material was provided. Patient voiced understanding of the above.

## 2024-04-23 RX ORDER — SODIUM CHLORIDE, SODIUM LACTATE, POTASSIUM CHLORIDE, CALCIUM CHLORIDE 600; 310; 30; 20 MG/100ML; MG/100ML; MG/100ML; MG/100ML
INJECTION, SOLUTION INTRAVENOUS CONTINUOUS
OUTPATIENT
Start: 2024-04-23

## 2024-04-23 NOTE — DISCHARGE INSTRUCTIONS
(chicken pox, measles, etc.) Please call your doctor before coming to the hospital.      If your child is having surgery please make arrangements for any other children to be cared for at home on the day of surgery.  Other children are not permitted in recovery room and we want you to be able to spend time with the patient.  If other arrangements are not available then we suggest that you have a second adult to stay in the waiting room.      If you have any other questions regarding your procedure or the day of surgery, please call [phone], or [phone]

## 2024-04-30 ENCOUNTER — HOSPITAL ENCOUNTER (OUTPATIENT)
Dept: PREADMISSION TESTING | Age: 44
Discharge: HOME | End: 2024-05-04
Payer: COMMERCIAL

## 2024-04-30 VITALS
DIASTOLIC BLOOD PRESSURE: 75 MMHG | HEART RATE: 82 BPM | BODY MASS INDEX: 40.67 KG/M2 | WEIGHT: 221 LBS | RESPIRATION RATE: 14 BRPM | HEIGHT: 62 IN | TEMPERATURE: 96.8 F | OXYGEN SATURATION: 98 % | SYSTOLIC BLOOD PRESSURE: 110 MMHG

## 2024-04-30 LAB
ABO + RH BLD: NORMAL
ANION GAP SERPL CALCULATED.3IONS-SCNC: 12 MMOL/L (ref 9–16)
ARM BAND NUMBER: NORMAL
BLOOD BANK SAMPLE EXPIRATION: NORMAL
BLOOD GROUP ANTIBODIES SERPL: NEGATIVE
BUN SERPL-MCNC: 11 MG/DL (ref 6–20)
CALCIUM SERPL-MCNC: 9.7 MG/DL (ref 8.6–10.4)
CHLORIDE SERPL-SCNC: 101 MMOL/L (ref 98–107)
CO2 SERPL-SCNC: 24 MMOL/L (ref 20–31)
CREAT SERPL-MCNC: 1.2 MG/DL (ref 0.5–0.9)
ERYTHROCYTE [DISTWIDTH] IN BLOOD BY AUTOMATED COUNT: 13.9 % (ref 11.8–14.4)
GFR SERPL CREATININE-BSD FRML MDRD: 55 ML/MIN/1.73M2
GLUCOSE SERPL-MCNC: 74 MG/DL (ref 74–99)
HCT VFR BLD AUTO: 43.7 % (ref 36.3–47.1)
HGB BLD-MCNC: 14.1 G/DL (ref 11.9–15.1)
MCH RBC QN AUTO: 30.1 PG (ref 25.2–33.5)
MCHC RBC AUTO-ENTMCNC: 32.3 G/DL (ref 28.4–34.8)
MCV RBC AUTO: 93.2 FL (ref 82.6–102.9)
NRBC BLD-RTO: 0 PER 100 WBC
PLATELET # BLD AUTO: 317 K/UL (ref 138–453)
PMV BLD AUTO: 9.9 FL (ref 8.1–13.5)
POTASSIUM SERPL-SCNC: 4.2 MMOL/L (ref 3.7–5.3)
RBC # BLD AUTO: 4.69 M/UL (ref 3.95–5.11)
SODIUM SERPL-SCNC: 137 MMOL/L (ref 136–145)
WBC OTHER # BLD: 8.4 K/UL (ref 3.5–11.3)

## 2024-04-30 PROCEDURE — 86900 BLOOD TYPING SEROLOGIC ABO: CPT

## 2024-04-30 PROCEDURE — 86901 BLOOD TYPING SEROLOGIC RH(D): CPT

## 2024-04-30 PROCEDURE — 36415 COLL VENOUS BLD VENIPUNCTURE: CPT

## 2024-04-30 PROCEDURE — 85027 COMPLETE CBC AUTOMATED: CPT

## 2024-04-30 PROCEDURE — 80048 BASIC METABOLIC PNL TOTAL CA: CPT

## 2024-04-30 PROCEDURE — 86850 RBC ANTIBODY SCREEN: CPT

## 2024-04-30 RX ORDER — ATORVASTATIN CALCIUM 20 MG/1
TABLET, FILM COATED ORAL
COMMUNITY
Start: 2024-03-08

## 2024-04-30 NOTE — PROGRESS NOTES
Anesthesia Focused Assessment      STOP-BANG Sleep Apnea Questionnaire    SNORE loudly (heard through closed doors)?   Yes  TIRED, fatigued, sleepy during daytime?    Yes  OBSERVED stopping breathing during sleep?   Yes  High blood PRESSURE being treated?    Yes    BMI over 35?        Yes  AGE over 50?        No  NECK circumference over 16\"?     No  GENDER (male)?       No             Total 5  High risk 5-8  Intermediate risk 3-4  Low risk 0-2    Obstructive Sleep Apnea: yes  If YES, machine used: does not use her cpap     Type 1 DM:   no  T2DM:  no    Coronary Artery Disease:  denies, follows with [facility].  Hypertension:  yes    Active smoker:  cigars  Drinks alcohol:  no  Recreational drugs: marijuana regularly    Dentition: upper and lower partial dentures    Defib / AICD / Pacemaker: no      Renal Failure/dialysis:  no    Patient was evaluated in PAT & anesthesia guidelines were applied.   NPO guidelines, medication instructions and scheduled arrival time were reviewed with patient.  I advised patient to please contact the surgeon's office, ahead of time if possible, if any new signs or symptoms of illness, infection, rash, etc    Hx of anesthesia complications:  no  Family hx of anesthesia complications:  no                                                                                                                     Cardiology clearance is on file paper chart and [facility], no blood thinner instructions.  PCP clearance pending, will request copy with blood thinner instructions.      PITER TOMAS PA-C  4/30/24  2:03 PM

## 2024-05-01 NOTE — PROGRESS NOTES
ASA and Xarelto instructions received from pcp for procedure scheduled 5/6/2024. Writer then phoned pt to inform her of instructions. Pt verbalizes understanding of instructions.

## 2024-05-06 ENCOUNTER — HOSPITAL ENCOUNTER (OUTPATIENT)
Age: 44
Setting detail: OUTPATIENT SURGERY
Discharge: HOME | End: 2024-05-06
Attending: STUDENT IN AN ORGANIZED HEALTH CARE EDUCATION/TRAINING PROGRAM | Admitting: STUDENT IN AN ORGANIZED HEALTH CARE EDUCATION/TRAINING PROGRAM
Payer: COMMERCIAL

## 2024-05-06 ENCOUNTER — ANESTHESIA EVENT (OUTPATIENT)
Dept: OPERATING ROOM | Age: 44
End: 2024-05-06
Payer: COMMERCIAL

## 2024-05-06 ENCOUNTER — ANESTHESIA (OUTPATIENT)
Dept: OPERATING ROOM | Age: 44
End: 2024-05-06
Payer: COMMERCIAL

## 2024-05-06 VITALS
OXYGEN SATURATION: 100 % | HEART RATE: 80 BPM | TEMPERATURE: 97.9 F | SYSTOLIC BLOOD PRESSURE: 132 MMHG | DIASTOLIC BLOOD PRESSURE: 97 MMHG | RESPIRATION RATE: 23 BRPM

## 2024-05-06 DIAGNOSIS — R93.89 THICKENED ENDOMETRIUM: ICD-10-CM

## 2024-05-06 PROBLEM — Z86.79 HISTORY OF CHF (CONGESTIVE HEART FAILURE): Status: ACTIVE | Noted: 2024-05-06

## 2024-05-06 PROBLEM — N93.0 PCB (POST COITAL BLEEDING): Status: ACTIVE | Noted: 2024-05-06

## 2024-05-06 PROBLEM — Z98.890 HISTORY OF HYSTEROSCOPY: Status: ACTIVE | Noted: 2024-05-06

## 2024-05-06 PROBLEM — N93.9 ABNORMAL UTERINE BLEEDING (AUB): Status: ACTIVE | Noted: 2024-05-06

## 2024-05-06 LAB
HCG, PREGNANCY URINE (POC): NEGATIVE
POTASSIUM BLD-SCNC: 4.5 MMOL/L (ref 3.5–4.5)

## 2024-05-06 PROCEDURE — 3600000014 HC SURGERY LEVEL 4 ADDTL 15MIN: Performed by: STUDENT IN AN ORGANIZED HEALTH CARE EDUCATION/TRAINING PROGRAM

## 2024-05-06 PROCEDURE — 7100000001 HC PACU RECOVERY - ADDTL 15 MIN: Performed by: STUDENT IN AN ORGANIZED HEALTH CARE EDUCATION/TRAINING PROGRAM

## 2024-05-06 PROCEDURE — 6360000002 HC RX W HCPCS

## 2024-05-06 PROCEDURE — 2500000003 HC RX 250 WO HCPCS

## 2024-05-06 PROCEDURE — 7100000010 HC PHASE II RECOVERY - FIRST 15 MIN: Performed by: STUDENT IN AN ORGANIZED HEALTH CARE EDUCATION/TRAINING PROGRAM

## 2024-05-06 PROCEDURE — 6370000000 HC RX 637 (ALT 250 FOR IP)

## 2024-05-06 PROCEDURE — 3700000000 HC ANESTHESIA ATTENDED CARE: Performed by: STUDENT IN AN ORGANIZED HEALTH CARE EDUCATION/TRAINING PROGRAM

## 2024-05-06 PROCEDURE — 7100000011 HC PHASE II RECOVERY - ADDTL 15 MIN: Performed by: STUDENT IN AN ORGANIZED HEALTH CARE EDUCATION/TRAINING PROGRAM

## 2024-05-06 PROCEDURE — 2709999900 HC NON-CHARGEABLE SUPPLY: Performed by: STUDENT IN AN ORGANIZED HEALTH CARE EDUCATION/TRAINING PROGRAM

## 2024-05-06 PROCEDURE — 88305 TISSUE EXAM BY PATHOLOGIST: CPT

## 2024-05-06 PROCEDURE — 2720000010 HC SURG SUPPLY STERILE: Performed by: STUDENT IN AN ORGANIZED HEALTH CARE EDUCATION/TRAINING PROGRAM

## 2024-05-06 PROCEDURE — 3600000004 HC SURGERY LEVEL 4 BASE: Performed by: STUDENT IN AN ORGANIZED HEALTH CARE EDUCATION/TRAINING PROGRAM

## 2024-05-06 PROCEDURE — 3700000001 HC ADD 15 MINUTES (ANESTHESIA): Performed by: STUDENT IN AN ORGANIZED HEALTH CARE EDUCATION/TRAINING PROGRAM

## 2024-05-06 PROCEDURE — 84132 ASSAY OF SERUM POTASSIUM: CPT

## 2024-05-06 PROCEDURE — 2580000003 HC RX 258: Performed by: ANESTHESIOLOGY

## 2024-05-06 PROCEDURE — 81025 URINE PREGNANCY TEST: CPT

## 2024-05-06 PROCEDURE — 6360000002 HC RX W HCPCS: Performed by: STUDENT IN AN ORGANIZED HEALTH CARE EDUCATION/TRAINING PROGRAM

## 2024-05-06 PROCEDURE — 7100000000 HC PACU RECOVERY - FIRST 15 MIN: Performed by: STUDENT IN AN ORGANIZED HEALTH CARE EDUCATION/TRAINING PROGRAM

## 2024-05-06 PROCEDURE — 2580000003 HC RX 258: Performed by: STUDENT IN AN ORGANIZED HEALTH CARE EDUCATION/TRAINING PROGRAM

## 2024-05-06 PROCEDURE — 6370000000 HC RX 637 (ALT 250 FOR IP): Performed by: ANESTHESIOLOGY

## 2024-05-06 RX ORDER — SODIUM CHLORIDE, SODIUM LACTATE, POTASSIUM CHLORIDE, CALCIUM CHLORIDE 600; 310; 30; 20 MG/100ML; MG/100ML; MG/100ML; MG/100ML
INJECTION, SOLUTION INTRAVENOUS CONTINUOUS
Status: DISCONTINUED | OUTPATIENT
Start: 2024-05-06 | End: 2024-05-06 | Stop reason: HOSPADM

## 2024-05-06 RX ORDER — ACETAMINOPHEN 500 MG
1000 TABLET ORAL 3 TIMES DAILY
Qty: 50 TABLET | Refills: 1 | Status: SHIPPED | OUTPATIENT
Start: 2024-05-06

## 2024-05-06 RX ORDER — FENTANYL CITRATE 50 UG/ML
20 INJECTION, SOLUTION INTRAMUSCULAR; INTRAVENOUS EVERY 5 MIN PRN
Status: DISCONTINUED | OUTPATIENT
Start: 2024-05-06 | End: 2024-05-06 | Stop reason: HOSPADM

## 2024-05-06 RX ORDER — MIDAZOLAM HYDROCHLORIDE 1 MG/ML
INJECTION INTRAMUSCULAR; INTRAVENOUS PRN
Status: DISCONTINUED | OUTPATIENT
Start: 2024-05-06 | End: 2024-05-06 | Stop reason: SDUPTHER

## 2024-05-06 RX ORDER — MAGNESIUM HYDROXIDE 1200 MG/15ML
LIQUID ORAL CONTINUOUS PRN
Status: COMPLETED | OUTPATIENT
Start: 2024-05-06 | End: 2024-05-06

## 2024-05-06 RX ORDER — LABETALOL HYDROCHLORIDE 5 MG/ML
10 INJECTION, SOLUTION INTRAVENOUS
Status: DISCONTINUED | OUTPATIENT
Start: 2024-05-06 | End: 2024-05-06 | Stop reason: HOSPADM

## 2024-05-06 RX ORDER — ALBUTEROL SULFATE 90 UG/1
AEROSOL, METERED RESPIRATORY (INHALATION) PRN
Status: DISCONTINUED | OUTPATIENT
Start: 2024-05-06 | End: 2024-05-06 | Stop reason: SDUPTHER

## 2024-05-06 RX ORDER — DEXAMETHASONE SODIUM PHOSPHATE 10 MG/ML
INJECTION INTRAMUSCULAR; INTRAVENOUS PRN
Status: DISCONTINUED | OUTPATIENT
Start: 2024-05-06 | End: 2024-05-06 | Stop reason: SDUPTHER

## 2024-05-06 RX ORDER — LIDOCAINE HYDROCHLORIDE 10 MG/ML
INJECTION, SOLUTION EPIDURAL; INFILTRATION; INTRACAUDAL; PERINEURAL PRN
Status: DISCONTINUED | OUTPATIENT
Start: 2024-05-06 | End: 2024-05-06 | Stop reason: SDUPTHER

## 2024-05-06 RX ORDER — NALOXONE HYDROCHLORIDE 0.4 MG/ML
INJECTION, SOLUTION INTRAMUSCULAR; INTRAVENOUS; SUBCUTANEOUS PRN
Status: DISCONTINUED | OUTPATIENT
Start: 2024-05-06 | End: 2024-05-06 | Stop reason: HOSPADM

## 2024-05-06 RX ORDER — ACETAMINOPHEN 325 MG/1
650 TABLET ORAL ONCE
Status: COMPLETED | OUTPATIENT
Start: 2024-05-06 | End: 2024-05-06

## 2024-05-06 RX ORDER — PROPOFOL 10 MG/ML
INJECTION, EMULSION INTRAVENOUS PRN
Status: DISCONTINUED | OUTPATIENT
Start: 2024-05-06 | End: 2024-05-06 | Stop reason: SDUPTHER

## 2024-05-06 RX ORDER — METOCLOPRAMIDE HYDROCHLORIDE 5 MG/ML
5 INJECTION INTRAMUSCULAR; INTRAVENOUS
Status: DISCONTINUED | OUTPATIENT
Start: 2024-05-06 | End: 2024-05-06 | Stop reason: HOSPADM

## 2024-05-06 RX ORDER — FENTANYL CITRATE 50 UG/ML
INJECTION, SOLUTION INTRAMUSCULAR; INTRAVENOUS PRN
Status: DISCONTINUED | OUTPATIENT
Start: 2024-05-06 | End: 2024-05-06 | Stop reason: SDUPTHER

## 2024-05-06 RX ORDER — ONDANSETRON 2 MG/ML
INJECTION INTRAMUSCULAR; INTRAVENOUS PRN
Status: DISCONTINUED | OUTPATIENT
Start: 2024-05-06 | End: 2024-05-06 | Stop reason: SDUPTHER

## 2024-05-06 RX ORDER — DIMENHYDRINATE 50 MG
50 TABLET ORAL ONCE
Status: COMPLETED | OUTPATIENT
Start: 2024-05-06 | End: 2024-05-06

## 2024-05-06 RX ORDER — PROCHLORPERAZINE EDISYLATE 5 MG/ML
5 INJECTION INTRAMUSCULAR; INTRAVENOUS
Status: DISCONTINUED | OUTPATIENT
Start: 2024-05-06 | End: 2024-05-06 | Stop reason: HOSPADM

## 2024-05-06 RX ORDER — ONDANSETRON 4 MG/1
4 TABLET, FILM COATED ORAL 3 TIMES DAILY PRN
Qty: 15 TABLET | Refills: 0 | Status: SHIPPED | OUTPATIENT
Start: 2024-05-06

## 2024-05-06 RX ORDER — ROCURONIUM BROMIDE 10 MG/ML
INJECTION, SOLUTION INTRAVENOUS PRN
Status: DISCONTINUED | OUTPATIENT
Start: 2024-05-06 | End: 2024-05-06 | Stop reason: SDUPTHER

## 2024-05-06 RX ORDER — SODIUM CHLORIDE 0.9 % (FLUSH) 0.9 %
5-40 SYRINGE (ML) INJECTION EVERY 12 HOURS SCHEDULED
Status: DISCONTINUED | OUTPATIENT
Start: 2024-05-06 | End: 2024-05-06 | Stop reason: HOSPADM

## 2024-05-06 RX ADMIN — SUGAMMADEX 200 MG: 100 INJECTION, SOLUTION INTRAVENOUS at 09:20

## 2024-05-06 RX ADMIN — LIDOCAINE HYDROCHLORIDE 50 MG: 10 INJECTION, SOLUTION EPIDURAL; INFILTRATION; INTRACAUDAL; PERINEURAL at 08:53

## 2024-05-06 RX ADMIN — ALBUTEROL SULFATE 5 PUFF: 90 AEROSOL, METERED RESPIRATORY (INHALATION) at 09:16

## 2024-05-06 RX ADMIN — MIDAZOLAM 2 MG: 1 INJECTION INTRAMUSCULAR; INTRAVENOUS at 08:50

## 2024-05-06 RX ADMIN — PROPOFOL 200 MG: 10 INJECTION, EMULSION INTRAVENOUS at 08:53

## 2024-05-06 RX ADMIN — DEXAMETHASONE SODIUM PHOSPHATE 10 MG: 10 INJECTION INTRAMUSCULAR; INTRAVENOUS at 09:04

## 2024-05-06 RX ADMIN — FENTANYL CITRATE 100 MCG: 50 INJECTION, SOLUTION INTRAMUSCULAR; INTRAVENOUS at 08:53

## 2024-05-06 RX ADMIN — SODIUM CHLORIDE, POTASSIUM CHLORIDE, SODIUM LACTATE AND CALCIUM CHLORIDE: 600; 310; 30; 20 INJECTION, SOLUTION INTRAVENOUS at 07:52

## 2024-05-06 RX ADMIN — DIMENHYDRINATE 50 MG: 50 TABLET ORAL at 07:35

## 2024-05-06 RX ADMIN — ALBUTEROL SULFATE 5 PUFF: 90 AEROSOL, METERED RESPIRATORY (INHALATION) at 09:30

## 2024-05-06 RX ADMIN — ACETAMINOPHEN 650 MG: 325 TABLET ORAL at 07:35

## 2024-05-06 RX ADMIN — ROCURONIUM BROMIDE 50 MG: 10 SOLUTION INTRAVENOUS at 08:53

## 2024-05-06 RX ADMIN — ONDANSETRON 4 MG: 2 INJECTION INTRAMUSCULAR; INTRAVENOUS at 09:17

## 2024-05-06 ASSESSMENT — PAIN DESCRIPTION - DESCRIPTORS: DESCRIPTORS: ACHING

## 2024-05-06 ASSESSMENT — ENCOUNTER SYMPTOMS: SHORTNESS OF BREATH: 1

## 2024-05-06 ASSESSMENT — PAIN - FUNCTIONAL ASSESSMENT: PAIN_FUNCTIONAL_ASSESSMENT: 0-10

## 2024-05-06 NOTE — DISCHARGE INSTRUCTIONS
No alcoholic beverages, no driving or operating machinery, no making important decisions for 24 hours.   You may have a normal diet but should eat lightly day of surgery.  Drink plenty of fluids.  Urinate within 8 hours after surgery, if unable to urinate call your doctor

## 2024-05-06 NOTE — OP NOTE
Operative Note  Department of Obstetrics and Gynecology  [facility]       Patient: Chantelle Maddox   : 1980  MRN: [id number]       Acct: [id number]   PCP: Letty Orozco APRN - CNP  Date of Procedure: 24    Pre-operative Diagnosis: 44 y.o. female    Abnormal Uterine Bleeding   Post-Coital Bleeding   Fibroids  History of Cold Knife Cone x2 (EVER 3)  History of Congestive Heart Failure - EF 65%   Hypertension   Idiopathic Intracranial HTN   BMI 40    Post-operative Diagnosis:    Abnormal Uterine Bleeding   Post-Coital Bleeding   Fibroids  History of Cold Knife Cone x2 (EVER 3)  History of Congestive Heart Failure - EF 65%   Hypertension   Idiopathic Intracranial HTN   BMI 40    Procedure: Hysteroscopy, Dilation and Curettage, Mirena IUD Insertion    Surgeon: Teresa Sharp DO  Assistants: Janessa Voss DO PGY-4; Dolly Ballard MS3    Anesthesia: general    Indications: Patient is a 45 y/o with complaints of abnormal uterine bleeding. She reports heavy and painful periods. She also reports bleeding after intercourse following her cold knife cone which was completed 8/15/22. Pelvic US on 24 showed uterus measuring 10.8 x 8.1 x 7.8 cm  with a posterior fibroid. Recommendation is to proceed with surgical investigation and management. Patient counseled on options for management of heavy bleeding. She has a significant cardiac and medical history making her at increased risk of morbidity and mortality after major surgery. Her most recent echo showed improvement with EF 65% and only mild aortic regurgitation.  Patient desires placement of Mirena IUD at the time of surgery for management of bleeding.      Procedure Details:   The patient was seen in the pre-op room. The risks, benefits, complications, treatment options, and expected outcomes were discussed with the patient. The patient concurred with the proposed plan, giving informed consent. The patient was taken to the

## 2024-05-06 NOTE — ANESTHESIA PRE PROCEDURE
answered.)  Induction: intravenous.    MIPS: Postoperative opioids intended and Prophylactic antiemetics administered.  Anesthetic plan and risks discussed with patient.    Use of blood products discussed with patient whom.    Plan discussed with CRNA.    Attending anesthesiologist reviewed and agrees with Preprocedure content            Jena Ochoa MD   5/6/2024

## 2024-05-06 NOTE — ANESTHESIA POSTPROCEDURE EVALUATION
Department of Anesthesiology  Postprocedure Note    Patient: Chantelle Maddox  MRN: [id number]  YOB: 1980  Date of evaluation: 5/6/2024    Procedure Summary       Date: 05/06/24 Room / Location: [address]    Anesthesia Start: 0850 Anesthesia Stop: 0940    Procedure: DILATATION AND CURETTAGE HYSTEROSCOPY, AVETA, IUD INSERTION Diagnosis:       Thickened endometrium      (Thickened endometrium [R93.89])    Surgeons: Teresa Sharp DO Responsible Provider: Jena Ochoa MD    Anesthesia Type: general ASA Status: 2            Anesthesia Type: No value filed.    Mason Phase I: Mason Score: 9    Mason Phase II: Mason Score: 10    Anesthesia Post Evaluation    Patient location during evaluation: PACU  Patient participation: complete - patient participated  Level of consciousness: awake and alert  Airway patency: patent  Nausea & Vomiting: no nausea and no vomiting  Cardiovascular status: blood pressure returned to baseline  Respiratory status: acceptable  Hydration status: euvolemic  Comments: No known anesthesia related complication  Multimodal analgesia pain management approach  Pain management: adequate    No notable events documented.

## 2024-05-06 NOTE — H&P
OB/GYN Pre-Op H&P  [facility]    Patient Name: Chantelle Maddox     Patient : 1980  Room/Bed: [facility] OR [facility]/NONE  Admission Date/Time: 2024  6:28 AM  Primary Care Physician: Letty Orozco APRN - VERNA  MRN: [id number]    Date: 2024  Time: 8:15 AM    The patient was seen in pre-op holding. She is here for Hysteroscopy, Dilation and Curettage, Mirena IUD insertion.    Patient is a 45 y/o with complaints of abnormal uterine bleeding. She reports heavy and painful periods. She also reports bleeding after intercourse following her cold knife cone which was completed 8/15/22. Pelvis US on 24 showed uterus measuring 10.8 x 8.1 x 7.8 cm  with a posterior fibroid. Recommendation is to proceed with surgical investigation and management. Patient counseled on options for management of heavy bleeding. She has a significant cardiac and medical history making her at increased risk of morbidity and mortality after major surgery. Her most recent echo showed improvement with EF 65% and only mild aortic regurgitation.  Patient desires placement of Mirena IUD at the time of surgery for management of bleeding.     The procedure risks and complications were reviewed. The labs, Consent, and H&P were reviewed and updated. The patient was counseled on the possibility of  the need of a second surgery. The patient voiced understanding and had all of her questions answered. The possibility of incomplete removal of abnormal tissue was discussed.    OBSTETRICAL HISTORY:   OB History    Para Term  AB Living   7 7 7 0 0 7   SAB IAB Ectopic Molar Multiple Live Births   0 0 0 0 0 0      # Outcome Date GA Lbr Lasha/2nd Weight Sex Delivery Anes PTL Lv   7 Term      Vag-Spont      6 Term      Vag-Spont      5 Term      Vag-Spont      4 Term      Vag-Spont      3 Term      Vag-Spont      2 Term      Vag-Spont      1 Term      Vag-Spont          PAST MEDICAL HISTORY:   has a past medical history of

## 2024-05-07 ENCOUNTER — OFFICE VISIT (OUTPATIENT)
Dept: NEUROLOGY | Age: 44
End: 2024-05-07

## 2024-05-07 VITALS
SYSTOLIC BLOOD PRESSURE: 121 MMHG | HEART RATE: 78 BPM | DIASTOLIC BLOOD PRESSURE: 82 MMHG | BODY MASS INDEX: 40.67 KG/M2 | HEIGHT: 62 IN | WEIGHT: 221 LBS

## 2024-05-07 DIAGNOSIS — G43.E19 INTRACTABLE CHRONIC MIGRAINE WITH AURA AND WITHOUT STATUS MIGRAINOSUS: ICD-10-CM

## 2024-05-07 DIAGNOSIS — G93.2 IIH (IDIOPATHIC INTRACRANIAL HYPERTENSION): ICD-10-CM

## 2024-05-07 DIAGNOSIS — R41.3 MEMORY LOSS: Primary | ICD-10-CM

## 2024-05-07 LAB — SURGICAL PATHOLOGY REPORT: NORMAL

## 2024-05-07 RX ORDER — ACETAZOLAMIDE 250 MG/1
500 TABLET ORAL 2 TIMES DAILY
Qty: 90 TABLET | Refills: 4 | Status: SHIPPED | OUTPATIENT
Start: 2024-05-07

## 2024-05-07 RX ORDER — ACETAZOLAMIDE 250 MG/1
500 TABLET ORAL 2 TIMES DAILY
Qty: 90 TABLET | Refills: 4 | Status: SHIPPED | OUTPATIENT
Start: 2024-05-07 | End: 2024-05-07

## 2024-05-07 ASSESSMENT — ENCOUNTER SYMPTOMS
NAUSEA: 0
DIARRHEA: 0
SHORTNESS OF BREATH: 0
CHEST TIGHTNESS: 0
COUGH: 1
PHOTOPHOBIA: 1
ABDOMINAL PAIN: 0
VOMITING: 0

## 2024-05-07 NOTE — PROGRESS NOTES
pin, normal vibration, normal proprioception   CEREBELLAR FUNCTION:  Intact fine motor control over upper limbs and lower limbs   REFLEX FUNCTION:  Symmetric in upper and lower extremities, no Babinski sign   STATION and GAIT  Normal gait and tandem station, normal tip toes and heel walking     IMAGING COMPLETED:     MRI MRV of head. MRI orbits. MRI C T SPINE.     ASSESSMENT:     Patient is a 44 y.o. female with PMH as above, who presents to [facility] care after she was seen in January intially with headaches and visual changes and a diagnosis of IIH was established. Patient then came with worsening headaches that improved after she was started on Elavil in addition to Diamox 500 mg BID    Headaches secondary to IIH;        PLAN:     Seems that patient has not been on diamox for at least couple of months.   We are going to re prescribe diamox that has been helping with headaches before she ran out.  Follow up with ophthalmology and release records for neurology to obtain access and monitor.  Continue Elavil 25 mg nightly  Monitor headaches and keep diary.  Follow up with PCP. Monitor kidney function  Follow up with PCP for referral to orthopedic surgery due to knee pain limiting ambulation.     Ms. Maddox received counseling on the following healthy behaviors: medical compliance, smoking cessation, blood pressure control, regular follow up with primary doctor.        Electronically signed by PITA Marrero MD on 5/7/2024 at 3:18 PM

## 2024-05-20 ENCOUNTER — OFFICE VISIT (OUTPATIENT)
Dept: OBGYN | Age: 44
End: 2024-05-20
Payer: COMMERCIAL

## 2024-05-20 ENCOUNTER — HOSPITAL ENCOUNTER (OUTPATIENT)
Age: 44
Setting detail: SPECIMEN
Discharge: HOME | End: 2024-05-20

## 2024-05-20 VITALS
BODY MASS INDEX: 40.24 KG/M2 | SYSTOLIC BLOOD PRESSURE: 139 MMHG | WEIGHT: 220 LBS | HEART RATE: 86 BPM | DIASTOLIC BLOOD PRESSURE: 89 MMHG

## 2024-05-20 DIAGNOSIS — N93.9 ABNORMAL UTERINE BLEEDING (AUB): ICD-10-CM

## 2024-05-20 DIAGNOSIS — Z09 POSTOPERATIVE EXAMINATION: Primary | ICD-10-CM

## 2024-05-20 DIAGNOSIS — Z09 POSTOPERATIVE EXAMINATION: ICD-10-CM

## 2024-05-20 DIAGNOSIS — Z98.890 HISTORY OF HYSTEROSCOPY: ICD-10-CM

## 2024-05-20 PROBLEM — I50.32 CHRONIC HEART FAILURE WITH PRESERVED EJECTION FRACTION (HCC): Status: ACTIVE | Noted: 2024-05-20

## 2024-05-20 PROCEDURE — 99024 POSTOP FOLLOW-UP VISIT: CPT

## 2024-05-20 PROCEDURE — 99214 OFFICE O/P EST MOD 30 MIN: CPT

## 2024-05-20 RX ORDER — ATORVASTATIN CALCIUM 40 MG/1
TABLET, FILM COATED ORAL
COMMUNITY
Start: 2024-05-16

## 2024-05-20 RX ORDER — QUETIAPINE FUMARATE 200 MG/1
200 TABLET, FILM COATED ORAL EVERY EVENING
COMMUNITY
Start: 2024-03-19 | End: 2024-05-20 | Stop reason: SDUPTHER

## 2024-05-21 LAB
MICROORGANISM SPEC CULT: NORMAL
SPECIMEN DESCRIPTION: NORMAL

## 2024-05-21 NOTE — PROGRESS NOTES
Attending Physician Statement  I have discussed the care of Chantelle Maddox, including pertinent history and exam findings,  with the resident. I have reviewed the key elements of all parts of the encounter with the resident.  I agree with the assessment, plan and orders as documented by the resident.  (GE Modifier)    Ludmila Bolivar,    
error  
uninvolved  6/22/23: Pap NILM, HPV neg   7/27/23: Colpo - EVER-1         Return in about 1 year (around 5/20/2025) for Annual .    Counseling Completed:  Decrease post operative restrictions  Resume Sherman as tolerated  Strict return precautions with fever, chills, nausea, vomiting, diarrhea, worsening abdominal pain, worsening vaginal bleeding, foul smelling vaginal discharge    Cholo Acevedo MD  Ob/Gyn Resident  [facility], [facility]  5/20/2024, 1:55 PM

## 2024-06-06 DIAGNOSIS — N93.9 ABNORMAL UTERINE BLEEDING (AUB): Primary | ICD-10-CM

## 2024-06-06 PROBLEM — T83.32XA IUD STRINGS LOST: Status: ACTIVE | Noted: 2024-06-06

## 2024-06-20 ENCOUNTER — TELEPHONE (OUTPATIENT)
Dept: NEUROLOGY | Age: 44
End: 2024-06-20

## 2024-06-20 RX ORDER — AMITRIPTYLINE HYDROCHLORIDE 25 MG/1
25 TABLET, FILM COATED ORAL NIGHTLY
Qty: 30 TABLET | Refills: 3 | Status: SHIPPED | OUTPATIENT
Start: 2024-06-20

## 2024-06-21 NOTE — TELEPHONE ENCOUNTER
Called pt and left a detailed message saying script has been sent to the pharmacy. I try to call pt other number but it did not go through.

## 2024-07-05 ENCOUNTER — HOSPITAL ENCOUNTER (OUTPATIENT)
Dept: MAMMOGRAPHY | Age: 44
End: 2024-07-05
Payer: COMMERCIAL

## 2024-07-05 DIAGNOSIS — Z12.31 ENCOUNTER FOR SCREENING MAMMOGRAM FOR BREAST CANCER: ICD-10-CM

## 2024-07-05 PROCEDURE — 77063 BREAST TOMOSYNTHESIS BI: CPT

## 2024-07-08 ENCOUNTER — OFFICE VISIT (OUTPATIENT)
Dept: OBGYN | Age: 44
End: 2024-07-08
Payer: COMMERCIAL

## 2024-07-08 VITALS
HEART RATE: 79 BPM | WEIGHT: 227 LBS | BODY MASS INDEX: 41.77 KG/M2 | HEIGHT: 62 IN | SYSTOLIC BLOOD PRESSURE: 107 MMHG | DIASTOLIC BLOOD PRESSURE: 74 MMHG

## 2024-07-08 DIAGNOSIS — B96.89 BACTERIAL VAGINOSIS: ICD-10-CM

## 2024-07-08 DIAGNOSIS — N76.0 BACTERIAL VAGINOSIS: ICD-10-CM

## 2024-07-08 DIAGNOSIS — Z98.890 HISTORY OF HYSTEROSCOPY: ICD-10-CM

## 2024-07-08 DIAGNOSIS — T83.32XS INTRAUTERINE CONTRACEPTIVE DEVICE THREADS LOST, SEQUELA: ICD-10-CM

## 2024-07-08 DIAGNOSIS — N93.9 ABNORMAL UTERINE BLEEDING (AUB): Primary | ICD-10-CM

## 2024-07-08 PROBLEM — G93.2 IIH (IDIOPATHIC INTRACRANIAL HYPERTENSION): Status: RESOLVED | Noted: 2024-01-21 | Resolved: 2024-07-08

## 2024-07-08 PROBLEM — R60.0 LEG EDEMA: Status: RESOLVED | Noted: 2021-08-23 | Resolved: 2024-07-08

## 2024-07-08 PROBLEM — N93.0 PCB (POST COITAL BLEEDING): Status: RESOLVED | Noted: 2024-05-06 | Resolved: 2024-07-08

## 2024-07-08 PROBLEM — R06.83 SNORING: Status: RESOLVED | Noted: 2019-03-21 | Resolved: 2024-07-08

## 2024-07-08 PROBLEM — R00.1 BRADYCARDIA: Status: RESOLVED | Noted: 2018-03-19 | Resolved: 2024-07-08

## 2024-07-08 PROBLEM — R06.02 SOB (SHORTNESS OF BREATH): Status: RESOLVED | Noted: 2019-09-16 | Resolved: 2024-07-08

## 2024-07-08 PROBLEM — R09.82 POSTNASAL DRIP: Status: RESOLVED | Noted: 2018-12-05 | Resolved: 2024-07-08

## 2024-07-08 PROBLEM — R09.81 NASAL CONGESTION: Status: RESOLVED | Noted: 2018-12-05 | Resolved: 2024-07-08

## 2024-07-08 PROBLEM — R55 SYNCOPE: Status: RESOLVED | Noted: 2018-03-19 | Resolved: 2024-07-08

## 2024-07-08 PROBLEM — M22.2X2 PATELLOFEMORAL PAIN SYNDROME OF LEFT KNEE: Status: RESOLVED | Noted: 2017-08-15 | Resolved: 2024-07-08

## 2024-07-08 PROBLEM — G44.89 OTHER HEADACHE SYNDROME: Status: RESOLVED | Noted: 2024-01-21 | Resolved: 2024-07-08

## 2024-07-08 PROCEDURE — 3078F DIAST BP <80 MM HG: CPT

## 2024-07-08 PROCEDURE — G8427 DOCREV CUR MEDS BY ELIG CLIN: HCPCS

## 2024-07-08 PROCEDURE — G8417 CALC BMI ABV UP PARAM F/U: HCPCS

## 2024-07-08 PROCEDURE — 3074F SYST BP LT 130 MM HG: CPT

## 2024-07-08 PROCEDURE — 99213 OFFICE O/P EST LOW 20 MIN: CPT

## 2024-07-08 PROCEDURE — 4004F PT TOBACCO SCREEN RCVD TLK: CPT

## 2024-07-08 RX ORDER — METRONIDAZOLE 500 MG/1
500 TABLET ORAL 2 TIMES DAILY
Qty: 14 TABLET | Refills: 0 | Status: SHIPPED | OUTPATIENT
Start: 2024-07-08 | End: 2024-07-15

## 2024-07-08 RX ORDER — PALIPERIDONE PALMITATE 234 MG/1.5ML
INJECTION INTRAMUSCULAR
COMMUNITY
Start: 2024-07-02

## 2024-07-08 NOTE — PROGRESS NOTES
OB/GYN Problem Visit    Chantelle Maddox  2024                       Primary Care Physician: Letty Orozco, APRN - CNP    CC:   Chief Complaint   Patient presents with    Vaginal Bleeding     1 month follow up.  Patient states her bleeding has been much better since she has been on the medication and would like a refill.         HPI: Chantelle Maddox is a 44 y.o. female     The patient was seen and examined. She is here for follow up her vaginal bleeding. The patient has a long standing history of abnormal uterine bleeding that she noted after her CKC on 8/15/22. The patient had a pelvic ultrasound on 24 which revealed uterus 10.8 x 8.1 x 7.8 with posterior fibroid. Patient then elected for Hysteroscopy, Dilation and Curettage with Mirena IUD placement on 24. Pathology revealed benign squamous epithelium and endocervical tissue. Mirena IUD string check was performed 24, with IUD strings not visualized. Pelvic Ultrasound completed 24 which confirmed IUD placement within uterus and endometrium. Patient reported increased vaginal bleeding and she was initiated on Progesterone 10 mg daily x 30 days. She is now presenting for follow up for her bleeding, while being on the Progesterone and IUD.     Patient reports that bleeding has stopped since being on the progesterone. The patient states she also is having some BV discharge and smell at this time.     Her bowel habits are regular. She denies any bloating.  She denies dysuria. She denies urinary leaking.  She denies vaginal discharge.  She is not sexually active.    REVIEW OF SYSTEMS:   Constitutional: negative fever, negative chills, negative weight changes   HEENT: negative visual disturbances, negative headaches, negative dizziness, negative hearing loss  Breast: Negative breast abnormalities, negative breast lumps, negative nipple discharge  Respiratory: negative dyspnea, negative cough, negative SOB  Cardiovascular: negative chest

## 2024-07-08 NOTE — PROGRESS NOTES
Attending Physician Statement  I have discussed the care of Chantelle Maddox, including pertinent history and exam findings, with the resident. I have reviewed the key elements of all parts of the encounter with the resident.  I agree with the assessment, plan and orders as documented by the resident.  (GE Modifier)    Teresa Sharp [facility]  7/8/2024, 2:40 PM

## 2024-07-18 ENCOUNTER — TELEPHONE (OUTPATIENT)
Dept: OBGYN | Age: 44
End: 2024-07-18

## 2024-07-18 DIAGNOSIS — N93.9 ABNORMAL UTERINE BLEEDING (AUB): ICD-10-CM

## 2024-07-18 NOTE — TELEPHONE ENCOUNTER
Patient called the office stating she had an IUD place 5/6/24.  She was having some continuous bleeding with it and was sent Aygestin 5mg on 6/6/24.  She states when she took that medication, it helped stop the bleeding but when she was finished, she started  bleeding again.  She is wonder if more can be sent to [facility] on [address]

## 2024-07-25 ENCOUNTER — APPOINTMENT (OUTPATIENT)
Dept: GENERAL RADIOLOGY | Age: 44
End: 2024-07-25
Payer: COMMERCIAL

## 2024-07-25 ENCOUNTER — HOSPITAL ENCOUNTER (EMERGENCY)
Age: 44
Discharge: HOME | End: 2024-07-25
Attending: EMERGENCY MEDICINE
Payer: COMMERCIAL

## 2024-07-25 VITALS
HEART RATE: 107 BPM | HEIGHT: 63 IN | OXYGEN SATURATION: 100 % | SYSTOLIC BLOOD PRESSURE: 121 MMHG | RESPIRATION RATE: 16 BRPM | DIASTOLIC BLOOD PRESSURE: 77 MMHG | BODY MASS INDEX: 42.52 KG/M2 | WEIGHT: 240 LBS | TEMPERATURE: 97.6 F

## 2024-07-25 DIAGNOSIS — M25.562 ACUTE PAIN OF LEFT KNEE: Primary | ICD-10-CM
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
## 2024-07-25 PROCEDURE — 6370000000 HC RX 637 (ALT 250 FOR IP)

## 2024-07-25 PROCEDURE — 73564 X-RAY EXAM KNEE 4 OR MORE: CPT

## 2024-07-25 PROCEDURE — 99283 EMERGENCY DEPT VISIT LOW MDM: CPT

## 2024-07-25 RX ORDER — ACETAMINOPHEN 500 MG
1000 TABLET ORAL ONCE
Status: COMPLETED | OUTPATIENT
Start: 2024-07-25 | End: 2024-07-25

## 2024-07-25 RX ADMIN — ACETAMINOPHEN 1000 MG: 500 TABLET ORAL at 23:10

## 2024-07-25 ASSESSMENT — ENCOUNTER SYMPTOMS
ABDOMINAL PAIN: 0
DIARRHEA: 0
SHORTNESS OF BREATH: 0
VOMITING: 0
NAUSEA: 0

## 2024-07-25 ASSESSMENT — LIFESTYLE VARIABLES
HOW OFTEN DO YOU HAVE A DRINK CONTAINING ALCOHOL: NEVER
HOW MANY STANDARD DRINKS CONTAINING ALCOHOL DO YOU HAVE ON A TYPICAL DAY: PATIENT DOES NOT DRINK
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
## 2024-07-31 RX ORDER — ACETAZOLAMIDE 250 MG/1
500 TABLET ORAL 2 TIMES DAILY
Qty: 120 TABLET | Refills: 4 | Status: SHIPPED | OUTPATIENT
Start: 2024-07-31

## 2024-07-31 NOTE — TELEPHONE ENCOUNTER
E-scribe requesting refill for Diamox. Please review and e-scribe if applicable.     Last Visit Date: 05/07/2024    Next Visit Date: 08/27/2024

## 2024-08-12 ENCOUNTER — OFFICE VISIT (OUTPATIENT)
Dept: OBGYN | Age: 44
End: 2024-08-12
Payer: COMMERCIAL

## 2024-08-12 VITALS
DIASTOLIC BLOOD PRESSURE: 82 MMHG | WEIGHT: 234 LBS | BODY MASS INDEX: 41.45 KG/M2 | HEART RATE: 90 BPM | SYSTOLIC BLOOD PRESSURE: 132 MMHG

## 2024-08-12 DIAGNOSIS — Z98.890: Chronic | ICD-10-CM

## 2024-08-12 DIAGNOSIS — I10 HYPERTENSION, UNSPECIFIED TYPE: ICD-10-CM

## 2024-08-12 DIAGNOSIS — F17.200 SMOKER: ICD-10-CM

## 2024-08-12 DIAGNOSIS — N93.9 ABNORMAL UTERINE BLEEDING (AUB): Primary | ICD-10-CM

## 2024-08-12 DIAGNOSIS — Z98.890 HISTORY OF HYSTEROSCOPY: ICD-10-CM

## 2024-08-12 PROCEDURE — 3075F SYST BP GE 130 - 139MM HG: CPT | Performed by: STUDENT IN AN ORGANIZED HEALTH CARE EDUCATION/TRAINING PROGRAM

## 2024-08-12 PROCEDURE — 99213 OFFICE O/P EST LOW 20 MIN: CPT | Performed by: STUDENT IN AN ORGANIZED HEALTH CARE EDUCATION/TRAINING PROGRAM

## 2024-08-12 PROCEDURE — 3079F DIAST BP 80-89 MM HG: CPT | Performed by: STUDENT IN AN ORGANIZED HEALTH CARE EDUCATION/TRAINING PROGRAM

## 2024-08-12 RX ORDER — METFORMIN HYDROCHLORIDE 500 MG/1
1 TABLET, EXTENDED RELEASE ORAL DAILY
Qty: 30 TABLET | Refills: 11 | Status: SHIPPED | OUTPATIENT
Start: 2024-08-12

## 2024-08-12 ASSESSMENT — PATIENT HEALTH QUESTIONNAIRE - PHQ9
SUM OF ALL RESPONSES TO PHQ9 QUESTIONS 1 & 2: 1
SUM OF ALL RESPONSES TO PHQ QUESTIONS 1-9: 1
2. FEELING DOWN, DEPRESSED OR HOPELESS: NOT AT ALL
SUM OF ALL RESPONSES TO PHQ QUESTIONS 1-9: 1
1. LITTLE INTEREST OR PLEASURE IN DOING THINGS: SEVERAL DAYS
SUM OF ALL RESPONSES TO PHQ QUESTIONS 1-9: 1
SUM OF ALL RESPONSES TO PHQ QUESTIONS 1-9: 1

## 2024-08-12 NOTE — PROGRESS NOTES
negative cough  Cardiovascular: negative chest pain,  negative palpitations  Gastrointestinal: negative abdominal pain, negative RUQ pain, negative N/V, negative diarrhea, negative constipation  Genitourinary: negative dysuria, + vaginal bleeding  Dermatological: negative rash  Hematologic: negative bruising  Immunologic/Lymphatic: negative recent illness, negative recent sick contact  Musculoskeletal: negative back pain, negative myalgias, negative arthralgias  Neurological:  negative dizziness, negative weakness  Behavior/Psych: negative depression, negative anxiety      OBSTETRICAL HISTORY:  OB History    Para Term  AB Living   7 7 7 0 0 7   SAB IAB Ectopic Molar Multiple Live Births   0 0 0   0        # Outcome Date GA Lbr Lasha/2nd Weight Sex Type Anes PTL Lv   7 Term      Vag-Spont      6 Term      Vag-Spont      5 Term      Vag-Spont      4 Term      Vag-Spont      3 Term      Vag-Spont      2 Term      Vag-Spont      1 Term      Vag-Spont          PAST MEDICAL HISTORY:      Diagnosis Date    Abnormal Pap smear of cervix 2016    Abnormal stress test 2018    Achilles tendonitis     Right foot    Anxiety     Aortic regurgitation     mild-mod on echo    Aortic valve insufficiency     Asthma     Bradycardia 2018    Cancer ([facility])     cervical cancer    Chest pain 2018    CHF (congestive heart failure) ([facility]) 2022    Coronary artery disease without angina pectoris 2022    Depression     Dizziness 05/15/2018    GERD (gastroesophageal reflux disease)     Headache(784.0)     Heart murmur     Herpes     Hx of blood clots     Hyperlipidemia     Hypertension     [facility]-Dr. Mcginnis    Leaky heart valve     Migraine headache     Obesity     Poor historian     Schizophrenia ([facility])     Seasonal allergies     Sleep apnea     no machine    Snoring 2019    SOB (shortness of breath) 2019    Syncope 2018    Thyroid disease     Hypothyroid    Under care

## 2024-08-20 ENCOUNTER — CLINICAL DOCUMENTATION (OUTPATIENT)
Dept: NEUROLOGY | Age: 44
End: 2024-08-20

## 2024-08-20 NOTE — PROGRESS NOTES
Patient on Diamox with Dx of IIH. Patient seeking clearance to undergo laparoscopic surgery  It does not seem that patient has any contraindications for surgery    Patient is cleared for surgery from a neuro standpoint in terms of IIH. She has no vascular imaging.   It is important to know that diamox acts as a diuretic and therefore care shall be taken regarding fluids intake. Okay to continue Diamox      Electronically signed by PITA Marrero MD on 8/22/2024 at 7:34 AM

## 2024-08-26 ENCOUNTER — TELEPHONE (OUTPATIENT)
Dept: OBGYN | Age: 44
End: 2024-08-26

## 2024-08-27 ENCOUNTER — OFFICE VISIT (OUTPATIENT)
Dept: NEUROLOGY | Age: 44
End: 2024-08-27
Payer: COMMERCIAL

## 2024-08-27 VITALS
SYSTOLIC BLOOD PRESSURE: 107 MMHG | WEIGHT: 234.2 LBS | DIASTOLIC BLOOD PRESSURE: 75 MMHG | HEART RATE: 85 BPM | HEIGHT: 63 IN | BODY MASS INDEX: 41.5 KG/M2

## 2024-08-27 DIAGNOSIS — G93.2 IIH (IDIOPATHIC INTRACRANIAL HYPERTENSION): ICD-10-CM

## 2024-08-27 DIAGNOSIS — G43.109 MIGRAINE WITH AURA AND WITHOUT STATUS MIGRAINOSUS, NOT INTRACTABLE: Primary | ICD-10-CM

## 2024-08-27 PROCEDURE — 1036F TOBACCO NON-USER: CPT | Performed by: STUDENT IN AN ORGANIZED HEALTH CARE EDUCATION/TRAINING PROGRAM

## 2024-08-27 PROCEDURE — 3078F DIAST BP <80 MM HG: CPT | Performed by: STUDENT IN AN ORGANIZED HEALTH CARE EDUCATION/TRAINING PROGRAM

## 2024-08-27 PROCEDURE — 3074F SYST BP LT 130 MM HG: CPT | Performed by: STUDENT IN AN ORGANIZED HEALTH CARE EDUCATION/TRAINING PROGRAM

## 2024-08-27 PROCEDURE — 99214 OFFICE O/P EST MOD 30 MIN: CPT | Performed by: STUDENT IN AN ORGANIZED HEALTH CARE EDUCATION/TRAINING PROGRAM

## 2024-08-27 PROCEDURE — G8427 DOCREV CUR MEDS BY ELIG CLIN: HCPCS | Performed by: STUDENT IN AN ORGANIZED HEALTH CARE EDUCATION/TRAINING PROGRAM

## 2024-08-27 PROCEDURE — G8417 CALC BMI ABV UP PARAM F/U: HCPCS | Performed by: STUDENT IN AN ORGANIZED HEALTH CARE EDUCATION/TRAINING PROGRAM

## 2024-08-27 ASSESSMENT — ENCOUNTER SYMPTOMS
ABDOMINAL PAIN: 0
PHOTOPHOBIA: 0
NAUSEA: 0
DIARRHEA: 0
SHORTNESS OF BREATH: 0
COUGH: 1
VOMITING: 0
CHEST TIGHTNESS: 0

## 2024-08-27 NOTE — PROGRESS NOTES
facial sensation   VII    -     Normal facial symmetry  VIII   -     Intact hearing   IX,X -     Symmetrical palate  XI    -     Symmetrical shoulder shrug  XII   -     Midline tongue, no atrophy    MOTOR FUNCTION: RUE: Significant for good strength of grade 5/5 in proximal and distal muscle groups   LUE: Significant for good strength of grade 5/5 in proximal and distal muscle groups   RLE: Significant for good strength of grade 5/5 in proximal and distal muscle groups   LLE: Significant for good strength of grade 5/5 in proximal and distal muscle groups      Normal bulk, normal tone and no involuntary movements, no tremor   SENSORY FUNCTION:  Normal touch, normal pin, normal vibration, normal proprioception   CEREBELLAR FUNCTION:  Intact fine motor control over upper limbs and lower limbs   REFLEX FUNCTION:  Symmetric in upper and lower extremities, no Babinski sign   STATION and GAIT  Normal gait and tandem station, normal tip toes and heel walking     IMAGING COMPLETED:     MRI MRV of head. MRI orbits. MRI C T SPINE.     ASSESSMENT:     Patient is a 44 y.o. female with PMH as above, who presents to [facility] care after she was seen in January intially with headaches and visual changes and a diagnosis of IIH was established. Patient then came with worsening headaches that improved after she was started on Elavil in addition to Diamox 500 mg BID    Headaches secondary to IIH; significantly improved with Diamox        PLAN:     Continue on current dose of Diamox 500 mg twice daily  Continue Elavil 25 mg nightly  Patient already cleared for laparoscopic surgery while on Diamox.  Continue to follow up with PCP. Monitor kidney function    Ms. Maddox received counseling on the following healthy behaviors: medical compliance, smoking cessation, blood pressure control, regular follow up with primary doctor.        Electronically signed by PITA Marrero MD on 8/27/2024 at 10:07 AM

## 2024-09-11 ENCOUNTER — SCHEDULED TELEPHONE ENCOUNTER (OUTPATIENT)
Dept: OBGYN | Age: 44
End: 2024-09-11
Payer: COMMERCIAL

## 2024-09-11 DIAGNOSIS — N93.9 ABNORMAL UTERINE BLEEDING: ICD-10-CM

## 2024-09-11 DIAGNOSIS — Z01.818 ENCOUNTER FOR PRE-OPERATIVE EXAMINATION: Primary | ICD-10-CM

## 2024-09-11 PROCEDURE — 99422 OL DIG E/M SVC 11-20 MIN: CPT

## 2024-09-11 RX ORDER — NORETHINDRONE ACETATE 5 MG
10 TABLET ORAL DAILY
Qty: 60 TABLET | Refills: 3 | Status: SHIPPED | OUTPATIENT
Start: 2024-09-11

## 2024-09-17 ENCOUNTER — OFFICE VISIT (OUTPATIENT)
Dept: ORTHOPEDIC SURGERY | Age: 44
End: 2024-09-17

## 2024-09-17 VITALS — WEIGHT: 238 LBS | BODY MASS INDEX: 42.17 KG/M2 | HEIGHT: 63 IN

## 2024-09-17 DIAGNOSIS — M23.92 INTERNAL DERANGEMENT OF LEFT KNEE: ICD-10-CM

## 2024-09-17 DIAGNOSIS — R52 PAIN: Primary | ICD-10-CM

## 2024-10-17 ENCOUNTER — HOSPITAL ENCOUNTER (OUTPATIENT)
Age: 44
Discharge: HOME | End: 2024-10-17
Attending: ORTHOPAEDIC SURGERY
Payer: COMMERCIAL

## 2024-10-17 ENCOUNTER — HOSPITAL ENCOUNTER (OUTPATIENT)
Age: 44
End: 2024-10-17
Attending: ORTHOPAEDIC SURGERY
Payer: COMMERCIAL

## 2024-10-17 ENCOUNTER — HOSPITAL ENCOUNTER (OUTPATIENT)
Age: 44
Discharge: HOME | End: 2024-10-19
Attending: ORTHOPAEDIC SURGERY
Payer: COMMERCIAL

## 2024-10-17 ENCOUNTER — HOSPITAL ENCOUNTER (OUTPATIENT)
Dept: GENERAL RADIOLOGY | Age: 44
Discharge: HOME | End: 2024-10-19
Attending: ORTHOPAEDIC SURGERY
Payer: COMMERCIAL

## 2024-10-17 ENCOUNTER — HOSPITAL ENCOUNTER (OUTPATIENT)
Dept: MRI IMAGING | Age: 44
Discharge: HOME | End: 2024-10-19
Attending: ORTHOPAEDIC SURGERY
Payer: COMMERCIAL

## 2024-10-17 DIAGNOSIS — Z01.818 PREOP EXAMINATION: ICD-10-CM

## 2024-10-17 DIAGNOSIS — R52 PAIN: ICD-10-CM

## 2024-10-17 LAB
25(OH)D3 SERPL-MCNC: 52.5 NG/ML (ref 30–100)
ALBUMIN SERPL-MCNC: 4.8 G/DL (ref 3.5–5.2)
ALBUMIN/GLOB SERPL: 1 {RATIO} (ref 1–2.5)
ALP SERPL-CCNC: 99 U/L (ref 35–104)
ALT SERPL-CCNC: 35 U/L (ref 10–35)
ANION GAP SERPL CALCULATED.3IONS-SCNC: 14 MMOL/L (ref 9–16)
AST SERPL-CCNC: 21 U/L (ref 10–35)
BASOPHILS # BLD: 0.04 K/UL (ref 0–0.2)
BASOPHILS NFR BLD: 0 % (ref 0–2)
BILIRUB SERPL-MCNC: 0.3 MG/DL (ref 0–1.2)
BILIRUB UR QL STRIP: NEGATIVE
BUN SERPL-MCNC: 13 MG/DL (ref 6–20)
CALCIUM SERPL-MCNC: 9.9 MG/DL (ref 8.6–10.4)
CHLORIDE SERPL-SCNC: 105 MMOL/L (ref 98–107)
CHOLEST SERPL-MCNC: 123 MG/DL (ref 0–199)
CHOLESTEROL/HDL RATIO: 4
CLARITY UR: ABNORMAL
CO2 SERPL-SCNC: 20 MMOL/L (ref 20–31)
COLOR UR: YELLOW
CREAT SERPL-MCNC: 1.2 MG/DL (ref 0.5–0.9)
EKG ATRIAL RATE: 77 BPM
EKG P AXIS: 72 DEGREES
EKG P-R INTERVAL: 150 MS
EKG Q-T INTERVAL: 388 MS
EKG QRS DURATION: 116 MS
EKG QTC CALCULATION (BAZETT): 439 MS
EKG R AXIS: -45 DEGREES
EKG T AXIS: 43 DEGREES
EKG VENTRICULAR RATE: 77 BPM
EOSINOPHIL # BLD: 0.08 K/UL (ref 0–0.44)
EOSINOPHILS RELATIVE PERCENT: 1 % (ref 1–4)
EPI CELLS #/AREA URNS HPF: ABNORMAL /HPF (ref 0–5)
ERYTHROCYTE [DISTWIDTH] IN BLOOD BY AUTOMATED COUNT: 13.7 % (ref 11.8–14.4)
EST. AVERAGE GLUCOSE BLD GHB EST-MCNC: 140 MG/DL
GFR, ESTIMATED: 56 ML/MIN/1.73M2
GLUCOSE SERPL-MCNC: 96 MG/DL (ref 74–99)
GLUCOSE UR STRIP-MCNC: NEGATIVE MG/DL
HAV IGM SERPL QL IA: NONREACTIVE
HBA1C MFR BLD: 6.5 % (ref 4–6)
HBV CORE IGM SERPL QL IA: NONREACTIVE
HBV SURFACE AG SERPL QL IA: NONREACTIVE
HCT VFR BLD AUTO: 47 % (ref 36.3–47.1)
HCV AB SERPL QL IA: NONREACTIVE
HDLC SERPL-MCNC: 30 MG/DL
HGB BLD-MCNC: 14.8 G/DL (ref 11.9–15.1)
HGB UR QL STRIP.AUTO: ABNORMAL
HIV 1+2 AB+HIV1 P24 AG SERPL QL IA: NONREACTIVE
IMM GRANULOCYTES # BLD AUTO: 0.04 K/UL (ref 0–0.3)
IMM GRANULOCYTES NFR BLD: 0 %
KETONES UR STRIP-MCNC: NEGATIVE MG/DL
LDLC SERPL CALC-MCNC: 69 MG/DL (ref 0–100)
LEUKOCYTE ESTERASE UR QL STRIP: ABNORMAL
LYMPHOCYTES NFR BLD: 2.68 K/UL (ref 1.1–3.7)
LYMPHOCYTES RELATIVE PERCENT: 29 % (ref 24–43)
MAGNESIUM SERPL-MCNC: 2 MG/DL (ref 1.6–2.6)
MCH RBC QN AUTO: 30.1 PG (ref 25.2–33.5)
MCHC RBC AUTO-ENTMCNC: 31.5 G/DL (ref 28.4–34.8)
MCV RBC AUTO: 95.7 FL (ref 82.6–102.9)
MONOCYTES NFR BLD: 0.73 K/UL (ref 0.1–1.2)
MONOCYTES NFR BLD: 8 % (ref 3–12)
NEUTROPHILS NFR BLD: 62 % (ref 36–65)
NEUTS SEG NFR BLD: 5.54 K/UL (ref 1.5–8.1)
NITRITE UR QL STRIP: NEGATIVE
NRBC BLD-RTO: 0 PER 100 WBC
PH UR STRIP: 6.5 [PH] (ref 5–8)
PLATELET # BLD AUTO: 320 K/UL (ref 138–453)
PMV BLD AUTO: 9.8 FL (ref 8.1–13.5)
POTASSIUM SERPL-SCNC: 3.9 MMOL/L (ref 3.7–5.3)
PROT SERPL-MCNC: 8.2 G/DL (ref 6.6–8.7)
PROT UR STRIP-MCNC: NEGATIVE MG/DL
RBC # BLD AUTO: 4.91 M/UL (ref 3.95–5.11)
RBC #/AREA URNS HPF: ABNORMAL /HPF (ref 0–2)
SODIUM SERPL-SCNC: 139 MMOL/L (ref 136–145)
SP GR UR STRIP: 1.01 (ref 1–1.03)
T PALLIDUM AB SER QL IA: NONREACTIVE
T4 FREE SERPL-MCNC: 1.1 NG/DL (ref 0.92–1.68)
TRICHOMONAS #/AREA URNS HPF: ABNORMAL /[HPF]
TRIGL SERPL-MCNC: 125 MG/DL
TSH SERPL DL<=0.05 MIU/L-ACNC: 2.02 UIU/ML (ref 0.27–4.2)
UROBILINOGEN UR STRIP-ACNC: NORMAL EU/DL (ref 0–1)
VLDLC SERPL CALC-MCNC: 25 MG/DL
WBC #/AREA URNS HPF: ABNORMAL /HPF (ref 0–5)
WBC OTHER # BLD: 9.1 K/UL (ref 3.5–11.3)

## 2024-10-17 PROCEDURE — 86780 TREPONEMA PALLIDUM: CPT

## 2024-10-17 PROCEDURE — 36415 COLL VENOUS BLD VENIPUNCTURE: CPT

## 2024-10-17 PROCEDURE — 84439 ASSAY OF FREE THYROXINE: CPT

## 2024-10-17 PROCEDURE — 82306 VITAMIN D 25 HYDROXY: CPT

## 2024-10-17 PROCEDURE — 85025 COMPLETE CBC W/AUTO DIFF WBC: CPT

## 2024-10-17 PROCEDURE — 84443 ASSAY THYROID STIM HORMONE: CPT

## 2024-10-17 PROCEDURE — 83735 ASSAY OF MAGNESIUM: CPT

## 2024-10-17 PROCEDURE — 73721 MRI JNT OF LWR EXTRE W/O DYE: CPT

## 2024-10-17 PROCEDURE — 71046 X-RAY EXAM CHEST 2 VIEWS: CPT

## 2024-10-17 PROCEDURE — 80074 ACUTE HEPATITIS PANEL: CPT

## 2024-10-17 PROCEDURE — 83036 HEMOGLOBIN GLYCOSYLATED A1C: CPT

## 2024-10-17 PROCEDURE — 80053 COMPREHEN METABOLIC PANEL: CPT

## 2024-10-17 PROCEDURE — 80061 LIPID PANEL: CPT

## 2024-10-17 PROCEDURE — 81001 URINALYSIS AUTO W/SCOPE: CPT

## 2024-10-17 PROCEDURE — 87389 HIV-1 AG W/HIV-1&-2 AB AG IA: CPT

## 2024-10-21 LAB
EKG ATRIAL RATE: 77 BPM
EKG P AXIS: 72 DEGREES
EKG P-R INTERVAL: 150 MS
EKG Q-T INTERVAL: 388 MS
EKG QRS DURATION: 116 MS
EKG QTC CALCULATION (BAZETT): 439 MS
EKG R AXIS: -45 DEGREES
EKG T AXIS: 43 DEGREES
EKG VENTRICULAR RATE: 77 BPM

## 2024-10-29 ENCOUNTER — TELEPHONE (OUTPATIENT)
Dept: ORTHOPEDIC SURGERY | Age: 44
End: 2024-10-29

## 2024-10-29 ENCOUNTER — OFFICE VISIT (OUTPATIENT)
Dept: ORTHOPEDIC SURGERY | Age: 44
End: 2024-10-29
Payer: COMMERCIAL

## 2024-10-29 VITALS — WEIGHT: 238 LBS | HEIGHT: 63 IN | BODY MASS INDEX: 42.17 KG/M2

## 2024-10-29 DIAGNOSIS — S83.207A ACUTE MENISCAL TEAR OF LEFT KNEE, INITIAL ENCOUNTER: Primary | ICD-10-CM

## 2024-10-29 DIAGNOSIS — M17.12 OSTEOARTHRITIS OF LEFT KNEE, UNSPECIFIED OSTEOARTHRITIS TYPE: ICD-10-CM

## 2024-10-29 PROCEDURE — 99213 OFFICE O/P EST LOW 20 MIN: CPT | Performed by: ORTHOPAEDIC SURGERY

## 2024-10-29 RX ORDER — BUPIVACAINE HYDROCHLORIDE 2.5 MG/ML
2 INJECTION, SOLUTION INFILTRATION; PERINEURAL ONCE
Status: COMPLETED | OUTPATIENT
Start: 2024-10-29 | End: 2024-10-29

## 2024-10-29 RX ORDER — METHYLPREDNISOLONE ACETATE 80 MG/ML
80 INJECTION, SUSPENSION INTRA-ARTICULAR; INTRALESIONAL; INTRAMUSCULAR; SOFT TISSUE ONCE
Status: COMPLETED | OUTPATIENT
Start: 2024-10-29 | End: 2024-10-29

## 2024-10-29 RX ORDER — LIDOCAINE HYDROCHLORIDE 10 MG/ML
2 INJECTION, SOLUTION INFILTRATION; PERINEURAL ONCE
Status: COMPLETED | OUTPATIENT
Start: 2024-10-29 | End: 2024-10-29

## 2024-10-29 RX ADMIN — LIDOCAINE HYDROCHLORIDE 2 ML: 10 INJECTION, SOLUTION INFILTRATION; PERINEURAL at 09:06

## 2024-10-29 RX ADMIN — METHYLPREDNISOLONE ACETATE 80 MG: 80 INJECTION, SUSPENSION INTRA-ARTICULAR; INTRALESIONAL; INTRAMUSCULAR; SOFT TISSUE at 09:06

## 2024-10-29 RX ADMIN — BUPIVACAINE HYDROCHLORIDE 2 ML: 2.5 INJECTION, SOLUTION INFILTRATION; PERINEURAL at 09:05

## 2024-10-29 NOTE — TELEPHONE ENCOUNTER
Patient saw Dr. Ding 10/29/24 Tuesday stated was told to schedule for 3 months injections for knee, patient wasnt for sure which injections it was, advise it may be a prior authorization needed before we can schedule for injections

## 2024-10-29 NOTE — TELEPHONE ENCOUNTER
Informed pt it would be a repeat corticosteroid injection, and if she is not experiencing pain injection/fu is not needed.
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
## 2024-11-04 ENCOUNTER — HOSPITAL ENCOUNTER (OUTPATIENT)
Age: 44
Discharge: HOME | End: 2024-11-04
Payer: COMMERCIAL

## 2024-11-04 ENCOUNTER — HOSPITAL ENCOUNTER (OUTPATIENT)
Dept: PHYSICAL THERAPY | Age: 44
Setting detail: THERAPIES SERIES
Discharge: HOME | End: 2024-11-04
Attending: ORTHOPAEDIC SURGERY
Payer: COMMERCIAL

## 2024-11-04 LAB
ANION GAP SERPL CALCULATED.3IONS-SCNC: 14 MMOL/L (ref 9–16)
BUN SERPL-MCNC: 8 MG/DL (ref 6–20)
CALCIUM SERPL-MCNC: 9.1 MG/DL (ref 8.6–10.4)
CHLORIDE SERPL-SCNC: 103 MMOL/L (ref 98–107)
CO2 SERPL-SCNC: 20 MMOL/L (ref 20–31)
CREAT SERPL-MCNC: 1.1 MG/DL (ref 0.6–0.9)
GFR, ESTIMATED: 63 ML/MIN/1.73M2
GLUCOSE SERPL-MCNC: 75 MG/DL (ref 74–99)
POTASSIUM SERPL-SCNC: 3.4 MMOL/L (ref 3.7–5.3)
SODIUM SERPL-SCNC: 137 MMOL/L (ref 136–145)

## 2024-11-04 PROCEDURE — 80048 BASIC METABOLIC PNL TOTAL CA: CPT

## 2024-11-04 PROCEDURE — 97110 THERAPEUTIC EXERCISES: CPT

## 2024-11-04 PROCEDURE — 97161 PT EVAL LOW COMPLEX 20 MIN: CPT

## 2024-11-04 PROCEDURE — 36415 COLL VENOUS BLD VENIPUNCTURE: CPT

## 2024-11-04 NOTE — CONSULTS
[]  Retired   [] Not employed   [x] Disability  [] Other:  []  Return to work:   Work activities/duties        ADL/IADL [x] Previously independent with all [] Currently independent with all Who currently assists the patient with task    [] Previously independent with all except: [x] Currently independent with all except:    Bathing  [] Assist [] Assist    Dress/grooming [] Assist [] Assist    Transfer/mobility [] Assist [] Assist    Feeding [] Assist [] Assist    Toileting [] Assist [] Assist    Driving [] Assist [] Assist Cab   Housekeeping [] Assist [x] Assist Significant difficulty, unable to do everything   Grocery shop/meal prep [] Assist [x] Assist      Gait Prior level of function Current level of function    [x] Independent  [] Assist [x] Independent  [] Assist   Device: [x] Independent [x] Independent    [] Straight Cane [] Quad cane [] Straight Cane [] Quad cane    [] Standard walker [] Rolling walker   [] 4 wheeled walker [] Standard walker [] Rolling walker   [] 4 wheeled walker    [] Wheelchair [] Wheelchair     Pain:  [x] Yes  [] No Location: Left knee  Pain Rating: (0-10 scale) 8/10  Pain altered Tx:  [] Yes  [x] No  Action:    Symptoms:  [] Improving [x] Worsening [] Same  Better:  [] AM    [] PM    [] Sit    [] Rise/Sit    []Stand    [] Walk    [] Lying    [x] Other: Ice  Worse: [] AM    [] PM    [] Sit    [] Rise/Sit    []Stand    [x] Walk    [] Lying    [] Bend                      [] Valsalva    [] Other:  Sleep: [x] OK    [] Disturbed    Objective:    ROM  ° A/P STRENGTH    Left Right Left Right   Hip Flex    4   Ext    4-   ER       IR       ABD    4-   ADD       Knee Flex  70  4   Ext  -6  4   Ankle DF       PF       INV       EVER                  OBSERVATION No Deficit Deficit Not Tested Comments   Posture       Genu Valgus [] [] []    Genu Varus [] [] []    Genu Recurvatum [] [] []    Pronation [] [] []    Supination [] [] []    Palpation [] [x] [] Tenderness to palpation along the medial

## 2024-11-06 ENCOUNTER — HOSPITAL ENCOUNTER (OUTPATIENT)
Dept: PHYSICAL THERAPY | Age: 44
Setting detail: THERAPIES SERIES
Discharge: HOME | End: 2024-11-06
Attending: ORTHOPAEDIC SURGERY
Payer: COMMERCIAL

## 2024-11-06 PROCEDURE — 97110 THERAPEUTIC EXERCISES: CPT

## 2024-11-06 PROCEDURE — 97016 VASOPNEUMATIC DEVICE THERAPY: CPT

## 2024-11-06 NOTE — FLOWSHEET NOTE
[x] [facility]  Outpatient Rehabilitation &  Therapy  [address]  [phone]  [phone] [] [facility]  Outpatient Rehabilitation &  Therapy  [address]  P: [phone]  F: [phone] [] [facility]  Outpatient Rehabilitation &  Therapy  [address]  P: [phone]  F: [phone] [] [facility]  Outpatient Rehabilitation &  Therapy  [address]  [phone]  [phone] [] [facility]  Outpatient Rehabilitation &  Therapy  [address]   P: [phone]  F: [phone]  [] [facility]  Outpatient Rehabilitation &  Therapy  [address]  P: [phone]  F: [phone] [] [facility]  Outpatient Rehabilitation &  Therapy  [address]  P: [phone]  F: [phone] [] [facility]  Outpatient Rehabilitation &  Therapy  [address]  P: [phone]  F: [phone] [] [facility]  Outpatient Rehabilitation &  Therapy  [address]  P: [phone]  F: [phone]  [] [facility] Outpatient Rehabilitation &  Therapy  [address]  P: [phone]  F: [phone]     Physical Therapy Daily Treatment Note    Date:  2024  Patient Name:  Chantelle Maddox    :  1980  MRN: [id number]  Physician: Joseph Ding DO                                 Insurance: AETNA (20 v/year)  Medical Diagnosis: Acute meniscal tear of left knee                         Rehab Codes: M25.562, M25.662, M25.462, M62.81  Onset date: 24               Next 's appt.: 25    Visit# / total visits: 2/12   Cancels/No Shows: 0/0    Subjective:    Pain:  [x] Yes  [] No Location: Left knee Pain Rating: (0-10 scale) 8/10  Pain altered Tx:  [x] No  [] Yes  Action:  Comments: States her knee is the same as at the initial

## 2024-11-07 ENCOUNTER — PREP FOR PROCEDURE (OUTPATIENT)
Dept: OBGYN | Age: 44
End: 2024-11-07

## 2024-11-07 DIAGNOSIS — N87.9 CERVICAL DYSPLASIA: ICD-10-CM

## 2024-11-07 DIAGNOSIS — N93.9 ABNORMAL UTERINE BLEEDING: ICD-10-CM

## 2024-11-08 ENCOUNTER — TELEPHONE (OUTPATIENT)
Dept: OBGYN | Age: 44
End: 2024-11-08

## 2024-11-08 NOTE — TELEPHONE ENCOUNTER
Spoke with patient and gave her the information for surgery, answered questions and information sent out in the mail after verifying correct address.

## 2024-11-08 NOTE — TELEPHONE ENCOUNTER
Called patient and advised her that surgery scheduler is not in office today but I will route her message for Sierra to contact her next week.

## 2024-11-13 ENCOUNTER — HOSPITAL ENCOUNTER (OUTPATIENT)
Dept: PHYSICAL THERAPY | Age: 44
Setting detail: THERAPIES SERIES
Discharge: HOME | End: 2024-11-13
Attending: ORTHOPAEDIC SURGERY
Payer: COMMERCIAL

## 2024-11-13 PROCEDURE — 97110 THERAPEUTIC EXERCISES: CPT

## 2024-11-13 PROCEDURE — 97016 VASOPNEUMATIC DEVICE THERAPY: CPT

## 2024-11-13 NOTE — FLOWSHEET NOTE
[x] [facility]  Outpatient Rehabilitation &  Therapy  [address]  [phone]  [phone] [] [facility]  Outpatient Rehabilitation &  Therapy  [address]  P: [phone]  F: [phone] [] [facility]  Outpatient Rehabilitation &  Therapy  [address]  P: [phone]  F: [phone] [] [facility]  Outpatient Rehabilitation &  Therapy  [address]  [phone]  [phone] [] [facility]  Outpatient Rehabilitation &  Therapy  [address]   P: [phone]  F: [phone]  [] [facility]  Outpatient Rehabilitation &  Therapy  [address]  P: [phone]  F: [phone] [] [facility]  Outpatient Rehabilitation &  Therapy  [address]  P: [phone]  F: [phone] [] [facility]  Outpatient Rehabilitation &  Therapy  [address]  P: [phone]  F: [phone] [] [facility]  Outpatient Rehabilitation &  Therapy  [address]  P: [phone]  F: [phone]  [] [facility] Outpatient Rehabilitation &  Therapy  [address]  P: [phone]  F: [phone]     Physical Therapy Daily Treatment Note    Date:  2024  Patient Name:  Chantelle Maddox    :  1980  MRN: [id number]  Physician: Joseph Ding DO                                 Insurance: AETNA (20 v/year)  Medical Diagnosis: Acute meniscal tear of left knee                         Rehab Codes: M25.562, M25.662, M25.462, M62.81  Onset date: 24               Next 's appt.: 25    Visit# / total visits: 3/12   Cancels/No Shows: 0/0    Subjective:    Pain:  [x] Yes  [] No Location: Left knee Pain Rating: (0-10 scale) 5/10  Pain altered Tx:  [x] No  [] Yes  Action:  Comments:  Left knee pain better than last week.

## 2024-11-15 ENCOUNTER — HOSPITAL ENCOUNTER (OUTPATIENT)
Dept: PHYSICAL THERAPY | Age: 44
Setting detail: THERAPIES SERIES
End: 2024-11-15
Attending: ORTHOPAEDIC SURGERY
Payer: COMMERCIAL

## 2024-11-19 RX ORDER — SODIUM CHLORIDE, SODIUM LACTATE, POTASSIUM CHLORIDE, CALCIUM CHLORIDE 600; 310; 30; 20 MG/100ML; MG/100ML; MG/100ML; MG/100ML
INJECTION, SOLUTION INTRAVENOUS CONTINUOUS
OUTPATIENT
Start: 2024-11-19

## 2024-11-19 NOTE — DISCHARGE INSTRUCTIONS
chewing tobacco.  The only exception to this is a small sip of water to take with any morning dose of heart, blood pressure, or seizure medications.    Bring a list of all medications you take, along with the dose of the medications and how often you take it.  If more convenient bring the pharmacy bottles in a zip lock bag.      Please shower the night before and the morning of surgery with an antibacterial soap.  Please use the wipes given to you the night before your surgery after your shower.  Unless otherwise told by your physician, please do not shave legs or any part of your body below your neck the night before or day of your surgery.  You may shave your face or neck.    Brush your teeth but do not swallow water.      Bring your inhaler if you are currently using one.    Bring your eyeglasses and case with you.  No contacts are to be worn the day of surgery.  You also may bring your hearing aids.      Bring your blood band if one has been given to you.  Please do not close the clasp.    If you are on C-PAP or Bi-PAP at home and plan on staying in the hospital overnight for your surgery please bring the machine with you.      Do not wear any jewelry or body piercings day of surgery.  Also, NO lotion, perfume or deodorant to be used the day of surgery.    Do not bring any valuables, such as jewelry, cash or credit cards.  If you are staying overnight with us, please bring a SMALL bag of personal items.  We cannot accommodate large items, like suitcases.      Please wear loose, comfortable clothing.  If you are potentially going to have a cast or brace bring clothing that will fit over them.                                                                                                                          In case of illness - If you have cold or flu like symptoms (high fever, runny nose, sore throat, cough, etc.) rash, nausea, vomiting, loose stools, and/or recent contact with someone who has a contagious

## 2024-11-25 ENCOUNTER — HOSPITAL ENCOUNTER (OUTPATIENT)
Dept: PREADMISSION TESTING | Age: 44
Discharge: HOME | End: 2024-11-29
Payer: COMMERCIAL

## 2024-11-25 VITALS
OXYGEN SATURATION: 96 % | DIASTOLIC BLOOD PRESSURE: 65 MMHG | TEMPERATURE: 95.7 F | HEIGHT: 63 IN | WEIGHT: 238 LBS | RESPIRATION RATE: 18 BRPM | SYSTOLIC BLOOD PRESSURE: 100 MMHG | BODY MASS INDEX: 42.17 KG/M2 | HEART RATE: 94 BPM

## 2024-11-25 LAB
ABO + RH BLD: NORMAL
ANION GAP SERPL CALCULATED.3IONS-SCNC: 12 MMOL/L (ref 9–16)
ARM BAND NUMBER: NORMAL
BLOOD BANK SAMPLE EXPIRATION: NORMAL
BLOOD GROUP ANTIBODIES SERPL: NEGATIVE
BUN SERPL-MCNC: 10 MG/DL (ref 6–20)
CALCIUM SERPL-MCNC: 9.8 MG/DL (ref 8.6–10.4)
CHLORIDE SERPL-SCNC: 101 MMOL/L (ref 98–107)
CO2 SERPL-SCNC: 23 MMOL/L (ref 20–31)
CREAT SERPL-MCNC: 1.3 MG/DL (ref 0.6–0.9)
ERYTHROCYTE [DISTWIDTH] IN BLOOD BY AUTOMATED COUNT: 13.5 % (ref 11.8–14.4)
GFR, ESTIMATED: 52 ML/MIN/1.73M2
GLUCOSE SERPL-MCNC: 112 MG/DL (ref 74–99)
HCT VFR BLD AUTO: 48.4 % (ref 36.3–47.1)
HGB BLD-MCNC: 15.7 G/DL (ref 11.9–15.1)
MCH RBC QN AUTO: 30.3 PG (ref 25.2–33.5)
MCHC RBC AUTO-ENTMCNC: 32.4 G/DL (ref 28.4–34.8)
MCV RBC AUTO: 93.3 FL (ref 82.6–102.9)
NRBC BLD-RTO: 0 PER 100 WBC
PLATELET # BLD AUTO: 355 K/UL (ref 138–453)
PMV BLD AUTO: 9.6 FL (ref 8.1–13.5)
POTASSIUM SERPL-SCNC: 3.8 MMOL/L (ref 3.7–5.3)
RBC # BLD AUTO: 5.19 M/UL (ref 3.95–5.11)
SODIUM SERPL-SCNC: 136 MMOL/L (ref 136–145)
WBC OTHER # BLD: 7.8 K/UL (ref 3.5–11.3)

## 2024-11-25 PROCEDURE — 36415 COLL VENOUS BLD VENIPUNCTURE: CPT

## 2024-11-25 PROCEDURE — 85027 COMPLETE CBC AUTOMATED: CPT

## 2024-11-25 PROCEDURE — 80048 BASIC METABOLIC PNL TOTAL CA: CPT

## 2024-11-25 PROCEDURE — 86901 BLOOD TYPING SEROLOGIC RH(D): CPT

## 2024-11-25 PROCEDURE — 86900 BLOOD TYPING SEROLOGIC ABO: CPT

## 2024-11-25 PROCEDURE — 86850 RBC ANTIBODY SCREEN: CPT

## 2024-11-25 RX ORDER — NORETHINDRONE 5 MG/1
10 TABLET ORAL DAILY
COMMUNITY

## 2024-11-25 NOTE — PROGRESS NOTES
Anesthesia Focused Assessment      STOP-BANG Sleep Apnea Questionnaire    SNORE loudly (heard through closed doors)?   Yes  TIRED, fatigued, sleepy during daytime?    Yes  OBSERVED stopping breathing during sleep?   Yes  High blood PRESSURE being treated?    Yes    BMI over 35?        Yes  AGE over 50?        No  NECK circumference over 16\"?     Yes  GENDER (male)?       No             Total 6  High risk 5-8  Intermediate risk 3-4  Low risk 0-2    Obstructive Sleep Apnea: yes  If YES, machine used: no cpap     Type 1 DM:   no  T2DM:  no    Coronary Artery Disease:  no stents, follows with [facility], both heart failure clinic and regular cardiology.  She has had a cardiac cath in 2021 (no stents).  She has had stress (abnormal) in 2023, follow up CTA in 2023 (no significant findings) and echo in 2024 and regular follow ups with the office as documented below and found in EMR.  Hypertension:  yes    Active smoker:  history of cigars, none currently  Drinks alcohol:  no  Recreational drugs: marijuana history    Dentition:     Defib / AICD / Pacemaker: no      Renal Failure/dialysis:  no    Patient was evaluated in PAT & anesthesia guidelines were applied.   NPO guidelines, medication instructions and scheduled arrival time were reviewed with patient.  I advised patient to please contact the surgeon's office, ahead of time if possible, if any new signs or symptoms of illness, infection, rash, etc    Hx of anesthesia complications:  denies  Family hx of anesthesia complications:  no                                                                                                                     Neurology office notes from 8/2024 are in paper chart and EMR, along with clearance.    PCP clearance is in paper chart and EMR from Letty Orozco CNP.    CHF clinic office notes are in paper chart and EMR from [facility], Sweta Meza CNP (10/16/24).    Cardiology office notes, clearance (10/9/24), are

## 2024-11-26 LAB
EKG ATRIAL RATE: 89 BPM
EKG P AXIS: 67 DEGREES
EKG P-R INTERVAL: 148 MS
EKG Q-T INTERVAL: 392 MS
EKG QRS DURATION: 120 MS
EKG QTC CALCULATION (BAZETT): 476 MS
EKG R AXIS: -64 DEGREES
EKG T AXIS: 45 DEGREES
EKG VENTRICULAR RATE: 89 BPM

## 2024-12-02 ENCOUNTER — HOSPITAL ENCOUNTER (OUTPATIENT)
Dept: PHYSICAL THERAPY | Age: 44
Setting detail: THERAPIES SERIES
Discharge: HOME | End: 2024-12-02
Attending: ORTHOPAEDIC SURGERY

## 2024-12-02 NOTE — FLOWSHEET NOTE
[x] [facility]  Outpatient Rehabilitation &  Therapy  [address]  [phone]  [phone] [] [facility]  Outpatient Rehabilitation &  Therapy  [address]  P: [phone]  F: [phone] [] [facility]  Outpatient Rehabilitation &  Therapy  [address]  P: [phone]  F: [phone] [] [facility]  Outpatient Rehabilitation &  Therapy  [address]  [phone]  [phone] [] [facility]  Outpatient Rehabilitation &  Therapy  [address]   P: [phone]  F: [phone]  [] [facility]  Outpatient Rehabilitation &  Therapy  [address]  P: [phone]  F: [phone] [] [facility]  Outpatient Rehabilitation &  Therapy  [address]  P: [phone]  F: [phone] [] [facility]  Outpatient Rehabilitation &  Therapy  [address]  P: [phone]  F: [phone] [] [facility]  Outpatient Rehabilitation &  Therapy  [address]  P: [phone]  F: [phone]  [] [facility] Outpatient Rehabilitation &  Therapy  [address]  P: [phone]  F: [phone]     Therapy Cancel/No Show note    Date: 2024  Patient: Chantelle Maddox  : 1980  MRN: [id number]    Cancels/No Shows to date: 0    For today's appointment patient:    [x]  Cancelled    [] Rescheduled appointment    [] No-show     Reason given by patient:    []  Patient ill    []  Conflicting appointment    [x] No transportation      [] Conflict with work    [] No reason given    [] Weather related    [] COVID-19    [] Other:      Comments:        [] Next appointment was confirmed    Electronically signed by: Marie Camarena, PT

## 2024-12-03 RX ORDER — ACETAZOLAMIDE 250 MG/1
500 TABLET ORAL 2 TIMES DAILY
Qty: 120 TABLET | Refills: 4 | OUTPATIENT
Start: 2024-12-03

## 2024-12-09 ENCOUNTER — ANESTHESIA EVENT (OUTPATIENT)
Dept: OPERATING ROOM | Age: 44
DRG: 742 | End: 2024-12-09
Payer: COMMERCIAL

## 2024-12-09 ENCOUNTER — HOSPITAL ENCOUNTER (INPATIENT)
Age: 44
LOS: 1 days | Discharge: HOME | DRG: 742 | End: 2024-12-10
Attending: OBSTETRICS & GYNECOLOGY | Admitting: OBSTETRICS & GYNECOLOGY
Payer: COMMERCIAL

## 2024-12-09 ENCOUNTER — ANESTHESIA (OUTPATIENT)
Dept: OPERATING ROOM | Age: 44
DRG: 742 | End: 2024-12-09
Payer: COMMERCIAL

## 2024-12-09 DIAGNOSIS — N87.9 CERVICAL DYSPLASIA: ICD-10-CM

## 2024-12-09 DIAGNOSIS — Z98.890 POST-OPERATIVE STATE: Primary | ICD-10-CM

## 2024-12-09 DIAGNOSIS — N93.9 ABNORMAL UTERINE BLEEDING: ICD-10-CM

## 2024-12-09 LAB
HCG, PREGNANCY URINE (POC): NEGATIVE
HCT VFR BLD AUTO: 38.8 % (ref 36.3–47.1)
HGB BLD-MCNC: 12.2 G/DL (ref 11.9–15.1)
POTASSIUM BLD-SCNC: 3.7 MMOL/L (ref 3.5–4.5)

## 2024-12-09 PROCEDURE — 58552 LAPARO-VAG HYST INCL T/O: CPT | Performed by: OBSTETRICS & GYNECOLOGY

## 2024-12-09 PROCEDURE — 84132 ASSAY OF SERUM POTASSIUM: CPT

## 2024-12-09 PROCEDURE — 2580000003 HC RX 258: Performed by: ANESTHESIOLOGY

## 2024-12-09 PROCEDURE — 2580000003 HC RX 258: Performed by: OBSTETRICS & GYNECOLOGY

## 2024-12-09 PROCEDURE — 2500000003 HC RX 250 WO HCPCS: Performed by: NURSE ANESTHETIST, CERTIFIED REGISTERED

## 2024-12-09 PROCEDURE — 2720000010 HC SURG SUPPLY STERILE: Performed by: OBSTETRICS & GYNECOLOGY

## 2024-12-09 PROCEDURE — 1200000000 HC SEMI PRIVATE

## 2024-12-09 PROCEDURE — 88307 TISSUE EXAM BY PATHOLOGIST: CPT

## 2024-12-09 PROCEDURE — 6370000000 HC RX 637 (ALT 250 FOR IP)

## 2024-12-09 PROCEDURE — 6360000002 HC RX W HCPCS: Performed by: NURSE ANESTHETIST, CERTIFIED REGISTERED

## 2024-12-09 PROCEDURE — 0UB74ZZ EXCISION OF BILATERAL FALLOPIAN TUBES, PERCUTANEOUS ENDOSCOPIC APPROACH: ICD-10-PCS | Performed by: OBSTETRICS & GYNECOLOGY

## 2024-12-09 PROCEDURE — 7100000001 HC PACU RECOVERY - ADDTL 15 MIN: Performed by: OBSTETRICS & GYNECOLOGY

## 2024-12-09 PROCEDURE — 36415 COLL VENOUS BLD VENIPUNCTURE: CPT

## 2024-12-09 PROCEDURE — 3600000015 HC SURGERY LEVEL 5 ADDTL 15MIN: Performed by: OBSTETRICS & GYNECOLOGY

## 2024-12-09 PROCEDURE — 2580000003 HC RX 258

## 2024-12-09 PROCEDURE — 0TJB8ZZ INSPECTION OF BLADDER, VIA NATURAL OR ARTIFICIAL OPENING ENDOSCOPIC: ICD-10-PCS | Performed by: OBSTETRICS & GYNECOLOGY

## 2024-12-09 PROCEDURE — 7100000000 HC PACU RECOVERY - FIRST 15 MIN: Performed by: OBSTETRICS & GYNECOLOGY

## 2024-12-09 PROCEDURE — 85018 HEMOGLOBIN: CPT

## 2024-12-09 PROCEDURE — 3600000005 HC SURGERY LEVEL 5 BASE: Performed by: OBSTETRICS & GYNECOLOGY

## 2024-12-09 PROCEDURE — 2709999900 HC NON-CHARGEABLE SUPPLY: Performed by: OBSTETRICS & GYNECOLOGY

## 2024-12-09 PROCEDURE — 3700000001 HC ADD 15 MINUTES (ANESTHESIA): Performed by: OBSTETRICS & GYNECOLOGY

## 2024-12-09 PROCEDURE — 0UT9FZZ RESECTION OF UTERUS, VIA NATURAL OR ARTIFICIAL OPENING WITH PERCUTANEOUS ENDOSCOPIC ASSISTANCE: ICD-10-PCS | Performed by: OBSTETRICS & GYNECOLOGY

## 2024-12-09 PROCEDURE — 85014 HEMATOCRIT: CPT

## 2024-12-09 PROCEDURE — 6360000002 HC RX W HCPCS

## 2024-12-09 PROCEDURE — 81025 URINE PREGNANCY TEST: CPT

## 2024-12-09 PROCEDURE — 96372 THER/PROPH/DIAG INJ SC/IM: CPT

## 2024-12-09 PROCEDURE — G0378 HOSPITAL OBSERVATION PER HR: HCPCS

## 2024-12-09 PROCEDURE — 3700000000 HC ANESTHESIA ATTENDED CARE: Performed by: OBSTETRICS & GYNECOLOGY

## 2024-12-09 PROCEDURE — 2580000003 HC RX 258: Performed by: NURSE ANESTHETIST, CERTIFIED REGISTERED

## 2024-12-09 PROCEDURE — 6360000002 HC RX W HCPCS: Performed by: OBSTETRICS & GYNECOLOGY

## 2024-12-09 RX ORDER — SODIUM CHLORIDE 0.9 % (FLUSH) 0.9 %
5-40 SYRINGE (ML) INJECTION PRN
Status: DISCONTINUED | OUTPATIENT
Start: 2024-12-09 | End: 2024-12-09 | Stop reason: HOSPADM

## 2024-12-09 RX ORDER — METOPROLOL SUCCINATE 25 MG/1
25 TABLET, EXTENDED RELEASE ORAL DAILY
Status: DISCONTINUED | OUTPATIENT
Start: 2024-12-09 | End: 2024-12-10 | Stop reason: HOSPADM

## 2024-12-09 RX ORDER — SIMETHICONE 80 MG
80 TABLET,CHEWABLE ORAL EVERY 6 HOURS PRN
Status: DISCONTINUED | OUTPATIENT
Start: 2024-12-09 | End: 2024-12-10 | Stop reason: HOSPADM

## 2024-12-09 RX ORDER — FENTANYL CITRATE 50 UG/ML
INJECTION, SOLUTION INTRAMUSCULAR; INTRAVENOUS
Status: DISCONTINUED | OUTPATIENT
Start: 2024-12-09 | End: 2024-12-09 | Stop reason: SDUPTHER

## 2024-12-09 RX ORDER — ATORVASTATIN CALCIUM 20 MG/1
20 TABLET, FILM COATED ORAL NIGHTLY
Status: DISCONTINUED | OUTPATIENT
Start: 2024-12-09 | End: 2024-12-10 | Stop reason: HOSPADM

## 2024-12-09 RX ORDER — SODIUM CHLORIDE 0.9 % (FLUSH) 0.9 %
5-40 SYRINGE (ML) INJECTION EVERY 12 HOURS SCHEDULED
Status: DISCONTINUED | OUTPATIENT
Start: 2024-12-09 | End: 2024-12-10 | Stop reason: HOSPADM

## 2024-12-09 RX ORDER — ACETAMINOPHEN 500 MG
1000 TABLET ORAL EVERY 6 HOURS PRN
Qty: 30 TABLET | Refills: 1 | Status: SHIPPED | OUTPATIENT
Start: 2024-12-09

## 2024-12-09 RX ORDER — ONDANSETRON 2 MG/ML
4 INJECTION INTRAMUSCULAR; INTRAVENOUS EVERY 6 HOURS PRN
Status: DISCONTINUED | OUTPATIENT
Start: 2024-12-09 | End: 2024-12-10 | Stop reason: HOSPADM

## 2024-12-09 RX ORDER — DOCUSATE SODIUM 100 MG/1
100 CAPSULE, LIQUID FILLED ORAL 2 TIMES DAILY
Status: DISCONTINUED | OUTPATIENT
Start: 2024-12-09 | End: 2024-12-10 | Stop reason: HOSPADM

## 2024-12-09 RX ORDER — SODIUM CHLORIDE 0.9 % (FLUSH) 0.9 %
5-40 SYRINGE (ML) INJECTION EVERY 12 HOURS SCHEDULED
Status: DISCONTINUED | OUTPATIENT
Start: 2024-12-09 | End: 2024-12-09 | Stop reason: HOSPADM

## 2024-12-09 RX ORDER — SODIUM CHLORIDE, SODIUM LACTATE, POTASSIUM CHLORIDE, CALCIUM CHLORIDE 600; 310; 30; 20 MG/100ML; MG/100ML; MG/100ML; MG/100ML
INJECTION, SOLUTION INTRAVENOUS
Status: DISCONTINUED | OUTPATIENT
Start: 2024-12-09 | End: 2024-12-09 | Stop reason: SDUPTHER

## 2024-12-09 RX ORDER — ONDANSETRON 2 MG/ML
INJECTION INTRAMUSCULAR; INTRAVENOUS
Status: DISCONTINUED | OUTPATIENT
Start: 2024-12-09 | End: 2024-12-09 | Stop reason: SDUPTHER

## 2024-12-09 RX ORDER — ONDANSETRON 4 MG/1
4 TABLET, FILM COATED ORAL EVERY 6 HOURS PRN
Qty: 20 TABLET | Refills: 1 | Status: SHIPPED | OUTPATIENT
Start: 2024-12-09

## 2024-12-09 RX ORDER — LABETALOL HYDROCHLORIDE 5 MG/ML
INJECTION, SOLUTION INTRAVENOUS
Status: DISCONTINUED | OUTPATIENT
Start: 2024-12-09 | End: 2024-12-09 | Stop reason: SDUPTHER

## 2024-12-09 RX ORDER — ALBUTEROL SULFATE 90 UG/1
2 INHALANT RESPIRATORY (INHALATION) EVERY 4 HOURS PRN
Status: DISCONTINUED | OUTPATIENT
Start: 2024-12-09 | End: 2024-12-10 | Stop reason: HOSPADM

## 2024-12-09 RX ORDER — SIMETHICONE 80 MG
80 TABLET,CHEWABLE ORAL 4 TIMES DAILY PRN
Qty: 60 TABLET | Refills: 1 | Status: SHIPPED | OUTPATIENT
Start: 2024-12-09

## 2024-12-09 RX ORDER — PROCHLORPERAZINE EDISYLATE 5 MG/ML
10 INJECTION INTRAMUSCULAR; INTRAVENOUS EVERY 6 HOURS PRN
Status: DISCONTINUED | OUTPATIENT
Start: 2024-12-09 | End: 2024-12-10 | Stop reason: HOSPADM

## 2024-12-09 RX ORDER — SENNA AND DOCUSATE SODIUM 50; 8.6 MG/1; MG/1
1 TABLET, FILM COATED ORAL 2 TIMES DAILY
Qty: 30 TABLET | Refills: 1 | Status: SHIPPED | OUTPATIENT
Start: 2024-12-09

## 2024-12-09 RX ORDER — GABAPENTIN 300 MG/1
300 CAPSULE ORAL 3 TIMES DAILY
Status: DISCONTINUED | OUTPATIENT
Start: 2024-12-09 | End: 2024-12-10 | Stop reason: HOSPADM

## 2024-12-09 RX ORDER — SODIUM CHLORIDE 0.9 % (FLUSH) 0.9 %
5-40 SYRINGE (ML) INJECTION PRN
Status: DISCONTINUED | OUTPATIENT
Start: 2024-12-09 | End: 2024-12-10 | Stop reason: HOSPADM

## 2024-12-09 RX ORDER — SODIUM CHLORIDE 9 MG/ML
INJECTION, SOLUTION INTRAVENOUS PRN
Status: DISCONTINUED | OUTPATIENT
Start: 2024-12-09 | End: 2024-12-10 | Stop reason: HOSPADM

## 2024-12-09 RX ORDER — SENNA AND DOCUSATE SODIUM 50; 8.6 MG/1; MG/1
1 TABLET, FILM COATED ORAL 2 TIMES DAILY
Status: DISCONTINUED | OUTPATIENT
Start: 2024-12-09 | End: 2024-12-10 | Stop reason: HOSPADM

## 2024-12-09 RX ORDER — MIDAZOLAM HYDROCHLORIDE 2 MG/2ML
1 INJECTION, SOLUTION INTRAMUSCULAR; INTRAVENOUS EVERY 10 MIN PRN
Status: DISCONTINUED | OUTPATIENT
Start: 2024-12-09 | End: 2024-12-09 | Stop reason: HOSPADM

## 2024-12-09 RX ORDER — DEXAMETHASONE SODIUM PHOSPHATE 10 MG/ML
INJECTION, SOLUTION INTRAMUSCULAR; INTRAVENOUS
Status: DISCONTINUED | OUTPATIENT
Start: 2024-12-09 | End: 2024-12-09 | Stop reason: SDUPTHER

## 2024-12-09 RX ORDER — MAGNESIUM HYDROXIDE 1200 MG/15ML
LIQUID ORAL CONTINUOUS PRN
Status: DISCONTINUED | OUTPATIENT
Start: 2024-12-09 | End: 2024-12-09 | Stop reason: HOSPADM

## 2024-12-09 RX ORDER — LEVOTHYROXINE SODIUM 50 UG/1
50 TABLET ORAL DAILY
Status: DISCONTINUED | OUTPATIENT
Start: 2024-12-09 | End: 2024-12-10 | Stop reason: HOSPADM

## 2024-12-09 RX ORDER — ACETAZOLAMIDE 250 MG/1
500 TABLET ORAL 2 TIMES DAILY
Status: DISCONTINUED | OUTPATIENT
Start: 2024-12-09 | End: 2024-12-10 | Stop reason: HOSPADM

## 2024-12-09 RX ORDER — SODIUM CHLORIDE 9 MG/ML
INJECTION, SOLUTION INTRAVENOUS PRN
Status: DISCONTINUED | OUTPATIENT
Start: 2024-12-09 | End: 2024-12-09 | Stop reason: HOSPADM

## 2024-12-09 RX ORDER — OXYCODONE HYDROCHLORIDE 5 MG/1
10 TABLET ORAL EVERY 4 HOURS PRN
Status: DISCONTINUED | OUTPATIENT
Start: 2024-12-09 | End: 2024-12-10 | Stop reason: HOSPADM

## 2024-12-09 RX ORDER — ENOXAPARIN SODIUM 100 MG/ML
50 INJECTION SUBCUTANEOUS DAILY
Status: DISCONTINUED | OUTPATIENT
Start: 2024-12-09 | End: 2024-12-10 | Stop reason: HOSPADM

## 2024-12-09 RX ORDER — BUPIVACAINE HYDROCHLORIDE 5 MG/ML
INJECTION, SOLUTION PERINEURAL PRN
Status: DISCONTINUED | OUTPATIENT
Start: 2024-12-09 | End: 2024-12-09 | Stop reason: ALTCHOICE

## 2024-12-09 RX ORDER — ONDANSETRON 2 MG/ML
4 INJECTION INTRAMUSCULAR; INTRAVENOUS
Status: DISCONTINUED | OUTPATIENT
Start: 2024-12-09 | End: 2024-12-09 | Stop reason: HOSPADM

## 2024-12-09 RX ORDER — CEFAZOLIN SODIUM 1 G/3ML
INJECTION, POWDER, FOR SOLUTION INTRAMUSCULAR; INTRAVENOUS
Status: DISCONTINUED | OUTPATIENT
Start: 2024-12-09 | End: 2024-12-09 | Stop reason: SDUPTHER

## 2024-12-09 RX ORDER — DAPAGLIFLOZIN 5 MG/1
10 TABLET, FILM COATED ORAL EVERY MORNING
Status: DISCONTINUED | OUTPATIENT
Start: 2024-12-10 | End: 2024-12-09 | Stop reason: CLARIF

## 2024-12-09 RX ORDER — OXYCODONE HYDROCHLORIDE 5 MG/1
5 TABLET ORAL EVERY 6 HOURS PRN
Qty: 20 TABLET | Refills: 0 | Status: SHIPPED | OUTPATIENT
Start: 2024-12-09 | End: 2024-12-14

## 2024-12-09 RX ORDER — PROPOFOL 10 MG/ML
INJECTION, EMULSION INTRAVENOUS
Status: DISCONTINUED | OUTPATIENT
Start: 2024-12-09 | End: 2024-12-09 | Stop reason: SDUPTHER

## 2024-12-09 RX ORDER — OXYCODONE HYDROCHLORIDE 5 MG/1
5 TABLET ORAL EVERY 4 HOURS PRN
Status: DISCONTINUED | OUTPATIENT
Start: 2024-12-09 | End: 2024-12-10 | Stop reason: HOSPADM

## 2024-12-09 RX ORDER — SPIRONOLACTONE 25 MG/1
25 TABLET ORAL DAILY
Status: DISCONTINUED | OUTPATIENT
Start: 2024-12-09 | End: 2024-12-10 | Stop reason: HOSPADM

## 2024-12-09 RX ORDER — LOSARTAN POTASSIUM 25 MG/1
25 TABLET ORAL DAILY
Status: DISCONTINUED | OUTPATIENT
Start: 2024-12-09 | End: 2024-12-10 | Stop reason: HOSPADM

## 2024-12-09 RX ORDER — ONDANSETRON 4 MG/1
4 TABLET, ORALLY DISINTEGRATING ORAL EVERY 8 HOURS PRN
Status: DISCONTINUED | OUTPATIENT
Start: 2024-12-09 | End: 2024-12-10 | Stop reason: HOSPADM

## 2024-12-09 RX ORDER — FENTANYL CITRATE 50 UG/ML
25 INJECTION, SOLUTION INTRAMUSCULAR; INTRAVENOUS EVERY 5 MIN PRN
Status: DISCONTINUED | OUTPATIENT
Start: 2024-12-09 | End: 2024-12-09 | Stop reason: HOSPADM

## 2024-12-09 RX ORDER — KETOROLAC TROMETHAMINE 30 MG/ML
INJECTION, SOLUTION INTRAMUSCULAR; INTRAVENOUS
Status: DISCONTINUED | OUTPATIENT
Start: 2024-12-09 | End: 2024-12-09 | Stop reason: SDUPTHER

## 2024-12-09 RX ORDER — FENTANYL CITRATE 50 UG/ML
50 INJECTION, SOLUTION INTRAMUSCULAR; INTRAVENOUS EVERY 5 MIN PRN
Status: DISCONTINUED | OUTPATIENT
Start: 2024-12-09 | End: 2024-12-09 | Stop reason: HOSPADM

## 2024-12-09 RX ORDER — SODIUM CHLORIDE, SODIUM LACTATE, POTASSIUM CHLORIDE, CALCIUM CHLORIDE 600; 310; 30; 20 MG/100ML; MG/100ML; MG/100ML; MG/100ML
INJECTION, SOLUTION INTRAVENOUS CONTINUOUS
Status: DISCONTINUED | OUTPATIENT
Start: 2024-12-09 | End: 2024-12-09 | Stop reason: HOSPADM

## 2024-12-09 RX ORDER — LIDOCAINE HYDROCHLORIDE 10 MG/ML
INJECTION, SOLUTION EPIDURAL; INFILTRATION; INTRACAUDAL; PERINEURAL
Status: DISCONTINUED | OUTPATIENT
Start: 2024-12-09 | End: 2024-12-09 | Stop reason: SDUPTHER

## 2024-12-09 RX ORDER — NALOXONE HYDROCHLORIDE 0.4 MG/ML
INJECTION, SOLUTION INTRAMUSCULAR; INTRAVENOUS; SUBCUTANEOUS PRN
Status: DISCONTINUED | OUTPATIENT
Start: 2024-12-09 | End: 2024-12-09 | Stop reason: HOSPADM

## 2024-12-09 RX ORDER — SODIUM CHLORIDE, SODIUM LACTATE, POTASSIUM CHLORIDE, CALCIUM CHLORIDE 600; 310; 30; 20 MG/100ML; MG/100ML; MG/100ML; MG/100ML
INJECTION, SOLUTION INTRAVENOUS CONTINUOUS
Status: DISCONTINUED | OUTPATIENT
Start: 2024-12-09 | End: 2024-12-09

## 2024-12-09 RX ORDER — QUETIAPINE FUMARATE 100 MG/1
100 TABLET, FILM COATED ORAL 2 TIMES DAILY
Status: DISCONTINUED | OUTPATIENT
Start: 2024-12-09 | End: 2024-12-10 | Stop reason: HOSPADM

## 2024-12-09 RX ORDER — ROCURONIUM BROMIDE 10 MG/ML
INJECTION, SOLUTION INTRAVENOUS
Status: DISCONTINUED | OUTPATIENT
Start: 2024-12-09 | End: 2024-12-09 | Stop reason: SDUPTHER

## 2024-12-09 RX ORDER — ACETAMINOPHEN 500 MG
1000 TABLET ORAL EVERY 8 HOURS SCHEDULED
Status: DISCONTINUED | OUTPATIENT
Start: 2024-12-09 | End: 2024-12-10 | Stop reason: HOSPADM

## 2024-12-09 RX ORDER — MIDAZOLAM HYDROCHLORIDE 1 MG/ML
INJECTION, SOLUTION INTRAMUSCULAR; INTRAVENOUS
Status: DISCONTINUED | OUTPATIENT
Start: 2024-12-09 | End: 2024-12-09 | Stop reason: SDUPTHER

## 2024-12-09 RX ORDER — FUROSEMIDE 20 MG/1
20 TABLET ORAL DAILY
Status: DISCONTINUED | OUTPATIENT
Start: 2024-12-09 | End: 2024-12-10 | Stop reason: HOSPADM

## 2024-12-09 RX ADMIN — ACETAMINOPHEN 1000 MG: 500 TABLET ORAL at 14:18

## 2024-12-09 RX ADMIN — PROPOFOL 200 MG: 10 INJECTION, EMULSION INTRAVENOUS at 08:03

## 2024-12-09 RX ADMIN — METOPROLOL SUCCINATE 25 MG: 25 TABLET, EXTENDED RELEASE ORAL at 14:18

## 2024-12-09 RX ADMIN — SPIRONOLACTONE 25 MG: 25 TABLET, FILM COATED ORAL at 14:18

## 2024-12-09 RX ADMIN — ACETAZOLAMIDE 500 MG: 250 TABLET ORAL at 20:48

## 2024-12-09 RX ADMIN — SUGAMMADEX 200 MG: 100 INJECTION, SOLUTION INTRAVENOUS at 10:45

## 2024-12-09 RX ADMIN — ROCURONIUM BROMIDE 30 MG: 10 INJECTION, SOLUTION INTRAVENOUS at 08:24

## 2024-12-09 RX ADMIN — SODIUM CHLORIDE, POTASSIUM CHLORIDE, SODIUM LACTATE AND CALCIUM CHLORIDE: 600; 310; 30; 20 INJECTION, SOLUTION INTRAVENOUS at 07:58

## 2024-12-09 RX ADMIN — DOCUSATE SODIUM 100 MG: 100 CAPSULE, LIQUID FILLED ORAL at 20:49

## 2024-12-09 RX ADMIN — GABAPENTIN 300 MG: 300 CAPSULE ORAL at 20:48

## 2024-12-09 RX ADMIN — SENNOSIDES AND DOCUSATE SODIUM 1 TABLET: 50; 8.6 TABLET ORAL at 14:18

## 2024-12-09 RX ADMIN — SUGAMMADEX 200 MG: 100 INJECTION, SOLUTION INTRAVENOUS at 10:55

## 2024-12-09 RX ADMIN — CEFAZOLIN 2 G: 1 INJECTION, POWDER, FOR SOLUTION INTRAMUSCULAR; INTRAVENOUS at 08:23

## 2024-12-09 RX ADMIN — HYDROMORPHONE HYDROCHLORIDE 0.5 MG: 1 INJECTION, SOLUTION INTRAMUSCULAR; INTRAVENOUS; SUBCUTANEOUS at 09:19

## 2024-12-09 RX ADMIN — FUROSEMIDE 20 MG: 20 TABLET ORAL at 14:19

## 2024-12-09 RX ADMIN — AMITRIPTYLINE HYDROCHLORIDE 25 MG: 25 TABLET, FILM COATED ORAL at 20:49

## 2024-12-09 RX ADMIN — Medication 10 MG: at 10:47

## 2024-12-09 RX ADMIN — KETOROLAC TROMETHAMINE 30 MG: 30 INJECTION, SOLUTION INTRAMUSCULAR; INTRAVENOUS at 10:38

## 2024-12-09 RX ADMIN — FENTANYL CITRATE 100 MCG: 50 INJECTION, SOLUTION INTRAMUSCULAR; INTRAVENOUS at 08:03

## 2024-12-09 RX ADMIN — HYDROMORPHONE HYDROCHLORIDE 0.5 MG: 1 INJECTION, SOLUTION INTRAMUSCULAR; INTRAVENOUS; SUBCUTANEOUS at 08:41

## 2024-12-09 RX ADMIN — SODIUM CHLORIDE, POTASSIUM CHLORIDE, SODIUM LACTATE AND CALCIUM CHLORIDE: 600; 310; 30; 20 INJECTION, SOLUTION INTRAVENOUS at 12:33

## 2024-12-09 RX ADMIN — ROCURONIUM BROMIDE 50 MG: 10 INJECTION, SOLUTION INTRAVENOUS at 08:03

## 2024-12-09 RX ADMIN — ENOXAPARIN SODIUM 50 MG: 100 INJECTION SUBCUTANEOUS at 22:08

## 2024-12-09 RX ADMIN — ACETAMINOPHEN 1000 MG: 500 TABLET ORAL at 20:49

## 2024-12-09 RX ADMIN — ONDANSETRON 4 MG: 2 INJECTION INTRAMUSCULAR; INTRAVENOUS at 08:27

## 2024-12-09 RX ADMIN — FENTANYL CITRATE 50 MCG: 50 INJECTION, SOLUTION INTRAMUSCULAR; INTRAVENOUS at 09:54

## 2024-12-09 RX ADMIN — ATORVASTATIN CALCIUM 20 MG: 20 TABLET, FILM COATED ORAL at 20:49

## 2024-12-09 RX ADMIN — ROCURONIUM BROMIDE 10 MG: 10 INJECTION, SOLUTION INTRAVENOUS at 09:19

## 2024-12-09 RX ADMIN — SODIUM CHLORIDE, POTASSIUM CHLORIDE, SODIUM LACTATE AND CALCIUM CHLORIDE: 600; 310; 30; 20 INJECTION, SOLUTION INTRAVENOUS at 10:45

## 2024-12-09 RX ADMIN — SODIUM CHLORIDE, POTASSIUM CHLORIDE, SODIUM LACTATE AND CALCIUM CHLORIDE: 600; 310; 30; 20 INJECTION, SOLUTION INTRAVENOUS at 15:50

## 2024-12-09 RX ADMIN — LIDOCAINE HYDROCHLORIDE 50 MG: 10 INJECTION, SOLUTION EPIDURAL; INFILTRATION; INTRACAUDAL; PERINEURAL at 08:03

## 2024-12-09 RX ADMIN — SODIUM CHLORIDE, PRESERVATIVE FREE 10 ML: 5 INJECTION INTRAVENOUS at 20:52

## 2024-12-09 RX ADMIN — DEXAMETHASONE SODIUM PHOSPHATE 10 MG: 10 INJECTION INTRAMUSCULAR; INTRAVENOUS at 08:27

## 2024-12-09 RX ADMIN — Medication 10 MG: at 09:49

## 2024-12-09 RX ADMIN — MIDAZOLAM 2 MG: 1 INJECTION INTRAMUSCULAR; INTRAVENOUS at 08:00

## 2024-12-09 RX ADMIN — LOSARTAN POTASSIUM 25 MG: 25 TABLET, FILM COATED ORAL at 14:22

## 2024-12-09 RX ADMIN — ONDANSETRON 4 MG: 2 INJECTION INTRAMUSCULAR; INTRAVENOUS at 20:58

## 2024-12-09 RX ADMIN — ACETAZOLAMIDE 500 MG: 250 TABLET ORAL at 14:22

## 2024-12-09 RX ADMIN — GABAPENTIN 300 MG: 300 CAPSULE ORAL at 14:18

## 2024-12-09 RX ADMIN — SENNOSIDES AND DOCUSATE SODIUM 1 TABLET: 50; 8.6 TABLET ORAL at 20:48

## 2024-12-09 RX ADMIN — SODIUM CHLORIDE, POTASSIUM CHLORIDE, SODIUM LACTATE AND CALCIUM CHLORIDE: 600; 310; 30; 20 INJECTION, SOLUTION INTRAVENOUS at 07:36

## 2024-12-09 RX ADMIN — LEVOTHYROXINE SODIUM 50 MCG: 0.05 TABLET ORAL at 14:19

## 2024-12-09 RX ADMIN — OXYCODONE 5 MG: 5 TABLET ORAL at 20:58

## 2024-12-09 RX ADMIN — DOCUSATE SODIUM 100 MG: 100 CAPSULE, LIQUID FILLED ORAL at 14:18

## 2024-12-09 ASSESSMENT — PAIN - FUNCTIONAL ASSESSMENT
PAIN_FUNCTIONAL_ASSESSMENT: PREVENTS OR INTERFERES SOME ACTIVE ACTIVITIES AND ADLS
PAIN_FUNCTIONAL_ASSESSMENT: NONE - DENIES PAIN

## 2024-12-09 ASSESSMENT — PAIN SCALES - GENERAL
PAINLEVEL_OUTOF10: 8
PAINLEVEL_OUTOF10: 0
PAINLEVEL_OUTOF10: 6
PAINLEVEL_OUTOF10: 6

## 2024-12-09 ASSESSMENT — ENCOUNTER SYMPTOMS: SHORTNESS OF BREATH: 1

## 2024-12-09 ASSESSMENT — PAIN DESCRIPTION - LOCATION: LOCATION: ABDOMEN

## 2024-12-09 ASSESSMENT — PAIN DESCRIPTION - DESCRIPTORS: DESCRIPTORS: PRESSURE

## 2024-12-09 NOTE — ANESTHESIA PRE PROCEDURE
Department of Anesthesiology  Preprocedure Note       Name:  Chantelle Maddox   Age:  44 y.o.  :  1980                                          MRN:  [id number]         Date:  2024      Surgeon: Surgeon(s):  Ludmila Bolivar DO    Procedure: Procedure(s):  TOTAL LAPAROSCOPIC HYSTERECTOMY, BILATERAL SALPINGECTOMY , CYSTOSCOPY    Medications prior to admission:   Prior to Admission medications    Medication Sig Start Date End Date Taking? Authorizing Provider   norethindrone (AYGESTIN) 5 MG tablet Take 2 tablets by mouth daily   Yes Glenn Lopez MD   amitriptyline (ELAVIL) 25 MG tablet TAKE 1 TABLET BY MOUTH NIGHTLY 24  Yes Dain Marrero MD   Prenatal Vit-Fe Fumarate-FA (NIVA-PLUS) 27-1 MG TABS Take 1 tablet by mouth daily 24  Yes Janessa Voss DO   atorvastatin (LIPITOR) 40 MG tablet TAKE 1 (ONE) TABLET BY MOUTH AT BEDTIME 24  Yes Glenn Lopez MD   acetaminophen (TYLENOL) 500 MG tablet Take 2 tablets by mouth 3 times daily 24  Yes Janessa Voss DO   ondansetron (ZOFRAN) 4 MG tablet Take 1 tablet by mouth 3 times daily as needed for Nausea or Vomiting 24  Yes Janessa Voss DO   nicotine polacrilex (NICORETTE) 4 MG gum Take 1 each by mouth as needed 21  Yes Glenn Lopez MD   GOODSENSE CLEARLAX 17 GM/SCOOP powder MIX 17 GRAMS IN 8 OUNCES OF WATER & TAKE BY MOUTH AT BEDTIME AS NEEDED FOR CONSTIPATION 24  Yes Glenn Lopez MD   Cholecalciferol (VITAMIN D3) 50 MCG ( UT) TABS Take 1 tablet by mouth daily 2 tablets 24  Yes Glenn Lopez MD   SYMBICORT 160-4.5 MCG/ACT AERO INHALE 1 PUFF BY MOUTH IN THE MORNING 24  Yes Glenn Lopez MD   timolol (TIMOPTIC) 0.5 % ophthalmic solution INSTILL 1 DROP INTO BOTH EYES DAILY IN THE MORNING 24  Yes Glenn Lopez MD   cyclobenzaprine (FLEXERIL) 10 MG tablet TAKE 1 (ONE) TABLET BY MOUTH TWO TIMES DAILY, AS NEEDED 24  Yes Provider,

## 2024-12-09 NOTE — H&P
OB/GYN Pre-Op H&P  [facility]    Patient Name: Chantelle Maddox     Patient : 1980  Room/Bed: STZ OR [place] RM/NONE  Admission Date/Time: 2024  6:08 AM  Primary Care Physician: Letty Orozco APRN - CNP  MRN: [id number]    Date: 2024  Time: 7:33 AM    The patient was seen in pre-op holding. She is here for Total Laparoscopic Hysterectomy, Bilateral Salpingectomy, Cystoscopy.    Patient was seen and outpatient OB/GYN office due to persistent abnormal uterine bleeding despite Mirena IUD and Aygestin.  Pelvic ultrasound shows heterogenous myometrial echotexture within intrauterine cavity with posterior uterine fibroid measuring approximately 3 cm x 2.5 cm x 3 cm.  Endometrial stripe was within normal limits.  Endometrial biopsy shows benign secretory endometrial with benign squamous epithelium and endocervical tissue.  After R/B/A, patient opting for definitive surgical management with hysterectomy.  Patient was cleared for surgery by neurology, CHF clinic, cardiology and neurology.  At this time, patient stable for surgical management.    The procedure risks and complications were reviewed.  The labs, Consent, and H&P were reviewed and updated.  The patient was counseled on the possibility of  the need of a second surgery.  The patient voiced understanding and had all of her questions answered. The possibility of incomplete removal of abnormal tissue was discussed.    OBSTETRICAL HISTORY:   OB History    Para Term  AB Living   7 7 7 0 0 7   SAB IAB Ectopic Molar Multiple Live Births   0 0 0 0 0 0      # Outcome Date GA Lbr Lasha/2nd Weight Sex Type Anes PTL Lv   7 Term      Vag-Spont      6 Term      Vag-Spont      5 Term      Vag-Spont      4 Term      Vag-Spont      3 Term      Vag-Spont      2 Term      Vag-Spont      1 Term      Vag-Spont          PAST MEDICAL HISTORY:   has a past medical history of Abnormal Pap smear of cervix, Abnormal stress test, Achilles  11/25/2024 15.7 (H)  11.9 - 15.1 g/dL Final    Hematocrit 11/25/2024 48.4 (H)  36.3 - 47.1 % Final    MCV 11/25/2024 93.3  82.6 - 102.9 fL Final    MCH 11/25/2024 30.3  25.2 - 33.5 pg Final    MCHC 11/25/2024 32.4  28.4 - 34.8 g/dL Final    RDW 11/25/2024 13.5  11.8 - 14.4 % Final    Platelets 11/25/2024 355  138 - 453 k/uL Final    MPV 11/25/2024 9.6  8.1 - 13.5 fL Final    NRBC Automated 11/25/2024 0.0  0.0 per 100 WBC Final    Blood Bank Sample Expiration 11/25/2024 12/12/2024,2359   Final    Arm Band Number 11/25/2024 BE [id number]   Final    ABO/Rh 11/25/2024 B POSITIVE   Final    Antibody Screen 11/25/2024 NEGATIVE   Final    Ventricular Rate 11/25/2024 89  BPM Final    Atrial Rate 11/25/2024 89  BPM Final    P-R Interval 11/25/2024 148  ms Final    QRS Duration 11/25/2024 120  ms Final    Q-T Interval 11/25/2024 392  ms Final    QTc Calculation (Bazett) 11/25/2024 476  ms Final    P Axis 11/25/2024 67  degrees Final    R Axis 11/25/2024 -64  degrees Final    T Axis 11/25/2024 45  degrees Final    Sodium 11/25/2024 136  136 - 145 mmol/L Final    Potassium 11/25/2024 3.8  3.7 - 5.3 mmol/L Final    Chloride 11/25/2024 101  98 - 107 mmol/L Final    CO2 11/25/2024 23  20 - 31 mmol/L Final    Anion Gap 11/25/2024 12  9 - 16 mmol/L Final    Glucose 11/25/2024 112 (H)  74 - 99 mg/dL Final    BUN 11/25/2024 10  6 - 20 mg/dL Final    Creatinine 11/25/2024 1.3 (H)  0.6 - 0.9 mg/dL Final    Est, Glom Filt Rate 11/25/2024 52 (L)  >60 mL/min/1.73m2 Final    Comment:       These results are not intended for use in patients <18 years of age.        eGFR results are calculated without a race factor using the 2021 CKD-EPI equation.  Careful clinical correlation is recommended, particularly when comparing to results   calculated using previous equations.  The CKD-EPI equation is less accurate in patients with extremes of muscle mass, extra-renal   metabolism of creatine, excessive creatine ingestion, or following therapy that

## 2024-12-09 NOTE — BRIEF OP NOTE
Brief Operative Note  Department of Obstetrics and Gynecology  [facility]     Patient: Chantelle Maddox   : 1980  MRN: [id number]       Acct: [id number]   Date of Procedure: 24     Pre-operative Diagnosis: 44 y.o. female    Abnormal Uterine Bleeding  Menorrhagia  Fibroid Uterus  Aortic Valve Insufficiency  Primary Hypertension  Hyperlipidemia  Coronary Artery Disease  Congestive Heart Failure  History of DVT  BMI 42    Post-operative Diagnosis: 44 y.o. female    Intra-abdominal Adhesions  Abnormal Uterine Bleeding  Menorrhagia  Fibroid Uterus  Aortic Valve Insufficiency  Primary Hypertension  Hyperlipidemia  Coronary Artery Disease  Congestive Heart Failure  History of DVT  BMI 42    Procedure: Laparoscopic Assisted Vaginal Hysterectomy, Bilateral Salpingectomy, Cystoscopy    Surgeon: Dr. Bolivar     Assistant(s): Joana Wagner, PGY3; Almaz Santos, PGY1    Anesthesia: general via ET tube    Findings:  Normal appearing external genitalia. Normal appearing vaginal mucosa. Abnormal appearing cervix with limited posterior tissue with evidence of previous excisional procedure. Survey of pelvis reveals mild to moderate abdominal adhesions to bilateral adnexa, enlarge bulky appearing uterus with overall normal appearing bilateral ovaries. Due to intraabdominal adhesions, difficult to visualize bilateral tubal fimbriae. On cystoscopy, efflux appreciated from bilateral ureteral jets.  Total IV fluids/Blood products:  1000 ml crystalloid  Urine Output:  100 ml    Estimated blood loss:  300ml  Drains:  none  Specimens:  Uterus, Cervix, Bilateral Fallopian Tubes  Instrument and Sponge Count: Correct  Complications:  none  Condition:  stable, transferred to post anesthesia recovery    See full operative report for further details.    Joana Wagner MD  Ob/Gyn Resident  2024, 10:40 AM

## 2024-12-09 NOTE — OP NOTE
Operative Note  Department of Obstetrics and Gynecology  [facility]       Patient: Chantelle Maddox   : 1980  MRN: [id number]       Acct: [id number]   PCP: Letty Orozco APRN - VERNA  Date of Procedure: 24    Pre-operative Diagnosis: 44 y.o. female    Abnormal Uterine Bleeding  Menorrhagia  Fibroid Uterus  Aortic Valve Insufficiency  Primary Hypertension  Hyperlipidemia  Coronary Artery Disease  Congestive Heart Failure  History of DVT  Idiopathic Intracranial Hypertension  BMI 42    Post-operative Diagnosis: 44 y.o. female    Intra-abdominal Adhesions  Abnormal Uterine Bleeding  Menorrhagia  Fibroid Uterus  Aortic Valve Insufficiency  Primary Hypertension  Hyperlipidemia  Coronary Artery Disease  Congestive Heart Failure  History of DVT  BMI 42    Procedure: Laparoscopic Assisted Vaginal Hysterectomy, Bilateral Salpingectomy, Cystoscopy    Surgeon: Dr. Bolivar  Assistants: Joana Wagner, PGY3; Shadia Santos, PGY1    Anesthesia: general via ET tube    Indications: Patient was seen and outpatient OB/GYN office due to persistent abnormal uterine bleeding despite Mirena IUD and Aygestin.  Pelvic ultrasound shows heterogenous myometrial echotexture within intrauterine cavity with posterior uterine fibroid measuring approximately 3 cm x 2.5 cm x 3 cm.  Endometrial stripe was within normal limits.  Endometrial biopsy shows benign secretory endometrial with benign squamous epithelium and endocervical tissue.  After R/B/A, patient opting for definitive surgical management with hysterectomy.  Patient was cleared for surgery by neurology, CHF clinic, cardiology and neurology.  At this time, patient stable for surgical management    Procedure Details:  The patient was seen in the pre-op room. The risks, benefits, complications, treatment options, and expected outcomes were discussed with the patient. The patient concurred with the proposed plan, giving informed consent.

## 2024-12-09 NOTE — PROGRESS NOTES
Gynecology Progress Note    Date: 2024  Time: 12:16 PM    Chantelle Maddox 44 y.o. female , POD # 0 s/p LAVH, BS, Cysto on 24    Plan to admit patient for overnight monitoring s/p LAVH      Assessment/Plan:  Chantelle Maddox 44 y.o. female , OD # 0 s/p LAVH, BS, Cysto on 24   - Doing well, vitals stable   - S/p villatoro catheter   - Continue IVF   - Encourage ambulation and use of incentive spirometer   - Pain control: Tylenol/Gabapentin, Philomena PRN   - Labs: H&H @ , CBC/CMP 12/10   - DVT Proph: Lovenox 50 mg if Hgb > 10 tonight   - Abx: S/p Ancef 2g   - Diet: Adult Diet   - Path: Diet Adult   - Continue post-op care, please page with any questions    Aortic Valve Insufficiency/CHF/CAD   - Patient on Telemetry   - Home meds ordered; Toprol XL, Spironolactone, Farxiga, Furosemide   - Follows with CHF clinic and Cardiology   - Continue to monitor overnight    Hx DVT   - Patient on Xarelto at home   - Last use 7 days ago   - Lovenox 50 mg ordered tonight if Hgb > 10   - SCDs ordered    Hypertension   - BPS elevated, baseline for patient   - Continue home meds losartan    Idiopathic Intracranial Hypertension   - Follows with neurology   - Clinically asymptomatic at this time   - Continue Amitriptyline 25 mg qhs and Diamox 500 mg BID    Hyperlipidemia   - Home Lipitor ordered    BMI 42      Joana Wagner MD  Ob/Gyn Resident  PGY3  2024, 12:16 PM

## 2024-12-09 NOTE — CARE COORDINATION
Case Management Assessment  Initial Evaluation    Date/Time of Evaluation: 12/9/2024 5:41 PM  Assessment Completed by: Bernice Renteria RN    If patient is discharged prior to next notation, then this note serves as note for discharge by case management.    Patient Name: Chantelle Maddox                   YOB: 1980  Diagnosis: Abnormal uterine bleeding [N93.9]  Cervical dysplasia [N87.9]  Post-operative state [Z98.890]                   Date / Time: 12/9/2024  6:08 AM    Patient Admission Status: Inpatient   Readmission Risk (Low < 19, Mod (19-27), High > 27): Readmission Risk Score: 6.8    Current PCP: Letty Orozco APRN - CNP  PCP verified by CM? (P) Yes (Letty Orozco)    Chart Reviewed: Yes      History Provided by: (P) Patient  Patient Orientation: (P) Alert and Oriented, Person, Place, Situation, Self    Patient Cognition: (P) Alert    Hospitalization in the last 30 days (Readmission):  No    If yes, Readmission Assessment in  Navigator will be completed.    Advance Directives:      Code Status: Full Code   Patient's Primary Decision Maker is: (P) Legal Next of Kin      Discharge Planning:    Patient lives with: (P) Alone Type of Home: (P) Apartment  Primary Care Giver: (P) Self  Patient Support Systems include: (P) Family Members, Friends/Neighbors   Current Financial resources: (P) Other (Comment) (Inside, FullContact)  Current community resources: (P) None  Current services prior to admission: (P) None            Current DME:              Type of Home Care services:  (P) None    ADLS  Prior functional level: (P) Independent in ADLs/IADLs  Current functional level: (P) Independent in ADLs/IADLs    PT AM-PAC:   /24  OT AM-PAC:   /24    Family can provide assistance at DC: (P) Yes  Would you like Case Management to discuss the discharge plan with any other family members/significant others, and if so, who? (P) No  Plans to Return to Present Housing: (P) Yes  Other

## 2024-12-09 NOTE — FLOWSHEET NOTE
RN attempted to call patient's friend [phone] to update of patient's room placement, but no one answered and RN did not leave a message.

## 2024-12-09 NOTE — DISCHARGE SUMMARY
Gyn Discharge Summary  [facility]      Patient Name: Chantelle Maddox  Patient : 1980  Primary Care Physician: Letty Orozco APRN - CNP  Admit Date: 2024    Principal Diagnosis: Postoperative state    Other Diagnosis:   Abnormal uterine bleeding [N93.9]  Cervical dysplasia [N87.9]  Post-operative state [Z98.890]  Patient Active Problem List   Diagnosis    Schizophrenia (HCC)    HTN (hypertension)    Anxiety    GERD (gastroesophageal reflux disease)    Hyperlipemia    Persistent cervical dysplasia    Hx LEEP ()    Cold Knife Cone 19    Abnormal stress test    Acute sinusitis    Allergic rhinitis    Chest pain    CHF (congestive heart failure) (HCC)    Coronary artery disease without angina pectoris    Dizziness    Migraine without aura    DIANA (obstructive sleep apnea)    Reflux esophagitis    Nonrheumatic aortic valve insufficiency    Severe aortic regurgitation    Severe obesity (BMI 35.0-39.9) with comorbidity    Smoker    Cold Knife Cone 8/15/22    Vaginal bleeding    Intractable chronic migraine with aura and without status migrainosus    Idiopathic intracranial hypertension    Hscope, D&C, Mirena IUD insertion 24    Thickened endometrium    History of CHF (congestive heart failure)    Abnormal uterine bleeding (AUB)    Chronic heart failure with preserved ejection fraction (HCC)    IUD strings lost (US confirmed inside uterus)    Abnormal uterine bleeding    Cervical dysplasia    LAVH, BS, Cysto 24       Infection: No  Hospital Acquired: N/A    Surgical Operations & Procedures: LAVH, BS, Cysto 24     Consultations:  Anesthesia    Pertinent Findings & Procedures:   Chantelle Maddox is a 44 y.o. female , admitted for postoperative management; received Tylenol/Gabapentin, Philomena.  She underwent LAVH, BS, Cysto on 24.  Post-op course normal, discharged home.  Follow up in 1 week. Discharge instructions reviewed and questions answered.    Course of

## 2024-12-10 VITALS
DIASTOLIC BLOOD PRESSURE: 75 MMHG | BODY MASS INDEX: 42.17 KG/M2 | OXYGEN SATURATION: 94 % | RESPIRATION RATE: 18 BRPM | HEIGHT: 63 IN | TEMPERATURE: 98.1 F | HEART RATE: 84 BPM | SYSTOLIC BLOOD PRESSURE: 123 MMHG | WEIGHT: 238 LBS

## 2024-12-10 LAB
ALBUMIN SERPL-MCNC: 3.7 G/DL (ref 3.5–5.2)
ALBUMIN/GLOB SERPL: 1.3 {RATIO} (ref 1–2.5)
ALP SERPL-CCNC: 68 U/L (ref 35–104)
ALT SERPL-CCNC: 31 U/L (ref 10–35)
ANION GAP SERPL CALCULATED.3IONS-SCNC: 12 MMOL/L (ref 9–16)
AST SERPL-CCNC: 28 U/L (ref 10–35)
BILIRUB SERPL-MCNC: 0.3 MG/DL (ref 0–1.2)
BUN SERPL-MCNC: 13 MG/DL (ref 6–20)
CALCIUM SERPL-MCNC: 8.4 MG/DL (ref 8.6–10.4)
CHLORIDE SERPL-SCNC: 105 MMOL/L (ref 98–107)
CO2 SERPL-SCNC: 19 MMOL/L (ref 20–31)
CREAT SERPL-MCNC: 1.1 MG/DL (ref 0.6–0.9)
ERYTHROCYTE [DISTWIDTH] IN BLOOD BY AUTOMATED COUNT: 13.6 % (ref 11.8–14.4)
GFR, ESTIMATED: 63 ML/MIN/1.73M2
GLUCOSE SERPL-MCNC: 139 MG/DL (ref 74–99)
HCT VFR BLD AUTO: 37.2 % (ref 36.3–47.1)
HGB BLD-MCNC: 11.5 G/DL (ref 11.9–15.1)
MCH RBC QN AUTO: 29.5 PG (ref 25.2–33.5)
MCHC RBC AUTO-ENTMCNC: 30.9 G/DL (ref 28.4–34.8)
MCV RBC AUTO: 95.4 FL (ref 82.6–102.9)
NRBC BLD-RTO: 0 PER 100 WBC
PLATELET # BLD AUTO: 274 K/UL (ref 138–453)
PMV BLD AUTO: 10.2 FL (ref 8.1–13.5)
POTASSIUM SERPL-SCNC: 4.2 MMOL/L (ref 3.7–5.3)
PROT SERPL-MCNC: 6.5 G/DL (ref 6.6–8.7)
RBC # BLD AUTO: 3.9 M/UL (ref 3.95–5.11)
SODIUM SERPL-SCNC: 136 MMOL/L (ref 136–145)
WBC OTHER # BLD: 13.9 K/UL (ref 3.5–11.3)

## 2024-12-10 PROCEDURE — 6370000000 HC RX 637 (ALT 250 FOR IP)

## 2024-12-10 PROCEDURE — 96372 THER/PROPH/DIAG INJ SC/IM: CPT

## 2024-12-10 PROCEDURE — 0TJB8ZZ INSPECTION OF BLADDER, VIA NATURAL OR ARTIFICIAL OPENING ENDOSCOPIC: ICD-10-PCS | Performed by: OBSTETRICS & GYNECOLOGY

## 2024-12-10 PROCEDURE — G0378 HOSPITAL OBSERVATION PER HR: HCPCS

## 2024-12-10 PROCEDURE — 85027 COMPLETE CBC AUTOMATED: CPT

## 2024-12-10 PROCEDURE — 0UB74ZZ EXCISION OF BILATERAL FALLOPIAN TUBES, PERCUTANEOUS ENDOSCOPIC APPROACH: ICD-10-PCS | Performed by: OBSTETRICS & GYNECOLOGY

## 2024-12-10 PROCEDURE — 36415 COLL VENOUS BLD VENIPUNCTURE: CPT

## 2024-12-10 PROCEDURE — 80053 COMPREHEN METABOLIC PANEL: CPT

## 2024-12-10 PROCEDURE — 2580000003 HC RX 258

## 2024-12-10 PROCEDURE — 6360000002 HC RX W HCPCS

## 2024-12-10 PROCEDURE — 0UT9FZZ RESECTION OF UTERUS, VIA NATURAL OR ARTIFICIAL OPENING WITH PERCUTANEOUS ENDOSCOPIC ASSISTANCE: ICD-10-PCS | Performed by: OBSTETRICS & GYNECOLOGY

## 2024-12-10 RX ORDER — SENNA AND DOCUSATE SODIUM 50; 8.6 MG/1; MG/1
2 TABLET, FILM COATED ORAL ONCE
Status: DISCONTINUED | OUTPATIENT
Start: 2024-12-10 | End: 2024-12-10

## 2024-12-10 RX ADMIN — GABAPENTIN 300 MG: 300 CAPSULE ORAL at 08:35

## 2024-12-10 RX ADMIN — ACETAMINOPHEN 1000 MG: 500 TABLET ORAL at 04:59

## 2024-12-10 RX ADMIN — MAGNESIUM HYDROXIDE 30 ML: 400 SUSPENSION ORAL at 08:42

## 2024-12-10 RX ADMIN — LEVOTHYROXINE SODIUM 50 MCG: 0.05 TABLET ORAL at 08:35

## 2024-12-10 RX ADMIN — EMPAGLIFLOZIN 10 MG: 10 TABLET, FILM COATED ORAL at 08:35

## 2024-12-10 RX ADMIN — SENNOSIDES AND DOCUSATE SODIUM 1 TABLET: 50; 8.6 TABLET ORAL at 08:34

## 2024-12-10 RX ADMIN — SPIRONOLACTONE 25 MG: 25 TABLET, FILM COATED ORAL at 08:34

## 2024-12-10 RX ADMIN — ENOXAPARIN SODIUM 50 MG: 100 INJECTION SUBCUTANEOUS at 08:35

## 2024-12-10 RX ADMIN — LOSARTAN POTASSIUM 25 MG: 25 TABLET, FILM COATED ORAL at 08:35

## 2024-12-10 RX ADMIN — METOPROLOL SUCCINATE 25 MG: 25 TABLET, EXTENDED RELEASE ORAL at 08:34

## 2024-12-10 RX ADMIN — SODIUM CHLORIDE, PRESERVATIVE FREE 10 ML: 5 INJECTION INTRAVENOUS at 08:42

## 2024-12-10 RX ADMIN — DOCUSATE SODIUM 100 MG: 100 CAPSULE, LIQUID FILLED ORAL at 08:35

## 2024-12-10 RX ADMIN — ACETAZOLAMIDE 500 MG: 250 TABLET ORAL at 08:35

## 2024-12-10 RX ADMIN — FUROSEMIDE 20 MG: 20 TABLET ORAL at 08:35

## 2024-12-10 ASSESSMENT — PAIN DESCRIPTION - LOCATION: LOCATION: ABDOMEN

## 2024-12-10 ASSESSMENT — PAIN SCALES - GENERAL
PAINLEVEL_OUTOF10: 5
PAINLEVEL_OUTOF10: 3

## 2024-12-10 ASSESSMENT — PAIN DESCRIPTION - DESCRIPTORS: DESCRIPTORS: ACHING

## 2024-12-10 ASSESSMENT — PAIN - FUNCTIONAL ASSESSMENT: PAIN_FUNCTIONAL_ASSESSMENT: PREVENTS OR INTERFERES SOME ACTIVE ACTIVITIES AND ADLS

## 2024-12-10 NOTE — ANESTHESIA POSTPROCEDURE EVALUATION
Department of Anesthesiology  Postprocedure Note    Patient: Chantelle Maddox  MRN: [id number]  YOB: 1980  Date of evaluation: 12/10/2024    Procedure Summary       Date: 12/09/24 Room / Location: [address]    Anesthesia Start: 0758 Anesthesia Stop: 1104    Procedure: TOTAL LAPAROSCOPIC ASSISTED VAGINAL HYSTERECTOMY, BILATERAL SALPINGECTOMY , CYSTOSCOPY (Bilateral) Diagnosis:       Abnormal uterine bleeding      Cervical dysplasia      (Abnormal uterine bleeding [N93.9])      (Cervical dysplasia [N87.9])    Surgeons: Ludmila Bolivar DO Responsible Provider: Greg Rob MD    Anesthesia Type: general ASA Status: 3            Anesthesia Type: No value filed.    Mason Phase I: Mason Score: 9    Mason Phase II:    POST-OP ANESTHESIA NOTE       /75   Pulse 84   Temp 98.1 °F (36.7 °C) (Oral)   Resp 18   Ht 1.588 m (5' 2.5\")   Wt 108 kg (238 lb)   LMP  (LMP Unknown)   SpO2 94%   BMI 42.84 kg/m²    Pain Assessment: None - Denies Pain  Pain Level: 3         Anesthesia Post Evaluation    Patient location during evaluation: PACU  Patient participation: complete - patient participated  Level of consciousness: awake  Pain score: 3  Airway patency: patent  Nausea & Vomiting: no vomiting and no nausea  Cardiovascular status: hemodynamically stable  Respiratory status: acceptable  Hydration status: stable  Pain management: adequate        No notable events documented.

## 2024-12-10 NOTE — DISCHARGE SUMMARY
Discussed discharge instructions with patient. All question's fully answered. Patient will call me if any problems arrive. Medications arrived at bedside, IV's removed. Patient wheeled downstairs to be transported home. Patient stated her ride wouldn't be here till 1230 but wanted to wait downstairs and not in her room.

## 2024-12-10 NOTE — PROGRESS NOTES
Gynecology Progress Note    Date: 12/10/2024  Time: 8:28 AM    Chantelle CARNEY White 44 y.o. female , POD # 1 s/p ISHA, BS, Cysto on 12/10/24    Patient seen and examined. She had no major complaints this morning. Pain is controlled.  Patient is  tolerating oral intake. She is urinating well.  She reports minor vaginal bleeding appropriate for post op status. She is  ambulating without difficulty.  She is  passing flatus. She denies Fever/Chills, Chest Pain, SOB, N/V, Calf Pain    Vitals:  Vitals:    24 2128 12/10/24 0000 12/10/24 0400 12/10/24 0725   BP:  122/70 136/81 123/75   Pulse:  88 75 84   Resp: 18 18 17 18   Temp:  99.1 °F (37.3 °C) 98.8 °F (37.1 °C) 98.1 °F (36.7 °C)   TempSrc:  Oral Oral Oral   SpO2:  92% 90% 94%   Weight:       Height:             Intake/Output:   Last Shift: I/O last 3 completed shifts:  In: 1400 [P.O.:400; I.V.:1000]  Out: 1100 [Urine:800; Blood:300]  Current Shift: No intake/output data recorded.  700 mL out in last 12 hrs ~ 58mL/hr      Physical Exam:  General:  no apparent distress, alert, and cooperative  Neurologic:  alert, oriented, normal speech, no focal findings or movement disorder noted  Lungs:  No conversational dyspnea  Heart:  regular rate and rhythm    Abdomen: soft, non-distended, appropriate tenderness  Incision: 3 port sites covered in telfa, minimal saturation  Extremities:  no calf tenderness, non edematous    Lab:  Recent Results (from the past 12 hour(s))   CBC    Collection Time: 12/10/24  7:09 AM   Result Value Ref Range    WBC 13.9 (H) 3.5 - 11.3 k/uL    RBC 3.90 (L) 3.95 - 5.11 m/uL    Hemoglobin 11.5 (L) 11.9 - 15.1 g/dL    Hematocrit 37.2 36.3 - 47.1 %    MCV 95.4 82.6 - 102.9 fL    MCH 29.5 25.2 - 33.5 pg    MCHC 30.9 28.4 - 34.8 g/dL    RDW 13.6 11.8 - 14.4 %    Platelets 274 138 - 453 k/uL    MPV 10.2 8.1 - 13.5 fL    NRBC Automated 0.0 0.0 per 100 WBC   Comprehensive Metabolic Panel    Collection Time: 12/10/24  7:09 AM   Result Value Ref Range     Sodium 136 136 - 145 mmol/L    Potassium 4.2 3.7 - 5.3 mmol/L    Chloride 105 98 - 107 mmol/L    CO2 19 (L) 20 - 31 mmol/L    Anion Gap 12 9 - 16 mmol/L    Glucose 139 (H) 74 - 99 mg/dL    BUN 13 6 - 20 mg/dL    Creatinine 1.1 (H) 0.6 - 0.9 mg/dL    Est, Glom Filt Rate 63 >60 mL/min/1.73m2    Calcium 8.4 (L) 8.6 - 10.4 mg/dL    Total Protein 6.5 (L) 6.6 - 8.7 g/dL    Albumin 3.7 3.5 - 5.2 g/dL    Albumin/Globulin Ratio 1.3 1.0 - 2.5    Total Bilirubin 0.3 0.0 - 1.2 mg/dL    Alkaline Phosphatase 68 35 - 104 U/L    ALT 31 10 - 35 U/L    AST 28 10 - 35 U/L       Assessment/Plan:  Chantelle CARNEY White 44 y.o. female , POD # 1 s/p SUSHIL VIEIRA, Cysto on 12/10/24   - Doing well, vitals overall stable   - UOP adequate   - States pain is well controlled, is passing gas and urinating   - S/p IVF   - Encourage ambulation and use of incentive spirometer   - Pain control: Tylenol/Gabapentin, Philomena PRN   - Labs: Hgb 11.5; CMP unremarkable   - DVT Proph: s/p lovenox 50 mg x1   - Abx: S/p ancef x1   - Diet: Adult Diet   - Path: Pending   - Continue post-op care, please page with any questions   - Patient stable for discharge at this time    Joana Wagner MD  Ob/Gyn Resident  PGY3  12/10/2024, 8:28 AM

## 2024-12-10 NOTE — PROGRESS NOTES
CLINICAL PHARMACY NOTE: MEDS TO BEDS    Total # of Prescriptions Filled: 5   The following medications were delivered to the patient:  GAS RELIEF 80MG  ZOFRAN 4MG   OXY 5MG   SENEXON   TYLENOL 500MG    Additional Documentation:

## 2024-12-10 NOTE — PLAN OF CARE
Problem: Chronic Conditions and Co-morbidities  Goal: Patient's chronic conditions and co-morbidity symptoms are monitored and maintained or improved  12/10/2024 1211 by Kathe Gary RN  Outcome: Completed  12/10/2024 0412 by Leah Curtis RN  Outcome: Progressing     Problem: Discharge Planning  Goal: Discharge to home or other facility with appropriate resources  12/10/2024 1211 by Kathe Gary RN  Outcome: Completed  12/10/2024 0412 by Leah Curtis RN  Outcome: Progressing     Problem: Pain  Goal: Verbalizes/displays adequate comfort level or baseline comfort level  12/10/2024 1211 by Kathe Gary RN  Outcome: Completed  12/10/2024 0412 by Leah Curtis RN  Outcome: Progressing     Problem: Safety - Adult  Goal: Free from fall injury  12/10/2024 1211 by Kathe Gary RN  Outcome: Completed  12/10/2024 0412 by Leah Curtis RN  Outcome: Progressing

## 2024-12-11 LAB — SURGICAL PATHOLOGY REPORT: NORMAL

## 2024-12-17 ENCOUNTER — OFFICE VISIT (OUTPATIENT)
Dept: OBGYN | Age: 44
End: 2024-12-17

## 2024-12-17 VITALS
BODY MASS INDEX: 42.84 KG/M2 | DIASTOLIC BLOOD PRESSURE: 66 MMHG | HEART RATE: 86 BPM | SYSTOLIC BLOOD PRESSURE: 106 MMHG | WEIGHT: 238 LBS

## 2024-12-17 DIAGNOSIS — Z98.890 POST-OPERATIVE STATE: ICD-10-CM

## 2024-12-17 DIAGNOSIS — Z98.890 POSTOPERATIVE STATE: Primary | ICD-10-CM

## 2024-12-17 PROCEDURE — 99024 POSTOP FOLLOW-UP VISIT: CPT

## 2024-12-17 RX ORDER — ACETAMINOPHEN 500 MG
1000 TABLET ORAL EVERY 6 HOURS PRN
Qty: 30 TABLET | Refills: 1 | Status: SHIPPED | OUTPATIENT
Start: 2024-12-17

## 2024-12-17 RX ORDER — CYCLOBENZAPRINE HCL 10 MG
10 TABLET ORAL 3 TIMES DAILY PRN
Qty: 21 TABLET | Refills: 0 | Status: SHIPPED | OUTPATIENT
Start: 2024-12-17 | End: 2024-12-27

## 2024-12-17 NOTE — PROGRESS NOTES
Attending Physician Statement  I have discussed the care of Chantelle Maddox, including pertinent history and exam findings, with the resident. I have reviewed the key elements of all parts of the encounter with the resident.  I agree with the assessment, plan and orders as documented by the resident.  (GE Modifier)    Teresa Sharp [facility], [facility]  12/17/2024, 2:54 PM

## 2024-12-17 NOTE — PROGRESS NOTES
Postoperative Visit    Chantelle Maddox is a 44 y.o. female , 1 week Post Operative s/p SUSHIL VIEIRA, Cysto on 12/10/24    The patient was seen. She has no chief complaint today.     She is tolerating oral intake. She has any dizziness or shortness of breath or chest pain.  Her bowels are regular and she denies any urinary tract symptomology. Patient denies headache, nausea, vomiting, fever, chills, abdominal pain, vaginal bleeding, diarrhea, change in color/amount/odor of vaginal discharge, dysuria or, hematuria.     Patient Active Problem List   Diagnosis    Schizophrenia (HCC)    HTN (hypertension)    Anxiety    GERD (gastroesophageal reflux disease)    Hyperlipemia    Persistent cervical dysplasia    Hx LEEP ()    Cold Knife Cone 19    Abnormal stress test    Acute sinusitis    Allergic rhinitis    Chest pain    CHF (congestive heart failure) (HCC)    Coronary artery disease without angina pectoris    Dizziness    Migraine without aura    DIANA (obstructive sleep apnea)    Reflux esophagitis    Nonrheumatic aortic valve insufficiency    Severe aortic regurgitation    Severe obesity (BMI 35.0-39.9) with comorbidity    Smoker    Cold Knife Cone 8/15/22    Vaginal bleeding    Intractable chronic migraine with aura and without status migrainosus    Idiopathic intracranial hypertension    Hscope, D&C, Mirena IUD insertion 24    Thickened endometrium    History of CHF (congestive heart failure)    Abnormal uterine bleeding (AUB)    Chronic heart failure with preserved ejection fraction (HCC)    IUD strings lost (US confirmed inside uterus)    Abnormal uterine bleeding    Cervical dysplasia    SUSHIL VIEIRA, Cysto 24       Vitals:   Blood pressure 106/66, pulse 86, weight 108 kg (238 lb), not currently breastfeeding.    Physical Exam:  General:  no apparent distress, alert, and cooperative  Lungs:  No increased work of breathing, good air exchange, normal chest wall rise bilaterally  Heart:  regular rate

## 2025-01-08 PROBLEM — Z98.890 POST-OPERATIVE STATE: Status: RESOLVED | Noted: 2024-12-09 | Resolved: 2025-01-08

## 2025-01-23 ENCOUNTER — OFFICE VISIT (OUTPATIENT)
Dept: OBGYN | Age: 45
End: 2025-01-23

## 2025-01-23 VITALS
BODY MASS INDEX: 42.3 KG/M2 | SYSTOLIC BLOOD PRESSURE: 154 MMHG | DIASTOLIC BLOOD PRESSURE: 93 MMHG | WEIGHT: 235 LBS | HEART RATE: 97 BPM

## 2025-01-23 DIAGNOSIS — Z98.890 POST-OPERATIVE STATE: Primary | ICD-10-CM

## 2025-01-23 DIAGNOSIS — Z87.410 HISTORY OF CERVICAL DYSPLASIA: ICD-10-CM

## 2025-01-23 NOTE — PROGRESS NOTES
Postoperative Visit    Chantelle Maddox is a 44 y.o. female , 6 weeks Post Operative s/p ISHA, BS, cystoscopy,  on 24    The patient was seen. She has no chief complaint today. She is not taking any pain medications any more. She denies any vaginal bleeding or pain.    She is tolerating oral intake well. She denies any dizziness or shortness of breath or chest pain.  Her bowels are regular and she denies any urinary tract symptomology. Patient denies headache, nausea, vomiting, fever, chills, abdominal pain, diarrhea, change in color/amount/odor of vaginal discharge, dysuria or, hematuria.     Patient Active Problem List   Diagnosis    Schizophrenia (HCC)    HTN (hypertension)    Anxiety    GERD (gastroesophageal reflux disease)    Hyperlipemia    Persistent cervical dysplasia    Hx LEEP ()    Cold Knife Cone 19    Abnormal stress test    Acute sinusitis    Allergic rhinitis    Chest pain    CHF (congestive heart failure) (HCC)    Coronary artery disease without angina pectoris    Dizziness    Migraine without aura    DIANA (obstructive sleep apnea)    Reflux esophagitis    Nonrheumatic aortic valve insufficiency    Severe aortic regurgitation    Severe obesity (BMI 35.0-39.9) with comorbidity    Smoker    Cold Knife Cone 8/15/22    Vaginal bleeding    Intractable chronic migraine with aura and without status migrainosus    Idiopathic intracranial hypertension    Hscope, D&C, Mirena IUD insertion 24    Thickened endometrium    History of CHF (congestive heart failure)    Abnormal uterine bleeding (AUB)    Chronic heart failure with preserved ejection fraction (HCC)    IUD strings lost (US confirmed inside uterus)    Abnormal uterine bleeding    Cervical dysplasia    History of cervical dysplasia       Vitals:   Blood pressure (!) 154/93, pulse 97, weight 106.6 kg (235 lb), not currently breastfeeding.    Physical Exam:  Chaperone for Intimate Exam: Chaperone was present for entire exam,

## 2025-02-25 ENCOUNTER — OFFICE VISIT (OUTPATIENT)
Dept: NEUROLOGY | Age: 45
End: 2025-02-25
Payer: COMMERCIAL

## 2025-02-25 VITALS
SYSTOLIC BLOOD PRESSURE: 141 MMHG | HEIGHT: 63 IN | BODY MASS INDEX: 41.36 KG/M2 | WEIGHT: 233.4 LBS | HEART RATE: 81 BPM | DIASTOLIC BLOOD PRESSURE: 91 MMHG

## 2025-02-25 DIAGNOSIS — G93.2 IIH (IDIOPATHIC INTRACRANIAL HYPERTENSION): Primary | ICD-10-CM

## 2025-02-25 DIAGNOSIS — G43.009 MIGRAINE WITHOUT AURA AND WITHOUT STATUS MIGRAINOSUS, NOT INTRACTABLE: ICD-10-CM

## 2025-02-25 PROCEDURE — 3080F DIAST BP >= 90 MM HG: CPT

## 2025-02-25 PROCEDURE — 99214 OFFICE O/P EST MOD 30 MIN: CPT

## 2025-02-25 PROCEDURE — 1036F TOBACCO NON-USER: CPT

## 2025-02-25 PROCEDURE — G8427 DOCREV CUR MEDS BY ELIG CLIN: HCPCS

## 2025-02-25 PROCEDURE — 3077F SYST BP >= 140 MM HG: CPT

## 2025-02-25 PROCEDURE — G8417 CALC BMI ABV UP PARAM F/U: HCPCS

## 2025-02-25 RX ORDER — SPIRONOLACTONE 25 MG/1
TABLET ORAL
COMMUNITY

## 2025-02-25 RX ORDER — ACETAZOLAMIDE 250 MG/1
500 TABLET ORAL 2 TIMES DAILY
Qty: 90 TABLET | Refills: 3 | Status: SHIPPED | OUTPATIENT
Start: 2025-02-25

## 2025-02-25 ASSESSMENT — ENCOUNTER SYMPTOMS
ABDOMINAL DISTENTION: 0
ABDOMINAL PAIN: 0
EYE DISCHARGE: 0
APNEA: 0
COLOR CHANGE: 0

## 2025-02-25 NOTE — PROGRESS NOTES
[address]  [facility] [address]  Dept: [phone]  Dept Fax: [phone]    NEUROLOGY FOLLOW UP NOTE                                              PATIENT NAME: Chantelle Maddox   PATIENT MRN: [id number]  FOLLOW UP TODAY: 2/25/2025        INITIAL & INTERVAL HISTORY:     Chantelle Maddox is a 44 y.o. female here for follow up for the history of migraine and IIH. Patient stated that her headache is usually well controled when she takes her medications. She has not been taking her Diamox since November, it was discontinued ( ?appeared to be by mistake upon refill request). Overall, she feels well and denies having any visual disturbances.     As per previous medical documentation: 44 y.o. female w PMH of HTN HLD CAD CHF, DVT,  and migraines as well as AR. Patient presented to the hospital in Jan 2024, due to worsening headache after LP and diagnosis of IIH. Patient was on diamox at time of admission.     Patient at that time had underwent MRI c spine and T spine without evidence of acute abnormalities. Abnormal bone marrow signal and enhancement anterior aspect of the endplates of T2, T9, T10 likely related to degenerative endplate changes. Questionable mildly increased T2 signal left posterior aspect of cervical spinal cord at C2 which may be related to artifact.      Patient headaches improved with cocktail migraine and she was started on Elavil 25 mg nightly.   Patient prior to discharge had difficulty with ambulating reportedly 9 feet only. Did not qualify for ARU. She then was sent home with home healthcare.      Patient initially was seen in [facility] on 1/4/2024 for holocranial headaches associated with blurry vision as well as bilateral papilledema noted by optometrist. Patient at that time underwent  MRV and MRI brain as well as MRI orbits with and without contrast prior to undergoing LP at [facility]

## 2025-02-25 NOTE — PATIENT INSTRUCTIONS
For Diamax: take 1 tab in morning and evening for 1 week and then 2 tablets in the morning and evening

## 2025-02-27 ENCOUNTER — HOSPITAL ENCOUNTER (OUTPATIENT)
Age: 45
Discharge: HOME | End: 2025-03-01
Payer: COMMERCIAL

## 2025-02-27 VITALS — HEIGHT: 62 IN | WEIGHT: 233 LBS | BODY MASS INDEX: 42.88 KG/M2

## 2025-02-27 DIAGNOSIS — I35.1 NONRHEUMATIC AORTIC VALVE INSUFFICIENCY: ICD-10-CM

## 2025-02-27 LAB
ECHO AO ROOT DIAM: 2.4 CM
ECHO AO ROOT INDEX: 1.18 CM/M2
ECHO AR MAX VEL PISA: 5.2 M/S
ECHO AV MEAN GRADIENT: 7 MMHG
ECHO AV MEAN VELOCITY: 1.2 M/S
ECHO AV PEAK GRADIENT: 15 MMHG
ECHO AV PEAK VELOCITY: 2 M/S
ECHO AV REGURGITANT PHT: 373 MS
ECHO AV VELOCITY RATIO: 0.75
ECHO AV VTI: 32.9 CM
ECHO BSA: 2.15 M2
ECHO LA AREA 2C: 17.9 CM2
ECHO LA AREA 4C: 18.5 CM2
ECHO LA DIAMETER INDEX: 1.67 CM/M2
ECHO LA DIAMETER: 3.4 CM
ECHO LA MAJOR AXIS: 5.3 CM
ECHO LA MINOR AXIS: 5.6 CM
ECHO LA TO AORTIC ROOT RATIO: 1.42
ECHO LA VOL BP: 51 ML (ref 22–52)
ECHO LA VOL MOD A2C: 47 ML (ref 22–52)
ECHO LA VOL MOD A4C: 53 ML (ref 22–52)
ECHO LA VOL/BSA BIPLANE: 25 ML/M2 (ref 16–34)
ECHO LA VOLUME INDEX MOD A2C: 23 ML/M2 (ref 16–34)
ECHO LA VOLUME INDEX MOD A4C: 26 ML/M2 (ref 16–34)
ECHO LV E' LATERAL VELOCITY: 6.64 CM/S
ECHO LV E' SEPTAL VELOCITY: 5.33 CM/S
ECHO LV EDV A2C: 82 ML
ECHO LV EDV A4C: 131 ML
ECHO LV EDV INDEX A4C: 64 ML/M2
ECHO LV EDV NDEX A2C: 40 ML/M2
ECHO LV EF PHYSICIAN: 52 %
ECHO LV EJECTION FRACTION A2C: 56 %
ECHO LV EJECTION FRACTION A4C: 53 %
ECHO LV EJECTION FRACTION BIPLANE: 52 % (ref 55–100)
ECHO LV ESV A2C: 37 ML
ECHO LV ESV A4C: 62 ML
ECHO LV ESV INDEX A2C: 18 ML/M2
ECHO LV ESV INDEX A4C: 30 ML/M2
ECHO LV FRACTIONAL SHORTENING: 40 % (ref 28–44)
ECHO LV INTERNAL DIMENSION DIASTOLE INDEX: 2.45 CM/M2
ECHO LV INTERNAL DIMENSION DIASTOLIC: 5 CM (ref 3.9–5.3)
ECHO LV INTERNAL DIMENSION SYSTOLIC INDEX: 1.47 CM/M2
ECHO LV INTERNAL DIMENSION SYSTOLIC: 3 CM
ECHO LV IVSD: 1.1 CM (ref 0.6–0.9)
ECHO LV MASS 2D: 207.1 G (ref 67–162)
ECHO LV MASS INDEX 2D: 101.5 G/M2 (ref 43–95)
ECHO LV POSTERIOR WALL DIASTOLIC: 1.1 CM (ref 0.6–0.9)
ECHO LV RELATIVE WALL THICKNESS RATIO: 0.44
ECHO LVOT PEAK GRADIENT: 9 MMHG
ECHO LVOT PEAK VELOCITY: 1.5 M/S
ECHO MV A VELOCITY: 1.1 M/S
ECHO MV E DECELERATION TIME (DT): 106 MS
ECHO MV E VELOCITY: 0.81 M/S
ECHO MV E/A RATIO: 0.74
ECHO MV E/E' LATERAL: 12.2
ECHO MV E/E' RATIO (AVERAGED): 13.7
ECHO MV E/E' SEPTAL: 15.2

## 2025-02-27 PROCEDURE — 93306 TTE W/DOPPLER COMPLETE: CPT

## 2025-04-15 NOTE — TELEPHONE ENCOUNTER
Pt is requesting refill of:     Medication: Elavil 25 mg     Active on Med List: Yes    Last Office Visit: 2/25/25    Next Office Visit: 7/23/25    Please refill is request is acceptable. Thank you

## 2025-06-04 RX ORDER — PNV,CALCIUM 72/IRON/FOLIC ACID 27 MG-1 MG
1 TABLET ORAL DAILY
Qty: 30 TABLET | Refills: 11 | Status: SHIPPED | OUTPATIENT
Start: 2025-06-04

## 2025-07-23 ENCOUNTER — OFFICE VISIT (OUTPATIENT)
Dept: NEUROLOGY | Age: 45
End: 2025-07-23
Payer: COMMERCIAL

## 2025-07-23 VITALS
DIASTOLIC BLOOD PRESSURE: 94 MMHG | HEART RATE: 87 BPM | WEIGHT: 217.8 LBS | HEIGHT: 62 IN | SYSTOLIC BLOOD PRESSURE: 144 MMHG | BODY MASS INDEX: 40.08 KG/M2

## 2025-07-23 DIAGNOSIS — G93.2 IIH (IDIOPATHIC INTRACRANIAL HYPERTENSION): Primary | ICD-10-CM

## 2025-07-23 PROCEDURE — G8417 CALC BMI ABV UP PARAM F/U: HCPCS

## 2025-07-23 PROCEDURE — 99214 OFFICE O/P EST MOD 30 MIN: CPT

## 2025-07-23 PROCEDURE — G8427 DOCREV CUR MEDS BY ELIG CLIN: HCPCS

## 2025-07-23 PROCEDURE — 3080F DIAST BP >= 90 MM HG: CPT

## 2025-07-23 PROCEDURE — 3077F SYST BP >= 140 MM HG: CPT

## 2025-07-23 PROCEDURE — 1036F TOBACCO NON-USER: CPT

## 2025-07-23 RX ORDER — DAPAGLIFLOZIN 10 MG/1
TABLET, FILM COATED ORAL
COMMUNITY
Start: 2025-07-10

## 2025-07-23 RX ORDER — ACETAZOLAMIDE 250 MG/1
250 TABLET ORAL 2 TIMES DAILY
Qty: 90 TABLET | Refills: 3 | Status: SHIPPED | OUTPATIENT
Start: 2025-07-23

## 2025-07-23 NOTE — PROGRESS NOTES
affect the vision.     - continue with Elavil 25 mg nightly     Follow up in 6 months          Ms. Maddox received counseling on the following healthy behaviors: medical compliance, smoking cessation, blood pressure control, regular follow up with primary doctor.        Electronically signed by Sangeetha Aguero MD on 7/23/2025 at 12:57 PM

## 2025-07-23 NOTE — PATIENT INSTRUCTIONS
Take Diamox 250 mg 1 tab twice daily in the morning in in the evening   Take Elavil 25 mg 1 tab nightly

## 2025-08-15 ENCOUNTER — TRANSCRIBE ORDERS (OUTPATIENT)
Dept: ADMINISTRATIVE | Age: 45
End: 2025-08-15

## 2025-08-15 DIAGNOSIS — Z12.31 ENCOUNTER FOR SCREENING MAMMOGRAM FOR BREAST CANCER: Primary | ICD-10-CM

## 2025-09-03 ENCOUNTER — HOSPITAL ENCOUNTER (OUTPATIENT)
Dept: MAMMOGRAPHY | Age: 45
Discharge: HOME | End: 2025-09-05
Payer: COMMERCIAL

## 2025-09-03 DIAGNOSIS — Z12.31 ENCOUNTER FOR SCREENING MAMMOGRAM FOR BREAST CANCER: ICD-10-CM

## 2025-09-03 PROCEDURE — 77063 BREAST TOMOSYNTHESIS BI: CPT

## (undated) DEVICE — TROCAR: Brand: KII SLEEVE

## (undated) DEVICE — GLOVE SURG SZ 65 THK91MIL LTX FREE SYN POLYISOPRENE

## (undated) DEVICE — RASP SURG W65XL13MM REPL RECIP L TEAR CRSS CUT STRYKR FOR

## (undated) DEVICE — APPLICATOR MEDICATED 26 CC SOLUTION HI LT ORNG CHLORAPREP

## (undated) DEVICE — TUBING, SUCTION, 9/32" X 20', STRAIGHT: Brand: MEDLINE INDUSTRIES, INC.

## (undated) DEVICE — BIT DRL DIA2.9MM FOR SFT ANCHR SHT SHFT PK JUGGERKNOT

## (undated) DEVICE — DEVICE SUT W/ SZ 0 L48IN VLT POLYSRB SUT DISP ES-9 ENDO

## (undated) DEVICE — SUTURE MONOCRYL + SZ 4 0 L18IN ABSRB UD PC 3 L16MM 3 8 CIR PRIM MCP845G

## (undated) DEVICE — SVMMC GYN MIN PK

## (undated) DEVICE — SOLUTION SCRB 4OZ 2% CHG FOR SURG SCRBBING HND WSH

## (undated) DEVICE — SHEET,DRAPE,70X100,STERILE: Brand: MEDLINE

## (undated) DEVICE — YANKAUER,FLEXIBLE HANDLE,REGLR CAPACITY: Brand: MEDLINE INDUSTRIES, INC.

## (undated) DEVICE — SUTURE VICRYL + SZ 0 L27IN ABSRB VLT L26MM UR-6 5/8 CIR VCP603H

## (undated) DEVICE — GLOVE ORANGE PI 7 1/2   MSG9075

## (undated) DEVICE — SINGLE PORT MANIFOLD: Brand: NEPTUNE 2

## (undated) DEVICE — COUNTER NDL 10 COUNT HLD 20 FOAM BLK SGL MAG

## (undated) DEVICE — TROCAR: Brand: KII FIOS FIRST ENTRY

## (undated) DEVICE — STRAP,POSITIONING,KNEE/BODY,FOAM,4X60": Brand: MEDLINE

## (undated) DEVICE — SYRINGE MED 10ML LUERLOCK TIP W/O SFTY DISP

## (undated) DEVICE — TAPE CAST W4INXL4YD WHT RESIN FBRGLS H2O ACT TACK FREE

## (undated) DEVICE — SOLUTION SCRB 4OZ 4% CHG H2O AIDED FOR PREOPERATIVE SKIN

## (undated) DEVICE — DEVICE TRCR 12X9X3IN WHT CLSR DISP OMNICLOSE

## (undated) DEVICE — DRESSING PETRO W3XL8IN OIL EMUL N ADH GZ KNIT IMPREG CELOS

## (undated) DEVICE — CONTROL SYRINGE LUER-LOCK TIP: Brand: MONOJECT

## (undated) DEVICE — DRESSING,GAUZE,XEROFORM,CURAD,1"X8",ST: Brand: CURAD

## (undated) DEVICE — SYSTEM ES CUP DIA3CM PNEUMO OCCL BLLN DISP FOR CLIN POS

## (undated) DEVICE — TIP IU L8CM DIA6.7MM BLU SIL FLX DISP RUMI II

## (undated) DEVICE — TAPE CAST W3XL144IN WHT FBRGLS H2O ACTIVATION RESIN TACK

## (undated) DEVICE — MERCY HEALTH ST CHARLES: Brand: MEDLINE INDUSTRIES, INC.

## (undated) DEVICE — GLOVE SURG SZ 55 CRM LTX FREE POLYISOPRENE POLYMER BEAD ANTI

## (undated) DEVICE — SPONGE GZ W3XL3IN 4 PLY RAYON POLY STD NONWOVEN

## (undated) DEVICE — LARGE TEAR CROSS CUT RASP, 7MM X 14MM: Brand: MICROAIRE®

## (undated) DEVICE — ELECTRODE ES L11CM DIA5MM BALL SHFT RED DISP UTAHLOOP

## (undated) DEVICE — Device

## (undated) DEVICE — CYSTO/BLADDER IRRIGATION SET, REGULATING CLAMP

## (undated) DEVICE — GOWN,AURORA,NONREINFORCED,LARGE: Brand: MEDLINE

## (undated) DEVICE — GLOVE SURG SZ 6 THK91MIL LTX FREE SYN POLYISOPRENE ANTI

## (undated) DEVICE — CONTAINER,SPECIMEN,4OZ,OR STRL: Brand: MEDLINE

## (undated) DEVICE — ELECTRODE LAP L36CM PTFE WIRE J HK CLEANCOAT

## (undated) DEVICE — SYSTEM WST MGMT AVETA

## (undated) DEVICE — GLOVE ORANGE PI 7   MSG9070

## (undated) DEVICE — SUTURE MCRYL + SZ 4-0 L27IN ABSRB UD L19MM PS-2 3/8 CIR MCP426H

## (undated) DEVICE — BLANKET WRM W29.9XL79.1IN UP BODY FORC AIR MISTRAL-AIR

## (undated) DEVICE — SUTURE VCRL + SZ 2-0 L27IN ABSRB UD CT-2 L26MM 1/2 CIR TAPR VCP269H

## (undated) DEVICE — SYRINGE MED 10ML TRNSLUC BRL PLUNG BLK MRK POLYPR CTRL

## (undated) DEVICE — PREMIUM DRY TRAY LF: Brand: MEDLINE INDUSTRIES, INC.

## (undated) DEVICE — SOLUTION IV 1000ML 0.9% SOD CHL PH 5 INJ USP VIAFLX PLAS

## (undated) DEVICE — DRAPE,UNDRBUT,WHT GRAD PCH,CAPPORT,20/CS: Brand: MEDLINE

## (undated) DEVICE — COVER OR TBL W40XL90IN ABSRB STD AND GRIPPY BK SAHARA

## (undated) DEVICE — APPLICATOR FIBER TIP 16 IN CELLULOSE HD SWAB CHEVRON SCPET

## (undated) DEVICE — HYSTEROSCOPE RIGID CORAL AVETA DISP

## (undated) DEVICE — 1016 S-DRAPE IRRIG POUCH 10/BOX: Brand: STERI-DRAPE™

## (undated) DEVICE — ELECTROSURGICAL PENCIL BUTTON SWITCH E-Z CLEAN COATED BLADE ELECTRODE 10 FT (3 M) CORD HOLSTER: Brand: MEGADYNE

## (undated) DEVICE — 1LYRTR 16FR10ML100%SIL UMS SNP: Brand: MEDLINE INDUSTRIES, INC.

## (undated) DEVICE — LEGGINGS, PAIR, 31X48, STERILE: Brand: MEDLINE

## (undated) DEVICE — INTENT TO BE USED WITH SUTURE MATERIAL FOR TISSUE CLOSURE: Brand: RICHARD-ALLAN® NEEDLE 1/2 CIRCLE TAPER

## (undated) DEVICE — SUTURE ABSORBABLE BRAIDED 2-0 CT-1 27 IN UD VICRYL J259H

## (undated) DEVICE — DEVICE INTRAUTERNE CONTRACEPTIVE MIRENA

## (undated) DEVICE — SUTURE MONOCRYL SZ 4-0 L27IN ABSRB UD L24MM PS-1 3/8 CIR PRIM Y935H

## (undated) DEVICE — SOLUTION IRRIG 1000ML 0.9% SOD CHL USP POUR PLAS BTL

## (undated) DEVICE — SUTURE VICRYL SZ 0 L27IN ABSRB UD L36MM CT-1 1/2 CIR J260H

## (undated) DEVICE — DRAPE,REIN 53X77,STERILE: Brand: MEDLINE

## (undated) DEVICE — PADDING CAST W4INXL4YD SPUN DACRON POLY POR SYN VERSATILE

## (undated) DEVICE — SUTURE PROL SZ 3-0 L18IN NONABSORBABLE BLU L24MM FS-1 3/8 8684G

## (undated) DEVICE — GARMENT,MEDLINE,DVT,INT,CALF,MED, GEN2: Brand: MEDLINE

## (undated) DEVICE — PROTECTOR ULN NRV PUR FOAM HK LOOP STRP ANATOMICALLY

## (undated) DEVICE — INTENDED FOR TISSUE SEPARATION, AND OTHER PROCEDURES THAT REQUIRE A SHARP SURGICAL BLADE TO PUNCTURE OR CUT.: Brand: BARD-PARKER ® CARBON RIB-BACK BLADES

## (undated) DEVICE — SUTURE VCRL + SZ 3-0 L27IN ABSRB WHT FS-1 3/8 CIR REV CUT VCP442H

## (undated) DEVICE — ZIMMER® STERILE DISPOSABLE TOURNIQUET CUFF WITH PLC, DUAL PORT, SINGLE BLADDER, 42 IN. (107 CM)

## (undated) DEVICE — SEALER/DIVIDER LAP SHFT L37CM JAW APER 11.4MM 315DEG ROT

## (undated) DEVICE — 1810 FOAM BLOCK NEEDLE COUNTER: Brand: DEVON

## (undated) DEVICE — STRAP ARMBRD W1.5XL32IN FOAM STR YET SFT W/ HK AND LOOP

## (undated) DEVICE — AIRSEAL 5 MM ACCESS PORT AND LOW PROFILE OBTURATOR WITH BLADELESS OPTICAL TIP, 120 MM LENGTH: Brand: AIRSEAL

## (undated) DEVICE — PAD,ABDOMINAL,8"X7.5",ST,LF,20/BX: Brand: MEDLINE INDUSTRIES, INC.

## (undated) DEVICE — SOLUTION ANTIFOG VIS SYS CLEARIFY LAPSCP

## (undated) DEVICE — ZIMMER® STERILE DISPOSABLE TOURNIQUET CUFF WITH PLC, DUAL PORT, SINGLE BLADDER, 30 IN. (76 CM)

## (undated) DEVICE — NEEDLE SPINAL 22GA L3.5IN SPINOCAN

## (undated) DEVICE — SUTURE PROL SZ 4-0 L18IN NONABSORBABLE BLU L19MM FS-2 3/8 8683G

## (undated) DEVICE — SUTURE VICRYL + SZ 0 L27IN ABSRB UD L36MM CT-1 1/2 CIR VCPP41D

## (undated) DEVICE — TRI-LUMEN FILTERED TUBE SET WITH ACTIVATED CHARCOAL FILTER: Brand: AIRSEAL

## (undated) DEVICE — GOWN,AURORA,NONRNF,XL,30/CS: Brand: MEDLINE

## (undated) DEVICE — BAG ISOLATN W20XL20IN PLAS CUF OPN STR CLR FLM W SEAMLESS

## (undated) DEVICE — PAD PT POS 36 IN SURGYPAD DISP

## (undated) DEVICE — SYSTEM WST MGMT W/ CAP AVETA

## (undated) DEVICE — LAPAROSCOPIC SCISSORS: Brand: EPIX LAPAROSCOPIC SCISSORS

## (undated) DEVICE — BANDAGE COMPR W4INXL5YD WHT BGE POLY COT M E WRP WV HK AND

## (undated) DEVICE — SUTURE VCRL SZ 0 L27IN ABSRB UD L36MM CT-1 1/2 CIR J260H